# Patient Record
Sex: MALE | Race: WHITE | NOT HISPANIC OR LATINO | Employment: OTHER | URBAN - METROPOLITAN AREA
[De-identification: names, ages, dates, MRNs, and addresses within clinical notes are randomized per-mention and may not be internally consistent; named-entity substitution may affect disease eponyms.]

---

## 2017-10-26 ENCOUNTER — APPOINTMENT (OUTPATIENT)
Dept: RADIOLOGY | Facility: MEDICAL CENTER | Age: 72
DRG: 190 | End: 2017-10-26
Attending: EMERGENCY MEDICINE
Payer: MEDICARE

## 2017-10-26 ENCOUNTER — RESOLUTE PROFESSIONAL BILLING HOSPITAL PROF FEE (OUTPATIENT)
Dept: HOSPITALIST | Facility: MEDICAL CENTER | Age: 72
End: 2017-10-26
Payer: MEDICARE

## 2017-10-26 ENCOUNTER — APPOINTMENT (OUTPATIENT)
Dept: RADIOLOGY | Facility: MEDICAL CENTER | Age: 72
DRG: 190 | End: 2017-10-26
Payer: MEDICARE

## 2017-10-26 ENCOUNTER — HOSPITAL ENCOUNTER (INPATIENT)
Facility: MEDICAL CENTER | Age: 72
LOS: 4 days | DRG: 190 | End: 2017-10-30
Attending: EMERGENCY MEDICINE | Admitting: HOSPITALIST
Payer: MEDICARE

## 2017-10-26 DIAGNOSIS — J44.1 CHRONIC OBSTRUCTIVE PULMONARY DISEASE WITH ACUTE EXACERBATION (HCC): ICD-10-CM

## 2017-10-26 PROBLEM — R06.03 RESPIRATORY DISTRESS: Status: ACTIVE | Noted: 2017-10-26

## 2017-10-26 LAB
ALBUMIN SERPL BCP-MCNC: 3.9 G/DL (ref 3.2–4.9)
ALBUMIN/GLOB SERPL: 1.2 G/DL
ALP SERPL-CCNC: 64 U/L (ref 30–99)
ALT SERPL-CCNC: 15 U/L (ref 2–50)
ANION GAP SERPL CALC-SCNC: 13 MMOL/L (ref 0–11.9)
AST SERPL-CCNC: 24 U/L (ref 12–45)
BASOPHILS # BLD AUTO: 0.7 % (ref 0–1.8)
BASOPHILS # BLD: 0.07 K/UL (ref 0–0.12)
BILIRUB SERPL-MCNC: 0.8 MG/DL (ref 0.1–1.5)
BNP SERPL-MCNC: 91 PG/ML (ref 0–100)
BUN SERPL-MCNC: 12 MG/DL (ref 8–22)
CALCIUM SERPL-MCNC: 9.1 MG/DL (ref 8.5–10.5)
CHLORIDE SERPL-SCNC: 95 MMOL/L (ref 96–112)
CO2 SERPL-SCNC: 22 MMOL/L (ref 20–33)
CREAT SERPL-MCNC: 0.74 MG/DL (ref 0.5–1.4)
EOSINOPHIL # BLD AUTO: 0.01 K/UL (ref 0–0.51)
EOSINOPHIL NFR BLD: 0.1 % (ref 0–6.9)
ERYTHROCYTE [DISTWIDTH] IN BLOOD BY AUTOMATED COUNT: 53.1 FL (ref 35.9–50)
GFR SERPL CREATININE-BSD FRML MDRD: >60 ML/MIN/1.73 M 2
GLOBULIN SER CALC-MCNC: 3.3 G/DL (ref 1.9–3.5)
GLUCOSE SERPL-MCNC: 104 MG/DL (ref 65–99)
HCT VFR BLD AUTO: 66.8 % (ref 42–52)
HGB BLD-MCNC: 22.4 G/DL (ref 14–18)
IMM GRANULOCYTES # BLD AUTO: 0.04 K/UL (ref 0–0.11)
IMM GRANULOCYTES NFR BLD AUTO: 0.4 % (ref 0–0.9)
LACTATE BLD-SCNC: 1.8 MMOL/L (ref 0.5–2)
LACTATE BLD-SCNC: 2.4 MMOL/L (ref 0.5–2)
LYMPHOCYTES # BLD AUTO: 1.09 K/UL (ref 1–4.8)
LYMPHOCYTES NFR BLD: 10.3 % (ref 22–41)
MAGNESIUM SERPL-MCNC: 1.9 MG/DL (ref 1.5–2.5)
MCH RBC QN AUTO: 30.9 PG (ref 27–33)
MCHC RBC AUTO-ENTMCNC: 33.5 G/DL (ref 33.7–35.3)
MCV RBC AUTO: 92.3 FL (ref 81.4–97.8)
MONOCYTES # BLD AUTO: 0.97 K/UL (ref 0–0.85)
MONOCYTES NFR BLD AUTO: 9.2 % (ref 0–13.4)
NEUTROPHILS # BLD AUTO: 8.42 K/UL (ref 1.82–7.42)
NEUTROPHILS NFR BLD: 79.3 % (ref 44–72)
NRBC # BLD AUTO: 0 K/UL
NRBC BLD AUTO-RTO: 0 /100 WBC
PLATELET # BLD AUTO: 119 K/UL (ref 164–446)
PMV BLD AUTO: 10.6 FL (ref 9–12.9)
POTASSIUM SERPL-SCNC: 4.9 MMOL/L (ref 3.6–5.5)
PROT SERPL-MCNC: 7.2 G/DL (ref 6–8.2)
RBC # BLD AUTO: 7.24 M/UL (ref 4.7–6.1)
SODIUM SERPL-SCNC: 130 MMOL/L (ref 135–145)
TROPONIN I SERPL-MCNC: 0.01 NG/ML (ref 0–0.04)
WBC # BLD AUTO: 10.6 K/UL (ref 4.8–10.8)

## 2017-10-26 PROCEDURE — 71010 DX-CHEST-PORTABLE (1 VIEW): CPT

## 2017-10-26 PROCEDURE — 36415 COLL VENOUS BLD VENIPUNCTURE: CPT

## 2017-10-26 PROCEDURE — 700111 HCHG RX REV CODE 636 W/ 250 OVERRIDE (IP): Performed by: HOSPITALIST

## 2017-10-26 PROCEDURE — 770006 HCHG ROOM/CARE - MED/SURG/GYN SEMI*

## 2017-10-26 PROCEDURE — 80053 COMPREHEN METABOLIC PANEL: CPT

## 2017-10-26 PROCEDURE — 71275 CT ANGIOGRAPHY CHEST: CPT

## 2017-10-26 PROCEDURE — 99285 EMERGENCY DEPT VISIT HI MDM: CPT

## 2017-10-26 PROCEDURE — 85025 COMPLETE CBC W/AUTO DIFF WBC: CPT

## 2017-10-26 PROCEDURE — 87040 BLOOD CULTURE FOR BACTERIA: CPT | Mod: 91

## 2017-10-26 PROCEDURE — 84484 ASSAY OF TROPONIN QUANT: CPT

## 2017-10-26 PROCEDURE — 700105 HCHG RX REV CODE 258: Performed by: EMERGENCY MEDICINE

## 2017-10-26 PROCEDURE — 83880 ASSAY OF NATRIURETIC PEPTIDE: CPT

## 2017-10-26 PROCEDURE — 94760 N-INVAS EAR/PLS OXIMETRY 1: CPT

## 2017-10-26 PROCEDURE — 304562 HCHG STAT O2 MASK/CANNULA

## 2017-10-26 PROCEDURE — 82668 ASSAY OF ERYTHROPOIETIN: CPT

## 2017-10-26 PROCEDURE — 700117 HCHG RX CONTRAST REV CODE 255: Performed by: EMERGENCY MEDICINE

## 2017-10-26 PROCEDURE — 83735 ASSAY OF MAGNESIUM: CPT

## 2017-10-26 PROCEDURE — 96365 THER/PROPH/DIAG IV INF INIT: CPT

## 2017-10-26 PROCEDURE — 700105 HCHG RX REV CODE 258: Performed by: HOSPITALIST

## 2017-10-26 PROCEDURE — 83605 ASSAY OF LACTIC ACID: CPT

## 2017-10-26 PROCEDURE — 700101 HCHG RX REV CODE 250: Performed by: EMERGENCY MEDICINE

## 2017-10-26 PROCEDURE — 96361 HYDRATE IV INFUSION ADD-ON: CPT

## 2017-10-26 PROCEDURE — A9270 NON-COVERED ITEM OR SERVICE: HCPCS | Performed by: HOSPITALIST

## 2017-10-26 PROCEDURE — 94640 AIRWAY INHALATION TREATMENT: CPT

## 2017-10-26 PROCEDURE — 700102 HCHG RX REV CODE 250 W/ 637 OVERRIDE(OP): Performed by: HOSPITALIST

## 2017-10-26 RX ORDER — AMOXICILLIN 250 MG
2 CAPSULE ORAL 2 TIMES DAILY
Status: DISCONTINUED | OUTPATIENT
Start: 2017-10-27 | End: 2017-10-31 | Stop reason: HOSPADM

## 2017-10-26 RX ORDER — ALBUTEROL SULFATE 90 UG/1
2 AEROSOL, METERED RESPIRATORY (INHALATION) EVERY 6 HOURS PRN
COMMUNITY
End: 2018-10-21

## 2017-10-26 RX ORDER — NICOTINE 21 MG/24HR
21 PATCH, TRANSDERMAL 24 HOURS TRANSDERMAL
Status: DISCONTINUED | OUTPATIENT
Start: 2017-10-27 | End: 2017-10-31 | Stop reason: HOSPADM

## 2017-10-26 RX ORDER — IPRATROPIUM BROMIDE AND ALBUTEROL SULFATE 2.5; .5 MG/3ML; MG/3ML
3 SOLUTION RESPIRATORY (INHALATION)
Status: DISCONTINUED | OUTPATIENT
Start: 2017-10-26 | End: 2017-10-27

## 2017-10-26 RX ORDER — ONDANSETRON 2 MG/ML
4 INJECTION INTRAMUSCULAR; INTRAVENOUS EVERY 4 HOURS PRN
Status: DISCONTINUED | OUTPATIENT
Start: 2017-10-26 | End: 2017-10-31 | Stop reason: HOSPADM

## 2017-10-26 RX ORDER — AMLODIPINE BESYLATE 5 MG/1
5 TABLET ORAL DAILY
Status: DISCONTINUED | OUTPATIENT
Start: 2017-10-26 | End: 2017-10-26

## 2017-10-26 RX ORDER — BUDESONIDE AND FORMOTEROL FUMARATE DIHYDRATE 160; 4.5 UG/1; UG/1
2 AEROSOL RESPIRATORY (INHALATION)
Status: DISCONTINUED | OUTPATIENT
Start: 2017-10-26 | End: 2017-10-26

## 2017-10-26 RX ORDER — SODIUM CHLORIDE 9 MG/ML
INJECTION, SOLUTION INTRAVENOUS ONCE
Status: COMPLETED | OUTPATIENT
Start: 2017-10-26 | End: 2017-10-26

## 2017-10-26 RX ORDER — ONDANSETRON 4 MG/1
4 TABLET, ORALLY DISINTEGRATING ORAL EVERY 4 HOURS PRN
Status: DISCONTINUED | OUTPATIENT
Start: 2017-10-26 | End: 2017-10-31 | Stop reason: HOSPADM

## 2017-10-26 RX ORDER — IPRATROPIUM BROMIDE AND ALBUTEROL SULFATE 2.5; .5 MG/3ML; MG/3ML
3 SOLUTION RESPIRATORY (INHALATION)
Status: DISCONTINUED | OUTPATIENT
Start: 2017-10-26 | End: 2017-10-31 | Stop reason: HOSPADM

## 2017-10-26 RX ORDER — POLYETHYLENE GLYCOL 3350 17 G/17G
1 POWDER, FOR SOLUTION ORAL
Status: DISCONTINUED | OUTPATIENT
Start: 2017-10-26 | End: 2017-10-31 | Stop reason: HOSPADM

## 2017-10-26 RX ORDER — ENALAPRILAT 1.25 MG/ML
1.25 INJECTION INTRAVENOUS EVERY 6 HOURS PRN
Status: DISCONTINUED | OUTPATIENT
Start: 2017-10-26 | End: 2017-10-31 | Stop reason: HOSPADM

## 2017-10-26 RX ORDER — BUDESONIDE AND FORMOTEROL FUMARATE DIHYDRATE 160; 4.5 UG/1; UG/1
2 AEROSOL RESPIRATORY (INHALATION) 2 TIMES DAILY
Status: DISCONTINUED | OUTPATIENT
Start: 2017-10-27 | End: 2017-10-31 | Stop reason: HOSPADM

## 2017-10-26 RX ORDER — ACETAMINOPHEN 325 MG/1
650 TABLET ORAL EVERY 6 HOURS PRN
Status: DISCONTINUED | OUTPATIENT
Start: 2017-10-26 | End: 2017-10-31 | Stop reason: HOSPADM

## 2017-10-26 RX ORDER — BISACODYL 10 MG
10 SUPPOSITORY, RECTAL RECTAL
Status: DISCONTINUED | OUTPATIENT
Start: 2017-10-26 | End: 2017-10-31 | Stop reason: HOSPADM

## 2017-10-26 RX ORDER — SODIUM CHLORIDE 9 MG/ML
INJECTION, SOLUTION INTRAVENOUS CONTINUOUS
Status: DISCONTINUED | OUTPATIENT
Start: 2017-10-26 | End: 2017-10-31 | Stop reason: HOSPADM

## 2017-10-26 RX ADMIN — IOHEXOL 75 ML: 350 INJECTION, SOLUTION INTRAVENOUS at 19:30

## 2017-10-26 RX ADMIN — IPRATROPIUM BROMIDE AND ALBUTEROL SULFATE 3 ML: .5; 3 SOLUTION RESPIRATORY (INHALATION) at 20:43

## 2017-10-26 RX ADMIN — ENOXAPARIN SODIUM 40 MG: 100 INJECTION SUBCUTANEOUS at 22:17

## 2017-10-26 RX ADMIN — AMPICILLIN SODIUM AND SULBACTAM SODIUM 3 G: 2; 1 INJECTION, POWDER, FOR SOLUTION INTRAMUSCULAR; INTRAVENOUS at 19:41

## 2017-10-26 RX ADMIN — ASPIRIN 81 MG: 81 TABLET, COATED ORAL at 22:17

## 2017-10-26 RX ADMIN — IPRATROPIUM BROMIDE AND ALBUTEROL SULFATE 3 ML: .5; 3 SOLUTION RESPIRATORY (INHALATION) at 23:18

## 2017-10-26 RX ADMIN — SODIUM CHLORIDE: 9 INJECTION, SOLUTION INTRAVENOUS at 19:41

## 2017-10-26 RX ADMIN — AZITHROMYCIN FOR INJECTION INJECTION, POWDER, LYOPHILIZED, FOR SOLUTION 500 MG: 500 INJECTION INTRAVENOUS at 22:18

## 2017-10-26 RX ADMIN — SODIUM CHLORIDE: 9 INJECTION, SOLUTION INTRAVENOUS at 17:10

## 2017-10-26 ASSESSMENT — COPD QUESTIONNAIRES
DO YOU EVER COUGH UP ANY MUCUS OR PHLEGM?: YES, A FEW DAYS A WEEK OR MONTH
COPD SCREENING SCORE: 9
DURING THE PAST 4 WEEKS HOW MUCH DID YOU FEEL SHORT OF BREATH: MOST  OR ALL OF THE TIME
HAVE YOU SMOKED AT LEAST 100 CIGARETTES IN YOUR ENTIRE LIFE: YES

## 2017-10-26 ASSESSMENT — LIFESTYLE VARIABLES
HOW MANY TIMES IN THE PAST YEAR HAVE YOU HAD 5 OR MORE DRINKS IN A DAY: 10
TOTAL SCORE: 0
EVER HAD A DRINK FIRST THING IN THE MORNING TO STEADY YOUR NERVES TO GET RID OF A HANGOVER: NO
EVER_SMOKED: YES
ALCOHOL_USE: YES
ON A TYPICAL DAY WHEN YOU DRINK ALCOHOL HOW MANY DRINKS DO YOU HAVE: 4
CONSUMPTION TOTAL: POSITIVE
TOTAL SCORE: 0
AVERAGE NUMBER OF DAYS PER WEEK YOU HAVE A DRINK CONTAINING ALCOHOL: 7
TOTAL SCORE: 0
PACK_YEARS: 27
EVER_SMOKED: YES
HAVE YOU EVER FELT YOU SHOULD CUT DOWN ON YOUR DRINKING: NO
HAVE PEOPLE ANNOYED YOU BY CRITICIZING YOUR DRINKING: NO
EVER FELT BAD OR GUILTY ABOUT YOUR DRINKING: NO

## 2017-10-26 ASSESSMENT — PATIENT HEALTH QUESTIONNAIRE - PHQ9
SUM OF ALL RESPONSES TO PHQ QUESTIONS 1-9: 0
2. FEELING DOWN, DEPRESSED, IRRITABLE, OR HOPELESS: NOT AT ALL
SUM OF ALL RESPONSES TO PHQ9 QUESTIONS 1 AND 2: 0
1. LITTLE INTEREST OR PLEASURE IN DOING THINGS: NOT AT ALL
SUM OF ALL RESPONSES TO PHQ9 QUESTIONS 1 AND 2: 0
1. LITTLE INTEREST OR PLEASURE IN DOING THINGS: NOT AT ALL
2. FEELING DOWN, DEPRESSED, IRRITABLE, OR HOPELESS: NOT AT ALL
SUM OF ALL RESPONSES TO PHQ QUESTIONS 1-9: 0

## 2017-10-26 NOTE — ED NOTES
"Patient states onset of increased shortness of breath since Friday. Patient alert and oriented x 4. Patient states chest \"heaviness, it's not pain\". Patient also complaining of stool incontinence.  "

## 2017-10-26 NOTE — ED NOTES
Chief Complaint   Patient presents with   • Shortness of Breath     onset saturday   • Cough     chest congestion

## 2017-10-27 PROBLEM — D75.1 POLYCYTHEMIA: Status: ACTIVE | Noted: 2017-10-27

## 2017-10-27 LAB
ANION GAP SERPL CALC-SCNC: 7 MMOL/L (ref 0–11.9)
BASOPHILS # BLD AUTO: 0.5 % (ref 0–1.8)
BASOPHILS # BLD: 0.05 K/UL (ref 0–0.12)
BUN SERPL-MCNC: 14 MG/DL (ref 8–22)
CALCIUM SERPL-MCNC: 8.1 MG/DL (ref 8.5–10.5)
CHLORIDE SERPL-SCNC: 99 MMOL/L (ref 96–112)
CO2 SERPL-SCNC: 25 MMOL/L (ref 20–33)
CREAT SERPL-MCNC: 0.83 MG/DL (ref 0.5–1.4)
EOSINOPHIL # BLD AUTO: 0.01 K/UL (ref 0–0.51)
EOSINOPHIL NFR BLD: 0.1 % (ref 0–6.9)
ERYTHROCYTE [DISTWIDTH] IN BLOOD BY AUTOMATED COUNT: 53.1 FL (ref 35.9–50)
FERRITIN SERPL-MCNC: 301 NG/ML (ref 22–322)
GFR SERPL CREATININE-BSD FRML MDRD: >60 ML/MIN/1.73 M 2
GLUCOSE SERPL-MCNC: 83 MG/DL (ref 65–99)
HCT VFR BLD AUTO: 62.7 % (ref 42–52)
HGB BLD-MCNC: 21 G/DL (ref 14–18)
IMM GRANULOCYTES # BLD AUTO: 0.05 K/UL (ref 0–0.11)
IMM GRANULOCYTES NFR BLD AUTO: 0.5 % (ref 0–0.9)
LYMPHOCYTES # BLD AUTO: 1.28 K/UL (ref 1–4.8)
LYMPHOCYTES NFR BLD: 12.5 % (ref 22–41)
MCH RBC QN AUTO: 31.3 PG (ref 27–33)
MCHC RBC AUTO-ENTMCNC: 33.5 G/DL (ref 33.7–35.3)
MCV RBC AUTO: 93.3 FL (ref 81.4–97.8)
MONOCYTES # BLD AUTO: 1.1 K/UL (ref 0–0.85)
MONOCYTES NFR BLD AUTO: 10.7 % (ref 0–13.4)
NEUTROPHILS # BLD AUTO: 7.76 K/UL (ref 1.82–7.42)
NEUTROPHILS NFR BLD: 75.7 % (ref 44–72)
NRBC # BLD AUTO: 0 K/UL
NRBC BLD AUTO-RTO: 0 /100 WBC
PLATELET # BLD AUTO: 107 K/UL (ref 164–446)
PMV BLD AUTO: 10.9 FL (ref 9–12.9)
POTASSIUM SERPL-SCNC: 4.3 MMOL/L (ref 3.6–5.5)
RBC # BLD AUTO: 6.72 M/UL (ref 4.7–6.1)
SODIUM SERPL-SCNC: 131 MMOL/L (ref 135–145)
WBC # BLD AUTO: 10.3 K/UL (ref 4.8–10.8)

## 2017-10-27 PROCEDURE — 700101 HCHG RX REV CODE 250: Performed by: EMERGENCY MEDICINE

## 2017-10-27 PROCEDURE — 80048 BASIC METABOLIC PNL TOTAL CA: CPT

## 2017-10-27 PROCEDURE — 82728 ASSAY OF FERRITIN: CPT

## 2017-10-27 PROCEDURE — 700105 HCHG RX REV CODE 258: Performed by: HOSPITALIST

## 2017-10-27 PROCEDURE — 770006 HCHG ROOM/CARE - MED/SURG/GYN SEMI*

## 2017-10-27 PROCEDURE — A9270 NON-COVERED ITEM OR SERVICE: HCPCS | Performed by: HOSPITALIST

## 2017-10-27 PROCEDURE — 94640 AIRWAY INHALATION TREATMENT: CPT

## 2017-10-27 PROCEDURE — 36415 COLL VENOUS BLD VENIPUNCTURE: CPT

## 2017-10-27 PROCEDURE — 99195 PHLEBOTOMY: CPT

## 2017-10-27 PROCEDURE — 99223 1ST HOSP IP/OBS HIGH 75: CPT | Mod: AI | Performed by: HOSPITALIST

## 2017-10-27 PROCEDURE — 700102 HCHG RX REV CODE 250 W/ 637 OVERRIDE(OP): Performed by: HOSPITALIST

## 2017-10-27 PROCEDURE — 700111 HCHG RX REV CODE 636 W/ 250 OVERRIDE (IP): Performed by: HOSPITALIST

## 2017-10-27 PROCEDURE — 85025 COMPLETE CBC W/AUTO DIFF WBC: CPT

## 2017-10-27 RX ORDER — ALBUTEROL SULFATE 90 UG/1
2 AEROSOL, METERED RESPIRATORY (INHALATION) EVERY 4 HOURS PRN
Status: DISCONTINUED | OUTPATIENT
Start: 2017-10-27 | End: 2017-10-31 | Stop reason: HOSPADM

## 2017-10-27 RX ORDER — IPRATROPIUM BROMIDE AND ALBUTEROL SULFATE 2.5; .5 MG/3ML; MG/3ML
3 SOLUTION RESPIRATORY (INHALATION)
Status: DISCONTINUED | OUTPATIENT
Start: 2017-10-28 | End: 2017-10-31 | Stop reason: HOSPADM

## 2017-10-27 RX ADMIN — IPRATROPIUM BROMIDE AND ALBUTEROL SULFATE 3 ML: .5; 3 SOLUTION RESPIRATORY (INHALATION) at 20:37

## 2017-10-27 RX ADMIN — AZITHROMYCIN FOR INJECTION INJECTION, POWDER, LYOPHILIZED, FOR SOLUTION 500 MG: 500 INJECTION INTRAVENOUS at 08:08

## 2017-10-27 RX ADMIN — AMPICILLIN SODIUM AND SULBACTAM SODIUM 3 G: 2; 1 INJECTION, POWDER, FOR SOLUTION INTRAMUSCULAR; INTRAVENOUS at 05:37

## 2017-10-27 RX ADMIN — AMPICILLIN SODIUM AND SULBACTAM SODIUM 3 G: 2; 1 INJECTION, POWDER, FOR SOLUTION INTRAMUSCULAR; INTRAVENOUS at 11:14

## 2017-10-27 RX ADMIN — IPRATROPIUM BROMIDE AND ALBUTEROL SULFATE 3 ML: .5; 3 SOLUTION RESPIRATORY (INHALATION) at 08:13

## 2017-10-27 RX ADMIN — IPRATROPIUM BROMIDE AND ALBUTEROL SULFATE 3 ML: .5; 3 SOLUTION RESPIRATORY (INHALATION) at 10:59

## 2017-10-27 RX ADMIN — AMPICILLIN SODIUM AND SULBACTAM SODIUM 3 G: 2; 1 INJECTION, POWDER, FOR SOLUTION INTRAMUSCULAR; INTRAVENOUS at 23:21

## 2017-10-27 RX ADMIN — AMPICILLIN SODIUM AND SULBACTAM SODIUM 3 G: 2; 1 INJECTION, POWDER, FOR SOLUTION INTRAMUSCULAR; INTRAVENOUS at 17:12

## 2017-10-27 RX ADMIN — BUDESONIDE AND FORMOTEROL FUMARATE DIHYDRATE 2 PUFF: 160; 4.5 AEROSOL RESPIRATORY (INHALATION) at 08:09

## 2017-10-27 RX ADMIN — SODIUM CHLORIDE: 9 INJECTION, SOLUTION INTRAVENOUS at 23:32

## 2017-10-27 RX ADMIN — ASPIRIN 81 MG: 81 TABLET, COATED ORAL at 08:08

## 2017-10-27 RX ADMIN — AMPICILLIN SODIUM AND SULBACTAM SODIUM 3 G: 2; 1 INJECTION, POWDER, FOR SOLUTION INTRAMUSCULAR; INTRAVENOUS at 00:17

## 2017-10-27 RX ADMIN — IPRATROPIUM BROMIDE AND ALBUTEROL SULFATE 3 ML: .5; 3 SOLUTION RESPIRATORY (INHALATION) at 15:10

## 2017-10-27 RX ADMIN — ENOXAPARIN SODIUM 40 MG: 100 INJECTION SUBCUTANEOUS at 20:49

## 2017-10-27 RX ADMIN — BUDESONIDE AND FORMOTEROL FUMARATE DIHYDRATE 2 PUFF: 160; 4.5 AEROSOL RESPIRATORY (INHALATION) at 20:35

## 2017-10-27 RX ADMIN — SODIUM CHLORIDE: 9 INJECTION, SOLUTION INTRAVENOUS at 04:25

## 2017-10-27 ASSESSMENT — ENCOUNTER SYMPTOMS
CONSTIPATION: 0
WHEEZING: 1
DIZZINESS: 0
HEARTBURN: 0
DEPRESSION: 0
LOSS OF CONSCIOUSNESS: 0
DIARRHEA: 0
FOCAL WEAKNESS: 0
PALPITATIONS: 0
GASTROINTESTINAL NEGATIVE: 1
CHILLS: 1
EYES NEGATIVE: 1
SHORTNESS OF BREATH: 1
DOUBLE VISION: 0
BACK PAIN: 0
SPUTUM PRODUCTION: 1
COUGH: 1
HEADACHES: 0
HEMOPTYSIS: 0
ABDOMINAL PAIN: 0
EYE DISCHARGE: 0
CONSTITUTIONAL NEGATIVE: 1
PSYCHIATRIC NEGATIVE: 1
VOMITING: 0
NAUSEA: 0
MUSCULOSKELETAL NEGATIVE: 1
DIAPHORESIS: 0
SEIZURES: 0
FEVER: 0
COUGH: 0
SPEECH CHANGE: 0
NEUROLOGICAL NEGATIVE: 1
BRUISES/BLEEDS EASILY: 0
SORE THROAT: 1
NERVOUS/ANXIOUS: 0
CARDIOVASCULAR NEGATIVE: 1
BLOOD IN STOOL: 0
CHILLS: 0

## 2017-10-27 NOTE — ASSESSMENT & PLAN NOTE
COPD exacerbation is better with IV Solu-Medrol and able to taper his Solu-Medrol today. I will decrease it down to 20 mg every 8 hours. Anticipate that I will be able to switch him over to oral steroids tomorrow.  Continue with oxygen and weaning down the patient's oxygenation levels have improved and his oxygenation with pulse oximetry has improved as well.  Continue at this point with nebulizer treatments  Continue with RT protocol.  Anticipate patient will be able to be discharged home tomorrow.

## 2017-10-27 NOTE — PROGRESS NOTES
Patient with some SOB with exertion. OOB to BR with standby assist. Non productive cough noted. Patient encouraged to use incentive spirometry. Weaning patient's oxygen down. Currently at 2L with oxygen sats between 91-93.

## 2017-10-27 NOTE — ASSESSMENT & PLAN NOTE
Has improved and he is now up to 120.  Monitor platelet levels and monitor for bleeding. Currently does not need a transfusion.

## 2017-10-27 NOTE — PROGRESS NOTES
[facility] Hospitalist Progress Note    Date of Service: 10/27/2017    Chief Complaint  72 y.o. male admitted 10/26/2017 with shortness of breath.    Interval Problem Update  Patient is a 72-year-old male who is morbidly obese, he has obstructive sleep apnea by physical exam but needs a sleep study to confirm, he at this point also has developed COPD exacerbation, he is placed on RT protocol nebulizer treatments and antibiotics and oxygen. The patient also has polycythemia thus I have asked hematology to consult on him it is getting worse over time. At this point hematology does not feel a phlebotomy as indicated. They will follow him up as an outpatient in the clinic once he has did discharged and improved with his oxygenation. Sodium is still low with dehydration continue to's point with hydration.    Consultants/Specialty  Hematology    Disposition  To be determined        Review of Systems   Constitutional: Negative.  Negative for chills, diaphoresis and fever.   HENT: Negative.    Eyes: Negative.  Negative for double vision.   Respiratory: Positive for shortness of breath and wheezing. Negative for cough and hemoptysis.    Cardiovascular: Negative.  Negative for chest pain, palpitations and leg swelling.   Gastrointestinal: Negative.  Negative for abdominal pain, blood in stool, constipation, diarrhea, heartburn, nausea and vomiting.   Genitourinary: Negative.  Negative for frequency, hematuria and urgency.   Musculoskeletal: Negative.  Negative for joint pain.   Skin: Negative.  Negative for itching and rash.   Neurological: Negative.  Negative for dizziness, focal weakness, seizures, loss of consciousness and headaches.   Endo/Heme/Allergies: Negative.  Does not bruise/bleed easily.   Psychiatric/Behavioral: Negative.  Negative for suicidal ideas. The patient is not nervous/anxious.       Physical Exam  Laboratory/Imaging   Hemodynamics  Temp (24hrs), Av.5 °C (97.7 °F), Min:36.1 °C (97 °F), Max:36.7 °C (98  °F)   Temperature: 36.7 °C (98 °F)  Pulse  Av.1  Min: 72  Max: 107 Heart Rate (Monitored): 91  Blood Pressure : 120/71, NIBP: 127/77      Respiratory      Respiration: 17, Pulse Oximetry: 91 %, O2 Daily Delivery Respiratory : OxyMask     Given By:: Mouthpiece, Work Of Breathing / Effort: Mild  RUL Breath Sounds: Expiratory Wheezes, RML Breath Sounds: Expiratory Wheezes, RLL Breath Sounds: Diminished, STEFANI Breath Sounds: Expiratory Wheezes, LLL Breath Sounds: Diminished    Fluids    Intake/Output Summary (Last 24 hours) at 10/27/17 1651  Last data filed at 10/27/17 1600   Gross per 24 hour   Intake              720 ml   Output                0 ml   Net              720 ml       Nutrition  Orders Placed This Encounter   Procedures   • Diet Order     Standing Status:   Standing     Number of Occurrences:   1     Order Specific Question:   Diet:     Answer:   Regular [1]     Physical Exam   Constitutional: He is oriented to person, place, and time. He appears well-developed and well-nourished. He is not intubated.   HENT:   Head: Normocephalic and atraumatic.   Right Ear: External ear normal.   Left Ear: External ear normal.   Nose: Nose normal.   Mouth/Throat: Oropharynx is clear and moist.   Eyes: Conjunctivae and EOM are normal. Pupils are equal, round, and reactive to light.   Neck: Normal range of motion. Neck supple. No JVD present. No thyromegaly present.   Cardiovascular: Normal rate and regular rhythm.    No murmur heard.  Pulmonary/Chest: Accessory muscle usage present. Tachypnea noted. No apnea and no bradypnea. He is not intubated. He is in respiratory distress. He has decreased breath sounds in the right lower field and the left lower field. He has rhonchi in the right lower field and the left lower field. He exhibits no tenderness.   Abdominal: He exhibits no distension and no mass. There is no tenderness. There is no rebound and no guarding.   Genitourinary:   Genitourinary Comments: Moore catheter    Musculoskeletal: Normal range of motion. He exhibits no edema or tenderness.   Lymphadenopathy:     He has no cervical adenopathy.   Neurological: He is alert and oriented to person, place, and time. He has normal reflexes. No cranial nerve deficit.   Skin: Skin is warm and dry. No rash noted. No erythema.   Psychiatric: He has a normal mood and affect.   Nursing note and vitals reviewed.      Recent Labs      10/26/17   1600  10/27/17   0019  10/27/17   1353   WBC  10.6  10.3   --    RBC  7.24*  6.72*   --    HEMOGLOBIN  22.4*  21.0*   --    HEMATOCRIT  66.8*  62.7*  61.7*   MCV  92.3  93.3   --    MCH  30.9  31.3   --    MCHC  33.5*  33.5*   --    RDW  53.1*  53.1*   --    PLATELETCT  119*  107*   --    MPV  10.6  10.9   --      Recent Labs      10/26/17   1600  10/27/17   0019   SODIUM  130*  131*   POTASSIUM  4.9  4.3   CHLORIDE  95*  99   CO2  22  25   GLUCOSE  104*  83   BUN  12  14   CREATININE  0.74  0.83   CALCIUM  9.1  8.1*         Recent Labs      10/26/17   1717   BNPBTYPENAT  91              Assessment/Plan     Polycythemia   Assessment & Plan    Hematology was consult as they are at this point checking into JAK2 mutation as well as ferritin levels as well as reporting levels. They feel that phlebotomy at this point is not indicated yet. Bone marrow has not been suggested. They feel this is secondary polycythemia to his COPD and smoking as well as sleep apnea        Respiratory distress   Assessment & Plan    Continue with oxygen by facemask.        Thrombocytopenia (CMS-HCC)- (present on admission)   Assessment & Plan    Secondary to bone marrow suppression secondary to the polycythemia        Hyponatremia- (present on admission)   Assessment & Plan    Secondary to dehydration with hydration his sodium levels are slowly improving.        MATTY (obstructive sleep apnea)- (present on admission)   Assessment & Plan    Patient denies any sleep apnea in the past. He will need an outpatient sleep study.         Obesity- (present on admission)   Assessment & Plan    Patient needs weight loss management as an outpatient.        Tobacco dependence- (present on admission)   Assessment & Plan    -nicotine replacement protocol and cessation education provided for more than 10 minutes, discussed options of nicotine patch, acupuncture, medical treatment with wellbutrin and chantix. Discussed other options. Code 42208        COPD (chronic obstructive pulmonary disease) (CMS-[facility])- (present on admission)   Assessment & Plan    Patient is an RT protocol, oxygen by facemask, he usually does not use oxygen at home.  Patient continues to smoke smoking education was provided for him.  Suspect that he also has underlying sleep apnea and he will need an outpatient sleep study evaluation.            Reviewed items::  EKG reviewed, Labs reviewed, Medications reviewed and Radiology images reviewed  Moore catheter::  No Moore  DVT prophylaxis pharmacological::  Enoxaparin (Lovenox)  Ulcer Prophylaxis::  Not indicated  Antibiotics:  Treating active infection/contamination beyond 24 hours perioperative coverage

## 2017-10-27 NOTE — PROGRESS NOTES
Pt arrived to T335-2 via wheelchair with ED tech @ 2020. Pt able to ambulate from chair to bed, SB assist.  VSS, pt on 4 L O2 via oxymask sating at 92-96%,  in place. RT protocol in progress. Assessment completed per flowsheets. Pt and daughter oriented to floor, call light and routine. Updated on POC, all questions answered at this time. Call light and belongings within reach, bed locked and in lowest position, hourly rounding in progress.    Skin check performed with MARIA DEL CARMEN Coy. No breakdown noted.

## 2017-10-27 NOTE — ASSESSMENT & PLAN NOTE
I have counseled him on obesity today I'm anticipating he will need outpatient follow-up with a bariatric clinic

## 2017-10-27 NOTE — ASSESSMENT & PLAN NOTE
Patient initially refused to except that he has obstructive sleep apnea.  After discussing with him several times he is recognizing that the symptoms are all in the pointing to the direction that he may have obstructive sleep apnea and he is at this point willing to entertain an outpatient sleep study.

## 2017-10-27 NOTE — ED PROVIDER NOTES
"ED Provider Note    CHIEF COMPLAINT  Chief Complaint   Patient presents with   • Shortness of Breath     onset saturday   • Cough     chest congestion       HPI  Gary Severino Gil is a 72 y.o. male who presentsFor evaluation of shortness of breath. The patient has a history of COPD. He states for the past week he has been having shortness of breath chest congestion and is concerned he may have pneumonia. He is not oxygen dependent. He denies any exertional chest pain, denies any pleuritic pain. Denies any swelling of his legs. He is a long-time smoker. He denies pain radiating through the back. He has no abdominal pain. He states his cough is productive of yellowish-green sputum. He does have some degree of exertional dyspnea but no related chest pain. He has not had a documented fever but felt hot.    REVIEW OF SYSTEMS  See HPI for further details. All other systems reviewed and negative except as noted above    PAST MEDICAL HISTORY  Past Medical History:   Diagnosis Date   • Chronic obstructive pulmonary disease (CMS-[facility])    • MRSA (methicillin resistant Staphylococcus aureus)    • Productive cough     \"smoker\"   • Snoring     sleep apnea questionairre completed       FAMILY HISTORY  Family History   Problem Relation Age of Onset   • Stroke         SOCIAL HISTORY  Social History     Social History   • Marital status:      Spouse name: N/A   • Number of children: N/A   • Years of education: N/A     Social History Main Topics   • Smoking status: Current Every Day Smoker     Packs/day: 1.50     Years: 45.00     Types: Cigarettes   • Smokeless tobacco: Never Used   • Alcohol use 8.0 oz/week     16 Standard drinks or equivalent per week      Comment: noted 4 per day   • Drug use: No   • Sexual activity: Not on file     Other Topics Concern   • Not on file     Social History Narrative   • No narrative on file       SURGICAL HISTORY  Past Surgical History:   Procedure Laterality Date   • FLAP GRAFT  6/1/2010    " "Performed by FRANCESCA VIDAL at SURGERY [facility] ORS   • FISTULA IN ANO REPAIR  6/1/2010    Performed by FRANCESCA VIDAL at SURGERY [facility]   • RECTAL EXPLORATION  11/17/2009    Performed by FRANCESCA VIDAL at SURGERY SAME DAY [facility] ORS   • DEBRIDEMENT  11/17/2009    Performed by FRANCESCA VIDAL at SURGERY SAME DAY [facility] ORS   • RECTAL EXPLORATION  9/3/2009    Performed by FRANCESCA VIDAL at SURGERY SAME DAY [facility] ORS   • DEBRIDEMENT  9/3/2009    Performed by FRANCESCA VIDAL at SURGERY SAME DAY [facility] ORS   • LUIS ENRIQUE RECTAL ABSCESS INCISION AND DRAINAGE  4/6/2009    Performed by FRANCESCA VIDAL at SURGERY [facility]   • LUMBAR FUSION POSTERIOR  1987   • LUMBAR FUSION ANTERIOR  1983       CURRENT MEDICATIONS  Home Medications    **Home medications have not yet been reviewed for this encounter**          ALLERGIES  Allergies   Allergen Reactions   • Morphine Sulfate      hallucinates       PHYSICAL EXAM  VITAL SIGNS: /80   Pulse 92   Temp 36.6 °C (97.9 °F)   Resp 18   Ht 1.626 m (5' 4\")   Wt 96.8 kg (213 lb 6.5 oz)   SpO2 94%   BMI 36.63 kg/m²   Constitutional :  Well developed, Well nourished, No acute distress, Non-toxic appearance.   HENT:There is atraumatic normocephalic. Oxygen mask is in place he is plethoric in appearance. He has somewhat dry mucous membranes  Eyes: Normal-appearing nonicteric  Neck: Normal range of motion, No tenderness, Supple, No stridor.   Lymphatic: No cervical adenopathy or supraclavicular adenopathy.   Cardiovascular: Normal heart rate, Normal rhythm, No murmurs, No rubs, No gallops.   Thorax & Lungs: Equal breath sounds are noted bilaterally prolonged expiratory phase is noted no rales rhonchi  Skin: Warm, Dry, No erythema, No rash.   Abdomen soft nontender somewhat obese no masses  Extremities no peripheral edema no calf tenderness  Neurological the patient is awake alert without any focal findings  Psychiatric appropriate " mood and affect      Results for orders placed or performed during the hospital encounter of 10/26/17   Lactic acid (lactate)   Result Value Ref Range    Lactic Acid 2.4 (H) 0.5 - 2.0 mmol/L   Lactic acid (lactate)   Result Value Ref Range    Lactic Acid 1.8 0.5 - 2.0 mmol/L   CBC WITH DIFFERENTIAL   Result Value Ref Range    WBC 10.6 4.8 - 10.8 K/uL    RBC 7.24 (H) 4.70 - 6.10 M/uL    Hemoglobin 22.4 (H) 14.0 - 18.0 g/dL    Hematocrit 66.8 (HH) 42.0 - 52.0 %    MCV 92.3 81.4 - 97.8 fL    MCH 30.9 27.0 - 33.0 pg    MCHC 33.5 (L) 33.7 - 35.3 g/dL    RDW 53.1 (H) 35.9 - 50.0 fL    Platelet Count 119 (L) 164 - 446 K/uL    MPV 10.6 9.0 - 12.9 fL    Neutrophils-Polys 79.30 (H) 44.00 - 72.00 %    Lymphocytes 10.30 (L) 22.00 - 41.00 %    Monocytes 9.20 0.00 - 13.40 %    Eosinophils 0.10 0.00 - 6.90 %    Basophils 0.70 0.00 - 1.80 %    Immature Granulocytes 0.40 0.00 - 0.90 %    Nucleated RBC 0.00 /100 WBC    Neutrophils (Absolute) 8.42 (H) 1.82 - 7.42 K/uL    Lymphs (Absolute) 1.09 1.00 - 4.80 K/uL    Monos (Absolute) 0.97 (H) 0.00 - 0.85 K/uL    Eos (Absolute) 0.01 0.00 - 0.51 K/uL    Baso (Absolute) 0.07 0.00 - 0.12 K/uL    Immature Granulocytes (abs) 0.04 0.00 - 0.11 K/uL    NRBC (Absolute) 0.00 K/uL   COMP METABOLIC PANEL   Result Value Ref Range    Sodium 130 (L) 135 - 145 mmol/L    Potassium 4.9 3.6 - 5.5 mmol/L    Chloride 95 (L) 96 - 112 mmol/L    Co2 22 20 - 33 mmol/L    Anion Gap 13.0 (H) 0.0 - 11.9    Glucose 104 (H) 65 - 99 mg/dL    Bun 12 8 - 22 mg/dL    Creatinine 0.74 0.50 - 1.40 mg/dL    Calcium 9.1 8.5 - 10.5 mg/dL    AST(SGOT) 24 12 - 45 U/L    ALT(SGPT) 15 2 - 50 U/L    Alkaline Phosphatase 64 30 - 99 U/L    Total Bilirubin 0.8 0.1 - 1.5 mg/dL    Albumin 3.9 3.2 - 4.9 g/dL    Total Protein 7.2 6.0 - 8.2 g/dL    Globulin 3.3 1.9 - 3.5 g/dL    A-G Ratio 1.2 g/dL   URINALYSIS   Result Value Ref Range    Color Yellow     Character Clear     Specific Gravity 1.023 <1.035    Ph 5.0 5.0 - 8.0    Glucose >=1000  (A) Negative mg/dL    Ketones Negative Negative mg/dL    Protein Negative Negative mg/dL    Bilirubin Negative Negative    Urobilinogen, Urine 0.2 Negative    Nitrite Negative Negative    Leukocyte Esterase Negative Negative    Occult Blood Negative Negative    Micro Urine Req see below    URINE CULTURE(NEW)   Result Value Ref Range    Significant Indicator NEG     Source UR     Site      Urine Culture No growth at 48 hours    BLOOD CULTURE   Result Value Ref Range    Significant Indicator NEG     Source BLD     Site PERIPHERAL     Blood Culture No growth after 5 days of incubation.    BLOOD CULTURE   Result Value Ref Range    Significant Indicator NEG     Source BLD     Site PERIPHERAL     Blood Culture No growth after 5 days of incubation.    ESTIMATED GFR   Result Value Ref Range    GFR If African American >60 >60 mL/min/1.73 m 2    GFR If Non African American >60 >60 mL/min/1.73 m 2   BNP   Result Value Ref Range    B Natriuretic Peptide 91 0 - 100 pg/mL   TROPONIN   Result Value Ref Range    Troponin I 0.01 0.00 - 0.04 ng/mL   MAGNESIUM   Result Value Ref Range    Magnesium 1.9 1.5 - 2.5 mg/dL   Basic Metabolic Panel (BMP)   Result Value Ref Range    Sodium 131 (L) 135 - 145 mmol/L    Potassium 4.3 3.6 - 5.5 mmol/L    Chloride 99 96 - 112 mmol/L    Co2 25 20 - 33 mmol/L    Glucose 83 65 - 99 mg/dL    Bun 14 8 - 22 mg/dL    Creatinine 0.83 0.50 - 1.40 mg/dL    Calcium 8.1 (L) 8.5 - 10.5 mg/dL    Anion Gap 7.0 0.0 - 11.9   CBC with Differential   Result Value Ref Range    WBC 10.3 4.8 - 10.8 K/uL    RBC 6.72 (H) 4.70 - 6.10 M/uL    Hemoglobin 21.0 (H) 14.0 - 18.0 g/dL    Hematocrit 62.7 (H) 42.0 - 52.0 %    MCV 93.3 81.4 - 97.8 fL    MCH 31.3 27.0 - 33.0 pg    MCHC 33.5 (L) 33.7 - 35.3 g/dL    RDW 53.1 (H) 35.9 - 50.0 fL    Platelet Count 107 (L) 164 - 446 K/uL    MPV 10.9 9.0 - 12.9 fL    Neutrophils-Polys 75.70 (H) 44.00 - 72.00 %    Lymphocytes 12.50 (L) 22.00 - 41.00 %    Monocytes 10.70 0.00 - 13.40 %     Eosinophils 0.10 0.00 - 6.90 %    Basophils 0.50 0.00 - 1.80 %    Immature Granulocytes 0.50 0.00 - 0.90 %    Nucleated RBC 0.00 /100 WBC    Neutrophils (Absolute) 7.76 (H) 1.82 - 7.42 K/uL    Lymphs (Absolute) 1.28 1.00 - 4.80 K/uL    Monos (Absolute) 1.10 (H) 0.00 - 0.85 K/uL    Eos (Absolute) 0.01 0.00 - 0.51 K/uL    Baso (Absolute) 0.05 0.00 - 0.12 K/uL    Immature Granulocytes (abs) 0.05 0.00 - 0.11 K/uL    NRBC (Absolute) 0.00 K/uL   ERYTHROPOIETIN   Result Value Ref Range    Erythropoietin 53 (H) 4 - 27 mU/mL   ESTIMATED GFR   Result Value Ref Range    GFR If African American >60 >60 mL/min/1.73 m 2    GFR If Non African American >60 >60 mL/min/1.73 m 2   INFLUENZA BY PCR, A/B/H1N1   Result Value Ref Range    Influenza virus A RNA Negative Negative    Influenza virus B, PCR Negative Negative    Influenza A 2009, H1N1, PCR Not Detected Negative   ABG - LAB   Result Value Ref Range    Ph 7.28 (LL) 7.40 - 7.50    Pco2 60.5 (HH) 26.0 - 37.0 mmHg    Po2 63.0 (L) 64.0 - 87.0 mmHg    O2 Saturation 90.3 (L) 93.0 - 99.0 %    Hco3 28 (H) 17 - 25 mmol/L    Base Excess -1 -4 - 3 mmol/L    Body Temp 97.3 Centigrade   THERAPEUTIC PHLEBOTOMY   Result Value Ref Range    Hematocrit 61.7 (H) 42.0 - 52.0 %    Blood Pressure - Ther Phleb See comment     Volume Removed - Ther Phleb See comment     Theraputic Phlebotomy Performed By:    FERRITIN   Result Value Ref Range    Ferritin 301.0 22.0 - 322.0 ng/mL   JAK2 V617F MUTATION DETECTION   Result Value Ref Range    JAK2 V617F Mutation 0.0 %   ABG - LAB   Result Value Ref Range    Ph 7.29 (LL) 7.40 - 7.50    Pco2 66.9 (HH) 26.0 - 37.0 mmHg    Po2 70.2 64.0 - 87.0 mmHg    O2 Saturation 92.2 (L) 93.0 - 99.0 %    Hco3 32 (H) 17 - 25 mmol/L    Base Excess 2 -4 - 3 mmol/L    Body Temp see below Centigrade    O2 Therapy 3.0 2.0 - 10.0 L/min   CBC WITHOUT DIFFERENTIAL   Result Value Ref Range    WBC 7.5 4.8 - 10.8 K/uL    RBC 6.02 4.70 - 6.10 M/uL    Hemoglobin 18.7 (H) 14.0 - 18.0 g/dL     Hematocrit 58.6 (H) 42.0 - 52.0 %    MCV 97.3 81.4 - 97.8 fL    MCH 31.1 27.0 - 33.0 pg    MCHC 31.9 (L) 33.7 - 35.3 g/dL    RDW 56.5 (H) 35.9 - 50.0 fL    Platelet Count 120 (L) 164 - 446 K/uL    MPV 10.0 9.0 - 12.9 fL       CT-CTA CHEST PULMONARY ARTERY W/ RECONS   Final Result         1. No CT evidence of pulmonary embolism.      2. Wall thickening of the bilateral lower lobe airways with associated small ill-defined groundglass nodular opacities in the lung bases, right more than left. This could relate to infection.      DX-CHEST-PORTABLE (1 VIEW)   Final Result      No acute cardiac or pulmonary abnormality is noted. Cardiomegaly.            COURSE & MEDICAL DECISION MAKING  Pertinent Labs & Imaging studies reviewed. (See chart for details)  The patient is presenting for assessment shortness of breath. PE study was taken to rule out embolism as a cause of his shortness of breath although his initial chest x-ray shows clear findings CT shows findings of possible infection right more than left. He will be started on antibiotics for probable pneumonia. He will be admitted to the hospitalist staff for continued management of probable pneumonia. He does have mildly elevated lactate possibly secondary to dehydration no obvious sepsis as noted. The findings were discussed with the hospitalist who will be admitting the patient. He also has underlying COPD.    FINAL IMPRESSION  1. Pneumonitis right greater than left  2. COPD  3.      Electronically signed by: Ashwin Drew, 10/26/2017

## 2017-10-27 NOTE — NON-PROVIDER
[facility]    Date of Service: 10/27/2017    Chief Complaint  72 y.o. male admitted 10/26/2017 with SOB    Interval Problem Update  Patient was admitted to [facility] yesterday around 3:30pm. He reported complaints of SOB and heaviness in the chest. He had COPD exacerbation, possible sleep apnea, and high levels of hemoglobin. Sleep study will confirmed the MATTY. Hematology will find more results on the cause of polycythemia. Having a phlebotomy is not currently recommended at this time. He needs to be weaned of O2 mask till about 88% SAO2 for discharge.    Consultants/Specialty  Hematology will consult with patient.    Disposition  TBA until oxygen level is normalized.         Review of Systems   Constitutional: Positive for chills and malaise/fatigue. Negative for fever.   HENT: Positive for congestion and sore throat.    Eyes: Negative for discharge.   Respiratory: Positive for cough, sputum production and shortness of breath. Negative for hemoptysis.    Cardiovascular: Positive for leg swelling. Negative for chest pain and palpitations.   Gastrointestinal: Negative for abdominal pain, blood in stool, diarrhea, melena, nausea and vomiting.   Genitourinary: Negative for dysuria.   Musculoskeletal: Negative for back pain and joint pain.   Skin: Negative for rash.   Neurological: Negative for dizziness, speech change and headaches.   Psychiatric/Behavioral: Negative for depression. The patient is not nervous/anxious.     Physical Exam  Laboratory/Imaging   Hemodynamics  Temp (24hrs), Av.5 °C (97.7 °F), Min:36.1 °C (97 °F), Max:36.7 °C (98 °F)   Temperature: 36.7 °C (98 °F)  Pulse  Av.1  Min: 72  Max: 107 Heart Rate (Monitored): 91  Blood Pressure : 120/71, NIBP: 127/77      Respiratory      Respiration: 17, Pulse Oximetry: 91 %, O2 Daily Delivery Respiratory : OxyMask     Given By:: Mouthpiece, Work Of Breathing / Effort: Mild  RUL Breath Sounds: Expiratory Wheezes, RML Breath Sounds:  Expiratory Wheezes, RLL Breath Sounds: Diminished, STEFANI Breath Sounds: Expiratory Wheezes, LLL Breath Sounds: Diminished    Fluids    Intake/Output Summary (Last 24 hours) at 10/27/17 1654  Last data filed at 10/27/17 1600   Gross per 24 hour   Intake              720 ml   Output                0 ml   Net              720 ml       Nutrition  Orders Placed This Encounter   Procedures   • Diet Order     Standing Status:   Standing     Number of Occurrences:   1     Order Specific Question:   Diet:     Answer:   Regular [1]     Physical Exam    Constitutional: alert and aware of surroundings. Oriented to time, place, and event.   Head: Did not see abnormal head structure. No trauma seen.   Right Ear: External ear normal without erthyema   Left Ear: External ear normal without erythema.  Nose: Normal nose  Mouth/Throat: Oropharynx has no erythema or exudate  Eyes: pupillary light reflex is normal. EOM intact (H-test negative)   Neck:normal ROM. No enlarged thryoid gland felt.   Cardiovascular: normal S1/S2 without any murmurs. Normal rate and rhythm with faint heart sounds.   Pulmonary/Chest:Patient is using his acessory muscles more. He is breathing with trouble on inspiration but not expiration. He has rhonchi present  In RLF and LLF.  Abdominal: no distension and without any mass. No tenderness or gaurding.  Genitourinary Comments: de jesus catheter is present  Musculoskeletal: normal ROM with any tenderness or pain.   Lymphadenopathy: negative cervical adenopathy.   Neurological: . He has normal reflexes. Cranial nerves all intact I-XII. He is alert and oriented to person, place, and time   Skin: Skin is dry and not moist. Has no redness.   Psychiatric: No signs of depression or elevated mood. Normal mood present   Nursing note and vitals reviewed.    Recent Labs      10/26/17   1600  10/27/17   0019  10/27/17   1353   WBC  10.6  10.3   --    RBC  7.24*  6.72*   --    HEMOGLOBIN  22.4*  21.0*   --    HEMATOCRIT  66.8*   62.7*  61.7*   MCV  92.3  93.3   --    MCH  30.9  31.3   --    MCHC  33.5*  33.5*   --    RDW  53.1*  53.1*   --    PLATELETCT  119*  107*   --    MPV  10.6  10.9   --      Recent Labs      10/26/17   1600  10/27/17   0019   SODIUM  130*  131*   POTASSIUM  4.9  4.3   CHLORIDE  95*  99   CO2  22  25   GLUCOSE  104*  83   BUN  12  14   CREATININE  0.74  0.83   CALCIUM  9.1  8.1*         Recent Labs      10/26/17   1717   BNPBTYPENAT  91              Assessment/Plan     Active Problems:    COPD (chronic obstructive pulmonary disease) (CMS-[facility]) POA: Yes    Tobacco dependence POA: Yes    Obesity POA: Yes    MATTY (obstructive sleep apnea) POA: Yes    Hyponatremia POA: Yes    Thrombocytopenia (CMS-HCC) POA: Yes    Respiratory distress POA: Unknown    Polycythemia POA: Unknown  Resolved Problems:    * No resolved hospital problems. *      Assessment and Plan.  Polycythemia   Assessment & Plan     Hematology is checking for a JAK2 muatation but does not recommend for phlebotomy at this time. 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        Respiratory distress   Assessment & Plan     O2 mask will suffice.       Thrombocytopenia (CMS-HCC)- (present on admission)   Assessment & Plan     Due to bone marrow suppression and the secondary polycythemia       Hyponatremia- (present on admission)   Assessment & Plan     Hydration is gradually improving the sodium levels. We will monitor.       MATTY (obstructive sleep apnea)- (present on admission)   Assessment & Plan     Sleep study will be needed to confirm the MATTY diagnosis. Patient denies of having MATTY.       Obesity- (present on admission)   Assessment & Plan     Patient was educated briefly on weight loss and diet to control obesity. He agreed to change diet and start any exercise program that will be provided in an outpatient setting.        Tobacco dependence- (present on admission)   Assessment & Plan     -Patient reported that he quit smoking  cold turkey last week.       COPD (chronic obstructive pulmonary disease) (CMS-[facility])- (present on admission)   Assessment & Plan     Patient is using face mask to help with COPD exacerbations. RT protocol has been established here at [facility]. Smoking education was also discussed as well.                     Core Measures:  eviewed items:: EKG, Labs, radiology all reviewed  Moore catheter::  No Moore seen   DVT prophylaxis pharmacological::  Lovenox  Ulcer Prophylaxis::  Not indicated for patient  Antibiotics:  Treating active infection/contamination beyond 24 hours perioperative coverage

## 2017-10-27 NOTE — CONSULTS
"Consult Note: Hematology    Date of consultation: 10/27/2017 4:39 PM    Referring provider: Dr Peace    Reason for consultation: Polycythemia    History of presenting illness:      Gary Severino Gil   is a 72 y.o. year old male with long-standing history of heavy smoking and COPD. He was admitted to the hospital with pneumonia. He was found to have elevated hemoglobin of 22.4 g/dL with hematocrit of 67%.    Review of prior labs have shown hemoglobin in the 18 g/dL range back in 2012. According to the patient, he never had any proper workup for polycythemia. He does not have any leukocytosis. Platelet count has been slightly on the lower side for many years now. Differential is otherwise unremarkable. He is currently on antibiotics. He is plethoric faces. He is awaiting sleep apnea studies. He is planning to quit smoking. Denies having significant arterial or venous thrombotic events. He is on aspirin.    Past Medical History:    Past Medical History:   Diagnosis Date   • Chronic obstructive pulmonary disease (CMS-HCC)    • MRSA (methicillin resistant Staphylococcus aureus)    • Productive cough     \"smoker\"   • Snoring     sleep apnea questionairre completed       Past surgical history:    Past Surgical History:   Procedure Laterality Date   • FLAP GRAFT  6/1/2010    Performed by FRANCESCA VIDAL at SURGERY [facility]   • FISTULA IN ANO REPAIR  6/1/2010    Performed by FRANCESCA VIDAL at SURGERY [facility] ORS   • RECTAL EXPLORATION  11/17/2009    Performed by FRANCESCA VIDAL at SURGERY SAME DAY [facility] ORS   • DEBRIDEMENT  11/17/2009    Performed by FRANCESCA VIDAL at SURGERY SAME DAY [facility] ORS   • RECTAL EXPLORATION  9/3/2009    Performed by FRANCESCA VIDAL at SURGERY SAME DAY [facility] ORS   • DEBRIDEMENT  9/3/2009    Performed by FRANCESCA VIDAL at SURGERY SAME DAY [facility] ORS   • LUIS ENRIQUE RECTAL ABSCESS INCISION AND DRAINAGE  4/6/2009    Performed by FRANCESCA VIDAL at SURGERY " [facility] ORS   • LUMBAR FUSION POSTERIOR  1987   • LUMBAR FUSION ANTERIOR  1983       Allergies:  Morphine sulfate    Medications:    Current Facility-Administered Medications   Medication Dose Route Frequency Provider Last Rate Last Dose   • aspirin EC (ECOTRIN) tablet 81 mg  81 mg Oral DAILY ALLY QuirozOMiller   81 mg at 10/27/17 0808   • senna-docusate (PERICOLACE or SENOKOT S) 8.6-50 MG per tablet 2 Tab  2 Tab Oral BID Johny Peace D.O.   Stopped at 10/27/17 0900    And   • polyethylene glycol/lytes (MIRALAX) PACKET 1 Packet  1 Packet Oral QDAY PRN Johny Peace D.O.        And   • magnesium hydroxide (MILK OF MAGNESIA) suspension 30 mL  30 mL Oral QDAY PRN Johny Peace D.O.        And   • bisacodyl (DULCOLAX) suppository 10 mg  10 mg Rectal QDAY PRN Johny Peace D.O.       • Respiratory Care per Protocol   Nebulization Continuous RT Johny Peace D.O.       • NS infusion   Intravenous Continuous Johny Peace D.O. 83 mL/hr at 10/27/17 0425     • enoxaparin (LOVENOX) inj 40 mg  40 mg Subcutaneous QHS ALLY QuirozOMiller   40 mg at 10/26/17 2217   • enalaprilat (VASOTEC) injection 1.25 mg  1.25 mg Intravenous Q6HRS PRN ALLY QuirozO.       • ondansetron (ZOFRAN) syringe/vial injection 4 mg  4 mg Intravenous Q4HRS PRN Johny Peace D.O.       • ondansetron (ZOFRAN ODT) dispertab 4 mg  4 mg Oral Q4HRS PRN ALLY QuirozO.       • nicotine (NICODERM) 21 MG/24HR 21 mg  21 mg Transdermal Daily-0600 Johny Peace D.O.        And   • nicotine polacrilex (NICORETTE) 2 MG piece 2 mg  2 mg Oral Q HOUR PRN ALLY QuirozO.       • acetaminophen (TYLENOL) tablet 650 mg  650 mg Oral Q6HRS PRN ALLY QuirozO.       • ampicillin/sulbactam (UNASYN) 3 g in  mL IVPB  3 g Intravenous Q6HRS ALLY QuirozOMiller   Stopped at 10/27/17 1144   • azithromycin (ZITHROMAX) 500 mg in D5W 250 mL IVPB  500 mg Intravenous Q24HRS ALLY QuirozO.   Stopped at 10/27/17 0908   •  ipratropium-albuterol (DUONEB) nebulizer solution 3 mL  3 mL Nebulization Q4HRS (RT) Ashwin Drew M.D.   3 mL at 10/27/17 1510   • ipratropium-albuterol (DUONEB) nebulizer solution 3 mL  3 mL Nebulization Q4H PRN (RT) Johny Peace D.O.       • budesonide-formoterol (SYMBICORT) 160-4.5 MCG/ACT inhaler 2 Puff  2 Puff Inhalation BID Johny Peace D.O.   2 Puff at 10/27/17 0809       Social History:     Social History     Social History   • Marital status:      Spouse name: N/A   • Number of children: N/A   • Years of education: N/A     Occupational History   • Not on file.     Social History Main Topics   • Smoking status: Current Every Day Smoker     Packs/day: 1.50     Years: 45.00     Types: Cigarettes   • Smokeless tobacco: Never Used   • Alcohol use 8.0 oz/week     16 Standard drinks or equivalent per week      Comment: noted 4 per day   • Drug use: No   • Sexual activity: Not on file     Other Topics Concern   • Not on file     Social History Narrative   • No narrative on file       Family History:     Family History   Problem Relation Age of Onset   • Stroke         Review of Systems:  All other review of systems are negative except what was mentioned above in the HPI.    Constitutional: No fever, Positive for chills,  ,malaise/fatigue.    HEENT: No new auditory or visual complaints. No sore throat and neck pain.     RespiratoryPositive for cough, sputum production, shortness of breath and wheezing.    Cardiovascular: No new chest pain, palpitations, orthopnea and leg swelling.    Gastrointestinal: No heartburn, nausea, vomiting ,abdominal pain, hematochezia or melena     Genitourinary: Negative for dysuria, hematuria    Musculoskeletal: No new arthralgias or myalgias   Skin: Negative for rash and itching.    Neurological: Negative for focal weakness or headaches.    Endo/Heme/Allergies: No abnormal bleed/bruise.    Psychiatric/Behavioral: No new depression/anxiety.    Physical Exam:  Vitals:    "/71   Pulse 85   Temp 36.7 °C (98 °F)   Resp 17   Ht 1.626 m (5' 4\")   Wt 96.8 kg (213 lb 6.5 oz)   SpO2 91%   BMI 36.63 kg/m²     General: Not in acute distress, alert and oriented x 3  HEENT: No pallor, icterus. Oropharynx clear. Plethoric facies  Neck: Supple, no palpable masses.  Lymph nodes: No palpable cervical, supraclavicular, axillary or inguinal lymphadenopathy.    CVS: regular rate and rhythm, no rubs or gallops  RESP: Clear to auscultate bilaterally, no wheezing or crackles.   ABD: Soft, non tender, non distended, positive bowel sounds, no palpable organomegaly  EXT: 1+ bipedal edema  CNS: Alert and oriented x3, No focal deficits.  Skin- No rash      Labs:   Recent Labs      10/26/17   1600  10/27/17   0019  10/27/17   1353   RBC  7.24*  6.72*   --    HEMOGLOBIN  22.4*  21.0*   --    HEMATOCRIT  66.8*  62.7*  61.7*   PLATELETCT  119*  107*   --    FERRITIN   --    --   301.0     Lab Results   Component Value Date/Time    SODIUM 131 (L) 10/27/2017 12:19 AM    POTASSIUM 4.3 10/27/2017 12:19 AM    CHLORIDE 99 10/27/2017 12:19 AM    CO2 25 10/27/2017 12:19 AM    GLUCOSE 83 10/27/2017 12:19 AM    BUN 14 10/27/2017 12:19 AM    CREATININE 0.83 10/27/2017 12:19 AM    CREATININE 0.9 04/07/2009 04:25 AM        Assessment and Plan:  Elevated hemoglobin and hematocrit-review of prior labs showed elevated hemoglobin dating back to 2012. 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 He has been on aspirin which I recommended him to continue. His current hemoglobin and hematocrit is much more elevated compared to his baseline. He may also have some component of dehydration associated with this respiratory infection.     I will make arrangements for him to receive one phlebotomy. Had a discussion with him and his daughter about the diagnosis of primary versus secondary polycythemia. We will check ferritin and JAK2 mutation ,epo levels .    Informed him that for " secondary polycythemia, elevated hemoglobin and hematocrit is a compensated mechanics him to increase the oxygenation. No clear-cut data as to when to start phlebotomies in these patients.. I tend to initiate phlebotomies in patients with hematocrit to around 58-60%. Strongly counseled him about smoking cessation and getting a sleep study done.     Please make appointment with my clinic in a month or so after discharge for outpatient follow-up.    He agreed and verbalized his agreement and understanding with the current plan.  I answered all questions and concerns he has at this time.              Thank you for allowing me to participate in his care.    Please note that this dictation was created using voice recognition software. I have made every reasonable attempt to correct obvious errors, but I expect that there are errors of grammar and possibly content that I did not discover before finalizing the note.      SIGNATURES:  Ivan Garcia    CC:  Edwin Valentin M.D.  No ref. provider found

## 2017-10-27 NOTE — H&P
" Hospital Medicine History and Physical    Date of Service  10/27/2017    Chief Complaint  Chief Complaint   Patient presents with   • Shortness of Breath     onset saturday   • Cough     chest congestion       History of Presenting Illness  72 y.o. male who presented 10/26/2017 with Increasing shortness of breath and weakness. Patient has had upper respiratory symptoms that have developed over the past week. Ongoing cough initially dry but now becoming more productive with yellow sputum. Denies any sick contacts nor any recent travel out of the area. Quit smoking recently. Denies any fevers but did have some recent shaking chills. Denies any dysuria or diarrhea.    Primary Care Physician  Edwin Valentin M.D.    Code Status  Full    Review of Systems  Review of Systems   Constitutional: Positive for chills and malaise/fatigue. Negative for fever.   HENT: Positive for congestion and sore throat.    Eyes: Negative for discharge.   Respiratory: Positive for cough, sputum production and shortness of breath. Negative for hemoptysis.    Cardiovascular: Positive for leg swelling. Negative for chest pain and palpitations.   Gastrointestinal: Negative for abdominal pain, blood in stool, diarrhea, melena, nausea and vomiting.   Genitourinary: Negative for dysuria.   Musculoskeletal: Negative for back pain and joint pain.   Skin: Negative for rash.   Neurological: Negative for dizziness, speech change and headaches.   Psychiatric/Behavioral: Negative for depression. The patient is not nervous/anxious.           Past Medical History  Past Medical History:   Diagnosis Date   • Chronic obstructive pulmonary disease (CMS-HCC)    • MRSA (methicillin resistant Staphylococcus aureus)    • Productive cough     \"smoker\"   • Snoring     sleep apnea questionairre completed       Surgical History  Past Surgical History:   Procedure Laterality Date   • FLAP GRAFT  6/1/2010    Performed by FRANCESCA VIDAL at [facility] " ORS   • FISTULA IN ANO REPAIR  2010    Performed by FRANCESCA VIDAL at SURGERY [facility] ORS   • RECTAL EXPLORATION  2009    Performed by FRANCESCA VIDAL at SURGERY SAME DAY [facility] ORS   • DEBRIDEMENT  2009    Performed by FRANCESCA VIDAL at SURGERY SAME DAY [facility] ORS   • RECTAL EXPLORATION  9/3/2009    Performed by FRANCESCA VIDAL at SURGERY SAME DAY [facility] ORS   • DEBRIDEMENT  9/3/2009    Performed by FRANCESCA VIDAL at SURGERY SAME DAY [facility] ORS   • LUIS ENRIQUE RECTAL ABSCESS INCISION AND DRAINAGE  2009    Performed by FRANCESCA VIDAL at SURGERY [facility] ORS   • LUMBAR FUSION POSTERIOR     • LUMBAR FUSION ANTERIOR         Medications  No current facility-administered medications on file prior to encounter.      No current outpatient prescriptions on file prior to encounter.       Family History  Family History   Problem Relation Age of Onset   • Stroke         Social History  Social History   Substance Use Topics   • Smoking status: Current Every Day Smoker     Packs/day: 1.50     Years: 45.00     Types: Cigarettes   • Smokeless tobacco: Never Used   • Alcohol use 8.0 oz/week     16 Standard drinks or equivalent per week      Comment: noted 4 per day       Allergies  Allergies   Allergen Reactions   • Morphine Sulfate Unspecified     Hallucinations         Physical Exam  Laboratory   Hemodynamics  Temp (24hrs), Av.6 °C (97.9 °F), Min:36.6 °C (97.8 °F), Max:36.6 °C (97.9 °F)   Temperature: 36.6 °C (97.8 °F)  Pulse  Av.2  Min: 81  Max: 107 Heart Rate (Monitored): 91  Blood Pressure : 136/85, NIBP: 127/77      Respiratory      Respiration: 18, Pulse Oximetry: 92 %, O2 Daily Delivery Respiratory : OxyMask     Given By:: Mouthpiece, Work Of Breathing / Effort: Mild  RUL Breath Sounds: Expiratory Wheezes, RML Breath Sounds: Expiratory Wheezes, RLL Breath Sounds: Diminished, STEFANI Breath Sounds: Expiratory Wheezes, LLL Breath Sounds: Diminished    Physical  Exam   Constitutional: He is oriented to person, place, and time. No distress.   Obese   HENT:   Head: Normocephalic and atraumatic.   Nose: Nose normal.   Mouth/Throat: No oropharyngeal exudate.   Eyes: Conjunctivae and EOM are normal. Right eye exhibits no discharge. Left eye exhibits no discharge. No scleral icterus.   Neck: Carotid bruit is not present. No tracheal deviation present.   Cardiovascular: Normal rate, regular rhythm and intact distal pulses.    No murmur heard.  Pulses:       Carotid pulses are 2+ on the right side, and 2+ on the left side.       Radial pulses are 2+ on the right side, and 2+ on the left side.   Pulmonary/Chest: No stridor.   Abdominal: Soft. Bowel sounds are normal. He exhibits no distension. There is no tenderness.   Musculoskeletal: He exhibits edema (1+ bilateral lower legs). He exhibits no tenderness.   Lymphadenopathy:     He has no cervical adenopathy.   Neurological: He is alert and oriented to person, place, and time. No cranial nerve deficit.   Skin: He is not diaphoretic.   Bilateral lower legs with chronic venous stasis changes   Psychiatric: He has a normal mood and affect. His behavior is normal.   Vitals reviewed.      Recent Labs      10/26/17   1600  10/27/17   0019   WBC  10.6  10.3   RBC  7.24*  6.72*   HEMOGLOBIN  22.4*  21.0*   HEMATOCRIT  66.8*  62.7*   MCV  92.3  93.3   MCH  30.9  31.3   MCHC  33.5*  33.5*   RDW  53.1*  53.1*   PLATELETCT  119*  107*   MPV  10.6  10.9     Recent Labs      10/26/17   1600   SODIUM  130*   POTASSIUM  4.9   CHLORIDE  95*   CO2  22   GLUCOSE  104*   BUN  12   CREATININE  0.74   CALCIUM  9.1         Recent Labs      10/26/17   1717   BNPBTYPENAT  91           Imaging  CTA of the chest: No evidence of pulmonary embolism. Wall thickening of bilateral lower lobe airways associated small ill-defined groundglass nodular opacities in the lung bases, right more than left could be related infection   Chest x-ray: No acute cardiopulmonary  issues noted    Assessment/Plan     I anticipate this patient will require at least two midnights for appropriate medical management, necessitating inpatient admission.    Polycythemia   Assessment & Plan    IV fluids  Monitor hemoglobin and hematocrit  May need phlebotomy  Check EPO level  Multifactorial probable sleep apnea and COPD and ongoing smoker        Respiratory distress   Assessment & Plan    Probable pneumonia  For the pneumonitis from other vital process possible. Check influenza  Antibiotics initiated  Supplemental oxygen, as needed bronchodilators, respiratory protocol  Monitor vitals and daily lab        Thrombocytopenia (CMS-HCC)- (present on admission)   Assessment & Plan    Likely fatty liver  Monitor CBC  The signs of acute bleeding        Hyponatremia- (present on admission)   Assessment & Plan    IV fluids  Monitor a.m. BMP        MATTY (obstructive sleep apnea)- (present on admission)   Assessment & Plan    Night any knowledge of MATTY. He is not on any BiPAP/CPAP  Needs outpatient polysomnogram testing  Treatment ongoing weight loss        Obesity- (present on admission)   Assessment & Plan    Pickwickian body habitus  Needs weight loss  Encourage dietary changes        Tobacco dependence- (present on admission)   Assessment & Plan    States he quit one week ago  Nicotine patch if needed        COPD (chronic obstructive pulmonary disease) (CMS-HCC)- (present on admission)   Assessment & Plan    No acute exacerbation of this time  Respiratory protocol, bronchodilators as needed            VTE prophylaxis SCDs .

## 2017-10-27 NOTE — ASSESSMENT & PLAN NOTE
-nicotine replacement protocol and cessation education provided for more than 10 minutes, discussed options of nicotine patch, acupuncture, medical treatment with wellbutrin and chantix. Discussed other options. Code 88655

## 2017-10-28 LAB
APPEARANCE UR: CLEAR
BASE EXCESS BLDA CALC-SCNC: -1 MMOL/L (ref -4–3)
BASE EXCESS BLDA CALC-SCNC: 2 MMOL/L (ref -4–3)
BILIRUB UR QL STRIP.AUTO: NEGATIVE
BODY TEMPERATURE: 97.3 CENTIGRADE
BODY TEMPERATURE: ABNORMAL CENTIGRADE
COLOR UR: YELLOW
EPO SERPL-ACNC: 53 MU/ML (ref 4–27)
ERYTHROCYTE [DISTWIDTH] IN BLOOD BY AUTOMATED COUNT: 56.5 FL (ref 35.9–50)
FLUAV H1 2009 PAND RNA SPEC QL NAA+PROBE: NOT DETECTED
FLUAV RNA SPEC QL NAA+PROBE: NEGATIVE
FLUBV RNA SPEC QL NAA+PROBE: NEGATIVE
GLUCOSE UR STRIP.AUTO-MCNC: >=1000 MG/DL
HCO3 BLDA-SCNC: 28 MMOL/L (ref 17–25)
HCO3 BLDA-SCNC: 32 MMOL/L (ref 17–25)
HCT VFR BLD AUTO: 58.6 % (ref 42–52)
HGB BLD-MCNC: 18.7 G/DL (ref 14–18)
INHALED O2 FLOW RATE: 3 L/MIN (ref 2–10)
KETONES UR STRIP.AUTO-MCNC: NEGATIVE MG/DL
LEUKOCYTE ESTERASE UR QL STRIP.AUTO: NEGATIVE
MCH RBC QN AUTO: 31.1 PG (ref 27–33)
MCHC RBC AUTO-ENTMCNC: 31.9 G/DL (ref 33.7–35.3)
MCV RBC AUTO: 97.3 FL (ref 81.4–97.8)
MICRO URNS: ABNORMAL
NITRITE UR QL STRIP.AUTO: NEGATIVE
PCO2 BLDA: 60.5 MMHG (ref 26–37)
PCO2 BLDA: 66.9 MMHG (ref 26–37)
PH BLDA: 7.28 [PH] (ref 7.4–7.5)
PH BLDA: 7.29 [PH] (ref 7.4–7.5)
PH UR STRIP.AUTO: 5 [PH]
PLATELET # BLD AUTO: 120 K/UL (ref 164–446)
PMV BLD AUTO: 10 FL (ref 9–12.9)
PO2 BLDA: 63 MMHG (ref 64–87)
PO2 BLDA: 70.2 MMHG (ref 64–87)
PROT UR QL STRIP: NEGATIVE MG/DL
RBC # BLD AUTO: 6.02 M/UL (ref 4.7–6.1)
RBC UR QL AUTO: NEGATIVE
SAO2 % BLDA: 90.3 % (ref 93–99)
SAO2 % BLDA: 92.2 % (ref 93–99)
SP GR UR STRIP.AUTO: 1.02
UROBILINOGEN UR STRIP.AUTO-MCNC: 0.2 MG/DL
WBC # BLD AUTO: 7.5 K/UL (ref 4.8–10.8)

## 2017-10-28 PROCEDURE — 700111 HCHG RX REV CODE 636 W/ 250 OVERRIDE (IP): Performed by: HOSPITALIST

## 2017-10-28 PROCEDURE — 87502 INFLUENZA DNA AMP PROBE: CPT

## 2017-10-28 PROCEDURE — 36415 COLL VENOUS BLD VENIPUNCTURE: CPT

## 2017-10-28 PROCEDURE — 87503 INFLUENZA DNA AMP PROB ADDL: CPT

## 2017-10-28 PROCEDURE — 85027 COMPLETE CBC AUTOMATED: CPT

## 2017-10-28 PROCEDURE — 90670 PCV13 VACCINE IM: CPT | Performed by: HOSPITALIST

## 2017-10-28 PROCEDURE — A9270 NON-COVERED ITEM OR SERVICE: HCPCS | Performed by: HOSPITALIST

## 2017-10-28 PROCEDURE — 94640 AIRWAY INHALATION TREATMENT: CPT

## 2017-10-28 PROCEDURE — 81003 URINALYSIS AUTO W/O SCOPE: CPT

## 2017-10-28 PROCEDURE — 82803 BLOOD GASES ANY COMBINATION: CPT | Mod: 91

## 2017-10-28 PROCEDURE — 700105 HCHG RX REV CODE 258: Performed by: HOSPITALIST

## 2017-10-28 PROCEDURE — 90471 IMMUNIZATION ADMIN: CPT

## 2017-10-28 PROCEDURE — 99233 SBSQ HOSP IP/OBS HIGH 50: CPT | Performed by: HOSPITALIST

## 2017-10-28 PROCEDURE — 81270 JAK2 GENE: CPT

## 2017-10-28 PROCEDURE — 700101 HCHG RX REV CODE 250: Performed by: HOSPITALIST

## 2017-10-28 PROCEDURE — 3E0234Z INTRODUCTION OF SERUM, TOXOID AND VACCINE INTO MUSCLE, PERCUTANEOUS APPROACH: ICD-10-PCS | Performed by: HOSPITALIST

## 2017-10-28 PROCEDURE — 87086 URINE CULTURE/COLONY COUNT: CPT

## 2017-10-28 PROCEDURE — 700102 HCHG RX REV CODE 250 W/ 637 OVERRIDE(OP): Performed by: HOSPITALIST

## 2017-10-28 PROCEDURE — 770006 HCHG ROOM/CARE - MED/SURG/GYN SEMI*

## 2017-10-28 RX ORDER — AZITHROMYCIN 250 MG/1
500 TABLET, FILM COATED ORAL DAILY
Status: COMPLETED | OUTPATIENT
Start: 2017-10-29 | End: 2017-10-30

## 2017-10-28 RX ORDER — METHYLPREDNISOLONE SODIUM SUCCINATE 40 MG/ML
40 INJECTION, POWDER, LYOPHILIZED, FOR SOLUTION INTRAMUSCULAR; INTRAVENOUS EVERY 6 HOURS
Status: DISCONTINUED | OUTPATIENT
Start: 2017-10-28 | End: 2017-10-29

## 2017-10-28 RX ADMIN — PNEUMOCOCCAL 13-VALENT CONJUGATE VACCINE 0.5 ML: 2.2; 2.2; 2.2; 2.2; 2.2; 4.4; 2.2; 2.2; 2.2; 2.2; 2.2; 2.2; 2.2 INJECTION, SUSPENSION INTRAMUSCULAR at 09:47

## 2017-10-28 RX ADMIN — AMPICILLIN SODIUM AND SULBACTAM SODIUM 3 G: 2; 1 INJECTION, POWDER, FOR SOLUTION INTRAMUSCULAR; INTRAVENOUS at 17:38

## 2017-10-28 RX ADMIN — ASPIRIN 81 MG: 81 TABLET, COATED ORAL at 08:43

## 2017-10-28 RX ADMIN — ENOXAPARIN SODIUM 40 MG: 100 INJECTION SUBCUTANEOUS at 20:24

## 2017-10-28 RX ADMIN — BUDESONIDE AND FORMOTEROL FUMARATE DIHYDRATE 2 PUFF: 160; 4.5 AEROSOL RESPIRATORY (INHALATION) at 20:10

## 2017-10-28 RX ADMIN — AMPICILLIN SODIUM AND SULBACTAM SODIUM 3 G: 2; 1 INJECTION, POWDER, FOR SOLUTION INTRAMUSCULAR; INTRAVENOUS at 05:10

## 2017-10-28 RX ADMIN — AMPICILLIN SODIUM AND SULBACTAM SODIUM 3 G: 2; 1 INJECTION, POWDER, FOR SOLUTION INTRAMUSCULAR; INTRAVENOUS at 11:35

## 2017-10-28 RX ADMIN — STANDARDIZED SENNA CONCENTRATE AND DOCUSATE SODIUM 2 TABLET: 8.6; 5 TABLET, FILM COATED ORAL at 08:43

## 2017-10-28 RX ADMIN — METHYLPREDNISOLONE SODIUM SUCCINATE 40 MG: 40 INJECTION, POWDER, FOR SOLUTION INTRAMUSCULAR; INTRAVENOUS at 17:38

## 2017-10-28 RX ADMIN — IPRATROPIUM BROMIDE AND ALBUTEROL SULFATE 3 ML: .5; 3 SOLUTION RESPIRATORY (INHALATION) at 20:11

## 2017-10-28 RX ADMIN — METHYLPREDNISOLONE SODIUM SUCCINATE 40 MG: 40 INJECTION, POWDER, FOR SOLUTION INTRAMUSCULAR; INTRAVENOUS at 12:59

## 2017-10-28 RX ADMIN — METHYLPREDNISOLONE SODIUM SUCCINATE 40 MG: 40 INJECTION, POWDER, FOR SOLUTION INTRAMUSCULAR; INTRAVENOUS at 23:06

## 2017-10-28 RX ADMIN — NICOTINE 21 MG: 21 PATCH, EXTENDED RELEASE TRANSDERMAL at 05:09

## 2017-10-28 RX ADMIN — IPRATROPIUM BROMIDE AND ALBUTEROL SULFATE 3 ML: .5; 3 SOLUTION RESPIRATORY (INHALATION) at 15:38

## 2017-10-28 RX ADMIN — BUDESONIDE AND FORMOTEROL FUMARATE DIHYDRATE 2 PUFF: 160; 4.5 AEROSOL RESPIRATORY (INHALATION) at 08:38

## 2017-10-28 RX ADMIN — IPRATROPIUM BROMIDE AND ALBUTEROL SULFATE 3 ML: .5; 3 SOLUTION RESPIRATORY (INHALATION) at 12:03

## 2017-10-28 RX ADMIN — AZITHROMYCIN FOR INJECTION INJECTION, POWDER, LYOPHILIZED, FOR SOLUTION 500 MG: 500 INJECTION INTRAVENOUS at 08:43

## 2017-10-28 RX ADMIN — AMPICILLIN SODIUM AND SULBACTAM SODIUM 3 G: 2; 1 INJECTION, POWDER, FOR SOLUTION INTRAMUSCULAR; INTRAVENOUS at 23:06

## 2017-10-28 RX ADMIN — IPRATROPIUM BROMIDE AND ALBUTEROL SULFATE 3 ML: .5; 3 SOLUTION RESPIRATORY (INHALATION) at 08:37

## 2017-10-28 RX ADMIN — SODIUM CHLORIDE: 9 INJECTION, SOLUTION INTRAVENOUS at 13:03

## 2017-10-28 ASSESSMENT — LIFESTYLE VARIABLES: SUBSTANCE_ABUSE: 0

## 2017-10-28 ASSESSMENT — ENCOUNTER SYMPTOMS
PHOTOPHOBIA: 0
CONSTITUTIONAL NEGATIVE: 1
GASTROINTESTINAL NEGATIVE: 1
MYALGIAS: 0
TINGLING: 0
PSYCHIATRIC NEGATIVE: 1
NAUSEA: 0
ABDOMINAL PAIN: 0
FLANK PAIN: 0
DIZZINESS: 0
NEUROLOGICAL NEGATIVE: 1
EYES NEGATIVE: 1
BLURRED VISION: 0
EYE PAIN: 0
TREMORS: 0
BRUISES/BLEEDS EASILY: 0
DEPRESSION: 0
CLAUDICATION: 0
ORTHOPNEA: 0
VOMITING: 0
SENSORY CHANGE: 0
WHEEZING: 1
NECK PAIN: 0
WEIGHT LOSS: 0
COUGH: 1
CARDIOVASCULAR NEGATIVE: 1
MUSCULOSKELETAL NEGATIVE: 1
SHORTNESS OF BREATH: 1

## 2017-10-28 ASSESSMENT — PAIN SCALES - GENERAL: PAINLEVEL_OUTOF10: 0

## 2017-10-28 NOTE — PROGRESS NOTES
Patrick Nolan from Lab called with critical result of pH 7.29 and PCO2 of 66.9 at 0903. Critical lab result read back to Patrick Nolan.   Dr. Ida Gibson notified of critical lab result at 0906.  Critical lab result read back by Dr. Dr. Ida Gibson.

## 2017-10-28 NOTE — DIETARY
Nutrition note:  Pt with poor po PTA r/t SOB, no wt loss reported. BMI 36.63.  Pt is on a regular diet, eating well.  Skin intact. Please re-consult RD if po intake poor.  Otherwise, RD will monitor per dept policy.

## 2017-10-28 NOTE — PROGRESS NOTES
"Date of visit: 10/28/2017  2:54 PM      Chief Complaint- polycythemia-appears to be secondary    Interim history-he had one unit of phlebotomy this morning. Repeat hematocrit improved to 58%. Continues to have hypoxia and CO2 retention.  Past Medical History:      Past Medical History:   Diagnosis Date   • Chronic obstructive pulmonary disease (CMS-HCC)    • MRSA (methicillin resistant Staphylococcus aureus)    • Productive cough     \"smoker\"   • Snoring     sleep apnea questionairre completed       Past surgical history:       Past Surgical History:   Procedure Laterality Date   • FLAP GRAFT  6/1/2010    Performed by FRANCESCA VIDAL at SURGERY [facility] ORS   • FISTULA IN ANO REPAIR  6/1/2010    Performed by FRANCESCA VIDAL at SURGERY [facility] ORS   • RECTAL EXPLORATION  11/17/2009    Performed by FRANCESCA VIDAL at SURGERY SAME DAY [facility] ORS   • DEBRIDEMENT  11/17/2009    Performed by FRANCESCA VIDAL at SURGERY SAME DAY [facility] ORS   • RECTAL EXPLORATION  9/3/2009    Performed by FRANCESCA VIDAL at SURGERY SAME DAY [facility] ORS   • DEBRIDEMENT  9/3/2009    Performed by FRANCESCA VIDAL at SURGERY SAME DAY [facility] ORS   • LUIS ENRIQUE RECTAL ABSCESS INCISION AND DRAINAGE  4/6/2009    Performed by FRANCESCA VIDAL at SURGERY [facility] ORS   • LUMBAR FUSION POSTERIOR  1987   • LUMBAR FUSION ANTERIOR  1983       Allergies:       Morphine sulfate    Medications:         Current Facility-Administered Medications   Medication Dose Route Frequency Provider Last Rate Last Dose   • methylPREDNISolone (SOLU-MEDROL) 40 MG injection 40 mg  40 mg Intravenous Q6HRS Ida Gibson M.D.   40 mg at 10/28/17 1259   • albuterol inhaler 2 Puff  2 Puff Inhalation Q4HRS PRN Ida Gibson M.D.       • ipratropium-albuterol (DUONEB) nebulizer solution 3 mL  3 mL Nebulization 4X/DAY (RT) Ida Gibson M.D.   3 mL at 10/28/17 1203   • aspirin EC (ECOTRIN) tablet 81 mg  81 mg Oral DAILY Johny Peace D.O.   81 " mg at 10/28/17 0843   • senna-docusate (PERICOLACE or SENOKOT S) 8.6-50 MG per tablet 2 Tab  2 Tab Oral BID ALLY QuirozOMiller   2 Tab at 10/28/17 0843    And   • polyethylene glycol/lytes (MIRALAX) PACKET 1 Packet  1 Packet Oral QDAY PRN Johny Peace D.O.        And   • magnesium hydroxide (MILK OF MAGNESIA) suspension 30 mL  30 mL Oral QDAY PRN Johny Peace D.O.        And   • bisacodyl (DULCOLAX) suppository 10 mg  10 mg Rectal QDAY PRN ALLY QuirozO.       • Respiratory Care per Protocol   Nebulization Continuous RT Johny Peace D.O.       • NS infusion   Intravenous Continuous ALLY QuirozO. 83 mL/hr at 10/28/17 1303     • enoxaparin (LOVENOX) inj 40 mg  40 mg Subcutaneous QHS ALLY QuirozO.   40 mg at 10/27/17 2049   • enalaprilat (VASOTEC) injection 1.25 mg  1.25 mg Intravenous Q6HRS PRN Jonhy Peace D.O.       • ondansetron (ZOFRAN) syringe/vial injection 4 mg  4 mg Intravenous Q4HRS PRN ALLY QuirozO.       • ondansetron (ZOFRAN ODT) dispertab 4 mg  4 mg Oral Q4HRS PRN ALLY QuirozO.       • nicotine (NICODERM) 21 MG/24HR 21 mg  21 mg Transdermal Daily-0600 ALLY QuirozO.   21 mg at 10/28/17 0509    And   • nicotine polacrilex (NICORETTE) 2 MG piece 2 mg  2 mg Oral Q HOUR PRN ALLY QuirozO.       • acetaminophen (TYLENOL) tablet 650 mg  650 mg Oral Q6HRS PRN ALLY QuirozO.       • ampicillin/sulbactam (UNASYN) 3 g in  mL IVPB  3 g Intravenous Q6HRS ALLY QuirozOMiller   Stopped at 10/28/17 1205   • azithromycin (ZITHROMAX) 500 mg in D5W 250 mL IVPB  500 mg Intravenous Q24HRS Johny Peace D.O.   Stopped at 10/28/17 0943   • ipratropium-albuterol (DUONEB) nebulizer solution 3 mL  3 mL Nebulization Q4H PRN (RT) Johny Peace D.O.       • budesonide-formoterol (SYMBICORT) 160-4.5 MCG/ACT inhaler 2 Puff  2 Puff Inhalation BID Johny Peace D.O.   2 Puff at 10/28/17 0838         Social History:     Social History     Social History   •  "Marital status:      Spouse name: N/A   • Number of children: N/A   • Years of education: N/A     Occupational History   • Not on file.     Social History Main Topics   • Smoking status: Current Every Day Smoker     Packs/day: 1.50     Years: 45.00     Types: Cigarettes   • Smokeless tobacco: Never Used   • Alcohol use 8.0 oz/week     16 Standard drinks or equivalent per week      Comment: noted 4 per day   • Drug use: No   • Sexual activity: Not on file     Other Topics Concern   • Not on file     Social History Narrative   • No narrative on file       Family History:      Family History   Problem Relation Age of Onset   • Stroke         Review of Systems:  All other review of systems are negative except what was mentioned above in the HPI.    Constitutional: No fever, Positive for chills,  ,malaise/fatigue.    HEENT: No new auditory or visual complaints. No sore throat and neck pain.     RespiratoryPositive for cough, sputum production, shortness of breath and wheezing.    Cardiovascular: No new chest pain, palpitations, orthopnea and leg swelling.    Gastrointestinal: No heartburn, nausea, vomiting ,abdominal pain, hematochezia or melena     Genitourinary: Negative for dysuria, hematuria    Musculoskeletal: No new arthralgias or myalgias   Skin: Negative for rash and itching.    Neurological: Negative for focal weakness or headaches.    Endo/Heme/Allergies: No abnormal bleed/bruise.    Psychiatric/Behavioral: No new depression/anxiety.    Physical Exam:  Vitals: /46   Pulse 87   Temp 37 °C (98.6 °F)   Resp 18   Ht 1.626 m (5' 4\")   Wt 96.8 kg (213 lb 6.5 oz)   SpO2 97%   BMI 36.63 kg/m²     General: Not in acute distress, alert and oriented x 3  HEENT: No pallor, icterus. Oropharynx clear. Plethoric facies  Neck: Supple, no palpable masses.  Lymph nodes: No palpable cervical, supraclavicular, axillary or inguinal lymphadenopathy.    CVS: regular rate and rhythm, no rubs or gallops  RESP: " Clear to auscultate bilaterally, no wheezing or crackles.   ABD: Soft, non tender, non distended, positive bowel sounds, no palpable organomegaly  EXT: 1+ bipedal edema  CNS: Alert and oriented x3, No focal deficits.  Skin- No rash         Labs:   Admission on 10/26/2017   Component Date Value Ref Range Status   • Lactic Acid 10/26/2017 2.4* 0.5 - 2.0 mmol/L Final   • Lactic Acid 10/26/2017 1.8  0.5 - 2.0 mmol/L Final   • WBC 10/26/2017 10.6  4.8 - 10.8 K/uL Final   • RBC 10/26/2017 7.24* 4.70 - 6.10 M/uL Final   • Hemoglobin 10/26/2017 22.4* 14.0 - 18.0 g/dL Final   • Hematocrit 10/26/2017 66.8* 42.0 - 52.0 % Final    Comment: This result has been called to RN [id number] by Kailyn Garcia on 10 26 2017 at  1635, and has been read back.     • MCV 10/26/2017 92.3  81.4 - 97.8 fL Final   • MCH 10/26/2017 30.9  27.0 - 33.0 pg Final   • MCHC 10/26/2017 33.5* 33.7 - 35.3 g/dL Final   • RDW 10/26/2017 53.1* 35.9 - 50.0 fL Final   • Platelet Count 10/26/2017 119* 164 - 446 K/uL Final   • MPV 10/26/2017 10.6  9.0 - 12.9 fL Final   • Neutrophils-Polys 10/26/2017 79.30* 44.00 - 72.00 % Final   • Lymphocytes 10/26/2017 10.30* 22.00 - 41.00 % Final   • Monocytes 10/26/2017 9.20  0.00 - 13.40 % Final   • Eosinophils 10/26/2017 0.10  0.00 - 6.90 % Final   • Basophils 10/26/2017 0.70  0.00 - 1.80 % Final   • Immature Granulocytes 10/26/2017 0.40  0.00 - 0.90 % Final   • Nucleated RBC 10/26/2017 0.00  /100 WBC Final   • Neutrophils (Absolute) 10/26/2017 8.42* 1.82 - 7.42 K/uL Final   • Lymphs (Absolute) 10/26/2017 1.09  1.00 - 4.80 K/uL Final   • Monos (Absolute) 10/26/2017 0.97* 0.00 - 0.85 K/uL Final   • Eos (Absolute) 10/26/2017 0.01  0.00 - 0.51 K/uL Final   • Baso (Absolute) 10/26/2017 0.07  0.00 - 0.12 K/uL Final   • Immature Granulocytes (abs) 10/26/2017 0.04  0.00 - 0.11 K/uL Final         Assessment and Plan:  Polycythemia- Chronic with current worsening. Appears  to be secondary from chronic smoking, severe COPD and question  of sleep apnea. Ferritin level within normal limits, ruling out hemochromatosis.Epo, jak2 studies pending, expect them to be okay.    Hematocrit 58 today after phlebotomy. We will hold off on further phlebotomies as secondary polycythemia is a compensated mechanism for improved oxygenation. I will consider more phlebotomies if his hematocrit trends above 60%. Encouraged him to donate blood . Continue baby aspirin.    Please make an appointment with my office [phone] in 6-8 weeks    He agreed and verbalized his agreement and understanding with the current plan.  I answered all questions and concerns he has at this time         Please note that this dictation was created using voice recognition software. I have made every reasonable attempt to correct obvious errors, but I expect that there are errors of grammar and possibly content that I did not discover before finalizing the note.      SIGNATURES:  Ivan Garcia    CC:  Edwin Valentin M.D.  No ref. provider found

## 2017-10-28 NOTE — PROGRESS NOTES
[facility] Hospitalist Progress Note    Date of Service: 10/28/2017    Chief Complaint  72 y.o. male admitted 10/26/2017 with shortness of breath.    Interval Problem Update  Patient was initially admitted with shortness of breath, the shortness of breath is a combination of factors from his newly diagnosed community acquired pneumonia as well as COPD exacerbation as well as obstructive sleep apnea. Patient had to be placed at this point on IV Solu-Medrol today patient continues with IV antibiotics cultures at this point are pending. He continues with oxygen support when he takes the oxygen off he drops into the 80 percentile range on his oxygen saturation. At this point patient will need several days of treatment to get him out of exacerbation. Patient also was found to have extreme polycythemia hematology consult was done their input is appreciated, patient had therapeutic phlebotomy today and is able to his down to 18.7.    Consultants/Specialty  Hematology    Disposition  To be determined        Review of Systems   Constitutional: Negative.  Negative for malaise/fatigue and weight loss.   HENT: Negative.  Negative for ear discharge and nosebleeds.    Eyes: Negative.  Negative for blurred vision, photophobia and pain.   Respiratory: Positive for cough, shortness of breath and wheezing.    Cardiovascular: Negative.  Negative for orthopnea and claudication.   Gastrointestinal: Negative.  Negative for abdominal pain, nausea and vomiting.   Genitourinary: Negative.  Negative for dysuria and flank pain.   Musculoskeletal: Negative.  Negative for myalgias and neck pain.   Skin: Negative.    Neurological: Negative.  Negative for dizziness, tingling, tremors and sensory change.   Endo/Heme/Allergies: Negative.  Does not bruise/bleed easily.   Psychiatric/Behavioral: Negative.  Negative for depression, substance abuse and suicidal ideas.      Physical Exam  Laboratory/Imaging   Hemodynamics  Temp (24hrs), Av.6 °C (97.9  °F), Min:36.3 °C (97.3 °F), Max:37 °C (98.6 °F)   Temperature: 37 °C (98.6 °F)  Pulse  Av  Min: 72  Max: 107    Blood Pressure : 104/46      Respiratory      Respiration: 18, Pulse Oximetry: 97 %, O2 Daily Delivery Respiratory : OxyMask     Given By:: Mouthpiece, #MDI/DPI Given: MDI/DPI x 1, Work Of Breathing / Effort: Mild  RUL Breath Sounds: Diminished, RML Breath Sounds: Diminished, RLL Breath Sounds: Diminished, STEFANI Breath Sounds: Diminished, LLL Breath Sounds: Diminished    Fluids    Intake/Output Summary (Last 24 hours) at 10/28/17 1522  Last data filed at 10/28/17 1300   Gross per 24 hour   Intake             2054 ml   Output              250 ml   Net             1804 ml       Nutrition  Orders Placed This Encounter   Procedures   • Diet Order     Standing Status:   Standing     Number of Occurrences:   1     Order Specific Question:   Diet:     Answer:   Regular [1]     Physical Exam   Constitutional: He is oriented to person, place, and time. He appears well-developed and well-nourished. No distress. He is not intubated.   HENT:   Head: Normocephalic and atraumatic.   Right Ear: External ear normal.   Left Ear: External ear normal.   Nose: Nose normal.   Mouth/Throat: Oropharynx is clear and moist. No oropharyngeal exudate.   Eyes: Conjunctivae and EOM are normal. Pupils are equal, round, and reactive to light. Right eye exhibits no discharge. Left eye exhibits no discharge.   Neck: Normal range of motion. Neck supple. No tracheal deviation present.   Cardiovascular: Normal rate and regular rhythm.    No murmur heard.  Pulmonary/Chest: Accessory muscle usage present. No stridor. Tachypnea noted. No apnea and no bradypnea. He is not intubated. He is in respiratory distress. He has decreased breath sounds in the right middle field, the right lower field, the left middle field and the left lower field. He has wheezes in the right upper field, the right middle field, the left upper field and the left  middle field. He has rhonchi in the right lower field and the left lower field. He exhibits no tenderness.   Abdominal: Soft. Bowel sounds are normal. He exhibits distension. He exhibits no fluid wave and no ascites. There is no tenderness. There is no rebound.   Genitourinary:   Genitourinary Comments: Moore catheter   Musculoskeletal: Normal range of motion. He exhibits no edema or tenderness.   Neurological: He is alert and oriented to person, place, and time. He has normal reflexes. No cranial nerve deficit. He exhibits normal muscle tone. Coordination normal.   Skin: Skin is warm and dry. He is not diaphoretic. No pallor.   Psychiatric: He has a normal mood and affect. His behavior is normal. Thought content normal.   Nursing note and vitals reviewed.      Recent Labs      10/26/17   1600  10/27/17   0019  10/27/17   1353  10/28/17   1229   WBC  10.6  10.3   --   7.5   RBC  7.24*  6.72*   --   6.02   HEMOGLOBIN  22.4*  21.0*   --   18.7*   HEMATOCRIT  66.8*  62.7*  61.7*  58.6*   MCV  92.3  93.3   --   97.3   MCH  30.9  31.3   --   31.1   MCHC  33.5*  33.5*   --   31.9*   RDW  53.1*  53.1*   --   56.5*   PLATELETCT  119*  107*   --   120*   MPV  10.6  10.9   --   10.0     Recent Labs      10/26/17   1600  10/27/17   0019   SODIUM  130*  131*   POTASSIUM  4.9  4.3   CHLORIDE  95*  99   CO2  22  25   GLUCOSE  104*  83   BUN  12  14   CREATININE  0.74  0.83   CALCIUM  9.1  8.1*         Recent Labs      10/26/17   1717   BNPBTYPENAT  91              Assessment/Plan     Polycythemia   Assessment & Plan    Appreciated hematology input. Today the patient had therapeutic phlebotomy and his hemoglobin is down to 18.7.        Respiratory distress   Assessment & Plan    Today I have had to increase his respiratory treatment we are adding at this point Solu-Medrol to his COPD treatment.        Thrombocytopenia (CMS-HCC)- (present on admission)   Assessment & Plan    This is secondary to the polycythemia and underproduction  of platelets monitor for bleeding        Hyponatremia- (present on admission)   Assessment & Plan    Monitor sodium levels and continue with hydration.        MATTY (obstructive sleep apnea)- (present on admission)   Assessment & Plan    Patient denies ever having obstructive sleep apnea he will need an outpatient sleep study to evaluate him for possible nighttime CPAP        Obesity- (present on admission)   Assessment & Plan    I have discussed with him the need for weight loss he will need outpatient weight loss management.        Tobacco dependence- (present on admission)   Assessment & Plan    -nicotine replacement protocol and cessation education provided for more than 10 minutes, discussed options of nicotine patch, acupuncture, medical treatment with wellbutrin and chantix. Discussed other options. Code 99032        COPD (chronic obstructive pulmonary disease) (CMS-[facility])- (present on admission)   Assessment & Plan    His COPD at this point seems to be getting worse so I have added IV Solu-Medrol to it he remains on oxygen nebulizer treatments when he takes the oxygen off he's dropping down into the low 80s. Patient states that he's never been on oxygen continuously before. Patient will need at this point continued management with antibiotics as well as nebulizers.              Reviewed items::  EKG reviewed, Labs reviewed, Medications reviewed and Radiology images reviewed  Moore catheter::  No Moore  DVT prophylaxis pharmacological::  Enoxaparin (Lovenox)  Ulcer Prophylaxis::  Not indicated  Antibiotics:  Treating active infection/contamination beyond 24 hours perioperative coverage

## 2017-10-29 PROCEDURE — 700102 HCHG RX REV CODE 250 W/ 637 OVERRIDE(OP)

## 2017-10-29 PROCEDURE — 700111 HCHG RX REV CODE 636 W/ 250 OVERRIDE (IP): Performed by: HOSPITALIST

## 2017-10-29 PROCEDURE — 770006 HCHG ROOM/CARE - MED/SURG/GYN SEMI*

## 2017-10-29 PROCEDURE — A9270 NON-COVERED ITEM OR SERVICE: HCPCS | Performed by: HOSPITALIST

## 2017-10-29 PROCEDURE — 94667 MNPJ CHEST WALL 1ST: CPT

## 2017-10-29 PROCEDURE — 99407 BEHAV CHNG SMOKING > 10 MIN: CPT

## 2017-10-29 PROCEDURE — 99233 SBSQ HOSP IP/OBS HIGH 50: CPT | Performed by: HOSPITALIST

## 2017-10-29 PROCEDURE — 700101 HCHG RX REV CODE 250: Performed by: HOSPITALIST

## 2017-10-29 PROCEDURE — A9270 NON-COVERED ITEM OR SERVICE: HCPCS

## 2017-10-29 PROCEDURE — 700102 HCHG RX REV CODE 250 W/ 637 OVERRIDE(OP): Performed by: HOSPITALIST

## 2017-10-29 PROCEDURE — 94760 N-INVAS EAR/PLS OXIMETRY 1: CPT

## 2017-10-29 PROCEDURE — 94640 AIRWAY INHALATION TREATMENT: CPT

## 2017-10-29 PROCEDURE — 94668 MNPJ CHEST WALL SBSQ: CPT

## 2017-10-29 PROCEDURE — A9270 NON-COVERED ITEM OR SERVICE: HCPCS | Performed by: FAMILY MEDICINE

## 2017-10-29 PROCEDURE — 700105 HCHG RX REV CODE 258: Performed by: HOSPITALIST

## 2017-10-29 PROCEDURE — 700102 HCHG RX REV CODE 250 W/ 637 OVERRIDE(OP): Performed by: FAMILY MEDICINE

## 2017-10-29 RX ORDER — METHYLPREDNISOLONE SODIUM SUCCINATE 40 MG/ML
20 INJECTION, POWDER, LYOPHILIZED, FOR SOLUTION INTRAMUSCULAR; INTRAVENOUS EVERY 8 HOURS
Status: DISCONTINUED | OUTPATIENT
Start: 2017-10-29 | End: 2017-10-31 | Stop reason: HOSPADM

## 2017-10-29 RX ADMIN — BUDESONIDE AND FORMOTEROL FUMARATE DIHYDRATE 2 PUFF: 160; 4.5 AEROSOL RESPIRATORY (INHALATION) at 18:59

## 2017-10-29 RX ADMIN — STANDARDIZED SENNA CONCENTRATE AND DOCUSATE SODIUM 2 TABLET: 8.6; 5 TABLET, FILM COATED ORAL at 08:19

## 2017-10-29 RX ADMIN — BUDESONIDE AND FORMOTEROL FUMARATE DIHYDRATE 2 PUFF: 160; 4.5 AEROSOL RESPIRATORY (INHALATION) at 08:16

## 2017-10-29 RX ADMIN — IPRATROPIUM BROMIDE AND ALBUTEROL SULFATE 3 ML: .5; 3 SOLUTION RESPIRATORY (INHALATION) at 10:58

## 2017-10-29 RX ADMIN — IPRATROPIUM BROMIDE AND ALBUTEROL SULFATE 3 ML: .5; 3 SOLUTION RESPIRATORY (INHALATION) at 18:59

## 2017-10-29 RX ADMIN — AMPICILLIN SODIUM AND SULBACTAM SODIUM 3 G: 2; 1 INJECTION, POWDER, FOR SOLUTION INTRAMUSCULAR; INTRAVENOUS at 23:44

## 2017-10-29 RX ADMIN — ENOXAPARIN SODIUM 40 MG: 100 INJECTION SUBCUTANEOUS at 20:41

## 2017-10-29 RX ADMIN — AMPICILLIN SODIUM AND SULBACTAM SODIUM 3 G: 2; 1 INJECTION, POWDER, FOR SOLUTION INTRAMUSCULAR; INTRAVENOUS at 18:30

## 2017-10-29 RX ADMIN — AZITHROMYCIN 500 MG: 250 TABLET, FILM COATED ORAL at 08:19

## 2017-10-29 RX ADMIN — METHYLPREDNISOLONE SODIUM SUCCINATE 20 MG: 40 INJECTION, POWDER, FOR SOLUTION INTRAMUSCULAR; INTRAVENOUS at 20:40

## 2017-10-29 RX ADMIN — METHYLPREDNISOLONE SODIUM SUCCINATE 40 MG: 40 INJECTION, POWDER, FOR SOLUTION INTRAMUSCULAR; INTRAVENOUS at 05:21

## 2017-10-29 RX ADMIN — BUDESONIDE AND FORMOTEROL FUMARATE DIHYDRATE 2 PUFF: 160; 4.5 AEROSOL RESPIRATORY (INHALATION) at 20:41

## 2017-10-29 RX ADMIN — IPRATROPIUM BROMIDE AND ALBUTEROL SULFATE 3 ML: .5; 3 SOLUTION RESPIRATORY (INHALATION) at 08:15

## 2017-10-29 RX ADMIN — AMPICILLIN SODIUM AND SULBACTAM SODIUM 3 G: 2; 1 INJECTION, POWDER, FOR SOLUTION INTRAMUSCULAR; INTRAVENOUS at 05:21

## 2017-10-29 RX ADMIN — SODIUM CHLORIDE: 9 INJECTION, SOLUTION INTRAVENOUS at 15:40

## 2017-10-29 RX ADMIN — AMPICILLIN SODIUM AND SULBACTAM SODIUM 3 G: 2; 1 INJECTION, POWDER, FOR SOLUTION INTRAMUSCULAR; INTRAVENOUS at 11:27

## 2017-10-29 RX ADMIN — SODIUM CHLORIDE: 9 INJECTION, SOLUTION INTRAVENOUS at 01:03

## 2017-10-29 RX ADMIN — IPRATROPIUM BROMIDE AND ALBUTEROL SULFATE 3 ML: .5; 3 SOLUTION RESPIRATORY (INHALATION) at 15:58

## 2017-10-29 RX ADMIN — ASPIRIN 81 MG: 81 TABLET, COATED ORAL at 08:19

## 2017-10-29 RX ADMIN — GUAIFENESIN 200 MG: 100 SOLUTION ORAL at 23:44

## 2017-10-29 RX ADMIN — NICOTINE 21 MG: 21 PATCH, EXTENDED RELEASE TRANSDERMAL at 05:21

## 2017-10-29 RX ADMIN — METHYLPREDNISOLONE SODIUM SUCCINATE 20 MG: 40 INJECTION, POWDER, FOR SOLUTION INTRAMUSCULAR; INTRAVENOUS at 15:38

## 2017-10-29 ASSESSMENT — ENCOUNTER SYMPTOMS
SPEECH CHANGE: 0
BACK PAIN: 0
MYALGIAS: 0
CARDIOVASCULAR NEGATIVE: 1
SEIZURES: 0
EYES NEGATIVE: 1
BLURRED VISION: 0
CHILLS: 0
FOCAL WEAKNESS: 0
SHORTNESS OF BREATH: 1
MUSCULOSKELETAL NEGATIVE: 1
DIARRHEA: 0
HALLUCINATIONS: 0
CONSTIPATION: 0
NERVOUS/ANXIOUS: 0
GASTROINTESTINAL NEGATIVE: 1
HEARTBURN: 0
VOMITING: 0
DIAPHORESIS: 0
SORE THROAT: 0
BRUISES/BLEEDS EASILY: 0
PSYCHIATRIC NEGATIVE: 1
CONSTITUTIONAL NEGATIVE: 1
WHEEZING: 0
DOUBLE VISION: 0
FEVER: 0
COUGH: 0
PALPITATIONS: 0
DEPRESSION: 0
NEUROLOGICAL NEGATIVE: 1
NAUSEA: 0
HEADACHES: 0
WEAKNESS: 0

## 2017-10-29 ASSESSMENT — COPD QUESTIONNAIRES
DURING THE PAST 4 WEEKS HOW MUCH DID YOU FEEL SHORT OF BREATH: MOST  OR ALL OF THE TIME
COPD SCREENING SCORE: 9
DO YOU EVER COUGH UP ANY MUCUS OR PHLEGM?: YES, A FEW DAYS A WEEK OR MONTH
HAVE YOU SMOKED AT LEAST 100 CIGARETTES IN YOUR ENTIRE LIFE: YES
COPD SCREENING SCORE: 9
HAVE YOU SMOKED AT LEAST 100 CIGARETTES IN YOUR ENTIRE LIFE: YES
DURING THE PAST 4 WEEKS HOW MUCH DID YOU FEEL SHORT OF BREATH: MOST  OR ALL OF THE TIME
DO YOU EVER COUGH UP ANY MUCUS OR PHLEGM?: YES, A FEW DAYS A WEEK OR MONTH

## 2017-10-29 ASSESSMENT — LIFESTYLE VARIABLES
EVER FELT BAD OR GUILTY ABOUT YOUR DRINKING: NO
DO YOU DRINK ALCOHOL: YES
HAVE PEOPLE ANNOYED YOU BY CRITICIZING YOUR DRINKING: NO
TOTAL SCORE: 0
TOTAL SCORE: 0
EVER HAD A DRINK FIRST THING IN THE MORNING TO STEADY YOUR NERVES TO GET RID OF A HANGOVER: NO
HAVE YOU EVER FELT YOU SHOULD CUT DOWN ON YOUR DRINKING: NO
CONSUMPTION TOTAL: INCOMPLETE
TOTAL SCORE: 0

## 2017-10-29 ASSESSMENT — PAIN SCALES - GENERAL: PAINLEVEL_OUTOF10: 0

## 2017-10-29 ASSESSMENT — PATIENT HEALTH QUESTIONNAIRE - PHQ9
SUM OF ALL RESPONSES TO PHQ9 QUESTIONS 1 AND 2: 0
1. LITTLE INTEREST OR PLEASURE IN DOING THINGS: NOT AT ALL
2. FEELING DOWN, DEPRESSED, IRRITABLE, OR HOPELESS: NOT AT ALL
SUM OF ALL RESPONSES TO PHQ QUESTIONS 1-9: 0

## 2017-10-29 NOTE — PROGRESS NOTES
[facility] Hospitalist Progress Note    Date of Service: 10/29/2017    Chief Complaint  72 y.o. male admitted 10/26/2017 with shortness of breath.    Interval Problem Update  Patient was initially admitted with shortness of breath, the patient's initial hemoglobin was found to be 22.4. She was also with a COPD exacerbation and patient also uses tobacco and is extremely overweight with a weight of almost 100 kg. The patient was thus admitted to the floor he at this point has been evaluated by hematology at my request we have done a therapeutic phlebotomy for him and his hemoglobin is down to 18.7. Patient's COPD was treated with IV steroids I'm able to titrate the steroids down today to 20 mg IV every 8 hours and then tomorrow anticipate to switch him over to oral steroids. Patient's oxygenation at this point has improved initially was only 86% on 6 L by facemask today I have them down on facemask to 2 L and he is 96% saturating. I do anticipate that the patient will be discharged home with oxygen and we will ask the nurse at this point to do walking parameters of his oxygenation. Case management will need to most likely help him set up outpatient appointments and oxygen support.    Consultants/Specialty  Hematology    Disposition  Anticipate discharge to home tomorrow with oxygen.        Review of Systems   Constitutional: Negative.  Negative for chills, diaphoresis and fever.   HENT: Negative.  Negative for nosebleeds and sore throat.    Eyes: Negative.  Negative for blurred vision and double vision.   Respiratory: Positive for shortness of breath. Negative for cough and wheezing.    Cardiovascular: Negative.  Negative for chest pain, palpitations and leg swelling.   Gastrointestinal: Negative.  Negative for constipation, diarrhea, heartburn, nausea and vomiting.   Genitourinary: Negative.  Negative for dysuria and urgency.   Musculoskeletal: Negative.  Negative for back pain, joint pain and myalgias.   Skin: Negative.   Negative for itching and rash.   Neurological: Negative.  Negative for speech change, focal weakness, seizures, weakness and headaches.   Endo/Heme/Allergies: Negative.  Does not bruise/bleed easily.   Psychiatric/Behavioral: Negative.  Negative for depression, hallucinations and suicidal ideas. The patient is not nervous/anxious.       Physical Exam  Laboratory/Imaging   Hemodynamics  Temp (24hrs), Av.3 °C (97.4 °F), Min:35.9 °C (96.6 °F), Max:36.6 °C (97.8 °F)   Temperature: 35.9 °C (96.6 °F)  Pulse  Av.3  Min: 72  Max: 107    Blood Pressure : 108/86      Respiratory      Respiration: 16, Pulse Oximetry: 95 %, O2 Daily Delivery Respiratory : Silicone Nasal Cannula     Given By:: Mouthpiece, #MDI/DPI Given: MDI/DPI x 1, PEP/CPT Method: Flutter Valve, Work Of Breathing / Effort: Mild  RUL Breath Sounds: Expiratory Wheezes, RML Breath Sounds: Clear, RLL Breath Sounds: Clear, STEFANI Breath Sounds: Expiratory Wheezes, LLL Breath Sounds: Clear    Fluids    Intake/Output Summary (Last 24 hours) at 10/29/17 1259  Last data filed at 10/29/17 0000   Gross per 24 hour   Intake             1174 ml   Output              450 ml   Net              724 ml       Nutrition  Orders Placed This Encounter   Procedures   • Diet Order     Standing Status:   Standing     Number of Occurrences:   1     Order Specific Question:   Diet:     Answer:   Regular [1]     Physical Exam   Constitutional: He is oriented to person, place, and time. He appears well-developed and well-nourished. No distress. He is not intubated.   HENT:   Head: Normocephalic and atraumatic.   Right Ear: External ear normal.   Left Ear: External ear normal.   Nose: Nose normal.   Mouth/Throat: Oropharynx is clear and moist. No oropharyngeal exudate.   Eyes: Conjunctivae and EOM are normal. Pupils are equal, round, and reactive to light. Right eye exhibits no discharge. Left eye exhibits no discharge.   Neck: Normal range of motion. Neck supple. No tracheal  deviation present.   Cardiovascular: Normal rate and regular rhythm.    No murmur heard.  Pulmonary/Chest: No stridor. No apnea, no tachypnea and no bradypnea. He is not intubated. He has decreased breath sounds in the right middle field, the right lower field, the left middle field and the left lower field. He has no rhonchi. He exhibits no tenderness.   Abdominal: Soft. Bowel sounds are normal. He exhibits distension. He exhibits no fluid wave and no ascites. There is no tenderness. There is no guarding.   Genitourinary:   Genitourinary Comments: Moore catheter   Musculoskeletal: Normal range of motion. He exhibits no edema, tenderness or deformity.   Neurological: He is alert and oriented to person, place, and time. He has normal reflexes. He displays normal reflexes. He exhibits normal muscle tone.   Skin: Skin is warm and dry. No rash noted. He is not diaphoretic. No erythema. No pallor.   Psychiatric: He has a normal mood and affect. His behavior is normal. Judgment and thought content normal.   Nursing note and vitals reviewed.      Recent Labs      10/26/17   1600  10/27/17   0019  10/27/17   1353  10/28/17   1229   WBC  10.6  10.3   --   7.5   RBC  7.24*  6.72*   --   6.02   HEMOGLOBIN  22.4*  21.0*   --   18.7*   HEMATOCRIT  66.8*  62.7*  61.7*  58.6*   MCV  92.3  93.3   --   97.3   MCH  30.9  31.3   --   31.1   MCHC  33.5*  33.5*   --   31.9*   RDW  53.1*  53.1*   --   56.5*   PLATELETCT  119*  107*   --   120*   MPV  10.6  10.9   --   10.0     Recent Labs      10/26/17   1600  10/27/17   0019   SODIUM  130*  131*   POTASSIUM  4.9  4.3   CHLORIDE  95*  99   CO2  22  25   GLUCOSE  104*  83   BUN  12  14   CREATININE  0.74  0.83   CALCIUM  9.1  8.1*         Recent Labs      10/26/17   1717   BNPBTYPENAT  91              Assessment/Plan     Polycythemia   Assessment & Plan    Phlebotomy was performed and his hemoglobin is down to 18.7.        Respiratory distress   Assessment & Plan    Resolving and today is  his best respiratory date yet.        Thrombocytopenia (CMS-HCC)- (present on admission)   Assessment & Plan    Has improved and he is now up to 120.  Monitor platelet levels and monitor for bleeding. Currently does not need a transfusion.        Hyponatremia- (present on admission)   Assessment & Plan    Continue to monitor sodium levels with hydration we are returning back towards baseline.        MATTY (obstructive sleep apnea)- (present on admission)   Assessment & Plan    Patient initially refused to except that he has obstructive sleep apnea.  After discussing with him several times he is recognizing that the symptoms are all in the pointing to the direction that he may have obstructive sleep apnea and he is at this point willing to entertain an outpatient sleep study.        Obesity- (present on admission)   Assessment & Plan    I have counseled him on obesity today I'm anticipating he will need outpatient follow-up with a bariatric clinic        Tobacco dependence- (present on admission)   Assessment & Plan    -nicotine replacement protocol and cessation education provided for more than 10 minutes, discussed options of nicotine patch, acupuncture, medical treatment with wellbutrin and chantix. Discussed other options. Code 60120        COPD (chronic obstructive pulmonary disease) (CMS-HCC)- (present on admission)   Assessment & Plan    COPD exacerbation is better with IV Solu-Medrol and able to taper his Solu-Medrol today. I will decrease it down to 20 mg every 8 hours. Anticipate that I will be able to switch him over to oral steroids tomorrow.  Continue with oxygen and weaning down the patient's oxygenation levels have improved and his oxygenation with pulse oximetry has improved as well.  Continue at this point with nebulizer treatments  Continue with RT protocol.  Anticipate patient will be able to be discharged home tomorrow.                Reviewed items::  EKG reviewed, Labs reviewed, Medications  reviewed and Radiology images reviewed  Moore catheter::  No Moore  DVT prophylaxis pharmacological::  Enoxaparin (Lovenox)  Ulcer Prophylaxis::  Not indicated  Antibiotics:  Treating active infection/contamination beyond 24 hours perioperative coverage

## 2017-10-29 NOTE — RESPIRATORY CARE
"COPD EDUCATION by COPD CLINICAL EDUCATOR  (Phone: [phone])  10/29/2017 at 12:01 PM by Zenobia Chappell     Patient seen by Respiratory Education team to complete Block 1 of a 2 part series. Reference material about the program was given to patient along with our contact information.  Part #1 includes: What is COPD (how the lungs work), common treatments for COPD, the difference between \"rescue\" medications and \"daily\" medications, bronchial hygiene, and explanation of the Action Plan. We discussed appropriate medication technique along with a demonstration, and the correct way to care for and clean equipment.   A treatment was performed as documented, and advance directives and smoking cessation were discussed as appropriate to this patient. Question and answer session followed. Provided smoking cessation packet with \"Tips to Quit\" and flyer for \"Free Smoking Cessation Classes\". Provided \"Quit Card\" with the phone number to [employer] [facility] for free nicotine replacement therapy.  Smoking Cessation Intervention completed. Provided coupon for [employer].  Will place a request for Nebulizer and medications for home use with Nursing  "

## 2017-10-30 VITALS
HEIGHT: 64 IN | WEIGHT: 213.41 LBS | RESPIRATION RATE: 20 BRPM | OXYGEN SATURATION: 100 % | TEMPERATURE: 98.3 F | DIASTOLIC BLOOD PRESSURE: 81 MMHG | HEART RATE: 85 BPM | SYSTOLIC BLOOD PRESSURE: 139 MMHG | BODY MASS INDEX: 36.43 KG/M2

## 2017-10-30 LAB
BACTERIA UR CULT: NORMAL
BLD FROM PATIENT VOL: ABNORMAL ML
BP SYS/DIAS--P PHLEBOTOMY: ABNORMAL MM[HG]
HCT VFR BLD AUTO: 61.7 % (ref 42–52)
SIGNIFICANT IND 70042: NORMAL
SITE SITE: NORMAL
SOURCE SOURCE: NORMAL
THERAPEUTIC PHLEB 1539: ABNORMAL

## 2017-10-30 PROCEDURE — 94640 AIRWAY INHALATION TREATMENT: CPT

## 2017-10-30 PROCEDURE — 99407 BEHAV CHNG SMOKING > 10 MIN: CPT

## 2017-10-30 PROCEDURE — 94760 N-INVAS EAR/PLS OXIMETRY 1: CPT

## 2017-10-30 PROCEDURE — 99239 HOSP IP/OBS DSCHRG MGMT >30: CPT | Performed by: HOSPITALIST

## 2017-10-30 PROCEDURE — 700111 HCHG RX REV CODE 636 W/ 250 OVERRIDE (IP): Performed by: HOSPITALIST

## 2017-10-30 PROCEDURE — 700102 HCHG RX REV CODE 250 W/ 637 OVERRIDE(OP): Performed by: HOSPITALIST

## 2017-10-30 PROCEDURE — A9270 NON-COVERED ITEM OR SERVICE: HCPCS

## 2017-10-30 PROCEDURE — 700102 HCHG RX REV CODE 250 W/ 637 OVERRIDE(OP)

## 2017-10-30 PROCEDURE — 94668 MNPJ CHEST WALL SBSQ: CPT

## 2017-10-30 PROCEDURE — 700105 HCHG RX REV CODE 258: Performed by: HOSPITALIST

## 2017-10-30 PROCEDURE — 700101 HCHG RX REV CODE 250: Performed by: HOSPITALIST

## 2017-10-30 PROCEDURE — A9270 NON-COVERED ITEM OR SERVICE: HCPCS | Performed by: HOSPITALIST

## 2017-10-30 RX ORDER — AMOXICILLIN AND CLAVULANATE POTASSIUM 875; 125 MG/1; MG/1
1 TABLET, FILM COATED ORAL 2 TIMES DAILY
Qty: 14 TAB | Refills: 0 | Status: SHIPPED | OUTPATIENT
Start: 2017-10-30 | End: 2017-11-06

## 2017-10-30 RX ORDER — PREDNISONE 10 MG/1
TABLET ORAL
Qty: 30 TAB | Refills: 0 | Status: SHIPPED | OUTPATIENT
Start: 2017-10-30 | End: 2018-10-19

## 2017-10-30 RX ORDER — GUAIFENESIN 600 MG/1
600 TABLET, EXTENDED RELEASE ORAL EVERY 12 HOURS
Qty: 28 TAB | Refills: 0 | Status: SHIPPED | OUTPATIENT
Start: 2017-10-30 | End: 2018-10-21

## 2017-10-30 RX ORDER — ALBUTEROL SULFATE 2.5 MG/3ML
2.5 SOLUTION RESPIRATORY (INHALATION) EVERY 4 HOURS PRN
Qty: 120 BULLET | Refills: 3 | Status: SHIPPED | OUTPATIENT
Start: 2017-10-30 | End: 2018-10-21

## 2017-10-30 RX ORDER — NICOTINE 21 MG/24HR
1 PATCH, TRANSDERMAL 24 HOURS TRANSDERMAL EVERY 24 HOURS
Qty: 7 PATCH | Refills: 0 | Status: SHIPPED | OUTPATIENT
Start: 2017-10-30 | End: 2018-10-21

## 2017-10-30 RX ADMIN — METHYLPREDNISOLONE SODIUM SUCCINATE 20 MG: 40 INJECTION, POWDER, FOR SOLUTION INTRAMUSCULAR; INTRAVENOUS at 05:56

## 2017-10-30 RX ADMIN — IPRATROPIUM BROMIDE AND ALBUTEROL SULFATE 3 ML: .5; 3 SOLUTION RESPIRATORY (INHALATION) at 11:38

## 2017-10-30 RX ADMIN — NICOTINE 21 MG: 21 PATCH, EXTENDED RELEASE TRANSDERMAL at 05:55

## 2017-10-30 RX ADMIN — STANDARDIZED SENNA CONCENTRATE AND DOCUSATE SODIUM 2 TABLET: 8.6; 5 TABLET, FILM COATED ORAL at 08:33

## 2017-10-30 RX ADMIN — SODIUM CHLORIDE: 9 INJECTION, SOLUTION INTRAVENOUS at 01:27

## 2017-10-30 RX ADMIN — BUDESONIDE AND FORMOTEROL FUMARATE DIHYDRATE 2 PUFF: 160; 4.5 AEROSOL RESPIRATORY (INHALATION) at 08:25

## 2017-10-30 RX ADMIN — AMPICILLIN SODIUM AND SULBACTAM SODIUM 3 G: 2; 1 INJECTION, POWDER, FOR SOLUTION INTRAMUSCULAR; INTRAVENOUS at 05:56

## 2017-10-30 RX ADMIN — IPRATROPIUM BROMIDE AND ALBUTEROL SULFATE 3 ML: .5; 3 SOLUTION RESPIRATORY (INHALATION) at 14:22

## 2017-10-30 RX ADMIN — AZITHROMYCIN 500 MG: 250 TABLET, FILM COATED ORAL at 08:34

## 2017-10-30 RX ADMIN — IPRATROPIUM BROMIDE AND ALBUTEROL SULFATE 3 ML: .5; 3 SOLUTION RESPIRATORY (INHALATION) at 08:25

## 2017-10-30 RX ADMIN — ASPIRIN 81 MG: 81 TABLET, COATED ORAL at 08:34

## 2017-10-30 ASSESSMENT — COPD QUESTIONNAIRES
HAVE YOU SMOKED AT LEAST 100 CIGARETTES IN YOUR ENTIRE LIFE: YES
DURING THE PAST 4 WEEKS HOW MUCH DID YOU FEEL SHORT OF BREATH: MOST  OR ALL OF THE TIME
DO YOU EVER COUGH UP ANY MUCUS OR PHLEGM?: YES, A FEW DAYS A WEEK OR MONTH
COPD SCREENING SCORE: 9

## 2017-10-30 ASSESSMENT — LIFESTYLE VARIABLES: EVER_SMOKED: UNABLE TO EVALUATE AT THIS TIME - NEEDS ASSESSMENT PRIOR TO DISCHARGE

## 2017-10-30 ASSESSMENT — PATIENT HEALTH QUESTIONNAIRE - PHQ9
1. LITTLE INTEREST OR PLEASURE IN DOING THINGS: NOT AT ALL
SUM OF ALL RESPONSES TO PHQ9 QUESTIONS 1 AND 2: 0
2. FEELING DOWN, DEPRESSED, IRRITABLE, OR HOPELESS: NOT AT ALL
SUM OF ALL RESPONSES TO PHQ QUESTIONS 1-9: 0

## 2017-10-30 ASSESSMENT — PAIN SCALES - GENERAL: PAINLEVEL_OUTOF10: 0

## 2017-10-30 NOTE — DISCHARGE PLANNING
SW met with pt at bedside to discuss O2 and HH. SW obtained verbal auth for [facility] and [facility].     Choice faxed to [facility].

## 2017-10-30 NOTE — RESPIRATORY CARE
"COPD EDUCATION by COPD CLINICAL EDUCATOR  (Phone: [phone])  10/30/2017 at 11:50 AM by Zenobia Chappell    Patient seen by Respiratory Education team to complete the final block of education.  This session discussed signs and symptoms of an exacerbation (flare-up), triggers that can create flare-ups, reiteration of the \"Action Plan\" to refer to daily which will help categorize their symptoms in order to utilize the appropriate therapy, breathing techniques used to treat acute symptoms, and oxygen safety. Smoking Cessation was discussed as appropriate to this patient. Question and answer session followed.  Call placed to [phone] for assistance with discharge follow up appointment and a new patient referral to [facility]. He will be going home with Oxygen and a nebulizer with medications. Dr Gibson ordered for home. Encouraged Vlad to contact us if he has further questions.  "

## 2017-10-30 NOTE — DISCHARGE PLANNING
[facility] received DME choice form per the choice form the referral has been sent to [facility]

## 2017-10-30 NOTE — FACE TO FACE
Face to Face Note  -  Durable Medical Equipment    Ida Gibson M.D. - NPI: [id number]  I certify that this patient is under my care and that they have had a durable medical equipment(DME)face to face encounter by myself that meets the physician DME face-to-face encounter requirements with this patient on:    Date of encounter:   Patient:                    MRN:                       YOB: 2019  Gary Severino Gil  [id number]  1945     The encounter with the patient was in whole, or in part, for the following medical condition, which is the primary reason for durable medical equipment:  COPD    I certify that, based on my findings, the following durable medical equipment is medically necessary:  Oxygen.    HOME O2 Saturation Measurements:(Values must be present for Home Oxygen orders)  Room air sat at rest: 84  Room air sat with amb: 78 (stopped due to exertional dsypnea placed on O2)  With liters of O2: 4, O2 sat at rest with O2: 90  With Liters of O2: 4, O2 sat with amb with O2 : 89  Is the patient mobile?: Yes    My Clinical findings support the need for the above equipment due to:  Hypoxia    Supporting Symptoms: need for oxygen     ------------------------------------------------------------------------------------------------------------------    Face to Face Supporting Documentation - Home Health    The encounter with this patient was in whole or in part the primary reason for home health admission.    Date of encounter:   Patient:                    MRN:                       YOB: 2019  Gary Severino Gil  [id number]  1945     Home health to see patient for:  Skilled Nursing care for assessment, interventions & education    Skilled need for:  Exacerbation of Chronic Disease State copd    Skilled nursing interventions to include:  Comment: home help with oxygen support    Homebound evidenced status by:  Need the aid of supportive devices such as crutches,  canes, wheelchairs or walkers. Leaving home must require a considerable and taxing effort. There must exist a normal inability to leave the home.    Community Physician to provide follow up care: Edwin Valentin M.D.     Optional Interventions    Wound information & treatment:    Home Infusion Therapy orders:    Line/Drain/Airway:    I certify the face to face encounter for this home care referral meets the CMS requirements and the encounter/clinical assessment with the patient was, in whole, or in part, for the medical condition(s) listed above, which is the primary reason for home health care. Based on my clinical findings: the service(s) are medically necessary, support the need for home health care, and the homebound criteria are met.  I certify that this patient has had a face to face encounter by myself.  Ida Gibson M.D. - NPI: [id number]    *Debility, frailty and advanced age in the absence of an acute deterioration or exacerbation of a condition do not qualify a patient for home health.

## 2017-10-30 NOTE — DISCHARGE SUMMARY
CHIEF COMPLAINT ON ADMISSION  Chief Complaint   Patient presents with   • Shortness of Breath     onset saturday   • Cough     chest congestion       CODE STATUS  Full Code    HPI & HOSPITAL COURSE  This is a 72 y.o. male here with acute shortness of breath. Unfortunately patient is morbidly obese as well as has obstructive sleep apnea that has not been diagnosed. Over years the patient also has been a terrible smoker. The patient at this point with a combination of hypoxia has resulted in severe polycythemia. Looking back as far as 2012 the patient was already had a polycythemia but now his hemoglobin is elevated to over 22. Hematocrit was above 60. Patient had a hematology consultation thus and they recommended at that point therapeutic phlebotomy. Patient in the meantime was also with a COPD exacerbation which we had to treat with nebulizer treatments oxygenation as well as steroids. We were able to wean the steroids down and now his oxygenation has stabilized with supplemental oxygen only. Patient this point will need supplement oxygen for discharge patient also needs at this point continued evaluation of obstructive sleep apneas and outpatient and this will need to be set up to the sleep lab. At this point referrals made to the pulmonary clinic. Overall patient's condition has stabilized were able to transition him to oral steroids outpatient nebulizers and with the addition of oxygen and he'll be discharged home today.    Therefore, he is discharged in good and stable condition with close outpatient follow-up.    SPECIFIC OUTPATIENT FOLLOW-UP  Follow with primary care in 7-10 days.  Follow with pulmonary clinic for obstructive sleep apnea evaluation.    DISCHARGE PROBLEM LIST  Active Problems:    COPD (chronic obstructive pulmonary disease) (CMS-HCC) POA: Yes    Tobacco dependence POA: Yes    Obesity POA: Yes    MATTY (obstructive sleep apnea) POA: Yes    Hyponatremia POA: Yes    Thrombocytopenia (CMS-HCC) POA:  Yes    Respiratory distress POA: Unknown    Polycythemia POA: Unknown  Resolved Problems:    * No resolved hospital problems. *      FOLLOW UP  Future Appointments  Date Time Provider Department Center   10/31/2017 10:30 AM CARE MANAGER [facility]   11/1/2017 2:20 PM LARRY Arredondo [facility]   3/20/2018 10:00 AM [facility] None     No follow-up provider specified.    MEDICATIONS ON DISCHARGE   Gil, Gary Severino   Home Medication Instructions HUGO:[id number]    Printed on:10/30/17 1211   Medication Information                      albuterol (PROVENTIL) 2.5mg/3ml Nebu Soln solution for nebulization  3 mL by Nebulization route every four hours as needed for Shortness of Breath.             albuterol 108 (90 Base) MCG/ACT Aero Soln inhalation aerosol  Inhale 2 Puffs by mouth every 6 hours as needed for Shortness of Breath.             amoxicillin-clavulanate (AUGMENTIN) 875-125 MG Tab  Take 1 Tab by mouth 2 times a day for 7 days.             fluticasone-salmeterol (ADVAIR) 250-50 MCG/DOSE AEROSOL POWDER, BREATH ACTIVATED  Inhale 2 Puffs by mouth 2 times a day as needed.             guaifenesin LA (MUCINEX) 600 MG TABLET SR 12 HR  Take 1 Tab by mouth every 12 hours.             nicotine (NICODERM) 21 MG/24HR PATCH 24 HR  Apply 1 Patch to skin as directed every 24 hours.             predniSONE (DELTASONE) 10 MG Tab  40 mg daily for 5 days, 30 mg daily for 5 days, 20 mg daily for 5 days, 10 mg daily for 5 days                 DIET  Orders Placed This Encounter   Procedures   • Diet Order     Standing Status:   Standing     Number of Occurrences:   1     Order Specific Question:   Diet:     Answer:   Regular [1]       ACTIVITY  As tolerated.  Weight bearing as tolerated      CONSULTATIONS  None    PROCEDURES  None    LABORATORY  Lab Results   Component Value Date/Time    SODIUM 131 (L) 10/27/2017 12:19 AM    POTASSIUM 4.3 10/27/2017 12:19 AM    CHLORIDE 99 10/27/2017 12:19 AM    CO2 25 10/27/2017 12:19 AM     GLUCOSE 83 10/27/2017 12:19 AM    BUN 14 10/27/2017 12:19 AM    CREATININE 0.83 10/27/2017 12:19 AM    CREATININE 0.9 04/07/2009 04:25 AM        Lab Results   Component Value Date/Time    WBC 7.5 10/28/2017 12:29 PM    HEMOGLOBIN 18.7 (H) 10/28/2017 12:29 PM    HEMATOCRIT 58.6 (H) 10/28/2017 12:29 PM    PLATELETCT 120 (L) 10/28/2017 12:29 PM        Total time of the discharge process exceeds 45 minutes

## 2017-10-30 NOTE — PROGRESS NOTES
Pt complains of a cough when laying flat and is requesting cough medication. Call placed to Dr. Vasquez for orders.

## 2017-10-30 NOTE — PROGRESS NOTES
Report received from day RN, and care assumed. Pt A&O x 4. Pt denies N/V, and pain at this time. Vital signs reviewed and are WNL. IV assessed and patent, with dressing CDI. Pt states he feels most comfortable sitting at the edge of the bed, but was able to lay down and go to sleep. He did complain of a cough, so an order for PRN robitussin was obtained and Pt states this greatly helped his cough. Pt is on 3 L and switches back and forth between the nasal cannula and oxy-mask so that he is able to sleep better. Plan is to discharge the Pt home 10/30 pending labs and how Pt is feeling.  POC discussed with Pt and questions answered. POC includes O2 monitoring, rest, and probable discharge in the morning. Bed is in the lowest position, rails are up, and call light is within reach. Communication board updated and hourly rounding implemented.

## 2017-10-31 ENCOUNTER — PATIENT OUTREACH (OUTPATIENT)
Dept: HEALTH INFORMATION MANAGEMENT | Facility: OTHER | Age: 72
End: 2017-10-31

## 2017-10-31 LAB
BACTERIA BLD CULT: NORMAL
BACTERIA BLD CULT: NORMAL
SIGNIFICANT IND 70042: NORMAL
SIGNIFICANT IND 70042: NORMAL
SITE SITE: NORMAL
SITE SITE: NORMAL
SOURCE SOURCE: NORMAL
SOURCE SOURCE: NORMAL

## 2017-10-31 NOTE — DISCHARGE INSTRUCTIONS
Discharge Instructions    Discharged to home by car with relative. Discharged via wheelchair, hospital escort: Yes.  Special equipment needed: oxygen    Be sure to schedule a follow-up appointment with your primary care doctor or any specialists as instructed.     Discharge Plan:   Smoking Cessation Offered: Patient Refused  Pneumococcal Vaccine Given - only chart on this line when given: Given (See MAR)  Influenza Vaccine Indication: Patient Refuses    I understand that a diet low in cholesterol, fat, and sodium is recommended for good health. Unless I have been given specific instructions below for another diet, I accept this instruction as my diet prescription.   Other diet: regular    Special Instructions: None    · Is patient discharged on Warfarin / Coumadin?   No     · Is patient Post Blood Transfusion?  No    Depression / Suicide Risk    As you are discharged from this [facility] facility, it is important to learn how to keep safe from harming yourself.    Recognize the warning signs:  · Abrupt changes in personality, positive or negative- including increase in energy   · Giving away possessions  · Change in eating patterns- significant weight changes-  positive or negative  · Change in sleeping patterns- unable to sleep or sleeping all the time   · Unwillingness or inability to communicate  · Depression  · Unusual sadness, discouragement and loneliness  · Talk of wanting to die  · Neglect of personal appearance   · Rebelliousness- reckless behavior  · Withdrawal from people/activities they love  · Confusion- inability to concentrate     If you or a loved one observes any of these behaviors or has concerns about self-harm, here's what you can do:  · Talk about it- your feelings and reasons for harming yourself  · Remove any means that you might use to hurt yourself (examples: pills, rope, extension cords, firearm)  · Get professional help from the community (Mental Health, Substance Abuse, psychological  counseling)  · Do not be alone:Call your Safe Contact- someone whom you trust who will be there for you.  · Call your local CRISIS HOTLINE 141-3356 or 920-903-6251  · Call your local Children's Mobile Crisis Response Team [place] (843) 525-1311 or www.[employer]  · Call the toll free National Suicide Prevention Hotlines   · National Suicide Prevention Lifeline 721-226-GLHL (6923)  · National Hope Line Network 800-SUICIDE (204-2651)

## 2017-11-01 ENCOUNTER — OFFICE VISIT (OUTPATIENT)
Dept: MEDICAL GROUP | Facility: CLINIC | Age: 72
End: 2017-11-01
Payer: MEDICARE

## 2017-11-01 ENCOUNTER — HOSPITAL ENCOUNTER (OUTPATIENT)
Dept: LAB | Facility: MEDICAL CENTER | Age: 72
End: 2017-11-01
Attending: NURSE PRACTITIONER
Payer: MEDICARE

## 2017-11-01 VITALS
TEMPERATURE: 96.7 F | SYSTOLIC BLOOD PRESSURE: 124 MMHG | BODY MASS INDEX: 37.92 KG/M2 | RESPIRATION RATE: 16 BRPM | HEIGHT: 63 IN | HEART RATE: 78 BPM | WEIGHT: 214 LBS | OXYGEN SATURATION: 92 % | DIASTOLIC BLOOD PRESSURE: 74 MMHG

## 2017-11-01 DIAGNOSIS — E87.1 HYPONATREMIA: ICD-10-CM

## 2017-11-01 DIAGNOSIS — D75.1 POLYCYTHEMIA: ICD-10-CM

## 2017-11-01 DIAGNOSIS — J44.1 CHRONIC OBSTRUCTIVE PULMONARY DISEASE WITH ACUTE EXACERBATION (HCC): ICD-10-CM

## 2017-11-01 DIAGNOSIS — G47.33 OSA (OBSTRUCTIVE SLEEP APNEA): ICD-10-CM

## 2017-11-01 DIAGNOSIS — J96.21 ACUTE ON CHRONIC RESPIRATORY FAILURE WITH HYPOXIA (HCC): ICD-10-CM

## 2017-11-01 DIAGNOSIS — F17.200 TOBACCO DEPENDENCE: ICD-10-CM

## 2017-11-01 DIAGNOSIS — Z09 HOSPITAL DISCHARGE FOLLOW-UP: ICD-10-CM

## 2017-11-01 PROBLEM — R06.03 RESPIRATORY DISTRESS: Status: RESOLVED | Noted: 2017-10-26 | Resolved: 2017-11-01

## 2017-11-01 LAB
ANION GAP SERPL CALC-SCNC: 8 MMOL/L (ref 0–11.9)
BUN SERPL-MCNC: 12 MG/DL (ref 8–22)
CALCIUM SERPL-MCNC: 9.6 MG/DL (ref 8.5–10.5)
CHLORIDE SERPL-SCNC: 94 MMOL/L (ref 96–112)
CO2 SERPL-SCNC: 34 MMOL/L (ref 20–33)
CREAT SERPL-MCNC: 0.59 MG/DL (ref 0.5–1.4)
GFR SERPL CREATININE-BSD FRML MDRD: >60 ML/MIN/1.73 M 2
GLUCOSE SERPL-MCNC: 99 MG/DL (ref 65–99)
HCT VFR BLD AUTO: 66 % (ref 42–52)
HGB BLD-MCNC: 21.4 G/DL (ref 14–18)
MORPHOLOGY BLD-IMP: NORMAL
POTASSIUM SERPL-SCNC: 4.5 MMOL/L (ref 3.6–5.5)
SODIUM SERPL-SCNC: 136 MMOL/L (ref 135–145)

## 2017-11-01 PROCEDURE — 36415 COLL VENOUS BLD VENIPUNCTURE: CPT

## 2017-11-01 PROCEDURE — 85014 HEMATOCRIT: CPT

## 2017-11-01 PROCEDURE — 85018 HEMOGLOBIN: CPT

## 2017-11-01 PROCEDURE — 99496 TRANSJ CARE MGMT HIGH F2F 7D: CPT | Performed by: NURSE PRACTITIONER

## 2017-11-01 PROCEDURE — 80048 BASIC METABOLIC PNL TOTAL CA: CPT

## 2017-11-01 ASSESSMENT — ENCOUNTER SYMPTOMS
COUGH: 1
HEADACHES: 0
FALLS: 0
SHORTNESS OF BREATH: 1
PALPITATIONS: 0
NAUSEA: 0
VOMITING: 0
EYE PAIN: 0
WEAKNESS: 0
CHILLS: 0
NERVOUS/ANXIOUS: 1
FOCAL WEAKNESS: 0
WHEEZING: 0
DEPRESSION: 0
ABDOMINAL PAIN: 0
SPUTUM PRODUCTION: 1
BLURRED VISION: 0
FEVER: 0
HEARTBURN: 0
MYALGIAS: 0
DIZZINESS: 0

## 2017-11-01 ASSESSMENT — PATIENT HEALTH QUESTIONNAIRE - PHQ9: CLINICAL INTERPRETATION OF PHQ2 SCORE: 0

## 2017-11-01 NOTE — PATIENT INSTRUCTIONS
If you need further assistance, or have any questions; concerns or lingering symptoms before seeing your Primary Care Provider or specialist.     Do not hesitate to contact us.     Please contact us at the Post-Hospital Follow Up Program at [phone].   Our offices hours are Monday-Friday 8 am-5 pm.

## 2017-11-01 NOTE — ASSESSMENT & PLAN NOTE
On oxygen.   Need sleep study, no order yet.  10/26  Ct of chest: No CT evidence of pulmonary embolism. Wall thickening of the bilateral lower lobe airways with associated small ill-defined groundglass nodular opacities in the lung bases, right more than left. This could relate to infection.

## 2017-11-01 NOTE — ASSESSMENT & PLAN NOTE
On aspirin? Pt does not remember the visit from hematologist and not sure if he is taking aspirin. No aspirin listed on his medication list.   Last phlebotomy in the hospital on 10/27.   Most recent Hct 58.6% on 10/28.   JAK2 mutation in the process. EPO 53, ferritin normal  Hematologist follow up appointment: No. Will call hematologist Dr. Garcia office to obtain follow up appointment.

## 2017-11-01 NOTE — PROGRESS NOTES
POST DISCHARGE CALL MADE BY Rina Abbott.  Discharge Date:10/30/2017   Date of Outreach Call: 10/31/2017 10:30 AM  Now that you're home, how are you doing? Poor  Comment:States anxious feeling alone.  Do you have questions about your medications? No    Did you fill your medications? Yes  Comment:Patient states he did not get albuterol for  nebulizer.  He will contact pharmacy to see if medicaition  order received.    Do you have a follow-up appointment scheduled?Yes  Comment:Discharge Clinic on 11/1/17/Scheduled Uber  pick-up for patient at 1:30 pm    Discharging Department: Orthopedics    Number of Attempts: 1  Current or previous attempts completed within two business days of discharge? Yes  Provided education regarding treatment plan, medication, self-management, ADLs? Yes  Has patient completed Advance Directive? If yes, advise them to bring to appointment. No  Care Manager phone number provided? Yes  Is there anything else I can help you with? No

## 2017-11-01 NOTE — PROGRESS NOTES
Subjective:     Gary Severino Gil is a 72 y.o. male who presents for Hospital Follow-up.  Chart reviewed. Discharge summary available for review: Yes   Date of discharge 10/30/2017.  48- hour post discharge RN call completed on 10/31/2017 and documented in the medical record by Rina Abbott..    HPI: Recently hospitalized for sob, coughing, found to have COPD exacerbation, polycythemia.     COPD (chronic obstructive pulmonary disease) (CMS-[facility])  On oxygen, new to him.   Dishcarged with albuterol, advair, augmentin through 11/6 and tapering prednisone.    Since returning home, patient reports feeling unable to get enough oxygen at times which cause him feel panic. He is now living alone, daughter living close by but might not be able to help him all the time. He was not using oxygen before but now he is on oxygen but he is not sure how many liter he is using. He said maybe 3-5 liter. He is noted that he would take off oxygen every few minutes. Pt reports that is because he does not want to freeze his nose. He is noted to a little bit sob after that but saturation has been good 91-93%.     The patient reports a little weakness but he does not need physical therapy. He feels somewhat difficulty taking care of self at home and feeling alone and scared about his oxygen level. He would benefit from home health nurse.   Patient reports taking medications as instructed but when asked medication name or how to take it, he seems to be a little bit confused and reports he takes whatever he has at home.     Respiratory failure  On oxygen.   Need sleep study, no order yet.  10/26  Ct of chest: No CT evidence of pulmonary embolism. Wall thickening of the bilateral lower lobe airways with associated small ill-defined groundglass nodular opacities in the lung bases, right more than left. This could relate to infection.      Polycythemia  On aspirin? Pt does not remember the visit from hematologist and not sure if he is taking  aspirin. No aspirin listed on his medication list.   Last phlebotomy in the hospital on 10/27.   Most recent Hct 58.6% on 10/28.   JAK2 mutation in the process. EPO 53, ferritin normal  Hematologist follow up appointment: No. Will call hematologist Dr. Garcia office to obtain follow up appointment.       Tobacco dependence  Pt has not smoked since discharge.   Pt has nicotine patch but he is not using it. He has no urge to smoke now.     MATTY (obstructive sleep apnea)  No sleep study order yet.    No PCP appointment. Outside [facility]. Pt to call to make an appointment in 4-8 weeks.     Patient Active Problem List    Diagnosis Date Noted   • Polycythemia 10/27/2017   • COPD (chronic obstructive pulmonary disease) (CMS-HCC) 02/10/2012   • Respiratory failure (CMS-HCC) 02/10/2012   • Tobacco dependence 02/10/2012   • Obesity 02/10/2012   • MATTY (obstructive sleep apnea) 02/10/2012   • EtOH dependence (CMS-HCC) 02/10/2012   • Hyponatremia 02/10/2012   • Thrombocytopenia (CMS-HCC) 02/10/2012         Allergies:   Morphine sulfate    Social History:  Social History   Substance Use Topics   • Smoking status: Current Every Day Smoker     Packs/day: 1.50     Years: 45.00     Types: Cigarettes   • Smokeless tobacco: Never Used   • Alcohol use 8.0 oz/week     16 Standard drinks or equivalent per week      Comment: noted 4 per day        ROS:  Review of Systems   Constitutional: Negative for chills, fever and malaise/fatigue.   HENT: Negative for hearing loss and tinnitus.    Eyes: Negative for blurred vision and pain.   Respiratory: Positive for cough, sputum production (LITTLE) and shortness of breath (ON EXERTION). Negative for wheezing.    Cardiovascular: Negative for chest pain, palpitations and leg swelling.   Gastrointestinal: Negative for abdominal pain, heartburn, nausea and vomiting.   Genitourinary: Negative for dysuria, frequency and urgency.   Musculoskeletal: Negative for falls ( mainly walk with cane at home, no fall  "incident) and myalgias.   Skin: Negative for rash.   Neurological: Negative for dizziness, focal weakness, weakness and headaches.   Psychiatric/Behavioral: Negative for depression. The patient is nervous/anxious (FEELING PANIC ATTACK WHEN HE FEELS NO OXYGEN).         Objective:     Blood pressure 124/74, pulse 78, temperature 35.9 °C (96.7 °F), resp. rate 16, height 1.6 m (5' 3\"), weight 97.1 kg (214 lb), SpO2 92 %.     Physical Exam:  Physical Exam   Constitutional: He is oriented to person, place, and time and well-developed, well-nourished, and in no distress.   HENT:   Head: Normocephalic and atraumatic.   Eyes: Conjunctivae are normal.   Neck: Neck supple. No JVD present.   Cardiovascular: Normal rate and regular rhythm.    No murmur heard.  Pulmonary/Chest: Effort normal. No respiratory distress. He has wheezes ( left > right).   Abdominal: Soft. Bowel sounds are normal. He exhibits no distension. There is no tenderness. There is no rebound.   Musculoskeletal: Normal range of motion. He exhibits no edema.   Neurological: He is alert and oriented to person, place, and time.   Skin: Skin is warm.   Vitals reviewed.        Assessment and Plan:     1. Hospital discharge follow-up  Hospitalization and results reviewed with patient. High risk conditions requiring teaching or care coordination were identified and addressed.The patient demonstrate understanding of admission and underlying conditions. The patient understands discharge instructions and when to seek medical attention. Medications reviewed including instructions regarding high risk medications, dosing and side effects.    The patient is able to safely adhere to ADL/IADL, treatment and medication regimen, self-manage of high-risk conditions? No   The patient requires physical therapy/home health/DME referral? Yes   The patient requires referral to care coordination/behavioral health/social work?  No   Patient requires referral for pharmacy consult? No "   Advance directive/POLST on file?  No   Required counseled on advance directive?  Yes     No PCP appointment. Outside [facility]. Pt to call to make an appointment in 4-8 weeks.   - REFERRAL TO HOME HEALTH  Pt is living alone. Will also need  consult for assisted living information for future consideration.    2. Chronic obstructive pulmonary disease with acute exacerbation (CMS-HCC)  - on advair and prn albuterol, tapering steroid, continue augmentin to complete the course.   - oxygen required.   - REFERRAL TO SLEEP STUDIES  - REFERRAL TO HOME HEALTH    3. Polycythemia  - Follow up with hematologist. No appointment yet, message left to Dr. Garcia office.   - HEMOGLOBIN AND HEMATOCRIT; Future  - aspirin 81 MG tablet; Take 1 Tab by mouth every day.  Dispense: 100 Tab; Refill: 3  - REFERRAL TO HOME HEALTH  - prn phlebotomy if Hct > 58%.    4. Acute on chronic respiratory failure with hypoxia (CMS-HCC)  oxygen  - HEMOGLOBIN AND HEMATOCRIT; Future  - REFERRAL TO SLEEP STUDIES  - REFERRAL TO HOME HEALTH    5. MATTY (obstructive sleep apnea)  - REFERRAL TO SLEEP STUDIES  - Patient identified as having weight management issue.  Appropriate orders and counseling given.  - REFERRAL TO HOME HEALTH    6. Tobacco dependence  quit    7. Class 2 obesity due to excess calories with serious comorbidity and body mass index (BMI) of 37.0 to 37.9 in adult  - Patient identified as having weight management issue.  Appropriate orders and counseling given.  - REFERRAL TO HOME HEALTH    8. Hyponatremia  - BASIC METABOLIC PANEL; Future      Medication Reconciliation  Medication list at end of encounter:   Current Outpatient Prescriptions   Medication Sig Dispense Refill   • fluticasone-salmeterol (ADVAIR) 250-50 MCG/DOSE AEROSOL POWDER, BREATH ACTIVATED Inhale 2 Puffs by mouth 2 times a day.     • aspirin 81 MG tablet Take 1 Tab by mouth every day. 100 Tab 3   • nicotine (NICODERM) 21 MG/24HR PATCH 24 HR Apply 1 Patch to skin as  directed every 24 hours. 7 Patch 0   • albuterol (PROVENTIL) 2.5mg/3ml Nebu Soln solution for nebulization 3 mL by Nebulization route every four hours as needed for Shortness of Breath. 120 Bullet 3   • amoxicillin-clavulanate (AUGMENTIN) 875-125 MG Tab Take 1 Tab by mouth 2 times a day for 7 days. 14 Tab 0   • predniSONE (DELTASONE) 10 MG Tab 40 mg daily for 5 days, 30 mg daily for 5 days, 20 mg daily for 5 days, 10 mg daily for 5 days 30 Tab 0   • guaifenesin LA (MUCINEX) 600 MG TABLET SR 12 HR Take 1 Tab by mouth every 12 hours. 28 Tab 0   • albuterol 108 (90 Base) MCG/ACT Aero Soln inhalation aerosol Inhale 2 Puffs by mouth every 6 hours as needed for Shortness of Breath.       No current facility-administered medications for this visit.        Primary care follow-up:  New health conditions identified during hospitalization? Yes   Labs/pathology/imaging requires future PCP follow-up?  No we will call pt for result  Changes to medications during hospitalization or today? Yes     Recommended followup: No Follow-up on file. with Edwin Valentin M.D.   Future Appointments       Provider Department Center    11/1/2017 2:20 PM LARRY Arredondo [facility]    3/20/2018 10:00 AM [facility] Pulmonary Medicine           Patient Instruction  Patient offered educational material on discharge diagnosis and management of symptoms/red flags. Patient instructed to keep follow-up appointments and to bring written questions and and actual medications to each office visit. Patient instructed to call PCP/specialist with any problems/questions/concerns. Patient verbalizes understanding and has no further questions at this time.    Face-to-face transitional care management services with high complexity medical decision making.            Face to Face Supporting Documentation - Home Health    The encounter with this patient was in whole or in part the primary reason for home health  admission.    Date of encounter:   Patient:                    MRN:                       YOB: 2019  Gary Severino Gil  [id number]  1945     Home health to see patient for:  Skilled Nursing care for assessment, interventions & education    Skilled need for:  Exacerbation of Chronic Disease State copd exacerbation    Skilled nursing interventions to include:  Comment: monitor for respiratory status, periodically lab collection if ordered, vital sign, fall precaution    Homebound status evidenced by:  Need the aid of supportive devices such as crutches, canes, wheelchairs or walkers or Require the use of special transportation. Leaving home requires a considerable and taxing effort. There is a normal inability to leave the home.    Community Physician to provide follow up care: Edwin Valentin M.D.     Optional Interventions? No      I certify the face to face encounter for this home health care referral meets the CMS requirements and the encounter/clinical assessment with the patient was, in whole, or in part, for the medical condition(s) listed above, which is the primary reason for home health care. Based on my clinical findings: the service(s) are medically necessary, support the need for home health care, and the homebound criteria are met.  I certify that this patient has had a face to face encounter by myself.  KATTY Arredondo. - NPI: [id number]

## 2017-11-01 NOTE — ASSESSMENT & PLAN NOTE
Pt has not smoked since discharge.   Pt has nicotine patch but he is not using it. He has no urge to smoke now.

## 2017-11-01 NOTE — ASSESSMENT & PLAN NOTE
On oxygen, new to him.   Dishcarged with albuterol, advair, augmentin through 11/6 and tapering prednisone.    Since returning home, patient reports feeling unable to get enough oxygen at times which cause him feel panic. He is now living alone, daughter living close by but might not be able to help him all the time. He was not using oxygen before but now he is on oxygen but he is not sure how many liter he is using. He said maybe 3-5 liter. He is noted that he would take off oxygen every few minutes. Pt reports that is because he does not want to freeze his nose. He is noted to a little bit sob after that but saturation has been good 91-93%.     The patient reports a little weakness but he does not need physical therapy. He feels somewhat difficulty taking care of self at home and feeling alone and scared about his oxygen level. He would benefit from home health nurse.   Patient reports taking medications as instructed but when asked medication name or how to take it, he seems to be a little bit confused and reports he takes whatever he has at home.

## 2017-11-02 ENCOUNTER — HOME HEALTH ADMISSION (OUTPATIENT)
Dept: HOME HEALTH SERVICES | Facility: HOME HEALTHCARE | Age: 72
End: 2017-11-02
Payer: MEDICARE

## 2017-11-02 ENCOUNTER — TELEPHONE (OUTPATIENT)
Dept: MEDICAL GROUP | Facility: CLINIC | Age: 72
End: 2017-11-02

## 2017-11-02 DIAGNOSIS — D75.1 POLYCYTHEMIA: ICD-10-CM

## 2017-11-02 NOTE — TELEPHONE ENCOUNTER
Patient aware of results and need for phlebotomy lab.  Will arrange lab date & time and call patient with information.

## 2017-11-02 NOTE — TELEPHONE ENCOUNTER
----- Message from LARRY Arredondo sent at 11/2/2017  1:14 PM PDT -----  Result reviewed. Please call patient to let him know that his hemoglobin and hematocrit has elevated more. I have placed therapeutic phlebotomy order for him. I don't think home health can do this so please assist him to get transportation to lab. I also placed another follow up hemoglobin/hematocrit order for home health to collect next Monday. Ask patient to see if he gets any call from hematologist office for follow up appointment. Make sure he is taking aspirin now.     Thanks.  PARKER Mathews), APRN

## 2017-11-03 ENCOUNTER — TELEPHONE (OUTPATIENT)
Dept: VASCULAR LAB | Facility: MEDICAL CENTER | Age: 72
End: 2017-11-03

## 2017-11-03 ENCOUNTER — HOME CARE VISIT (OUTPATIENT)
Dept: HOME HEALTH SERVICES | Facility: HOME HEALTHCARE | Age: 72
End: 2017-11-03
Payer: MEDICARE

## 2017-11-03 ENCOUNTER — HOSPITAL ENCOUNTER (OUTPATIENT)
Dept: LAB | Facility: MEDICAL CENTER | Age: 72
End: 2017-11-03
Attending: NURSE PRACTITIONER
Payer: MEDICARE

## 2017-11-03 VITALS
SYSTOLIC BLOOD PRESSURE: 116 MMHG | TEMPERATURE: 97.4 F | DIASTOLIC BLOOD PRESSURE: 80 MMHG | HEART RATE: 82 BPM | BODY MASS INDEX: 32.74 KG/M2 | RESPIRATION RATE: 18 BRPM | WEIGHT: 191.8 LBS | OXYGEN SATURATION: 98 % | HEIGHT: 64 IN

## 2017-11-03 DIAGNOSIS — D75.1 POLYCYTHEMIA: ICD-10-CM

## 2017-11-03 LAB
HCT VFR BLD AUTO: 65.5 % (ref 42–52)
HGB BLD-MCNC: 21.6 G/DL (ref 14–18)
JAK2 P.V617F MUT/NOR BLD/T: 0 %

## 2017-11-03 PROCEDURE — 85014 HEMATOCRIT: CPT

## 2017-11-03 PROCEDURE — 36415 COLL VENOUS BLD VENIPUNCTURE: CPT

## 2017-11-03 PROCEDURE — G0162 HHC RN E&M PLAN SVS, 15 MIN: HCPCS

## 2017-11-03 PROCEDURE — 665001 SOC-HOME HEALTH

## 2017-11-03 PROCEDURE — 85018 HEMOGLOBIN: CPT

## 2017-11-03 PROCEDURE — 665999 HH PPS REVENUE DEBIT

## 2017-11-03 PROCEDURE — 665998 HH PPS REVENUE CREDIT

## 2017-11-03 SDOH — ECONOMIC STABILITY: HOUSING INSECURITY: UNSAFE APPLIANCES: 0

## 2017-11-03 SDOH — ECONOMIC STABILITY: HOUSING INSECURITY: UNSAFE COOKING RANGE AREA: 0

## 2017-11-03 SDOH — ECONOMIC STABILITY: HOUSING INSECURITY: HOME SAFETY: IALS PRESENT IN THE HOME.

## 2017-11-03 SDOH — ECONOMIC STABILITY: HOUSING INSECURITY
HOME SAFETY: OXYGEN SAFETY RISK ASSESSMENT PERFORMED. PATIENT DOES NOT HAVE A NO SMOKING SIGN POSTED IN THE HOME., PT WAS GIVEN A NO SMOKING SIGN AND PROVIDED EDUCATION ABOUT WHY IT IS IMPORTANT TO PLACE ONE. PATIENT DOES HAVE A WORKING FIRE EXTINGUISHER PRESENT

## 2017-11-03 SDOH — ECONOMIC STABILITY: HOUSING INSECURITY
HOME SAFETY: IN THE HOME. SMOKE ALARMS ARE PRESENT AND FUNCTIONAL ON EACH LEVEL OF THE HOME. PATIENT DOES HAVE A FIRE ESCAPE PLAN DEVELOPED. PATIENT DOES NOT HAVE FLAMMABLE MATERIALS PRESENT IN THE HOME PRESENTING A FIRE HAZARD. NO EVIDENCE FOUND OF SMOKING MATER

## 2017-11-03 ASSESSMENT — PATIENT HEALTH QUESTIONNAIRE - PHQ9
1. LITTLE INTEREST OR PLEASURE IN DOING THINGS: 00
2. FEELING DOWN, DEPRESSED, IRRITABLE, OR HOPELESS: 00

## 2017-11-03 ASSESSMENT — ENCOUNTER SYMPTOMS: SEVERE DYSPNEA: 1

## 2017-11-03 ASSESSMENT — ACTIVITIES OF DAILY LIVING (ADL): HOME_HEALTH_OASIS: 01

## 2017-11-04 ENCOUNTER — HOME CARE VISIT (OUTPATIENT)
Dept: HOME HEALTH SERVICES | Facility: HOME HEALTHCARE | Age: 72
End: 2017-11-04
Payer: MEDICARE

## 2017-11-04 PROCEDURE — 665999 HH PPS REVENUE DEBIT

## 2017-11-04 PROCEDURE — 665998 HH PPS REVENUE CREDIT

## 2017-11-05 PROCEDURE — 665999 HH PPS REVENUE DEBIT

## 2017-11-05 PROCEDURE — 665998 HH PPS REVENUE CREDIT

## 2017-11-06 ENCOUNTER — HOME CARE VISIT (OUTPATIENT)
Dept: HOME HEALTH SERVICES | Facility: HOME HEALTHCARE | Age: 72
End: 2017-11-06
Payer: MEDICARE

## 2017-11-06 ENCOUNTER — TELEPHONE (OUTPATIENT)
Dept: MEDICAL GROUP | Facility: CLINIC | Age: 72
End: 2017-11-06

## 2017-11-06 PROCEDURE — G0152 HHCP-SERV OF OT,EA 15 MIN: HCPCS

## 2017-11-06 PROCEDURE — 665998 HH PPS REVENUE CREDIT

## 2017-11-06 PROCEDURE — 665999 HH PPS REVENUE DEBIT

## 2017-11-06 NOTE — TELEPHONE ENCOUNTER
Left generic message for patient to contact office at [phone].   Patient scheduled with Hematology on 11/09/17 at 9 am.

## 2017-11-06 NOTE — TELEPHONE ENCOUNTER
----- Message from LARRY Arredondo sent at 11/6/2017  8:25 AM PST -----  Result reviewed. Pt is status post phlebotomy on 11/3. Did you hear anything from hematologist Dr. Garcia office?     Thanks a lot.  PARKER (Tess), APRN

## 2017-11-07 ENCOUNTER — HOME CARE VISIT (OUTPATIENT)
Dept: HOME HEALTH SERVICES | Facility: HOME HEALTHCARE | Age: 72
End: 2017-11-07
Payer: MEDICARE

## 2017-11-07 ENCOUNTER — HOSPITAL ENCOUNTER (OUTPATIENT)
Facility: MEDICAL CENTER | Age: 72
End: 2017-11-07
Attending: FAMILY MEDICINE
Payer: MEDICARE

## 2017-11-07 VITALS
RESPIRATION RATE: 20 BRPM | TEMPERATURE: 98.1 F | DIASTOLIC BLOOD PRESSURE: 62 MMHG | HEART RATE: 82 BPM | OXYGEN SATURATION: 98 % | SYSTOLIC BLOOD PRESSURE: 122 MMHG

## 2017-11-07 VITALS
DIASTOLIC BLOOD PRESSURE: 82 MMHG | OXYGEN SATURATION: 93 % | TEMPERATURE: 98.2 F | HEART RATE: 84 BPM | SYSTOLIC BLOOD PRESSURE: 112 MMHG | RESPIRATION RATE: 18 BRPM

## 2017-11-07 LAB
HCT VFR BLD AUTO: 62.7 % (ref 42–52)
HGB BLD-MCNC: 20.8 G/DL (ref 14–18)

## 2017-11-07 PROCEDURE — 665998 HH PPS REVENUE CREDIT

## 2017-11-07 PROCEDURE — 85014 HEMATOCRIT: CPT

## 2017-11-07 PROCEDURE — 665999 HH PPS REVENUE DEBIT

## 2017-11-07 PROCEDURE — G0151 HHCP-SERV OF PT,EA 15 MIN: HCPCS

## 2017-11-07 PROCEDURE — 36415 COLL VENOUS BLD VENIPUNCTURE: CPT

## 2017-11-07 PROCEDURE — G0299 HHS/HOSPICE OF RN EA 15 MIN: HCPCS

## 2017-11-07 PROCEDURE — G0155 HHCP-SVS OF CSW,EA 15 MIN: HCPCS

## 2017-11-07 PROCEDURE — 85018 HEMOGLOBIN: CPT

## 2017-11-07 SDOH — ECONOMIC STABILITY: HOUSING INSECURITY: UNSAFE COOKING RANGE AREA: 0

## 2017-11-07 SDOH — ECONOMIC STABILITY: HOUSING INSECURITY: UNSAFE APPLIANCES: 0

## 2017-11-07 ASSESSMENT — ACTIVITIES OF DAILY LIVING (ADL): ADLS_COMMENTS: !--EPICE-->

## 2017-11-07 ASSESSMENT — ENCOUNTER SYMPTOMS
NAUSEA: DENIES
VOMITING: DENIES

## 2017-11-07 NOTE — TELEPHONE ENCOUNTER
Patient informed of appointment scheduled with Dr Garcia on 11/09/17 at 9 am.  Patient agreed to appointment date and time.

## 2017-11-08 VITALS
DIASTOLIC BLOOD PRESSURE: 62 MMHG | RESPIRATION RATE: 20 BRPM | SYSTOLIC BLOOD PRESSURE: 122 MMHG | HEART RATE: 82 BPM | OXYGEN SATURATION: 98 % | TEMPERATURE: 98.1 F

## 2017-11-08 PROCEDURE — 665998 HH PPS REVENUE CREDIT

## 2017-11-08 PROCEDURE — 665999 HH PPS REVENUE DEBIT

## 2017-11-08 SDOH — ECONOMIC STABILITY: HOUSING INSECURITY
HOME SAFETY: OXYGEN SAFETY RISK ASSESSMENT PERFORMED. PATIENT DOES HAVE A NO SMOKING SIGN POSTED IN THE HOME. PATIENT DOES HAVE A WORKING FIRE EXTINGUISHER PRESENT IN THE HOME. SMOKE ALARMS ARE PRESENT AND FUNCTIONAL ON EACH LEVEL OF THE HOME. PATIENT DOES HAVE A

## 2017-11-08 SDOH — ECONOMIC STABILITY: HOUSING INSECURITY
HOME SAFETY: FIRE ESCAPE PLAN DEVELOPED. PATIENT DOES NOT HAVE FLAMMABLE MATERIALS PRESENT IN THE HOME PRESENTING A FIRE HAZARD. NO EVIDENCE FOUND OF SMOKING MATERIALS PRESENT IN THE HOME.

## 2017-11-08 SDOH — ECONOMIC STABILITY: HOUSING INSECURITY: UNSAFE APPLIANCES: 0

## 2017-11-08 SDOH — ECONOMIC STABILITY: HOUSING INSECURITY: UNSAFE COOKING RANGE AREA: 0

## 2017-11-08 ASSESSMENT — ENCOUNTER SYMPTOMS: SHORTNESS OF BREATH: T

## 2017-11-09 ENCOUNTER — OFFICE VISIT (OUTPATIENT)
Dept: HEMATOLOGY ONCOLOGY | Facility: MEDICAL CENTER | Age: 72
End: 2017-11-09
Payer: MEDICARE

## 2017-11-09 VITALS
RESPIRATION RATE: 16 BRPM | SYSTOLIC BLOOD PRESSURE: 120 MMHG | BODY MASS INDEX: 36.82 KG/M2 | TEMPERATURE: 96.7 F | HEIGHT: 63 IN | HEART RATE: 91 BPM | DIASTOLIC BLOOD PRESSURE: 78 MMHG | OXYGEN SATURATION: 92 % | WEIGHT: 207.78 LBS

## 2017-11-09 DIAGNOSIS — D75.1 POLYCYTHEMIA: ICD-10-CM

## 2017-11-09 PROCEDURE — 665998 HH PPS REVENUE CREDIT

## 2017-11-09 PROCEDURE — 99213 OFFICE O/P EST LOW 20 MIN: CPT | Performed by: INTERNAL MEDICINE

## 2017-11-09 PROCEDURE — 665999 HH PPS REVENUE DEBIT

## 2017-11-09 ASSESSMENT — PAIN SCALES - GENERAL: PAINLEVEL: NO PAIN

## 2017-11-09 NOTE — LETTER
2017        Gary Severino Gil  : 1945        To Whom It May Concern:      Mr. Hassan is a patient of mine. He is diagnosed with secondary polycythemia. It is ok to donate blood monthly.                           ____________________________________________  Ivan Garcia, Medical Oncologist/Hematologist

## 2017-11-10 PROCEDURE — 665999 HH PPS REVENUE DEBIT

## 2017-11-10 PROCEDURE — 665998 HH PPS REVENUE CREDIT

## 2017-11-11 PROCEDURE — 665999 HH PPS REVENUE DEBIT

## 2017-11-11 PROCEDURE — 665998 HH PPS REVENUE CREDIT

## 2017-11-12 PROCEDURE — 665999 HH PPS REVENUE DEBIT

## 2017-11-12 PROCEDURE — 665998 HH PPS REVENUE CREDIT

## 2017-11-13 ENCOUNTER — HOME CARE VISIT (OUTPATIENT)
Dept: HOME HEALTH SERVICES | Facility: HOME HEALTHCARE | Age: 72
End: 2017-11-13
Payer: MEDICARE

## 2017-11-13 PROCEDURE — 665998 HH PPS REVENUE CREDIT

## 2017-11-13 PROCEDURE — 665999 HH PPS REVENUE DEBIT

## 2017-11-13 ASSESSMENT — ACTIVITIES OF DAILY LIVING (ADL): OASIS_M1830: 02

## 2017-11-13 NOTE — PROGRESS NOTES
"Date of visit: 11/9/2017  4:00 PM      Chief Complaint- secondary polycythemia      History of presenting illness: Gary Severino Gil  is a 72 y.o. 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 He was recently admitted to the hospital with pneumonia and required a unit of phlebotomy. Testing for JAK2 mutation erythropoietin levels were not consistent with primary polycythemia. He is currently feeling better. He is here for a posthospital discharge follow-up    Past Medical History:      Past Medical History:   Diagnosis Date   • Chronic obstructive pulmonary disease (CMS-HCC)    • MRSA (methicillin resistant Staphylococcus aureus)    • Productive cough     \"smoker\"   • Snoring     sleep apnea questionairre completed       Past surgical history:       Past Surgical History:   Procedure Laterality Date   • FLAP GRAFT  6/1/2010    Performed by FRANCESCA VIDAL at SURGERY [facility] ORS   • FISTULA IN ANO REPAIR  6/1/2010    Performed by FRANCESCA VIDAL at SURGERY [facility] ORS   • RECTAL EXPLORATION  11/17/2009    Performed by FRANCESCA VIDAL at SURGERY SAME DAY [facility] ORS   • DEBRIDEMENT  11/17/2009    Performed by FRANCESCA VIDAL at SURGERY SAME DAY [facility] ORS   • RECTAL EXPLORATION  9/3/2009    Performed by FRANCESCA VIDAL at SURGERY SAME DAY [facility] ORS   • DEBRIDEMENT  9/3/2009    Performed by FRANCESCA VIDAL at SURGERY SAME DAY [facility] ORS   • LUIS ENRIQUE RECTAL ABSCESS INCISION AND DRAINAGE  4/6/2009    Performed by FRANCESCA VIDAL at SURGERY [facility] ORS   • LUMBAR FUSION POSTERIOR  1987   • LUMBAR FUSION ANTERIOR  1983       Allergies:       Morphine sulfate    Medications:         Current Outpatient Prescriptions   Medication Sig Dispense Refill   • aspirin 81 MG tablet Take 1 Tab by mouth every day. 100 Tab 3   • albuterol (PROVENTIL) 2.5mg/3ml Nebu Soln solution for nebulization 3 mL by Nebulization route every four hours as needed for " Shortness of Breath. 120 Bullet 3   • predniSONE (DELTASONE) 10 MG Tab 40 mg daily for 5 days, 30 mg daily for 5 days, 20 mg daily for 5 days, 10 mg daily for 5 days 30 Tab 0   • guaifenesin LA (MUCINEX) 600 MG TABLET SR 12 HR Take 1 Tab by mouth every 12 hours. 28 Tab 0   • non-formulary med Spray 4 L/min in nose. per mask and nasal cannula as he wants.     • fluticasone-salmeterol (ADVAIR) 250-50 MCG/DOSE AEROSOL POWDER, BREATH ACTIVATED Inhale 2 Puffs by mouth 2 times a day.     • nicotine (NICODERM) 21 MG/24HR PATCH 24 HR Apply 1 Patch to skin as directed every 24 hours. 7 Patch 0   • albuterol 108 (90 Base) MCG/ACT Aero Soln inhalation aerosol Inhale 2 Puffs by mouth every 6 hours as needed for Shortness of Breath.       No current facility-administered medications for this visit.          Social History:     Social History     Social History   • Marital status:      Spouse name: N/A   • Number of children: N/A   • Years of education: N/A     Occupational History   • Not on file.     Social History Main Topics   • Smoking status: Former Smoker     Packs/day: 1.50     Years: 45.00     Types: Cigarettes     Quit date: 10/27/2017   • Smokeless tobacco: Never Used   • Alcohol use 8.0 oz/week     16 Standard drinks or equivalent per week      Comment: noted 4 per day   • Drug use: No   • Sexual activity: Not on file     Other Topics Concern   • Not on file     Social History Narrative   • No narrative on file       Family History:      Family History   Problem Relation Age of Onset   • Stroke         Review of Systems:  All other review of systems are negative except what was mentioned above in the HPI.    Constitutional: Negative for fever, chills, weight loss and malaise/fatigue.    HEENT: No new auditory or visual complaints. No sore throat and neck pain.     Respiratory: Negative for cough, sputum production, Positive shortness of breath and wheezing.    Cardiovascular: Negative for chest pain,  "palpitations, orthopnea and leg swelling.    Gastrointestinal: Negative for heartburn, nausea, vomiting and abdominal pain.    Genitourinary: Negative for dysuria, hematuria    Musculoskeletal: No new arthralgias or myalgias   Skin: Negative for rash and itching.    Neurological: Negative for focal weakness and headaches.    Endo/Heme/Allergies: No abnormal bleed/bruise.    Psychiatric/Behavioral: No new depression/anxiety.    Physical Exam:  Vitals: /78   Pulse 91   Temp 35.9 °C (96.7 °F)   Resp 16   Ht 1.6 m (5' 2.99\")   Wt 94.3 kg (207 lb 12.5 oz)   SpO2 92%   BMI 36.82 kg/m²     General: Not in acute distress, alert and oriented x 3, plethoric  HEENT: No pallor, icterus. Oropharynx clear.   Neck: Supple, no palpable masses.  Lymph nodes: No palpable cervical, supraclavicular, axillary or inguinal lymphadenopathy.    CVS: regular rate and rhythm, no rubs or gallops  RESP: Clear to auscultate bilaterally, no wheezing or crackles.   ABD: Soft, non tender, non distended, positive bowel sounds, no palpable organomegaly  EXT: No edema or cyanosis  CNS: Alert and oriented x3, No focal deficits.  Skin- No rash      Labs:   Hospital Outpatient Visit on 11/07/2017   Component Date Value Ref Range Status   • Hematocrit 11/07/2017 62.7* 42.0 - 52.0 % Final   • Hemoglobin 11/07/2017 20.8* 14.0 - 18.0 g/dL Final   Hospital Outpatient Visit on 11/03/2017   Component Date Value Ref Range Status   • Hemoglobin 11/03/2017 21.6* 14.0 - 18.0 g/dL Final   • Hematocrit 11/03/2017 65.5* 42.0 - 52.0 % Final             Assessment and Plan:Secondary polycythemia-JAK2 mutation testing was negative. Erythropoietin is slightly on the elevated side. No evidence of malignancy seen in the recent CT scans.    Informed him that there is no clear-cut evidence for ideal threshold for phlebotomy in the case of second polycythemia. I would prefer to keep his hematocrit 58-60% range. His posthospital discharge follow-up hematocrit is " above 60%. Informed him that we can arrange monthly phlebotomies. He apparently have a rare type of blood group and would favor donating his blood , which is think is reasonable. I have given  him a letter stating that he does not have any contraindication for blood transfusion from a hematology standpoint. I will see him back in around 3 months from now. Instructed him to continue aspirin indefinitely    He agreed and verbalized his agreement and understanding with the current plan.  I answered all questions and concerns he has at this time         Please note that this dictation was created using voice recognition software. I have made every reasonable attempt to correct obvious errors, but I expect that there are errors of grammar and possibly content that I did not discover before finalizing the note.      SIGNATURES:  Ivan Garcia    CC:  ANNE MARIE Fraser Ya-Ju, A.P.R.N.

## 2017-11-14 PROCEDURE — 665998 HH PPS REVENUE CREDIT

## 2017-11-14 PROCEDURE — 665999 HH PPS REVENUE DEBIT

## 2017-11-15 ENCOUNTER — HOME CARE VISIT (OUTPATIENT)
Dept: HOME HEALTH SERVICES | Facility: HOME HEALTHCARE | Age: 72
End: 2017-11-15
Payer: MEDICARE

## 2017-11-15 PROCEDURE — 665998 HH PPS REVENUE CREDIT

## 2017-11-15 PROCEDURE — 665999 HH PPS REVENUE DEBIT

## 2017-11-16 PROCEDURE — 665998 HH PPS REVENUE CREDIT

## 2017-11-16 PROCEDURE — 665999 HH PPS REVENUE DEBIT

## 2017-11-17 ENCOUNTER — HOME CARE VISIT (OUTPATIENT)
Dept: HOME HEALTH SERVICES | Facility: HOME HEALTHCARE | Age: 72
End: 2017-11-17
Payer: MEDICARE

## 2017-11-17 PROCEDURE — 665999 HH PPS REVENUE DEBIT

## 2017-11-17 PROCEDURE — 665998 HH PPS REVENUE CREDIT

## 2017-11-18 PROCEDURE — 665998 HH PPS REVENUE CREDIT

## 2017-11-18 PROCEDURE — 665999 HH PPS REVENUE DEBIT

## 2017-11-19 PROCEDURE — 665999 HH PPS REVENUE DEBIT

## 2017-11-19 PROCEDURE — 665998 HH PPS REVENUE CREDIT

## 2017-11-19 SDOH — ECONOMIC STABILITY: HOUSING INSECURITY: UNSAFE APPLIANCES: 0

## 2017-11-19 SDOH — ECONOMIC STABILITY: HOUSING INSECURITY: UNSAFE COOKING RANGE AREA: 0

## 2017-11-19 ASSESSMENT — ACTIVITIES OF DAILY LIVING (ADL)
OASIS_M1830: 01
HOME_HEALTH_OASIS: 00

## 2017-11-20 PROCEDURE — G0180 MD CERTIFICATION HHA PATIENT: HCPCS | Performed by: FAMILY MEDICINE

## 2017-11-20 PROCEDURE — 665999 HH PPS REVENUE DEBIT

## 2017-11-20 PROCEDURE — 665998 HH PPS REVENUE CREDIT

## 2017-11-21 PROCEDURE — 665999 HH PPS REVENUE DEBIT

## 2017-11-21 PROCEDURE — 665998 HH PPS REVENUE CREDIT

## 2017-11-22 PROCEDURE — 665998 HH PPS REVENUE CREDIT

## 2017-11-22 PROCEDURE — 665999 HH PPS REVENUE DEBIT

## 2017-11-23 PROCEDURE — 665998 HH PPS REVENUE CREDIT

## 2017-11-23 PROCEDURE — 665999 HH PPS REVENUE DEBIT

## 2017-11-24 PROCEDURE — 665999 HH PPS REVENUE DEBIT

## 2017-11-24 PROCEDURE — 665998 HH PPS REVENUE CREDIT

## 2017-11-25 PROCEDURE — 665998 HH PPS REVENUE CREDIT

## 2017-11-25 PROCEDURE — 665999 HH PPS REVENUE DEBIT

## 2017-11-26 PROCEDURE — 665999 HH PPS REVENUE DEBIT

## 2017-11-26 PROCEDURE — 665998 HH PPS REVENUE CREDIT

## 2017-11-27 PROCEDURE — 665999 HH PPS REVENUE DEBIT

## 2017-11-27 PROCEDURE — 665998 HH PPS REVENUE CREDIT

## 2017-11-28 PROCEDURE — 665999 HH PPS REVENUE DEBIT

## 2017-11-28 PROCEDURE — 665998 HH PPS REVENUE CREDIT

## 2017-11-29 PROCEDURE — 665999 HH PPS REVENUE DEBIT

## 2017-11-29 PROCEDURE — 665998 HH PPS REVENUE CREDIT

## 2017-12-04 ENCOUNTER — PATIENT OUTREACH (OUTPATIENT)
Dept: HEALTH INFORMATION MANAGEMENT | Facility: OTHER | Age: 72
End: 2017-12-04

## 2017-12-04 DIAGNOSIS — J44.9 CHRONIC OBSTRUCTIVE PULMONARY DISEASE, UNSPECIFIED COPD TYPE (HCC): ICD-10-CM

## 2018-01-05 ENCOUNTER — HOSPITAL ENCOUNTER (OUTPATIENT)
Dept: LAB | Facility: MEDICAL CENTER | Age: 73
End: 2018-01-05
Attending: FAMILY MEDICINE
Payer: MEDICARE

## 2018-01-05 LAB
ALBUMIN SERPL BCP-MCNC: 4 G/DL (ref 3.2–4.9)
ALBUMIN/GLOB SERPL: 1.4 G/DL
ALP SERPL-CCNC: 57 U/L (ref 30–99)
ALT SERPL-CCNC: 12 U/L (ref 2–50)
ANION GAP SERPL CALC-SCNC: 6 MMOL/L (ref 0–11.9)
AST SERPL-CCNC: 16 U/L (ref 12–45)
BASOPHILS # BLD AUTO: 1.2 % (ref 0–1.8)
BASOPHILS # BLD: 0.12 K/UL (ref 0–0.12)
BILIRUB SERPL-MCNC: 0.6 MG/DL (ref 0.1–1.5)
BUN SERPL-MCNC: 14 MG/DL (ref 8–22)
CALCIUM SERPL-MCNC: 9.2 MG/DL (ref 8.5–10.5)
CHLORIDE SERPL-SCNC: 102 MMOL/L (ref 96–112)
CO2 SERPL-SCNC: 30 MMOL/L (ref 20–33)
CREAT SERPL-MCNC: 0.83 MG/DL (ref 0.5–1.4)
EOSINOPHIL # BLD AUTO: 0.28 K/UL (ref 0–0.51)
EOSINOPHIL NFR BLD: 2.8 % (ref 0–6.9)
ERYTHROCYTE [DISTWIDTH] IN BLOOD BY AUTOMATED COUNT: 58.2 FL (ref 35.9–50)
GFR SERPL CREATININE-BSD FRML MDRD: >60 ML/MIN/1.73 M 2
GLOBULIN SER CALC-MCNC: 2.8 G/DL (ref 1.9–3.5)
GLUCOSE SERPL-MCNC: 94 MG/DL (ref 65–99)
HCT VFR BLD AUTO: 56.7 % (ref 42–52)
HGB BLD-MCNC: 18.7 G/DL (ref 14–18)
IMM GRANULOCYTES # BLD AUTO: 0.1 K/UL (ref 0–0.11)
IMM GRANULOCYTES NFR BLD AUTO: 1 % (ref 0–0.9)
LYMPHOCYTES # BLD AUTO: 1.75 K/UL (ref 1–4.8)
LYMPHOCYTES NFR BLD: 17.5 % (ref 22–41)
MCH RBC QN AUTO: 31.7 PG (ref 27–33)
MCHC RBC AUTO-ENTMCNC: 33 G/DL (ref 33.7–35.3)
MCV RBC AUTO: 96.1 FL (ref 81.4–97.8)
MONOCYTES # BLD AUTO: 1.1 K/UL (ref 0–0.85)
MONOCYTES NFR BLD AUTO: 11 % (ref 0–13.4)
NEUTROPHILS # BLD AUTO: 6.63 K/UL (ref 1.82–7.42)
NEUTROPHILS NFR BLD: 66.5 % (ref 44–72)
NRBC # BLD AUTO: 0 K/UL
NRBC BLD-RTO: 0 /100 WBC
PLATELET # BLD AUTO: 206 K/UL (ref 164–446)
PMV BLD AUTO: 10.3 FL (ref 9–12.9)
POTASSIUM SERPL-SCNC: 4.2 MMOL/L (ref 3.6–5.5)
PROT SERPL-MCNC: 6.8 G/DL (ref 6–8.2)
RBC # BLD AUTO: 5.9 M/UL (ref 4.7–6.1)
SODIUM SERPL-SCNC: 138 MMOL/L (ref 135–145)
WBC # BLD AUTO: 10 K/UL (ref 4.8–10.8)

## 2018-01-05 PROCEDURE — 80053 COMPREHEN METABOLIC PANEL: CPT

## 2018-01-05 PROCEDURE — 36415 COLL VENOUS BLD VENIPUNCTURE: CPT

## 2018-01-05 PROCEDURE — 85025 COMPLETE CBC W/AUTO DIFF WBC: CPT

## 2018-01-15 ENCOUNTER — OFFICE VISIT (OUTPATIENT)
Dept: HEMATOLOGY ONCOLOGY | Facility: MEDICAL CENTER | Age: 73
End: 2018-01-15
Payer: MEDICARE

## 2018-01-15 VITALS
HEIGHT: 63 IN | WEIGHT: 222.55 LBS | DIASTOLIC BLOOD PRESSURE: 59 MMHG | SYSTOLIC BLOOD PRESSURE: 100 MMHG | HEART RATE: 89 BPM | RESPIRATION RATE: 16 BRPM | TEMPERATURE: 97.5 F | OXYGEN SATURATION: 82 % | BODY MASS INDEX: 39.43 KG/M2

## 2018-01-15 DIAGNOSIS — D75.1 POLYCYTHEMIA: ICD-10-CM

## 2018-01-15 PROCEDURE — 99213 OFFICE O/P EST LOW 20 MIN: CPT | Performed by: INTERNAL MEDICINE

## 2018-01-15 ASSESSMENT — PAIN SCALES - GENERAL: PAINLEVEL: NO PAIN

## 2018-01-15 NOTE — PROGRESS NOTES
"Date of visit: 1/15/2018  10:42 AM      Chief Complaint-  secondary polycythemia        History of presenting illness: Gary Severino Gil  is a 72 y.o. 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 He was admitted to the hospital with pneumonia and required a unit of phlebotomy. Testing for JAK2 mutation erythropoietin levels were not consistent with primary polycythemia.  The patient was more interested in donating his blood occasionally, however, the blood bank did not accept it. He is here for follow-up visit. Recent CBC shows stable hemoglobin level of 18.7 and hematocrit of 56%. His hemoglobin and hematocrit tends to run high in the i-STAT testing.    Past Medical History:      Past Medical History:   Diagnosis Date   • Chronic obstructive pulmonary disease (CMS-HCC)    • MRSA (methicillin resistant Staphylococcus aureus)    • Productive cough     \"smoker\"   • Snoring     sleep apnea questionairre completed       Past surgical history:       Past Surgical History:   Procedure Laterality Date   • FLAP GRAFT  6/1/2010    Performed by FRANCESCA VIDAL at SURGERY [facility] ORS   • FISTULA IN ANO REPAIR  6/1/2010    Performed by FRANCESCA VIDAL at SURGERY [facility] ORS   • RECTAL EXPLORATION  11/17/2009    Performed by FRANCESCA VIDAL at SURGERY SAME DAY [facility] ORS   • DEBRIDEMENT  11/17/2009    Performed by FRANCESCA VIDAL at SURGERY SAME DAY [facility] ORS   • RECTAL EXPLORATION  9/3/2009    Performed by FRANCESCA VIDAL at SURGERY SAME DAY [facility] ORS   • DEBRIDEMENT  9/3/2009    Performed by FRANCESCA VIDAL at SURGERY SAME DAY [facility] ORS   • LUIS ENRIQUE RECTAL ABSCESS INCISION AND DRAINAGE  4/6/2009    Performed by FRANCESCA VIDAL at SURGERY [facility] ORS   • LUMBAR FUSION POSTERIOR  1987   • LUMBAR FUSION ANTERIOR  1983       Allergies:       Morphine sulfate    Medications:         Current Outpatient Prescriptions   Medication Sig Dispense " Refill   • non-formulary med Spray 4 L/min in nose. per mask and nasal cannula as he wants.     • aspirin 81 MG tablet Take 1 Tab by mouth every day. (Patient not taking: Reported on 11/19/2017) 100 Tab 3   • fluticasone-salmeterol (ADVAIR) 250-50 MCG/DOSE AEROSOL POWDER, BREATH ACTIVATED Inhale 2 Puffs by mouth 2 times a day.     • nicotine (NICODERM) 21 MG/24HR PATCH 24 HR Apply 1 Patch to skin as directed every 24 hours. 7 Patch 0   • albuterol (PROVENTIL) 2.5mg/3ml Nebu Soln solution for nebulization 3 mL by Nebulization route every four hours as needed for Shortness of Breath. 120 Bullet 3   • predniSONE (DELTASONE) 10 MG Tab 40 mg daily for 5 days, 30 mg daily for 5 days, 20 mg daily for 5 days, 10 mg daily for 5 days 30 Tab 0   • guaifenesin LA (MUCINEX) 600 MG TABLET SR 12 HR Take 1 Tab by mouth every 12 hours. 28 Tab 0   • albuterol 108 (90 Base) MCG/ACT Aero Soln inhalation aerosol Inhale 2 Puffs by mouth every 6 hours as needed for Shortness of Breath.       No current facility-administered medications for this visit.          Social History:     Social History     Social History   • Marital status:      Spouse name: N/A   • Number of children: N/A   • Years of education: N/A     Occupational History   • Not on file.     Social History Main Topics   • Smoking status: Former Smoker     Packs/day: 1.50     Years: 45.00     Types: Cigarettes     Quit date: 10/27/2017   • Smokeless tobacco: Never Used   • Alcohol use 8.0 oz/week     16 Standard drinks or equivalent per week      Comment: noted 4 per day   • Drug use: No   • Sexual activity: Not on file     Other Topics Concern   • Not on file     Social History Narrative   • No narrative on file       Family History:      Family History   Problem Relation Age of Onset   • Stroke         Review of Systems:  All other review of systems are negative except what was mentioned above in the HPI.    Constitutional: Negative for fever, chills, weight loss,  "positive for malaise/fatigue.    HEENT: No new auditory or visual complaints. No sore throat and neck pain.     Respiratory: Negative for cough, sputum production, positive for shortness of breath and wheezing.    Cardiovascular: Negative for chest pain, palpitations, orthopnea and leg swelling.    Gastrointestinal: Negative for heartburn, nausea, vomiting and abdominal pain.    Genitourinary: Negative for dysuria, hematuria    Musculoskeletal: No new arthralgias or myalgias   Skin: Negative for rash and itching.    Neurological: Negative for focal weakness and headaches.    Endo/Heme/Allergies: No abnormal bleed/bruise.    Psychiatric/Behavioral: No new depression/anxiety.    Physical Exam:  Vitals: /59   Pulse 89   Temp 36.4 °C (97.5 °F)   Resp 16   Ht 1.6 m (5' 2.99\")   Wt 101 kg (222 lb 8.9 oz)   SpO2 (!) 82%   BMI 39.44 kg/m²     General: Not in acute distress, alert and oriented x 3.   HEENT: No pallor, icterus. Oropharynx clear.   Neck: Supple, no palpable masses.  Lymph nodes: No palpable cervical, supraclavicular, axillary or inguinal lymphadenopathy.    CVS: regular rate and rhythm, no rubs or gallops  RESP: Clear to auscultate bilaterally, no wheezing or crackles.   ABD: Soft, non tender, non distended, positive bowel sounds, no palpable organomegaly  EXT: No edema or cyanosis  CNS: Alert and oriented x3, No focal deficits.  Skin- No rash      Labs:   No visits with results within 1 Week(s) from this visit.   Latest known visit with results is:   Hospital Outpatient Visit on 01/05/2018   Component Date Value Ref Range Status   • WBC 01/05/2018 10.0  4.8 - 10.8 K/uL Final   • RBC 01/05/2018 5.90  4.70 - 6.10 M/uL Final   • Hemoglobin 01/05/2018 18.7* 14.0 - 18.0 g/dL Final   • Hematocrit 01/05/2018 56.7* 42.0 - 52.0 % Final   • MCV 01/05/2018 96.1  81.4 - 97.8 fL Final   • MCH 01/05/2018 31.7  27.0 - 33.0 pg Final   • MCHC 01/05/2018 33.0* 33.7 - 35.3 g/dL Final   • RDW 01/05/2018 58.2* 35.9 - " 50.0 fL Final   • Platelet Count 01/05/2018 206  164 - 446 K/uL Final   • MPV 01/05/2018 10.3  9.0 - 12.9 fL Final   • Neutrophils-Polys 01/05/2018 66.50  44.00 - 72.00 % Final   • Lymphocytes 01/05/2018 17.50* 22.00 - 41.00 % Final   • Monocytes 01/05/2018 11.00  0.00 - 13.40 % Final   • Eosinophils 01/05/2018 2.80  0.00 - 6.90 % Final   • Basophils 01/05/2018 1.20  0.00 - 1.80 % Final   • Immature Granulocytes 01/05/2018 1.00* 0.00 - 0.90 % Final   • Nucleated RBC 01/05/2018 0.00  /100 WBC Final   • Neutrophils (Absolute) 01/05/2018 6.63  1.82 - 7.42 K/uL Final   • Lymphs (Absolute) 01/05/2018 1.75  1.00 - 4.80 K/uL Final   • Monos (Absolute) 01/05/2018 1.10* 0.00 - 0.85 K/uL Final   • Eos (Absolute) 01/05/2018 0.28  0.00 - 0.51 K/uL Final   • Baso (Absolute) 01/05/2018 0.12  0.00 - 0.12 K/uL Final   • Immature Granulocytes (abs) 01/05/2018 0.10  0.00 - 0.11 K/uL Final   • NRBC (Absolute) 01/05/2018 0.00  K/uL Final   • GFR If  01/05/2018 >60  >60 mL/min/1.73 m 2 Final   • GFR If Non  01/05/2018 >60  >60 mL/min/1.73 m 2 Final   • Sodium 01/05/2018 138  135 - 145 mmol/L Final   • Potassium 01/05/2018 4.2  3.6 - 5.5 mmol/L Final   • Chloride 01/05/2018 102  96 - 112 mmol/L Final   • Co2 01/05/2018 30  20 - 33 mmol/L Final   • Anion Gap 01/05/2018 6.0  0.0 - 11.9 Final   • Glucose 01/05/2018 94  65 - 99 mg/dL Final   • Bun 01/05/2018 14  8 - 22 mg/dL Final   • Creatinine 01/05/2018 0.83  0.50 - 1.40 mg/dL Final   • Calcium 01/05/2018 9.2  8.5 - 10.5 mg/dL Final   • AST(SGOT) 01/05/2018 16  12 - 45 U/L Final   • ALT(SGPT) 01/05/2018 12  2 - 50 U/L Final   • Alkaline Phosphatase 01/05/2018 57  30 - 99 U/L Final   • Total Bilirubin 01/05/2018 0.6  0.1 - 1.5 mg/dL Final   • Albumin 01/05/2018 4.0  3.2 - 4.9 g/dL Final   • Total Protein 01/05/2018 6.8  6.0 - 8.2 g/dL Final   • Globulin 01/05/2018 2.8  1.9 - 3.5 g/dL Final   • A-G Ratio 01/05/2018 1.4  g/dL Final             Assessment and  Plan:  Secondary polycythemia-JAK2 mutation testing was negative. Erythropoietin is slightly on the elevated side. No evidence of malignancy seen in the recent CT scans.    . CBC shows overall stable hematocrit of 56%. Given the secondary polycythemia, I will hold off on doing any phlebotomy at this point. Informed him that there is no clear-cut evidence for ideal threshold for phlebotomy in the case of second polycythemia and unnecessary phlebotomies may exacerbate his compensated COPD. We will check a CBC in 3 months and I will see him back in 6 months for further follow-up.    . Of note, the i-STAT hemoglobin and hematocrit usually shows exaggerated parameters and he needs proper CBCs.    He agreed and verbalized his agreement and understanding with the current plan.  I answered all questions and concerns he has at this time         Please note that this dictation was created using voice recognition software. I have made every reasonable attempt to correct obvious errors, but I expect that there are errors of grammar and possibly content that I did not discover before finalizing the note.      SIGNATURES:  Ivan Garcia    CC:  Edwin Valentin M.D.  No ref. provider found

## 2018-06-20 ENCOUNTER — HOSPITAL ENCOUNTER (OUTPATIENT)
Dept: LAB | Facility: MEDICAL CENTER | Age: 73
End: 2018-06-20
Attending: INTERNAL MEDICINE
Payer: MEDICARE

## 2018-06-20 DIAGNOSIS — D75.1 POLYCYTHEMIA: ICD-10-CM

## 2018-06-20 LAB
BASOPHILS # BLD AUTO: 1.2 % (ref 0–1.8)
BASOPHILS # BLD: 0.13 K/UL (ref 0–0.12)
EOSINOPHIL # BLD AUTO: 0.33 K/UL (ref 0–0.51)
EOSINOPHIL NFR BLD: 3 % (ref 0–6.9)
ERYTHROCYTE [DISTWIDTH] IN BLOOD BY AUTOMATED COUNT: 52.6 FL (ref 35.9–50)
HCT VFR BLD AUTO: 65.1 % (ref 42–52)
HGB BLD-MCNC: 20.6 G/DL (ref 14–18)
IMM GRANULOCYTES # BLD AUTO: 0.07 K/UL (ref 0–0.11)
IMM GRANULOCYTES NFR BLD AUTO: 0.6 % (ref 0–0.9)
LYMPHOCYTES # BLD AUTO: 1.95 K/UL (ref 1–4.8)
LYMPHOCYTES NFR BLD: 17.6 % (ref 22–41)
MCH RBC QN AUTO: 30.5 PG (ref 27–33)
MCHC RBC AUTO-ENTMCNC: 31.6 G/DL (ref 33.7–35.3)
MCV RBC AUTO: 96.3 FL (ref 81.4–97.8)
MONOCYTES # BLD AUTO: 1.18 K/UL (ref 0–0.85)
MONOCYTES NFR BLD AUTO: 10.6 % (ref 0–13.4)
NEUTROPHILS # BLD AUTO: 7.42 K/UL (ref 1.82–7.42)
NEUTROPHILS NFR BLD: 67 % (ref 44–72)
NRBC # BLD AUTO: 0 K/UL
NRBC BLD-RTO: 0 /100 WBC
PLATELET # BLD AUTO: 194 K/UL (ref 164–446)
PMV BLD AUTO: 11.5 FL (ref 9–12.9)
RBC # BLD AUTO: 6.76 M/UL (ref 4.7–6.1)
WBC # BLD AUTO: 11.1 K/UL (ref 4.8–10.8)

## 2018-06-20 PROCEDURE — 36415 COLL VENOUS BLD VENIPUNCTURE: CPT

## 2018-06-20 PROCEDURE — 85025 COMPLETE CBC W/AUTO DIFF WBC: CPT

## 2018-06-21 ENCOUNTER — TELEPHONE (OUTPATIENT)
Dept: HEMATOLOGY ONCOLOGY | Facility: MEDICAL CENTER | Age: 73
End: 2018-06-21

## 2018-06-21 NOTE — TELEPHONE ENCOUNTER
Reviewed Hct result with Vlad and reason for therapeutic phlebotomy. This has been a chronic problem for the patient. He denies any acute complaints or questions. MAXIMILIAN Rodriguez reviewed TP appointments with him.

## 2018-06-21 NOTE — TELEPHONE ENCOUNTER
Received critical HCT at 65.1 from Cynthia at the [facility] lab. LONG Meyers and Dr. Radha dillon MA scheduled STAT phlebotomy for 7/2 and 7/9. Orders received for this RN to review critical result with patient.     Attempted to reach patient. LVM to call MARIA DEL CARMEN Pinto at [phone].

## 2018-06-21 NOTE — TELEPHONE ENCOUNTER
After hours answering service received a call on 06/20/2018 at 20:54 from Geovany at [phone] stating: Critical results.

## 2018-06-28 ENCOUNTER — OUTPATIENT INFUSION SERVICES (OUTPATIENT)
Dept: ONCOLOGY | Facility: MEDICAL CENTER | Age: 73
End: 2018-06-28
Attending: NURSE PRACTITIONER
Payer: MEDICARE

## 2018-06-28 VITALS
HEIGHT: 63 IN | OXYGEN SATURATION: 95 % | WEIGHT: 214.51 LBS | RESPIRATION RATE: 18 BRPM | SYSTOLIC BLOOD PRESSURE: 139 MMHG | DIASTOLIC BLOOD PRESSURE: 67 MMHG | TEMPERATURE: 98.2 F | HEART RATE: 98 BPM | BODY MASS INDEX: 38.01 KG/M2

## 2018-06-28 DIAGNOSIS — D75.1 POLYCYTHEMIA: ICD-10-CM

## 2018-06-28 LAB
BASOPHILS # BLD AUTO: 0.8 % (ref 0–1.8)
BASOPHILS # BLD: 0.08 K/UL (ref 0–0.12)
EOSINOPHIL # BLD AUTO: 0.29 K/UL (ref 0–0.51)
EOSINOPHIL NFR BLD: 2.9 % (ref 0–6.9)
ERYTHROCYTE [DISTWIDTH] IN BLOOD BY AUTOMATED COUNT: 50.9 FL (ref 35.9–50)
FERRITIN SERPL-MCNC: 216 NG/ML (ref 22–322)
HCT VFR BLD AUTO: 62.8 % (ref 42–52)
HGB BLD-MCNC: 20.4 G/DL (ref 14–18)
IMM GRANULOCYTES # BLD AUTO: 0.07 K/UL (ref 0–0.11)
IMM GRANULOCYTES NFR BLD AUTO: 0.7 % (ref 0–0.9)
LYMPHOCYTES # BLD AUTO: 1.37 K/UL (ref 1–4.8)
LYMPHOCYTES NFR BLD: 13.9 % (ref 22–41)
MCH RBC QN AUTO: 30.6 PG (ref 27–33)
MCHC RBC AUTO-ENTMCNC: 32.5 G/DL (ref 33.7–35.3)
MCV RBC AUTO: 94.3 FL (ref 81.4–97.8)
MONOCYTES # BLD AUTO: 1.02 K/UL (ref 0–0.85)
MONOCYTES NFR BLD AUTO: 10.3 % (ref 0–13.4)
NEUTROPHILS # BLD AUTO: 7.03 K/UL (ref 1.82–7.42)
NEUTROPHILS NFR BLD: 71.4 % (ref 44–72)
NRBC # BLD AUTO: 0 K/UL
NRBC BLD-RTO: 0 /100 WBC
PLATELET # BLD AUTO: 190 K/UL (ref 164–446)
PMV BLD AUTO: 10.1 FL (ref 9–12.9)
RBC # BLD AUTO: 6.66 M/UL (ref 4.7–6.1)
WBC # BLD AUTO: 9.9 K/UL (ref 4.8–10.8)

## 2018-06-28 PROCEDURE — 99195 PHLEBOTOMY: CPT

## 2018-06-28 PROCEDURE — 85025 COMPLETE CBC W/AUTO DIFF WBC: CPT

## 2018-06-28 PROCEDURE — 82728 ASSAY OF FERRITIN: CPT

## 2018-06-28 PROCEDURE — 36415 COLL VENOUS BLD VENIPUNCTURE: CPT

## 2018-06-28 ASSESSMENT — PAIN SCALES - GENERAL: PAINLEVEL_OUTOF10: 0

## 2018-06-28 NOTE — PROGRESS NOTES
Patient arrived for initial TP for polycythemia.  Reviewed plan of care; confirmed pt is to received two TP's, then f/u with Dr. Garcia due to increase in Hgb.  Pt asymptomatic, reports fatigue but states his blood levels have always been elevated.  PIV started, labs drawn. CBC resulted, Hgb 20.4.  TP indicated.  500ml whole blood removed without incident.  Orthostatic vitals taken, and wnl.  Pt denies any feelings of lightheadedness or dizziness. PIV flushed, removed. Confirmed next appt in two weeks. Pt discharged home in good spirits under no apparent distress.

## 2018-07-12 ENCOUNTER — OUTPATIENT INFUSION SERVICES (OUTPATIENT)
Dept: ONCOLOGY | Facility: MEDICAL CENTER | Age: 73
End: 2018-07-12
Attending: NURSE PRACTITIONER
Payer: MEDICARE

## 2018-07-12 ENCOUNTER — TELEPHONE (OUTPATIENT)
Dept: HEMATOLOGY ONCOLOGY | Facility: MEDICAL CENTER | Age: 73
End: 2018-07-12

## 2018-07-12 VITALS
WEIGHT: 213.85 LBS | TEMPERATURE: 97.6 F | OXYGEN SATURATION: 91 % | DIASTOLIC BLOOD PRESSURE: 74 MMHG | HEART RATE: 96 BPM | RESPIRATION RATE: 18 BRPM | SYSTOLIC BLOOD PRESSURE: 134 MMHG | BODY MASS INDEX: 37.89 KG/M2 | HEIGHT: 63 IN

## 2018-07-12 DIAGNOSIS — D75.1 POLYCYTHEMIA: ICD-10-CM

## 2018-07-12 LAB
BASOPHILS # BLD AUTO: 0.9 % (ref 0–1.8)
BASOPHILS # BLD: 0.09 K/UL (ref 0–0.12)
EOSINOPHIL # BLD AUTO: 0.3 K/UL (ref 0–0.51)
EOSINOPHIL NFR BLD: 3.1 % (ref 0–6.9)
ERYTHROCYTE [DISTWIDTH] IN BLOOD BY AUTOMATED COUNT: 51.9 FL (ref 35.9–50)
HCT VFR BLD AUTO: 57 % (ref 42–52)
HCT VFR BLD CALC: 59 % (ref 42–52)
HGB BLD-MCNC: 19 G/DL (ref 14–18)
HGB BLD-MCNC: 20.1 G/DL (ref 14–18)
IMM GRANULOCYTES # BLD AUTO: 0.08 K/UL (ref 0–0.11)
IMM GRANULOCYTES NFR BLD AUTO: 0.8 % (ref 0–0.9)
LYMPHOCYTES # BLD AUTO: 1.3 K/UL (ref 1–4.8)
LYMPHOCYTES NFR BLD: 13.5 % (ref 22–41)
MCH RBC QN AUTO: 31.2 PG (ref 27–33)
MCHC RBC AUTO-ENTMCNC: 33.3 G/DL (ref 33.7–35.3)
MCV RBC AUTO: 93.6 FL (ref 81.4–97.8)
MONOCYTES # BLD AUTO: 0.99 K/UL (ref 0–0.85)
MONOCYTES NFR BLD AUTO: 10.2 % (ref 0–13.4)
NEUTROPHILS # BLD AUTO: 6.9 K/UL (ref 1.82–7.42)
NEUTROPHILS NFR BLD: 71.5 % (ref 44–72)
NRBC # BLD AUTO: 0 K/UL
NRBC BLD-RTO: 0 /100 WBC
PLATELET # BLD AUTO: 200 K/UL (ref 164–446)
PMV BLD AUTO: 10.3 FL (ref 9–12.9)
RBC # BLD AUTO: 6.09 M/UL (ref 4.7–6.1)
WBC # BLD AUTO: 9.7 K/UL (ref 4.8–10.8)

## 2018-07-12 PROCEDURE — 36415 COLL VENOUS BLD VENIPUNCTURE: CPT

## 2018-07-12 PROCEDURE — 85014 HEMATOCRIT: CPT | Mod: XU

## 2018-07-12 PROCEDURE — 85025 COMPLETE CBC W/AUTO DIFF WBC: CPT

## 2018-07-12 PROCEDURE — 99195 PHLEBOTOMY: CPT

## 2018-07-12 ASSESSMENT — PAIN SCALES - GENERAL
PAINLEVEL_OUTOF10: 0
PAINLEVEL: NO PAIN

## 2018-07-12 NOTE — TELEPHONE ENCOUNTER
Left message for patient to confirm the new date and time of his follow up with Dr. Garcia since he will be out of town.

## 2018-07-13 NOTE — PROGRESS NOTES
Vlad arrives for the therapeutic phlebotomy. Labs drawn as ordered by MD.  Harrison's Hgb 19.0 , Hct 57 %. 500 cc blood removed. Vlad tolerated well. Orthostatic vitals stable, see flowsheet. Vlad denies chest pain, shortness of breath, dizziness, or lightheadedness after treatment. Next appointment scheduled. Discharged to self care; no apparent distress noted.

## 2018-07-16 ENCOUNTER — APPOINTMENT (OUTPATIENT)
Dept: HEMATOLOGY ONCOLOGY | Facility: MEDICAL CENTER | Age: 73
End: 2018-07-16
Payer: MEDICARE

## 2018-08-06 ENCOUNTER — OFFICE VISIT (OUTPATIENT)
Dept: HEMATOLOGY ONCOLOGY | Facility: MEDICAL CENTER | Age: 73
End: 2018-08-06
Payer: MEDICARE

## 2018-08-06 VITALS
DIASTOLIC BLOOD PRESSURE: 76 MMHG | WEIGHT: 215.39 LBS | HEIGHT: 63 IN | OXYGEN SATURATION: 92 % | TEMPERATURE: 97.3 F | RESPIRATION RATE: 18 BRPM | SYSTOLIC BLOOD PRESSURE: 112 MMHG | BODY MASS INDEX: 38.16 KG/M2 | HEART RATE: 91 BPM

## 2018-08-06 DIAGNOSIS — D75.1 POLYCYTHEMIA: ICD-10-CM

## 2018-08-06 PROCEDURE — 99213 OFFICE O/P EST LOW 20 MIN: CPT | Performed by: INTERNAL MEDICINE

## 2018-08-06 ASSESSMENT — PAIN SCALES - GENERAL: PAINLEVEL: NO PAIN

## 2018-08-06 NOTE — PROGRESS NOTES
"Date of visit: 8/6/2018  11:49 AM      Chief Complaint- secondary polycythemia      Identification/Prior relevant history: long-standing history of polycythemia secondary to smoking and COPD. He was admitted to the hospital with pneumonia and required a unit of phlebotomy. Testing for JAK2 mutation erythropoietin levels were not consistent with primary polycythemia.  The patient was more interested in donating his blood occasionally, however, the blood bank did not accept it  Interim history-he is getting periodic phlebotomy to keep his hematocrit around 58% or so. His hematocrit went up to 65% and received phlebotomy last month. CBC from today shows hematocrit of 57%. He reports that he has stopped smoking. He is feeling at his baseline otherwise.    Past Medical History:      Past Medical History:   Diagnosis Date   • Chronic obstructive pulmonary disease (CMS-HCC) (HCC)    • MRSA (methicillin resistant Staphylococcus aureus)    • Productive cough     \"smoker\"   • Snoring     sleep apnea questionairre completed       Past surgical history:       Past Surgical History:   Procedure Laterality Date   • FLAP GRAFT  6/1/2010    Performed by FRANCESCA VIDAL at SURGERY [facility] ORS   • FISTULA IN ANO REPAIR  6/1/2010    Performed by FRANCESCA VIDAL at SURGERY [facility] ORS   • RECTAL EXPLORATION  11/17/2009    Performed by FRANCESCA VIDAL at SURGERY SAME DAY [facility] ORS   • DEBRIDEMENT  11/17/2009    Performed by FRANCESCA VIDAL at SURGERY SAME DAY [facility] ORS   • RECTAL EXPLORATION  9/3/2009    Performed by FRANCESCA VIDAL at SURGERY SAME DAY [facility] ORS   • DEBRIDEMENT  9/3/2009    Performed by FRANCESCA VIDAL at SURGERY SAME DAY [facility] ORS   • LUIS ENRIQUE RECTAL ABSCESS INCISION AND DRAINAGE  4/6/2009    Performed by FRANCESCA VIDAL at SURGERY [facility] ORS   • LUMBAR FUSION POSTERIOR  1987   • LUMBAR FUSION ANTERIOR  1983       Allergies:       Morphine sulfate    Medications:       "   Current Outpatient Prescriptions   Medication Sig Dispense Refill   • non-formulary med Spray 4 L/min in nose. per mask and nasal cannula as he wants.     • aspirin 81 MG tablet Take 1 Tab by mouth every day. (Patient not taking: Reported on 11/19/2017) 100 Tab 3   • fluticasone-salmeterol (ADVAIR) 250-50 MCG/DOSE AEROSOL POWDER, BREATH ACTIVATED Inhale 2 Puffs by mouth 2 times a day.     • nicotine (NICODERM) 21 MG/24HR PATCH 24 HR Apply 1 Patch to skin as directed every 24 hours. 7 Patch 0   • albuterol (PROVENTIL) 2.5mg/3ml Nebu Soln solution for nebulization 3 mL by Nebulization route every four hours as needed for Shortness of Breath. 120 Bullet 3   • predniSONE (DELTASONE) 10 MG Tab 40 mg daily for 5 days, 30 mg daily for 5 days, 20 mg daily for 5 days, 10 mg daily for 5 days 30 Tab 0   • guaifenesin LA (MUCINEX) 600 MG TABLET SR 12 HR Take 1 Tab by mouth every 12 hours. 28 Tab 0   • albuterol 108 (90 Base) MCG/ACT Aero Soln inhalation aerosol Inhale 2 Puffs by mouth every 6 hours as needed for Shortness of Breath.       No current facility-administered medications for this visit.          Social History:     Social History     Social History   • Marital status:      Spouse name: N/A   • Number of children: N/A   • Years of education: N/A     Occupational History   • Not on file.     Social History Main Topics   • Smoking status: Former Smoker     Packs/day: 1.50     Years: 45.00     Types: Cigarettes     Quit date: 10/27/2017   • Smokeless tobacco: Never Used   • Alcohol use 8.0 oz/week     16 Standard drinks or equivalent per week      Comment: noted 4 per day   • Drug use: No   • Sexual activity: Not on file     Other Topics Concern   • Not on file     Social History Narrative   • No narrative on file       Family History:      Family History   Problem Relation Age of Onset   • Stroke Unknown        Review of Systems:  All other review of systems are negative except what was mentioned above in the  HPI.    Constitutional: Negative for fever, chills, weight loss and malaise/fatigue.    HEENT: No new auditory or visual complaints. No sore throat and neck pain.     Respiratory: Negative for cough, sputum production, shortness of breath and wheezing.    Cardiovascular: Negative for chest pain, palpitations, orthopnea and leg swelling.    Gastrointestinal: Negative for heartburn, nausea, vomiting and abdominal pain.    Genitourinary: Negative for dysuria, hematuria    Musculoskeletal: No new arthralgias or myalgias   Skin: Negative for rash and itching.    Neurological: Negative for focal weakness and headaches.    Endo/Heme/Allergies: No abnormal bleed/bruise.    Psychiatric/Behavioral: No new depression/anxiety.    Physical Exam:  Vitals: There were no vitals taken for this visit.    General: Not in acute distress, alert and oriented x 3  HEENT: No pallor, icterus. Oropharynx clear.   Neck: Supple, no palpable masses.  Lymph nodes: No palpable cervical, supraclavicular, axillary or inguinal lymphadenopathy.    CVS: regular rate and rhythm, no rubs or gallops  RESP: Clear to auscultate bilaterally, no wheezing or crackles.   ABD: Soft, non tender, non distended, positive bowel sounds, no palpable organomegaly  EXT: No edema or cyanosis  CNS: Alert and oriented x3, No focal deficits.  Skin- No rash      Labs:   No visits with results within 1 Week(s) from this visit.   Latest known visit with results is:   Outpatient Infusion Services on 07/12/2018   Component Date Value Ref Range Status   • WBC 07/12/2018 9.7  4.8 - 10.8 K/uL Final   • RBC 07/12/2018 6.09  4.70 - 6.10 M/uL Final   • Hemoglobin 07/12/2018 19.0* 14.0 - 18.0 g/dL Final   • Hematocrit 07/12/2018 57.0* 42.0 - 52.0 % Final   • MCV 07/12/2018 93.6  81.4 - 97.8 fL Final   • MCH 07/12/2018 31.2  27.0 - 33.0 pg Final   • MCHC 07/12/2018 33.3* 33.7 - 35.3 g/dL Final   • RDW 07/12/2018 51.9* 35.9 - 50.0 fL Final   • Platelet Count 07/12/2018 200  164 - 446  K/uL Final   • MPV 07/12/2018 10.3  9.0 - 12.9 fL Final   • Neutrophils-Polys 07/12/2018 71.50  44.00 - 72.00 % Final   • Lymphocytes 07/12/2018 13.50* 22.00 - 41.00 % Final   • Monocytes 07/12/2018 10.20  0.00 - 13.40 % Final   • Eosinophils 07/12/2018 3.10  0.00 - 6.90 % Final   • Basophils 07/12/2018 0.90  0.00 - 1.80 % Final   • Immature Granulocytes 07/12/2018 0.80  0.00 - 0.90 % Final   • Nucleated RBC 07/12/2018 0.00  /100 WBC Final   • Neutrophils (Absolute) 07/12/2018 6.90  1.82 - 7.42 K/uL Final    Includes immature neutrophils, if present.   • Lymphs (Absolute) 07/12/2018 1.30  1.00 - 4.80 K/uL Final   • Monos (Absolute) 07/12/2018 0.99* 0.00 - 0.85 K/uL Final   • Eos (Absolute) 07/12/2018 0.30  0.00 - 0.51 K/uL Final   • Baso (Absolute) 07/12/2018 0.09  0.00 - 0.12 K/uL Final   • Immature Granulocytes (abs) 07/12/2018 0.08  0.00 - 0.11 K/uL Final   • NRBC (Absolute) 07/12/2018 0.00  K/uL Final   • Istat Hematocrit 07/12/2018 59* 42 - 52 % Final   • Istat Hemoglobin 07/12/2018 20.1* 14.0 - 18.0 g/dL Final             Assessment and Plan:  Secondary polycythemia-JAK2 mutation testing was negative. Erythropoietin is slightly on the elevated side. No evidence of malignancy seen in the recent CT scans    . He tends to develop hematocrit in excess of 60%.. We will schedule phlebotomy around 3-4 months to keep the hematocrit from going about 60%. He has severe COPD and strongly counseled smoking cessation. He reports that he has stopped smoking. Of note, he does have some relative monocytosis and will keep an eye on it. Consideration can be given for NGS testing to rule out any occult myeloproliferative variant of CMML.    Return to clinic in 6 months.    He agreed and verbalized  agreement and understanding with the current plan.  I answered all questions and concerns at this time         Please note that this dictation was created using voice recognition software. I have made every reasonable attempt to  correct obvious errors, but I expect that there are errors of grammar and possibly content that I did not discover before finalizing the note.      SIGNATURES:  Ivan Garcia    CC:  Edwin Valentin M.D.  No ref. provider found

## 2018-10-04 ENCOUNTER — APPOINTMENT (OUTPATIENT)
Dept: ONCOLOGY | Facility: MEDICAL CENTER | Age: 73
End: 2018-10-04
Attending: NURSE PRACTITIONER
Payer: MEDICARE

## 2018-10-21 ENCOUNTER — OUTPATIENT INFUSION SERVICES (OUTPATIENT)
Dept: ONCOLOGY | Facility: MEDICAL CENTER | Age: 73
End: 2018-10-21
Attending: NURSE PRACTITIONER
Payer: MEDICARE

## 2018-10-21 VITALS
BODY MASS INDEX: 38.01 KG/M2 | RESPIRATION RATE: 20 BRPM | WEIGHT: 214.51 LBS | SYSTOLIC BLOOD PRESSURE: 144 MMHG | DIASTOLIC BLOOD PRESSURE: 72 MMHG | OXYGEN SATURATION: 93 % | HEART RATE: 74 BPM | HEIGHT: 63 IN | TEMPERATURE: 97.8 F

## 2018-10-21 DIAGNOSIS — D75.1 POLYCYTHEMIA: ICD-10-CM

## 2018-10-21 LAB
BASOPHILS # BLD AUTO: 0.8 % (ref 0–1.8)
BASOPHILS # BLD: 0.08 K/UL (ref 0–0.12)
EOSINOPHIL # BLD AUTO: 0.36 K/UL (ref 0–0.51)
EOSINOPHIL NFR BLD: 3.7 % (ref 0–6.9)
ERYTHROCYTE [DISTWIDTH] IN BLOOD BY AUTOMATED COUNT: 49.4 FL (ref 35.9–50)
FERRITIN SERPL-MCNC: 155.3 NG/ML (ref 22–322)
HCT VFR BLD AUTO: 60 % (ref 42–52)
HGB BLD-MCNC: 19.6 G/DL (ref 14–18)
IMM GRANULOCYTES # BLD AUTO: 0.06 K/UL (ref 0–0.11)
IMM GRANULOCYTES NFR BLD AUTO: 0.6 % (ref 0–0.9)
LYMPHOCYTES # BLD AUTO: 1.49 K/UL (ref 1–4.8)
LYMPHOCYTES NFR BLD: 15.4 % (ref 22–41)
MCH RBC QN AUTO: 30.8 PG (ref 27–33)
MCHC RBC AUTO-ENTMCNC: 32.7 G/DL (ref 33.7–35.3)
MCV RBC AUTO: 94.2 FL (ref 81.4–97.8)
MONOCYTES # BLD AUTO: 0.64 K/UL (ref 0–0.85)
MONOCYTES NFR BLD AUTO: 6.6 % (ref 0–13.4)
NEUTROPHILS # BLD AUTO: 7.06 K/UL (ref 1.82–7.42)
NEUTROPHILS NFR BLD: 72.9 % (ref 44–72)
NRBC # BLD AUTO: 0 K/UL
NRBC BLD-RTO: 0 /100 WBC
PLATELET # BLD AUTO: 195 K/UL (ref 164–446)
PMV BLD AUTO: 10.2 FL (ref 9–12.9)
RBC # BLD AUTO: 6.37 M/UL (ref 4.7–6.1)
WBC # BLD AUTO: 9.7 K/UL (ref 4.8–10.8)

## 2018-10-21 PROCEDURE — 82728 ASSAY OF FERRITIN: CPT

## 2018-10-21 PROCEDURE — 36415 COLL VENOUS BLD VENIPUNCTURE: CPT

## 2018-10-21 PROCEDURE — 99195 PHLEBOTOMY: CPT

## 2018-10-21 PROCEDURE — 85025 COMPLETE CBC W/AUTO DIFF WBC: CPT

## 2018-10-21 ASSESSMENT — PAIN SCALES - GENERAL: PAINLEVEL_OUTOF10: 0

## 2018-10-22 NOTE — PROGRESS NOTES
Patient arrived to infusion center for therapeutic phlebotomy. PIV established. CBC and Ferritn draw and verified per MD order. Hgb was 19.6. 500cc blood removed. Patient tolerated well. VS verified and no dizziness noted. Patient left infusion center with no apparent distress. Verifies appointment for January 2019.

## 2019-01-13 ENCOUNTER — OUTPATIENT INFUSION SERVICES (OUTPATIENT)
Dept: ONCOLOGY | Facility: MEDICAL CENTER | Age: 74
End: 2019-01-13
Attending: NURSE PRACTITIONER
Payer: MEDICARE

## 2019-01-13 VITALS
TEMPERATURE: 97.7 F | WEIGHT: 217.37 LBS | HEIGHT: 63 IN | BODY MASS INDEX: 38.52 KG/M2 | HEART RATE: 93 BPM | RESPIRATION RATE: 18 BRPM | OXYGEN SATURATION: 94 % | SYSTOLIC BLOOD PRESSURE: 121 MMHG | DIASTOLIC BLOOD PRESSURE: 67 MMHG

## 2019-01-13 DIAGNOSIS — D75.1 POLYCYTHEMIA: ICD-10-CM

## 2019-01-13 LAB
BASOPHILS # BLD AUTO: 0 % (ref 0–1.8)
BASOPHILS # BLD: 0 K/UL (ref 0–0.12)
EOSINOPHIL # BLD AUTO: 0.24 K/UL (ref 0–0.51)
EOSINOPHIL NFR BLD: 2 % (ref 0–6.9)
ERYTHROCYTE [DISTWIDTH] IN BLOOD BY AUTOMATED COUNT: 52.7 FL (ref 35.9–50)
FERRITIN SERPL-MCNC: 91.2 NG/ML (ref 22–322)
HCT VFR BLD AUTO: 63.5 % (ref 42–52)
HGB BLD-MCNC: 20.6 G/DL (ref 14–18)
LYMPHOCYTES # BLD AUTO: 2.16 K/UL (ref 1–4.8)
LYMPHOCYTES NFR BLD: 18 % (ref 22–41)
MANUAL DIFF BLD: NORMAL
MCH RBC QN AUTO: 30.6 PG (ref 27–33)
MCHC RBC AUTO-ENTMCNC: 32.4 G/DL (ref 33.7–35.3)
MCV RBC AUTO: 94.4 FL (ref 81.4–97.8)
MONOCYTES # BLD AUTO: 1.08 K/UL (ref 0–0.85)
MONOCYTES NFR BLD AUTO: 9 % (ref 0–13.4)
MORPHOLOGY BLD-IMP: NORMAL
NEUTROPHILS # BLD AUTO: 8.52 K/UL (ref 1.82–7.42)
NEUTROPHILS NFR BLD: 70 % (ref 44–72)
NEUTS BAND NFR BLD MANUAL: 1 % (ref 0–10)
NRBC # BLD AUTO: 0 K/UL
NRBC BLD-RTO: 0 /100 WBC
PLATELET # BLD AUTO: 209 K/UL (ref 164–446)
PLATELET BLD QL SMEAR: NORMAL
PMV BLD AUTO: 10.5 FL (ref 9–12.9)
POIKILOCYTOSIS BLD QL SMEAR: NORMAL
RBC # BLD AUTO: 6.73 M/UL (ref 4.7–6.1)
RBC BLD AUTO: PRESENT
WBC # BLD AUTO: 12 K/UL (ref 4.8–10.8)

## 2019-01-13 PROCEDURE — 99195 PHLEBOTOMY: CPT

## 2019-01-13 PROCEDURE — 85027 COMPLETE CBC AUTOMATED: CPT

## 2019-01-13 PROCEDURE — 36415 COLL VENOUS BLD VENIPUNCTURE: CPT

## 2019-01-13 PROCEDURE — 85007 BL SMEAR W/DIFF WBC COUNT: CPT

## 2019-01-13 PROCEDURE — 82728 ASSAY OF FERRITIN: CPT

## 2019-01-13 ASSESSMENT — PAIN SCALES - GENERAL: PAINLEVEL_OUTOF10: 0

## 2019-01-14 NOTE — PROGRESS NOTES
Pt scheduled for therapeutic phlebotomy (TP). Arrived ambulatory w/ cane, independent; denied pain/discomfort, s/sx infection, or recent health changes. PIV placed to R-AC; easily flushed, brisk blood return. CBC & ferritin drawn per orders; Hgb 20.6, w/i parameters for treatment. Approx 500 mL whole blood drawn off; pt denied dizziness, lightheadedness, or feeling unwell; orthostatic VS documented. PIV flushed, brisk blood return; removed, cath intact. Treatment plan reviewed, next appt in April; pt denied any unmet needs. Pt discharged ambulatory, independent, NAD.

## 2019-01-24 ENCOUNTER — TELEPHONE (OUTPATIENT)
Dept: HEMATOLOGY ONCOLOGY | Facility: MEDICAL CENTER | Age: 74
End: 2019-01-24

## 2020-07-23 ENCOUNTER — HOSPITAL ENCOUNTER (OUTPATIENT)
Dept: LAB | Facility: MEDICAL CENTER | Age: 75
End: 2020-07-23
Attending: INTERNAL MEDICINE
Payer: MEDICARE

## 2020-07-23 LAB
ALBUMIN SERPL BCP-MCNC: 4.5 G/DL (ref 3.2–4.9)
ALBUMIN/GLOB SERPL: 1.7 G/DL
ALP SERPL-CCNC: 108 U/L (ref 30–99)
ALT SERPL-CCNC: 14 U/L (ref 2–50)
ANION GAP SERPL CALC-SCNC: 13 MMOL/L (ref 7–16)
AST SERPL-CCNC: 13 U/L (ref 12–45)
BILIRUB SERPL-MCNC: 0.8 MG/DL (ref 0.1–1.5)
BUN SERPL-MCNC: 8 MG/DL (ref 8–22)
CALCIUM SERPL-MCNC: 9.2 MG/DL (ref 8.5–10.5)
CHLORIDE SERPL-SCNC: 96 MMOL/L (ref 96–112)
CO2 SERPL-SCNC: 30 MMOL/L (ref 20–33)
CREAT SERPL-MCNC: 0.72 MG/DL (ref 0.5–1.4)
GLOBULIN SER CALC-MCNC: 2.6 G/DL (ref 1.9–3.5)
GLUCOSE SERPL-MCNC: 101 MG/DL (ref 65–99)
LDH SERPL L TO P-CCNC: 232 U/L (ref 107–266)
POTASSIUM SERPL-SCNC: 4.5 MMOL/L (ref 3.6–5.5)
PROT SERPL-MCNC: 7.1 G/DL (ref 6–8.2)
SODIUM SERPL-SCNC: 139 MMOL/L (ref 135–145)

## 2020-07-23 PROCEDURE — 84165 PROTEIN E-PHORESIS SERUM: CPT

## 2020-07-23 PROCEDURE — 84155 ASSAY OF PROTEIN SERUM: CPT

## 2020-07-23 PROCEDURE — 82232 ASSAY OF BETA-2 PROTEIN: CPT

## 2020-07-23 PROCEDURE — 83615 LACTATE (LD) (LDH) ENZYME: CPT

## 2020-07-23 PROCEDURE — 80053 COMPREHEN METABOLIC PANEL: CPT

## 2020-07-25 LAB — B2 MICROGLOB SERPL-MCNC: 3 MG/L (ref 1.1–2.4)

## 2020-07-26 LAB
ALBUMIN SERPL ELPH-MCNC: 3.87 G/DL (ref 3.75–5.01)
ALPHA1 GLOB SERPL ELPH-MCNC: 0.43 G/DL (ref 0.19–0.46)
ALPHA2 GLOB SERPL ELPH-MCNC: 1.13 G/DL (ref 0.48–1.05)
B-GLOBULIN SERPL ELPH-MCNC: 1.02 G/DL (ref 0.48–1.1)
EER PROT ELECT SER Q1092: ABNORMAL
GAMMA GLOB SERPL ELPH-MCNC: 1.06 G/DL (ref 0.62–1.51)
INTERPRETATION SERPL IFE-IMP: ABNORMAL
PROT SERPL-MCNC: 7.5 G/DL (ref 6.3–8.2)

## 2020-09-10 NOTE — ED NOTES
Patient, Connie Henryemiec calling for medication refill. Medication(s) set up as pending orders from medication list.    Caller has been advised that their call does not guarantee an immediate refill. This refill will be reviewed within 72 hours by a qualified provider who will determine whether he or she can refill the medication.    Patient has contacted the pharmacy?  No    Patient advised n/a    Additional information:     patient has a month supply left yet    Patient’s preferred pharmacy has been noted and populated.       [id number] - [place], [place] [address] AT [place] [id number] [facility]  [place] [address]  Phone: [phone] Fax: [phone]     Pt rounded on and denies any needs. Pt sitting up in [place] for comfort.

## 2021-01-01 ENCOUNTER — APPOINTMENT (OUTPATIENT)
Dept: RADIOLOGY | Facility: MEDICAL CENTER | Age: 76
End: 2021-01-01
Attending: EMERGENCY MEDICINE
Payer: MEDICARE

## 2021-01-01 ENCOUNTER — HOSPICE ADMISSION (OUTPATIENT)
Dept: HOSPICE | Facility: HOSPICE | Age: 76
End: 2021-01-01
Payer: MEDICARE

## 2021-01-01 ENCOUNTER — APPOINTMENT (OUTPATIENT)
Dept: RADIOLOGY | Facility: MEDICAL CENTER | Age: 76
DRG: 871 | End: 2021-01-01
Attending: EMERGENCY MEDICINE
Payer: MEDICARE

## 2021-01-01 ENCOUNTER — HOSPITAL ENCOUNTER (INPATIENT)
Facility: MEDICAL CENTER | Age: 76
LOS: 5 days | DRG: 189 | End: 2021-10-05
Attending: EMERGENCY MEDICINE | Admitting: INTERNAL MEDICINE
Payer: MEDICARE

## 2021-01-01 ENCOUNTER — HOSPITAL ENCOUNTER (INPATIENT)
Facility: MEDICAL CENTER | Age: 76
LOS: 3 days | DRG: 871 | End: 2021-11-02
Attending: EMERGENCY MEDICINE | Admitting: HOSPITALIST
Payer: MEDICARE

## 2021-01-01 ENCOUNTER — HOSPITAL ENCOUNTER (OUTPATIENT)
Facility: MEDICAL CENTER | Age: 76
End: 2021-01-01
Attending: INTERNAL MEDICINE | Admitting: INTERNAL MEDICINE
Payer: MEDICARE

## 2021-01-01 ENCOUNTER — APPOINTMENT (OUTPATIENT)
Dept: CARDIOLOGY | Facility: MEDICAL CENTER | Age: 76
DRG: 189 | End: 2021-01-01
Attending: STUDENT IN AN ORGANIZED HEALTH CARE EDUCATION/TRAINING PROGRAM
Payer: MEDICARE

## 2021-01-01 ENCOUNTER — PATIENT OUTREACH (OUTPATIENT)
Dept: HEALTH INFORMATION MANAGEMENT | Facility: OTHER | Age: 76
End: 2021-01-01

## 2021-01-01 ENCOUNTER — APPOINTMENT (OUTPATIENT)
Dept: RADIOLOGY | Facility: MEDICAL CENTER | Age: 76
DRG: 189 | End: 2021-01-01
Attending: EMERGENCY MEDICINE
Payer: MEDICARE

## 2021-01-01 ENCOUNTER — HOSPITAL ENCOUNTER (OUTPATIENT)
Facility: MEDICAL CENTER | Age: 76
End: 2021-05-03
Attending: INTERNAL MEDICINE
Payer: MEDICARE

## 2021-01-01 ENCOUNTER — APPOINTMENT (OUTPATIENT)
Dept: RADIOLOGY | Facility: MEDICAL CENTER | Age: 76
DRG: 871 | End: 2021-01-01
Attending: HOSPITALIST
Payer: MEDICARE

## 2021-01-01 ENCOUNTER — HOSPITAL ENCOUNTER (INPATIENT)
Facility: MEDICAL CENTER | Age: 76
LOS: 10 days | DRG: 308 | End: 2021-10-29
Attending: EMERGENCY MEDICINE | Admitting: STUDENT IN AN ORGANIZED HEALTH CARE EDUCATION/TRAINING PROGRAM
Payer: MEDICARE

## 2021-01-01 ENCOUNTER — APPOINTMENT (OUTPATIENT)
Dept: RADIOLOGY | Facility: MEDICAL CENTER | Age: 76
DRG: 871 | End: 2021-01-01
Attending: INTERNAL MEDICINE
Payer: MEDICARE

## 2021-01-01 ENCOUNTER — APPOINTMENT (OUTPATIENT)
Dept: RADIOLOGY | Facility: MEDICAL CENTER | Age: 76
DRG: 871 | End: 2021-01-01
Attending: NURSE PRACTITIONER
Payer: MEDICARE

## 2021-01-01 ENCOUNTER — APPOINTMENT (OUTPATIENT)
Dept: RADIOLOGY | Facility: MEDICAL CENTER | Age: 76
DRG: 308 | End: 2021-01-01
Attending: EMERGENCY MEDICINE
Payer: MEDICARE

## 2021-01-01 ENCOUNTER — HOSPITAL ENCOUNTER (OUTPATIENT)
Dept: RADIOLOGY | Facility: MEDICAL CENTER | Age: 76
End: 2021-04-15

## 2021-01-01 ENCOUNTER — HOSPITAL ENCOUNTER (EMERGENCY)
Facility: MEDICAL CENTER | Age: 76
End: 2021-10-07
Attending: EMERGENCY MEDICINE
Payer: MEDICARE

## 2021-01-01 ENCOUNTER — APPOINTMENT (OUTPATIENT)
Dept: RADIOLOGY | Facility: MEDICAL CENTER | Age: 76
DRG: 308 | End: 2021-01-01
Attending: STUDENT IN AN ORGANIZED HEALTH CARE EDUCATION/TRAINING PROGRAM
Payer: MEDICARE

## 2021-01-01 VITALS
HEART RATE: 80 BPM | SYSTOLIC BLOOD PRESSURE: 94 MMHG | HEIGHT: 60 IN | DIASTOLIC BLOOD PRESSURE: 48 MMHG | OXYGEN SATURATION: 96 % | RESPIRATION RATE: 18 BRPM | WEIGHT: 186.07 LBS | BODY MASS INDEX: 36.53 KG/M2 | TEMPERATURE: 97.8 F

## 2021-01-01 VITALS
HEIGHT: 63 IN | BODY MASS INDEX: 34.73 KG/M2 | TEMPERATURE: 97.1 F | OXYGEN SATURATION: 92 % | RESPIRATION RATE: 18 BRPM | HEART RATE: 121 BPM | SYSTOLIC BLOOD PRESSURE: 134 MMHG | WEIGHT: 195.99 LBS | DIASTOLIC BLOOD PRESSURE: 78 MMHG

## 2021-01-01 VITALS
OXYGEN SATURATION: 93 % | HEIGHT: 63 IN | RESPIRATION RATE: 18 BRPM | WEIGHT: 196.21 LBS | BODY MASS INDEX: 34.77 KG/M2 | HEART RATE: 92 BPM | SYSTOLIC BLOOD PRESSURE: 111 MMHG | DIASTOLIC BLOOD PRESSURE: 70 MMHG | TEMPERATURE: 98 F

## 2021-01-01 VITALS
SYSTOLIC BLOOD PRESSURE: 77 MMHG | BODY MASS INDEX: 36.36 KG/M2 | TEMPERATURE: 98 F | HEIGHT: 60 IN | HEART RATE: 86 BPM | WEIGHT: 185.19 LBS | DIASTOLIC BLOOD PRESSURE: 45 MMHG | OXYGEN SATURATION: 38 %

## 2021-01-01 DIAGNOSIS — G89.4 CHRONIC PAIN SYNDROME: ICD-10-CM

## 2021-01-01 DIAGNOSIS — R53.1 WEAKNESS: ICD-10-CM

## 2021-01-01 DIAGNOSIS — I48.91 ATRIAL FIBRILLATION WITH RVR (HCC): ICD-10-CM

## 2021-01-01 DIAGNOSIS — A41.9 SEPSIS WITHOUT ACUTE ORGAN DYSFUNCTION, DUE TO UNSPECIFIED ORGANISM (HCC): ICD-10-CM

## 2021-01-01 DIAGNOSIS — J44.1 CHRONIC OBSTRUCTIVE PULMONARY DISEASE WITH ACUTE EXACERBATION (HCC): ICD-10-CM

## 2021-01-01 DIAGNOSIS — R79.89 ELEVATED LFTS: ICD-10-CM

## 2021-01-01 DIAGNOSIS — J44.1 ACUTE EXACERBATION OF CHRONIC OBSTRUCTIVE PULMONARY DISEASE (COPD) (HCC): ICD-10-CM

## 2021-01-01 DIAGNOSIS — R26.81 UNSTEADY GAIT: ICD-10-CM

## 2021-01-01 DIAGNOSIS — Z23 NEED FOR VACCINATION: ICD-10-CM

## 2021-01-01 DIAGNOSIS — M62.82 NON-TRAUMATIC RHABDOMYOLYSIS: ICD-10-CM

## 2021-01-01 DIAGNOSIS — J44.9 CHRONIC OBSTRUCTIVE PULMONARY DISEASE, UNSPECIFIED COPD TYPE (HCC): ICD-10-CM

## 2021-01-01 DIAGNOSIS — J96.21 ACUTE ON CHRONIC RESPIRATORY FAILURE WITH HYPOXIA (HCC): ICD-10-CM

## 2021-01-01 DIAGNOSIS — R57.9 SHOCK (HCC): ICD-10-CM

## 2021-01-01 DIAGNOSIS — A41.9 SEPSIS, DUE TO UNSPECIFIED ORGANISM, UNSPECIFIED WHETHER ACUTE ORGAN DYSFUNCTION PRESENT (HCC): ICD-10-CM

## 2021-01-01 DIAGNOSIS — I48.91 ATRIAL FIBRILLATION, UNSPECIFIED TYPE (HCC): ICD-10-CM

## 2021-01-01 LAB
ALBUMIN SERPL BCP-MCNC: 1.6 G/DL (ref 3.2–4.9)
ALBUMIN SERPL BCP-MCNC: 1.7 G/DL (ref 3.2–4.9)
ALBUMIN SERPL BCP-MCNC: 1.8 G/DL (ref 3.2–4.9)
ALBUMIN SERPL BCP-MCNC: 1.8 G/DL (ref 3.2–4.9)
ALBUMIN SERPL BCP-MCNC: 1.9 G/DL (ref 3.2–4.9)
ALBUMIN SERPL BCP-MCNC: 2.1 G/DL (ref 3.2–4.9)
ALBUMIN SERPL BCP-MCNC: 2.1 G/DL (ref 3.2–4.9)
ALBUMIN SERPL BCP-MCNC: 2.2 G/DL (ref 3.2–4.9)
ALBUMIN SERPL BCP-MCNC: 2.3 G/DL (ref 3.2–4.9)
ALBUMIN SERPL BCP-MCNC: 2.3 G/DL (ref 3.2–4.9)
ALBUMIN SERPL BCP-MCNC: 4 G/DL (ref 3.2–4.9)
ALBUMIN/GLOB SERPL: 0.5 G/DL
ALBUMIN/GLOB SERPL: 0.6 G/DL
ALBUMIN/GLOB SERPL: 0.7 G/DL
ALBUMIN/GLOB SERPL: 1.1 G/DL
ALP SERPL-CCNC: 115 U/L (ref 30–99)
ALP SERPL-CCNC: 134 U/L (ref 30–99)
ALP SERPL-CCNC: 148 U/L (ref 30–99)
ALP SERPL-CCNC: 150 U/L (ref 30–99)
ALP SERPL-CCNC: 153 U/L (ref 30–99)
ALP SERPL-CCNC: 177 U/L (ref 30–99)
ALP SERPL-CCNC: 221 U/L (ref 30–99)
ALP SERPL-CCNC: 261 U/L (ref 30–99)
ALP SERPL-CCNC: 315 U/L (ref 30–99)
ALP SERPL-CCNC: 322 U/L (ref 30–99)
ALP SERPL-CCNC: 386 U/L (ref 30–99)
ALT SERPL-CCNC: 20 U/L (ref 2–50)
ALT SERPL-CCNC: 31 U/L (ref 2–50)
ALT SERPL-CCNC: 37 U/L (ref 2–50)
ALT SERPL-CCNC: 39 U/L (ref 2–50)
ALT SERPL-CCNC: 48 U/L (ref 2–50)
ALT SERPL-CCNC: 48 U/L (ref 2–50)
ALT SERPL-CCNC: 49 U/L (ref 2–50)
ALT SERPL-CCNC: 60 U/L (ref 2–50)
ALT SERPL-CCNC: 72 U/L (ref 2–50)
ALT SERPL-CCNC: 78 U/L (ref 2–50)
ALT SERPL-CCNC: 82 U/L (ref 2–50)
AMMONIA PLAS-SCNC: 31 UMOL/L (ref 11–45)
AMMONIA PLAS-SCNC: 39 UMOL/L (ref 11–45)
ANION GAP SERPL CALC-SCNC: 11 MMOL/L (ref 7–16)
ANION GAP SERPL CALC-SCNC: 12 MMOL/L (ref 7–16)
ANION GAP SERPL CALC-SCNC: 6 MMOL/L (ref 7–16)
ANION GAP SERPL CALC-SCNC: 7 MMOL/L (ref 7–16)
ANION GAP SERPL CALC-SCNC: 8 MMOL/L (ref 7–16)
ANION GAP SERPL CALC-SCNC: 8 MMOL/L (ref 7–16)
ANION GAP SERPL CALC-SCNC: 9 MMOL/L (ref 7–16)
ANISOCYTOSIS BLD QL SMEAR: ABNORMAL
APPEARANCE UR: CLEAR
APPEARANCE UR: CLEAR
APTT PPP: 36.7 SEC (ref 24.7–36)
AST SERPL-CCNC: 114 U/L (ref 12–45)
AST SERPL-CCNC: 12 U/L (ref 12–45)
AST SERPL-CCNC: 132 U/L (ref 12–45)
AST SERPL-CCNC: 134 U/L (ref 12–45)
AST SERPL-CCNC: 139 U/L (ref 12–45)
AST SERPL-CCNC: 152 U/L (ref 12–45)
AST SERPL-CCNC: 161 U/L (ref 12–45)
AST SERPL-CCNC: 53 U/L (ref 12–45)
AST SERPL-CCNC: 75 U/L (ref 12–45)
AST SERPL-CCNC: 89 U/L (ref 12–45)
AST SERPL-CCNC: 92 U/L (ref 12–45)
B2 MICROGLOB SERPL-MCNC: 3.2 MG/L (ref 1.1–2.4)
BACTERIA #/AREA URNS HPF: NEGATIVE /HPF
BACTERIA #/AREA URNS HPF: NEGATIVE /HPF
BACTERIA BLD CULT: NORMAL
BACTERIA BLD CULT: NORMAL
BACTERIA UR CULT: NORMAL
BASE EXCESS BLDA CALC-SCNC: 7 MMOL/L (ref -4–3)
BASOPHILS # BLD AUTO: 0 % (ref 0–1.8)
BASOPHILS # BLD AUTO: 0.1 % (ref 0–1.8)
BASOPHILS # BLD AUTO: 0.2 % (ref 0–1.8)
BASOPHILS # BLD AUTO: 0.3 % (ref 0–1.8)
BASOPHILS # BLD AUTO: 0.3 % (ref 0–1.8)
BASOPHILS # BLD AUTO: 0.4 % (ref 0–1.8)
BASOPHILS # BLD AUTO: 0.5 % (ref 0–1.8)
BASOPHILS # BLD: 0 K/UL (ref 0–0.12)
BASOPHILS # BLD: 0.02 K/UL (ref 0–0.12)
BASOPHILS # BLD: 0.06 K/UL (ref 0–0.12)
BASOPHILS # BLD: 0.06 K/UL (ref 0–0.12)
BASOPHILS # BLD: 0.07 K/UL (ref 0–0.12)
BASOPHILS # BLD: 0.08 K/UL (ref 0–0.12)
BASOPHILS # BLD: 0.1 K/UL (ref 0–0.12)
BILIRUB CONJ SERPL-MCNC: 5.3 MG/DL (ref 0.1–0.5)
BILIRUB SERPL-MCNC: 0.2 MG/DL (ref 0.1–1.5)
BILIRUB SERPL-MCNC: 0.4 MG/DL (ref 0.1–1.5)
BILIRUB SERPL-MCNC: 0.6 MG/DL (ref 0.1–1.5)
BILIRUB SERPL-MCNC: 0.9 MG/DL (ref 0.1–1.5)
BILIRUB SERPL-MCNC: 1.1 MG/DL (ref 0.1–1.5)
BILIRUB SERPL-MCNC: 1.2 MG/DL (ref 0.1–1.5)
BILIRUB SERPL-MCNC: 1.6 MG/DL (ref 0.1–1.5)
BILIRUB SERPL-MCNC: 4.3 MG/DL (ref 0.1–1.5)
BILIRUB SERPL-MCNC: 5.1 MG/DL (ref 0.1–1.5)
BILIRUB SERPL-MCNC: 6.4 MG/DL (ref 0.1–1.5)
BILIRUB SERPL-MCNC: 7.1 MG/DL (ref 0.1–1.5)
BILIRUB UR QL STRIP.AUTO: ABNORMAL
BILIRUB UR QL STRIP.AUTO: NEGATIVE
BODY TEMPERATURE: 36.4 CENTIGRADE
BUN SERPL-MCNC: 10 MG/DL (ref 8–22)
BUN SERPL-MCNC: 12 MG/DL (ref 8–22)
BUN SERPL-MCNC: 14 MG/DL (ref 8–22)
BUN SERPL-MCNC: 15 MG/DL (ref 8–22)
BUN SERPL-MCNC: 16 MG/DL (ref 8–22)
BUN SERPL-MCNC: 18 MG/DL (ref 8–22)
BUN SERPL-MCNC: 6 MG/DL (ref 8–22)
BUN SERPL-MCNC: 7 MG/DL (ref 8–22)
BUN SERPL-MCNC: 8 MG/DL (ref 8–22)
BURR CELLS BLD QL SMEAR: NORMAL
CALCIUM SERPL-MCNC: 7.6 MG/DL (ref 8.5–10.5)
CALCIUM SERPL-MCNC: 8 MG/DL (ref 8.5–10.5)
CALCIUM SERPL-MCNC: 8.1 MG/DL (ref 8.5–10.5)
CALCIUM SERPL-MCNC: 8.1 MG/DL (ref 8.5–10.5)
CALCIUM SERPL-MCNC: 8.3 MG/DL (ref 8.5–10.5)
CALCIUM SERPL-MCNC: 8.4 MG/DL (ref 8.5–10.5)
CALCIUM SERPL-MCNC: 8.4 MG/DL (ref 8.5–10.5)
CALCIUM SERPL-MCNC: 8.5 MG/DL (ref 8.5–10.5)
CALCIUM SERPL-MCNC: 8.6 MG/DL (ref 8.5–10.5)
CALCIUM SERPL-MCNC: 8.6 MG/DL (ref 8.5–10.5)
CALCIUM SERPL-MCNC: 9.4 MG/DL (ref 8.5–10.5)
CHLORIDE SERPL-SCNC: 100 MMOL/L (ref 96–112)
CHLORIDE SERPL-SCNC: 101 MMOL/L (ref 96–112)
CHLORIDE SERPL-SCNC: 102 MMOL/L (ref 96–112)
CHLORIDE SERPL-SCNC: 103 MMOL/L (ref 96–112)
CHLORIDE SERPL-SCNC: 104 MMOL/L (ref 96–112)
CHLORIDE SERPL-SCNC: 105 MMOL/L (ref 96–112)
CHLORIDE SERPL-SCNC: 94 MMOL/L (ref 96–112)
CHLORIDE SERPL-SCNC: 95 MMOL/L (ref 96–112)
CHLORIDE SERPL-SCNC: 95 MMOL/L (ref 96–112)
CHLORIDE SERPL-SCNC: 96 MMOL/L (ref 96–112)
CHLORIDE SERPL-SCNC: 96 MMOL/L (ref 96–112)
CHLORIDE SERPL-SCNC: 97 MMOL/L (ref 96–112)
CHLORIDE SERPL-SCNC: 99 MMOL/L (ref 96–112)
CHOLEST SERPL-MCNC: 59 MG/DL (ref 100–199)
CK SERPL-CCNC: 15 U/L (ref 0–154)
CK SERPL-CCNC: 2677 U/L (ref 0–154)
CK SERPL-CCNC: 491 U/L (ref 0–154)
CO2 SERPL-SCNC: 26 MMOL/L (ref 20–33)
CO2 SERPL-SCNC: 27 MMOL/L (ref 20–33)
CO2 SERPL-SCNC: 28 MMOL/L (ref 20–33)
CO2 SERPL-SCNC: 28 MMOL/L (ref 20–33)
CO2 SERPL-SCNC: 29 MMOL/L (ref 20–33)
CO2 SERPL-SCNC: 30 MMOL/L (ref 20–33)
CO2 SERPL-SCNC: 30 MMOL/L (ref 20–33)
CO2 SERPL-SCNC: 31 MMOL/L (ref 20–33)
CO2 SERPL-SCNC: 31 MMOL/L (ref 20–33)
CO2 SERPL-SCNC: 32 MMOL/L (ref 20–33)
CO2 SERPL-SCNC: 33 MMOL/L (ref 20–33)
CO2 SERPL-SCNC: 33 MMOL/L (ref 20–33)
COLOR UR: ABNORMAL
COLOR UR: YELLOW
CREAT SERPL-MCNC: 0.25 MG/DL (ref 0.5–1.4)
CREAT SERPL-MCNC: 0.29 MG/DL (ref 0.5–1.4)
CREAT SERPL-MCNC: 0.29 MG/DL (ref 0.5–1.4)
CREAT SERPL-MCNC: 0.34 MG/DL (ref 0.5–1.4)
CREAT SERPL-MCNC: 0.43 MG/DL (ref 0.5–1.4)
CREAT SERPL-MCNC: 0.44 MG/DL (ref 0.5–1.4)
CREAT SERPL-MCNC: 0.44 MG/DL (ref 0.5–1.4)
CREAT SERPL-MCNC: 0.47 MG/DL (ref 0.5–1.4)
CREAT SERPL-MCNC: 0.48 MG/DL (ref 0.5–1.4)
CREAT SERPL-MCNC: 0.5 MG/DL (ref 0.5–1.4)
CREAT SERPL-MCNC: 0.52 MG/DL (ref 0.5–1.4)
CREAT SERPL-MCNC: 0.57 MG/DL (ref 0.5–1.4)
CREAT SERPL-MCNC: 0.58 MG/DL (ref 0.5–1.4)
CREAT SERPL-MCNC: 0.58 MG/DL (ref 0.5–1.4)
CREAT SERPL-MCNC: 0.74 MG/DL (ref 0.5–1.4)
CREAT SERPL-MCNC: 0.76 MG/DL (ref 0.5–1.4)
CRP SERPL HS-MCNC: 20.44 MG/DL (ref 0–0.75)
CRP SERPL HS-MCNC: 25.73 MG/DL (ref 0–0.75)
D DIMER PPP IA.FEU-MCNC: 2.09 UG/ML (FEU) (ref 0–0.5)
EKG IMPRESSION: NORMAL
EOSINOPHIL # BLD AUTO: 0 K/UL (ref 0–0.51)
EOSINOPHIL # BLD AUTO: 0.01 K/UL (ref 0–0.51)
EOSINOPHIL # BLD AUTO: 0.04 K/UL (ref 0–0.51)
EOSINOPHIL # BLD AUTO: 0.04 K/UL (ref 0–0.51)
EOSINOPHIL NFR BLD: 0 % (ref 0–6.9)
EOSINOPHIL NFR BLD: 0.1 % (ref 0–6.9)
EOSINOPHIL NFR BLD: 0.2 % (ref 0–6.9)
EOSINOPHIL NFR BLD: 0.3 % (ref 0–6.9)
EPI CELLS #/AREA URNS HPF: ABNORMAL /HPF
EPI CELLS #/AREA URNS HPF: NEGATIVE /HPF
ERYTHROCYTE [DISTWIDTH] IN BLOOD BY AUTOMATED COUNT: 49.1 FL (ref 35.9–50)
ERYTHROCYTE [DISTWIDTH] IN BLOOD BY AUTOMATED COUNT: 49.4 FL (ref 35.9–50)
ERYTHROCYTE [DISTWIDTH] IN BLOOD BY AUTOMATED COUNT: 50 FL (ref 35.9–50)
ERYTHROCYTE [DISTWIDTH] IN BLOOD BY AUTOMATED COUNT: 50.6 FL (ref 35.9–50)
ERYTHROCYTE [DISTWIDTH] IN BLOOD BY AUTOMATED COUNT: 50.8 FL (ref 35.9–50)
ERYTHROCYTE [DISTWIDTH] IN BLOOD BY AUTOMATED COUNT: 50.8 FL (ref 35.9–50)
ERYTHROCYTE [DISTWIDTH] IN BLOOD BY AUTOMATED COUNT: 51 FL (ref 35.9–50)
ERYTHROCYTE [DISTWIDTH] IN BLOOD BY AUTOMATED COUNT: 51 FL (ref 35.9–50)
ERYTHROCYTE [DISTWIDTH] IN BLOOD BY AUTOMATED COUNT: 51.1 FL (ref 35.9–50)
ERYTHROCYTE [DISTWIDTH] IN BLOOD BY AUTOMATED COUNT: 52.2 FL (ref 35.9–50)
ERYTHROCYTE [DISTWIDTH] IN BLOOD BY AUTOMATED COUNT: 53.2 FL (ref 35.9–50)
ERYTHROCYTE [DISTWIDTH] IN BLOOD BY AUTOMATED COUNT: 54.6 FL (ref 35.9–50)
ERYTHROCYTE [DISTWIDTH] IN BLOOD BY AUTOMATED COUNT: 54.8 FL (ref 35.9–50)
ERYTHROCYTE [DISTWIDTH] IN BLOOD BY AUTOMATED COUNT: 55.3 FL (ref 35.9–50)
ERYTHROCYTE [DISTWIDTH] IN BLOOD BY AUTOMATED COUNT: 55.8 FL (ref 35.9–50)
ETHANOL BLD-MCNC: <10.1 MG/DL (ref 0–10)
FLUAV RNA SPEC QL NAA+PROBE: NEGATIVE
FLUAV RNA SPEC QL NAA+PROBE: NEGATIVE
FLUBV RNA SPEC QL NAA+PROBE: NEGATIVE
FLUBV RNA SPEC QL NAA+PROBE: NEGATIVE
GLOBULIN SER CALC-MCNC: 2.8 G/DL (ref 1.9–3.5)
GLOBULIN SER CALC-MCNC: 3.2 G/DL (ref 1.9–3.5)
GLOBULIN SER CALC-MCNC: 3.2 G/DL (ref 1.9–3.5)
GLOBULIN SER CALC-MCNC: 3.3 G/DL (ref 1.9–3.5)
GLOBULIN SER CALC-MCNC: 3.4 G/DL (ref 1.9–3.5)
GLOBULIN SER CALC-MCNC: 3.5 G/DL (ref 1.9–3.5)
GLOBULIN SER CALC-MCNC: 3.5 G/DL (ref 1.9–3.5)
GLOBULIN SER CALC-MCNC: 3.6 G/DL (ref 1.9–3.5)
GLOBULIN SER CALC-MCNC: 3.6 G/DL (ref 1.9–3.5)
GLOBULIN SER CALC-MCNC: 3.7 G/DL (ref 1.9–3.5)
GLOBULIN SER CALC-MCNC: 4.1 G/DL (ref 1.9–3.5)
GLUCOSE BLD-MCNC: 106 MG/DL (ref 65–99)
GLUCOSE BLD-MCNC: 109 MG/DL (ref 65–99)
GLUCOSE BLD-MCNC: 98 MG/DL (ref 65–99)
GLUCOSE SERPL-MCNC: 102 MG/DL (ref 65–99)
GLUCOSE SERPL-MCNC: 107 MG/DL (ref 65–99)
GLUCOSE SERPL-MCNC: 108 MG/DL (ref 65–99)
GLUCOSE SERPL-MCNC: 114 MG/DL (ref 65–99)
GLUCOSE SERPL-MCNC: 123 MG/DL (ref 65–99)
GLUCOSE SERPL-MCNC: 126 MG/DL (ref 65–99)
GLUCOSE SERPL-MCNC: 133 MG/DL (ref 65–99)
GLUCOSE SERPL-MCNC: 145 MG/DL (ref 65–99)
GLUCOSE SERPL-MCNC: 147 MG/DL (ref 65–99)
GLUCOSE SERPL-MCNC: 83 MG/DL (ref 65–99)
GLUCOSE SERPL-MCNC: 88 MG/DL (ref 65–99)
GLUCOSE SERPL-MCNC: 93 MG/DL (ref 65–99)
GLUCOSE SERPL-MCNC: 98 MG/DL (ref 65–99)
GLUCOSE SERPL-MCNC: 98 MG/DL (ref 65–99)
GLUCOSE UR STRIP.AUTO-MCNC: 100 MG/DL
GLUCOSE UR STRIP.AUTO-MCNC: NEGATIVE MG/DL
HAV IGM SERPL QL IA: NORMAL
HBV CORE IGM SER QL: NORMAL
HBV SURFACE AG SER QL: NORMAL
HCO3 BLDA-SCNC: 32 MMOL/L (ref 17–25)
HCT VFR BLD AUTO: 25.8 % (ref 42–52)
HCT VFR BLD AUTO: 26.4 % (ref 42–52)
HCT VFR BLD AUTO: 27.8 % (ref 42–52)
HCT VFR BLD AUTO: 28.1 % (ref 42–52)
HCT VFR BLD AUTO: 31 % (ref 42–52)
HCT VFR BLD AUTO: 32.1 % (ref 42–52)
HCT VFR BLD AUTO: 33.7 % (ref 42–52)
HCT VFR BLD AUTO: 34.5 % (ref 42–52)
HCT VFR BLD AUTO: 37.5 % (ref 42–52)
HCT VFR BLD AUTO: 43.8 % (ref 42–52)
HCT VFR BLD AUTO: 46.8 % (ref 42–52)
HCT VFR BLD AUTO: 47 % (ref 42–52)
HCT VFR BLD AUTO: 48.5 % (ref 42–52)
HCT VFR BLD AUTO: 49.9 % (ref 42–52)
HCT VFR BLD AUTO: 51.1 % (ref 42–52)
HCV AB SER QL: NORMAL
HDLC SERPL-MCNC: 9 MG/DL
HGB BLD-MCNC: 10.4 G/DL (ref 14–18)
HGB BLD-MCNC: 10.5 G/DL (ref 14–18)
HGB BLD-MCNC: 11.3 G/DL (ref 14–18)
HGB BLD-MCNC: 12.2 G/DL (ref 14–18)
HGB BLD-MCNC: 13.9 G/DL (ref 14–18)
HGB BLD-MCNC: 14.5 G/DL (ref 14–18)
HGB BLD-MCNC: 14.5 G/DL (ref 14–18)
HGB BLD-MCNC: 14.8 G/DL (ref 14–18)
HGB BLD-MCNC: 15.9 G/DL (ref 14–18)
HGB BLD-MCNC: 16.5 G/DL (ref 14–18)
HGB BLD-MCNC: 8.2 G/DL (ref 14–18)
HGB BLD-MCNC: 8.3 G/DL (ref 14–18)
HGB BLD-MCNC: 8.5 G/DL (ref 14–18)
HGB BLD-MCNC: 8.8 G/DL (ref 14–18)
HGB BLD-MCNC: 9.7 G/DL (ref 14–18)
HYALINE CASTS #/AREA URNS LPF: ABNORMAL /LPF
HYALINE CASTS #/AREA URNS LPF: ABNORMAL /LPF
HYPOCHROMIA BLD QL SMEAR: ABNORMAL
IMM GRANULOCYTES # BLD AUTO: 0.18 K/UL (ref 0–0.11)
IMM GRANULOCYTES # BLD AUTO: 0.2 K/UL (ref 0–0.11)
IMM GRANULOCYTES # BLD AUTO: 0.31 K/UL (ref 0–0.11)
IMM GRANULOCYTES # BLD AUTO: 0.31 K/UL (ref 0–0.11)
IMM GRANULOCYTES # BLD AUTO: 0.32 K/UL (ref 0–0.11)
IMM GRANULOCYTES # BLD AUTO: 0.83 K/UL (ref 0–0.11)
IMM GRANULOCYTES NFR BLD AUTO: 0.7 % (ref 0–0.9)
IMM GRANULOCYTES NFR BLD AUTO: 1.1 % (ref 0–0.9)
IMM GRANULOCYTES NFR BLD AUTO: 1.3 % (ref 0–0.9)
IMM GRANULOCYTES NFR BLD AUTO: 1.5 % (ref 0–0.9)
IMM GRANULOCYTES NFR BLD AUTO: 1.7 % (ref 0–0.9)
IMM GRANULOCYTES NFR BLD AUTO: 4.7 % (ref 0–0.9)
INHALED O2 FLOW RATE: 7 L/MIN (ref 2–10)
INR PPP: 1.09 (ref 0.87–1.13)
INR PPP: 1.17 (ref 0.87–1.13)
INR PPP: 1.86 (ref 0.87–1.13)
KETONES UR STRIP.AUTO-MCNC: ABNORMAL MG/DL
KETONES UR STRIP.AUTO-MCNC: NEGATIVE MG/DL
LACTATE BLD-SCNC: 1.8 MMOL/L (ref 0.5–2)
LACTATE BLD-SCNC: 1.9 MMOL/L (ref 0.5–2)
LACTATE BLD-SCNC: 2.1 MMOL/L (ref 0.5–2)
LACTATE BLD-SCNC: 2.2 MMOL/L (ref 0.5–2)
LACTATE BLD-SCNC: 2.5 MMOL/L (ref 0.5–2)
LACTATE BLD-SCNC: 2.6 MMOL/L (ref 0.5–2)
LACTATE BLD-SCNC: 3.7 MMOL/L (ref 0.5–2)
LDH SERPL L TO P-CCNC: 281 U/L (ref 107–266)
LDH SERPL L TO P-CCNC: 296 U/L (ref 107–266)
LDH SERPL L TO P-CCNC: 323 U/L (ref 107–266)
LDLC SERPL CALC-MCNC: 24 MG/DL
LEUKOCYTE ESTERASE UR QL STRIP.AUTO: NEGATIVE
LEUKOCYTE ESTERASE UR QL STRIP.AUTO: NEGATIVE
LG PLATELETS BLD QL SMEAR: NORMAL
LIPASE SERPL-CCNC: 8 U/L (ref 11–82)
LV EJECT FRACT  99904: 60
LV EJECT FRACT MOD 2C 99903: 55.96
LV EJECT FRACT MOD 4C 99902: 62.07
LV EJECT FRACT MOD BP 99901: 60.69
LYMPHOCYTES # BLD AUTO: 0 K/UL (ref 1–4.8)
LYMPHOCYTES # BLD AUTO: 0.19 K/UL (ref 1–4.8)
LYMPHOCYTES # BLD AUTO: 0.21 K/UL (ref 1–4.8)
LYMPHOCYTES # BLD AUTO: 0.21 K/UL (ref 1–4.8)
LYMPHOCYTES # BLD AUTO: 0.26 K/UL (ref 1–4.8)
LYMPHOCYTES # BLD AUTO: 0.28 K/UL (ref 1–4.8)
LYMPHOCYTES # BLD AUTO: 0.37 K/UL (ref 1–4.8)
LYMPHOCYTES # BLD AUTO: 0.5 K/UL (ref 1–4.8)
LYMPHOCYTES NFR BLD: 0 % (ref 22–41)
LYMPHOCYTES NFR BLD: 0.8 % (ref 22–41)
LYMPHOCYTES NFR BLD: 0.9 % (ref 22–41)
LYMPHOCYTES NFR BLD: 0.9 % (ref 22–41)
LYMPHOCYTES NFR BLD: 1.4 % (ref 22–41)
LYMPHOCYTES NFR BLD: 1.6 % (ref 22–41)
LYMPHOCYTES NFR BLD: 2 % (ref 22–41)
LYMPHOCYTES NFR BLD: 2.1 % (ref 22–41)
MACROCYTES BLD QL SMEAR: ABNORMAL
MACROCYTES BLD QL SMEAR: ABNORMAL
MAGNESIUM SERPL-MCNC: 1.7 MG/DL (ref 1.5–2.5)
MAGNESIUM SERPL-MCNC: 2 MG/DL (ref 1.5–2.5)
MANUAL DIFF BLD: NORMAL
MCH RBC QN AUTO: 25.9 PG (ref 27–33)
MCH RBC QN AUTO: 26.1 PG (ref 27–33)
MCH RBC QN AUTO: 26.3 PG (ref 27–33)
MCH RBC QN AUTO: 26.4 PG (ref 27–33)
MCH RBC QN AUTO: 26.6 PG (ref 27–33)
MCH RBC QN AUTO: 26.7 PG (ref 27–33)
MCH RBC QN AUTO: 27 PG (ref 27–33)
MCH RBC QN AUTO: 27.3 PG (ref 27–33)
MCH RBC QN AUTO: 27.5 PG (ref 27–33)
MCH RBC QN AUTO: 27.6 PG (ref 27–33)
MCH RBC QN AUTO: 27.6 PG (ref 27–33)
MCH RBC QN AUTO: 27.9 PG (ref 27–33)
MCH RBC QN AUTO: 28.2 PG (ref 27–33)
MCH RBC QN AUTO: 28.4 PG (ref 27–33)
MCH RBC QN AUTO: 28.4 PG (ref 27–33)
MCHC RBC AUTO-ENTMCNC: 29.9 G/DL (ref 33.7–35.3)
MCHC RBC AUTO-ENTMCNC: 30.6 G/DL (ref 33.7–35.3)
MCHC RBC AUTO-ENTMCNC: 31 G/DL (ref 33.7–35.3)
MCHC RBC AUTO-ENTMCNC: 31.1 G/DL (ref 33.7–35.3)
MCHC RBC AUTO-ENTMCNC: 31.2 G/DL (ref 33.7–35.3)
MCHC RBC AUTO-ENTMCNC: 31.3 G/DL (ref 33.7–35.3)
MCHC RBC AUTO-ENTMCNC: 31.3 G/DL (ref 33.7–35.3)
MCHC RBC AUTO-ENTMCNC: 31.5 G/DL (ref 33.7–35.3)
MCHC RBC AUTO-ENTMCNC: 31.7 G/DL (ref 33.7–35.3)
MCHC RBC AUTO-ENTMCNC: 31.9 G/DL (ref 33.7–35.3)
MCHC RBC AUTO-ENTMCNC: 32.2 G/DL (ref 33.7–35.3)
MCHC RBC AUTO-ENTMCNC: 32.3 G/DL (ref 33.7–35.3)
MCHC RBC AUTO-ENTMCNC: 32.4 G/DL (ref 33.7–35.3)
MCHC RBC AUTO-ENTMCNC: 32.5 G/DL (ref 33.7–35.3)
MCHC RBC AUTO-ENTMCNC: 32.8 G/DL (ref 33.7–35.3)
MCV RBC AUTO: 82.9 FL (ref 81.4–97.8)
MCV RBC AUTO: 83 FL (ref 81.4–97.8)
MCV RBC AUTO: 83.3 FL (ref 81.4–97.8)
MCV RBC AUTO: 83.4 FL (ref 81.4–97.8)
MCV RBC AUTO: 83.9 FL (ref 81.4–97.8)
MCV RBC AUTO: 84.8 FL (ref 81.4–97.8)
MCV RBC AUTO: 84.9 FL (ref 81.4–97.8)
MCV RBC AUTO: 85.3 FL (ref 81.4–97.8)
MCV RBC AUTO: 85.3 FL (ref 81.4–97.8)
MCV RBC AUTO: 87.7 FL (ref 81.4–97.8)
MCV RBC AUTO: 87.8 FL (ref 81.4–97.8)
MCV RBC AUTO: 88.1 FL (ref 81.4–97.8)
MCV RBC AUTO: 88.6 FL (ref 81.4–97.8)
MCV RBC AUTO: 88.8 FL (ref 81.4–97.8)
MCV RBC AUTO: 94.9 FL (ref 81.4–97.8)
MICRO URNS: ABNORMAL
MICRO URNS: ABNORMAL
MICROCYTES BLD QL SMEAR: ABNORMAL
MONOCYTES # BLD AUTO: 0 K/UL (ref 0–0.85)
MONOCYTES # BLD AUTO: 0 K/UL (ref 0–0.85)
MONOCYTES # BLD AUTO: 0.17 K/UL (ref 0–0.85)
MONOCYTES # BLD AUTO: 0.21 K/UL (ref 0–0.85)
MONOCYTES # BLD AUTO: 0.26 K/UL (ref 0–0.85)
MONOCYTES # BLD AUTO: 0.53 K/UL (ref 0–0.85)
MONOCYTES # BLD AUTO: 0.63 K/UL (ref 0–0.85)
MONOCYTES # BLD AUTO: 0.65 K/UL (ref 0–0.85)
MONOCYTES # BLD AUTO: 0.68 K/UL (ref 0–0.85)
MONOCYTES # BLD AUTO: 0.68 K/UL (ref 0–0.85)
MONOCYTES # BLD AUTO: 0.82 K/UL (ref 0–0.85)
MONOCYTES # BLD AUTO: 1.58 K/UL (ref 0–0.85)
MONOCYTES NFR BLD AUTO: 0 % (ref 0–13.4)
MONOCYTES NFR BLD AUTO: 0 % (ref 0–13.4)
MONOCYTES NFR BLD AUTO: 0.8 % (ref 0–13.4)
MONOCYTES NFR BLD AUTO: 0.9 % (ref 0–13.4)
MONOCYTES NFR BLD AUTO: 0.9 % (ref 0–13.4)
MONOCYTES NFR BLD AUTO: 2.2 % (ref 0–13.4)
MONOCYTES NFR BLD AUTO: 2.5 % (ref 0–13.4)
MONOCYTES NFR BLD AUTO: 2.6 % (ref 0–13.4)
MONOCYTES NFR BLD AUTO: 3.7 % (ref 0–13.4)
MONOCYTES NFR BLD AUTO: 4.6 % (ref 0–13.4)
MONOCYTES NFR BLD AUTO: 5 % (ref 0–13.4)
MONOCYTES NFR BLD AUTO: 6.2 % (ref 0–13.4)
MORPHOLOGY BLD-IMP: NORMAL
MYELOCYTES NFR BLD MANUAL: 0.8 %
MYELOCYTES NFR BLD MANUAL: 0.9 %
NEUTROPHILS # BLD AUTO: 12.5 K/UL (ref 1.82–7.42)
NEUTROPHILS # BLD AUTO: 15.68 K/UL (ref 1.82–7.42)
NEUTROPHILS # BLD AUTO: 16.33 K/UL (ref 1.82–7.42)
NEUTROPHILS # BLD AUTO: 17.8 K/UL (ref 1.82–7.42)
NEUTROPHILS # BLD AUTO: 20.31 K/UL (ref 1.82–7.42)
NEUTROPHILS # BLD AUTO: 20.4 K/UL (ref 1.82–7.42)
NEUTROPHILS # BLD AUTO: 21.63 K/UL (ref 1.82–7.42)
NEUTROPHILS # BLD AUTO: 23.03 K/UL (ref 1.82–7.42)
NEUTROPHILS # BLD AUTO: 23.27 K/UL (ref 1.82–7.42)
NEUTROPHILS # BLD AUTO: 25.23 K/UL (ref 1.82–7.42)
NEUTROPHILS # BLD AUTO: 27.58 K/UL (ref 1.82–7.42)
NEUTROPHILS # BLD AUTO: 27.73 K/UL (ref 1.82–7.42)
NEUTROPHILS NFR BLD: 100 % (ref 44–72)
NEUTROPHILS NFR BLD: 100 % (ref 44–72)
NEUTROPHILS NFR BLD: 89.3 % (ref 44–72)
NEUTROPHILS NFR BLD: 90.1 % (ref 44–72)
NEUTROPHILS NFR BLD: 91.2 % (ref 44–72)
NEUTROPHILS NFR BLD: 91.7 % (ref 44–72)
NEUTROPHILS NFR BLD: 93 % (ref 44–72)
NEUTROPHILS NFR BLD: 93.2 % (ref 44–72)
NEUTROPHILS NFR BLD: 94.2 % (ref 44–72)
NEUTROPHILS NFR BLD: 95.6 % (ref 44–72)
NEUTROPHILS NFR BLD: 95.6 % (ref 44–72)
NEUTROPHILS NFR BLD: 98.2 % (ref 44–72)
NEUTS BAND NFR BLD MANUAL: 2.5 % (ref 0–10)
NEUTS BAND NFR BLD MANUAL: 4.3 % (ref 0–10)
NEUTS BAND NFR BLD MANUAL: 6 % (ref 0–10)
NITRITE UR QL STRIP.AUTO: NEGATIVE
NITRITE UR QL STRIP.AUTO: POSITIVE
NRBC # BLD AUTO: 0 K/UL
NRBC # BLD AUTO: 0.03 K/UL
NRBC # BLD AUTO: 0.05 K/UL
NRBC # BLD AUTO: 0.05 K/UL
NRBC # BLD AUTO: 0.06 K/UL
NRBC # BLD AUTO: 0.06 K/UL
NRBC BLD-RTO: 0 /100 WBC
NRBC BLD-RTO: 0.1 /100 WBC
NRBC BLD-RTO: 0.2 /100 WBC
NRBC BLD-RTO: 0.3 /100 WBC
NT-PROBNP SERPL IA-MCNC: 1160 PG/ML (ref 0–125)
NT-PROBNP SERPL IA-MCNC: 1283 PG/ML (ref 0–125)
NT-PROBNP SERPL IA-MCNC: 909 PG/ML (ref 0–125)
OTHER ELEMENTS URNS MICRO: ABNORMAL
OVALOCYTES BLD QL SMEAR: NORMAL
PCO2 BLDA: 44.9 MMHG (ref 26–37)
PCO2 TEMP ADJ BLDA: 43.7 MMHG (ref 26–37)
PH BLDA: 7.47 [PH] (ref 7.4–7.5)
PH TEMP ADJ BLDA: 7.48 [PH] (ref 7.4–7.5)
PH UR STRIP.AUTO: 5.5 [PH] (ref 5–8)
PH UR STRIP.AUTO: 6.5 [PH] (ref 5–8)
PHOSPHATE SERPL-MCNC: 1.6 MG/DL (ref 2.5–4.5)
PHOSPHATE SERPL-MCNC: 2.2 MG/DL (ref 2.5–4.5)
PHOSPHATE SERPL-MCNC: 2.8 MG/DL (ref 2.5–4.5)
PLATELET # BLD AUTO: 144 K/UL (ref 164–446)
PLATELET # BLD AUTO: 147 K/UL (ref 164–446)
PLATELET # BLD AUTO: 153 K/UL (ref 164–446)
PLATELET # BLD AUTO: 159 K/UL (ref 164–446)
PLATELET # BLD AUTO: 185 K/UL (ref 164–446)
PLATELET # BLD AUTO: 202 K/UL (ref 164–446)
PLATELET # BLD AUTO: 258 K/UL (ref 164–446)
PLATELET # BLD AUTO: 285 K/UL (ref 164–446)
PLATELET # BLD AUTO: 292 K/UL (ref 164–446)
PLATELET # BLD AUTO: 295 K/UL (ref 164–446)
PLATELET # BLD AUTO: 48 K/UL (ref 164–446)
PLATELET # BLD AUTO: 50 K/UL (ref 164–446)
PLATELET # BLD AUTO: 52 K/UL (ref 164–446)
PLATELET # BLD AUTO: 68 K/UL (ref 164–446)
PLATELET # BLD AUTO: 81 K/UL (ref 164–446)
PLATELET BLD QL SMEAR: NORMAL
PLATELETS.RETICULATED NFR BLD AUTO: 12.9 K/UL (ref 0.6–13.1)
PMV BLD AUTO: 10.2 FL (ref 9–12.9)
PMV BLD AUTO: 10.4 FL (ref 9–12.9)
PMV BLD AUTO: 11.1 FL (ref 9–12.9)
PMV BLD AUTO: 11.1 FL (ref 9–12.9)
PMV BLD AUTO: 11.3 FL (ref 9–12.9)
PMV BLD AUTO: 11.6 FL (ref 9–12.9)
PMV BLD AUTO: 11.7 FL (ref 9–12.9)
PMV BLD AUTO: 11.9 FL (ref 9–12.9)
PMV BLD AUTO: 12.1 FL (ref 9–12.9)
PMV BLD AUTO: 12.3 FL (ref 9–12.9)
PMV BLD AUTO: 9.6 FL (ref 9–12.9)
PMV BLD AUTO: 9.7 FL (ref 9–12.9)
PMV BLD AUTO: 9.8 FL (ref 9–12.9)
PO2 BLDA: 101.4 MMHG (ref 64–87)
POIKILOCYTOSIS BLD QL SMEAR: NORMAL
POTASSIUM SERPL-SCNC: 3.8 MMOL/L (ref 3.6–5.5)
POTASSIUM SERPL-SCNC: 3.9 MMOL/L (ref 3.6–5.5)
POTASSIUM SERPL-SCNC: 4 MMOL/L (ref 3.6–5.5)
POTASSIUM SERPL-SCNC: 4.2 MMOL/L (ref 3.6–5.5)
POTASSIUM SERPL-SCNC: 4.3 MMOL/L (ref 3.6–5.5)
POTASSIUM SERPL-SCNC: 4.4 MMOL/L (ref 3.6–5.5)
POTASSIUM SERPL-SCNC: 4.5 MMOL/L (ref 3.6–5.5)
POTASSIUM SERPL-SCNC: 4.5 MMOL/L (ref 3.6–5.5)
POTASSIUM SERPL-SCNC: 4.6 MMOL/L (ref 3.6–5.5)
POTASSIUM SERPL-SCNC: 4.6 MMOL/L (ref 3.6–5.5)
POTASSIUM SERPL-SCNC: 4.7 MMOL/L (ref 3.6–5.5)
POTASSIUM SERPL-SCNC: 4.9 MMOL/L (ref 3.6–5.5)
POTASSIUM SERPL-SCNC: 4.9 MMOL/L (ref 3.6–5.5)
POTASSIUM SERPL-SCNC: 5.1 MMOL/L (ref 3.6–5.5)
PREALB SERPL-MCNC: <3 MG/DL (ref 18–38)
PREALB SERPL-MCNC: <3 MG/DL (ref 18–38)
PROCALCITONIN SERPL-MCNC: 0.44 NG/ML
PROCALCITONIN SERPL-MCNC: 0.83 NG/ML
PROCALCITONIN SERPL-MCNC: 1.1 NG/ML
PROCALCITONIN SERPL-MCNC: 1.29 NG/ML
PROCALCITONIN SERPL-MCNC: 1.62 NG/ML
PROCALCITONIN SERPL-MCNC: 1.83 NG/ML
PROMYELOCYTES NFR BLD MANUAL: 0.9 %
PROT SERPL-MCNC: 4.5 G/DL (ref 6–8.2)
PROT SERPL-MCNC: 4.9 G/DL (ref 6–8.2)
PROT SERPL-MCNC: 5 G/DL (ref 6–8.2)
PROT SERPL-MCNC: 5.4 G/DL (ref 6–8.2)
PROT SERPL-MCNC: 5.5 G/DL (ref 6–8.2)
PROT SERPL-MCNC: 5.6 G/DL (ref 6–8.2)
PROT SERPL-MCNC: 6 G/DL (ref 6–8.2)
PROT SERPL-MCNC: 6.4 G/DL (ref 6–8.2)
PROT SERPL-MCNC: 7.6 G/DL (ref 6–8.2)
PROT UR QL STRIP: 30 MG/DL
PROT UR QL STRIP: NEGATIVE MG/DL
PROTHROMBIN TIME: 13.8 SEC (ref 12–14.6)
PROTHROMBIN TIME: 14.6 SEC (ref 12–14.6)
PROTHROMBIN TIME: 20.8 SEC (ref 12–14.6)
RBC # BLD AUTO: 3.11 M/UL (ref 4.7–6.1)
RBC # BLD AUTO: 3.11 M/UL (ref 4.7–6.1)
RBC # BLD AUTO: 3.26 M/UL (ref 4.7–6.1)
RBC # BLD AUTO: 3.35 M/UL (ref 4.7–6.1)
RBC # BLD AUTO: 3.74 M/UL (ref 4.7–6.1)
RBC # BLD AUTO: 3.85 M/UL (ref 4.7–6.1)
RBC # BLD AUTO: 3.95 M/UL (ref 4.7–6.1)
RBC # BLD AUTO: 4.14 M/UL (ref 4.7–6.1)
RBC # BLD AUTO: 4.42 M/UL (ref 4.7–6.1)
RBC # BLD AUTO: 4.99 M/UL (ref 4.7–6.1)
RBC # BLD AUTO: 5.11 M/UL (ref 4.7–6.1)
RBC # BLD AUTO: 5.27 M/UL (ref 4.7–6.1)
RBC # BLD AUTO: 5.36 M/UL (ref 4.7–6.1)
RBC # BLD AUTO: 5.63 M/UL (ref 4.7–6.1)
RBC # BLD AUTO: 5.8 M/UL (ref 4.7–6.1)
RBC # URNS HPF: ABNORMAL /HPF
RBC # URNS HPF: ABNORMAL /HPF
RBC BLD AUTO: NORMAL
RBC BLD AUTO: PRESENT
RBC UR QL AUTO: ABNORMAL
RBC UR QL AUTO: NEGATIVE
RSV RNA SPEC QL NAA+PROBE: NEGATIVE
RSV RNA SPEC QL NAA+PROBE: NEGATIVE
SAO2 % BLDA: 97.3 % (ref 93–99)
SARS-COV-2 RNA RESP QL NAA+PROBE: NOTDETECTED
SARS-COV-2 RNA RESP QL NAA+PROBE: NOTDETECTED
SIGNIFICANT IND 70042: NORMAL
SITE SITE: NORMAL
SODIUM SERPL-SCNC: 130 MMOL/L (ref 135–145)
SODIUM SERPL-SCNC: 132 MMOL/L (ref 135–145)
SODIUM SERPL-SCNC: 133 MMOL/L (ref 135–145)
SODIUM SERPL-SCNC: 135 MMOL/L (ref 135–145)
SODIUM SERPL-SCNC: 135 MMOL/L (ref 135–145)
SODIUM SERPL-SCNC: 137 MMOL/L (ref 135–145)
SODIUM SERPL-SCNC: 139 MMOL/L (ref 135–145)
SODIUM SERPL-SCNC: 139 MMOL/L (ref 135–145)
SODIUM SERPL-SCNC: 140 MMOL/L (ref 135–145)
SODIUM SERPL-SCNC: 141 MMOL/L (ref 135–145)
SODIUM SERPL-SCNC: 142 MMOL/L (ref 135–145)
SOURCE SOURCE: NORMAL
SP GR UR STRIP.AUTO: 1.01
SP GR UR STRIP.AUTO: 1.02
SPECIMEN SOURCE: NORMAL
SPECIMEN SOURCE: NORMAL
TARGETS BLD QL SMEAR: NORMAL
TOXIC GRANULES BLD QL SMEAR: NORMAL
TRIGL SERPL-MCNC: 131 MG/DL (ref 0–149)
TROPONIN T SERPL-MCNC: 13 NG/L (ref 6–19)
TROPONIN T SERPL-MCNC: 28 NG/L (ref 6–19)
TROPONIN T SERPL-MCNC: 48 NG/L (ref 6–19)
TROPONIN T SERPL-MCNC: 56 NG/L (ref 6–19)
TROPONIN T SERPL-MCNC: 58 NG/L (ref 6–19)
TROPONIN T SERPL-MCNC: 60 NG/L (ref 6–19)
TROPONIN T SERPL-MCNC: 61 NG/L (ref 6–19)
TSH SERPL DL<=0.005 MIU/L-ACNC: 0.74 UIU/ML (ref 0.38–5.33)
TSH SERPL DL<=0.005 MIU/L-ACNC: 3.85 UIU/ML (ref 0.38–5.33)
UROBILINOGEN UR STRIP.AUTO-MCNC: 1 MG/DL
UROBILINOGEN UR STRIP.AUTO-MCNC: >=8 MG/DL
WBC # BLD AUTO: 12.3 K/UL (ref 4.8–10.8)
WBC # BLD AUTO: 12.3 K/UL (ref 4.8–10.8)
WBC # BLD AUTO: 13.6 K/UL (ref 4.8–10.8)
WBC # BLD AUTO: 17.6 K/UL (ref 4.8–10.8)
WBC # BLD AUTO: 17.8 K/UL (ref 4.8–10.8)
WBC # BLD AUTO: 17.9 K/UL (ref 4.8–10.8)
WBC # BLD AUTO: 20.4 K/UL (ref 4.8–10.8)
WBC # BLD AUTO: 21 K/UL (ref 4.8–10.8)
WBC # BLD AUTO: 21.8 K/UL (ref 4.8–10.8)
WBC # BLD AUTO: 23.7 K/UL (ref 4.8–10.8)
WBC # BLD AUTO: 25.5 K/UL (ref 4.8–10.8)
WBC # BLD AUTO: 26.4 K/UL (ref 4.8–10.8)
WBC # BLD AUTO: 26.8 K/UL (ref 4.8–10.8)
WBC # BLD AUTO: 28.5 K/UL (ref 4.8–10.8)
WBC # BLD AUTO: 28.8 K/UL (ref 4.8–10.8)
WBC #/AREA URNS HPF: ABNORMAL /HPF
WBC #/AREA URNS HPF: ABNORMAL /HPF

## 2021-01-01 PROCEDURE — 770020 HCHG ROOM/CARE - TELE (206)

## 2021-01-01 PROCEDURE — 87086 URINE CULTURE/COLONY COUNT: CPT

## 2021-01-01 PROCEDURE — A9270 NON-COVERED ITEM OR SERVICE: HCPCS | Performed by: INTERNAL MEDICINE

## 2021-01-01 PROCEDURE — 93005 ELECTROCARDIOGRAM TRACING: CPT | Performed by: INTERNAL MEDICINE

## 2021-01-01 PROCEDURE — 84134 ASSAY OF PREALBUMIN: CPT

## 2021-01-01 PROCEDURE — A9270 NON-COVERED ITEM OR SERVICE: HCPCS | Performed by: STUDENT IN AN ORGANIZED HEALTH CARE EDUCATION/TRAINING PROGRAM

## 2021-01-01 PROCEDURE — 96365 THER/PROPH/DIAG IV INF INIT: CPT

## 2021-01-01 PROCEDURE — 700111 HCHG RX REV CODE 636 W/ 250 OVERRIDE (IP): Performed by: STUDENT IN AN ORGANIZED HEALTH CARE EDUCATION/TRAINING PROGRAM

## 2021-01-01 PROCEDURE — A9270 NON-COVERED ITEM OR SERVICE: HCPCS | Performed by: NURSE PRACTITIONER

## 2021-01-01 PROCEDURE — 700102 HCHG RX REV CODE 250 W/ 637 OVERRIDE(OP): Performed by: INTERNAL MEDICINE

## 2021-01-01 PROCEDURE — 700102 HCHG RX REV CODE 250 W/ 637 OVERRIDE(OP): Performed by: HOSPITALIST

## 2021-01-01 PROCEDURE — 93010 ELECTROCARDIOGRAM REPORT: CPT | Performed by: INTERNAL MEDICINE

## 2021-01-01 PROCEDURE — 700111 HCHG RX REV CODE 636 W/ 250 OVERRIDE (IP): Performed by: NURSE PRACTITIONER

## 2021-01-01 PROCEDURE — 700101 HCHG RX REV CODE 250: Performed by: INTERNAL MEDICINE

## 2021-01-01 PROCEDURE — 31720 CLEARANCE OF AIRWAYS: CPT

## 2021-01-01 PROCEDURE — 99223 1ST HOSP IP/OBS HIGH 75: CPT | Performed by: INTERNAL MEDICINE

## 2021-01-01 PROCEDURE — 97535 SELF CARE MNGMENT TRAINING: CPT

## 2021-01-01 PROCEDURE — A9270 NON-COVERED ITEM OR SERVICE: HCPCS | Performed by: HOSPITALIST

## 2021-01-01 PROCEDURE — 95819 EEG AWAKE AND ASLEEP: CPT | Mod: 26 | Performed by: PSYCHIATRY & NEUROLOGY

## 2021-01-01 PROCEDURE — 700102 HCHG RX REV CODE 250 W/ 637 OVERRIDE(OP): Performed by: GENERAL PRACTICE

## 2021-01-01 PROCEDURE — 81001 URINALYSIS AUTO W/SCOPE: CPT

## 2021-01-01 PROCEDURE — 94760 N-INVAS EAR/PLS OXIMETRY 1: CPT

## 2021-01-01 PROCEDURE — 94640 AIRWAY INHALATION TREATMENT: CPT

## 2021-01-01 PROCEDURE — 700105 HCHG RX REV CODE 258: Performed by: STUDENT IN AN ORGANIZED HEALTH CARE EDUCATION/TRAINING PROGRAM

## 2021-01-01 PROCEDURE — 80053 COMPREHEN METABOLIC PANEL: CPT

## 2021-01-01 PROCEDURE — 80048 BASIC METABOLIC PNL TOTAL CA: CPT

## 2021-01-01 PROCEDURE — 84443 ASSAY THYROID STIM HORMONE: CPT

## 2021-01-01 PROCEDURE — 94669 MECHANICAL CHEST WALL OSCILL: CPT

## 2021-01-01 PROCEDURE — 700105 HCHG RX REV CODE 258: Performed by: NURSE PRACTITIONER

## 2021-01-01 PROCEDURE — 71045 X-RAY EXAM CHEST 1 VIEW: CPT

## 2021-01-01 PROCEDURE — 71275 CT ANGIOGRAPHY CHEST: CPT | Mod: ME

## 2021-01-01 PROCEDURE — 85027 COMPLETE CBC AUTOMATED: CPT | Mod: 91

## 2021-01-01 PROCEDURE — 83880 ASSAY OF NATRIURETIC PEPTIDE: CPT

## 2021-01-01 PROCEDURE — 85730 THROMBOPLASTIN TIME PARTIAL: CPT

## 2021-01-01 PROCEDURE — 99232 SBSQ HOSP IP/OBS MODERATE 35: CPT | Performed by: HOSPITALIST

## 2021-01-01 PROCEDURE — 93005 ELECTROCARDIOGRAM TRACING: CPT | Performed by: EMERGENCY MEDICINE

## 2021-01-01 PROCEDURE — 700111 HCHG RX REV CODE 636 W/ 250 OVERRIDE (IP): Performed by: INTERNAL MEDICINE

## 2021-01-01 PROCEDURE — 84484 ASSAY OF TROPONIN QUANT: CPT

## 2021-01-01 PROCEDURE — 85027 COMPLETE CBC AUTOMATED: CPT

## 2021-01-01 PROCEDURE — 92610 EVALUATE SWALLOWING FUNCTION: CPT

## 2021-01-01 PROCEDURE — 93005 ELECTROCARDIOGRAM TRACING: CPT

## 2021-01-01 PROCEDURE — 85025 COMPLETE CBC W/AUTO DIFF WBC: CPT

## 2021-01-01 PROCEDURE — 96376 TX/PRO/DX INJ SAME DRUG ADON: CPT

## 2021-01-01 PROCEDURE — 700102 HCHG RX REV CODE 250 W/ 637 OVERRIDE(OP): Performed by: STUDENT IN AN ORGANIZED HEALTH CARE EDUCATION/TRAINING PROGRAM

## 2021-01-01 PROCEDURE — 99233 SBSQ HOSP IP/OBS HIGH 50: CPT | Performed by: INTERNAL MEDICINE

## 2021-01-01 PROCEDURE — 85007 BL SMEAR W/DIFF WBC COUNT: CPT

## 2021-01-01 PROCEDURE — 83615 LACTATE (LD) (LDH) ENZYME: CPT

## 2021-01-01 PROCEDURE — 36556 INSERT NON-TUNNEL CV CATH: CPT | Mod: RT | Performed by: NURSE PRACTITIONER

## 2021-01-01 PROCEDURE — 99223 1ST HOSP IP/OBS HIGH 75: CPT | Mod: AI | Performed by: INTERNAL MEDICINE

## 2021-01-01 PROCEDURE — 99291 CRITICAL CARE FIRST HOUR: CPT | Performed by: INTERNAL MEDICINE

## 2021-01-01 PROCEDURE — 99238 HOSP IP/OBS DSCHRG MGMT 30/<: CPT | Performed by: HOSPITALIST

## 2021-01-01 PROCEDURE — 99285 EMERGENCY DEPT VISIT HI MDM: CPT

## 2021-01-01 PROCEDURE — 82140 ASSAY OF AMMONIA: CPT

## 2021-01-01 PROCEDURE — 92526 ORAL FUNCTION THERAPY: CPT

## 2021-01-01 PROCEDURE — 97112 NEUROMUSCULAR REEDUCATION: CPT

## 2021-01-01 PROCEDURE — 87040 BLOOD CULTURE FOR BACTERIA: CPT

## 2021-01-01 PROCEDURE — 36415 COLL VENOUS BLD VENIPUNCTURE: CPT

## 2021-01-01 PROCEDURE — 99497 ADVNCD CARE PLAN 30 MIN: CPT | Performed by: NURSE PRACTITIONER

## 2021-01-01 PROCEDURE — 70450 CT HEAD/BRAIN W/O DYE: CPT | Mod: ME

## 2021-01-01 PROCEDURE — 83605 ASSAY OF LACTIC ACID: CPT

## 2021-01-01 PROCEDURE — 97602 WOUND(S) CARE NON-SELECTIVE: CPT

## 2021-01-01 PROCEDURE — A9270 NON-COVERED ITEM OR SERVICE: HCPCS | Performed by: EMERGENCY MEDICINE

## 2021-01-01 PROCEDURE — 85610 PROTHROMBIN TIME: CPT

## 2021-01-01 PROCEDURE — A9270 NON-COVERED ITEM OR SERVICE: HCPCS

## 2021-01-01 PROCEDURE — 96372 THER/PROPH/DIAG INJ SC/IM: CPT

## 2021-01-01 PROCEDURE — 0241U HCHG SARS-COV-2 COVID-19 NFCT DS RESP RNA 4 TRGT MIC: CPT

## 2021-01-01 PROCEDURE — 97162 PT EVAL MOD COMPLEX 30 MIN: CPT

## 2021-01-01 PROCEDURE — 83605 ASSAY OF LACTIC ACID: CPT | Mod: 91

## 2021-01-01 PROCEDURE — 83690 ASSAY OF LIPASE: CPT

## 2021-01-01 PROCEDURE — 87040 BLOOD CULTURE FOR BACTERIA: CPT | Mod: 91

## 2021-01-01 PROCEDURE — 99233 SBSQ HOSP IP/OBS HIGH 50: CPT | Performed by: GENERAL PRACTICE

## 2021-01-01 PROCEDURE — 770001 HCHG ROOM/CARE - MED/SURG/GYN PRIV*

## 2021-01-01 PROCEDURE — 76705 ECHO EXAM OF ABDOMEN: CPT

## 2021-01-01 PROCEDURE — 82962 GLUCOSE BLOOD TEST: CPT

## 2021-01-01 PROCEDURE — 700111 HCHG RX REV CODE 636 W/ 250 OVERRIDE (IP): Performed by: HOSPITALIST

## 2021-01-01 PROCEDURE — 700105 HCHG RX REV CODE 258: Performed by: INTERNAL MEDICINE

## 2021-01-01 PROCEDURE — 90662 IIV NO PRSV INCREASED AG IM: CPT | Performed by: INTERNAL MEDICINE

## 2021-01-01 PROCEDURE — 99233 SBSQ HOSP IP/OBS HIGH 50: CPT | Performed by: HOSPITALIST

## 2021-01-01 PROCEDURE — 700102 HCHG RX REV CODE 250 W/ 637 OVERRIDE(OP)

## 2021-01-01 PROCEDURE — 85055 RETICULATED PLATELET ASSAY: CPT

## 2021-01-01 PROCEDURE — 700102 HCHG RX REV CODE 250 W/ 637 OVERRIDE(OP): Performed by: NURSE PRACTITIONER

## 2021-01-01 PROCEDURE — 99233 SBSQ HOSP IP/OBS HIGH 50: CPT | Mod: 25 | Performed by: STUDENT IN AN ORGANIZED HEALTH CARE EDUCATION/TRAINING PROGRAM

## 2021-01-01 PROCEDURE — 700102 HCHG RX REV CODE 250 W/ 637 OVERRIDE(OP): Performed by: EMERGENCY MEDICINE

## 2021-01-01 PROCEDURE — A9270 NON-COVERED ITEM OR SERVICE: HCPCS | Performed by: GENERAL PRACTICE

## 2021-01-01 PROCEDURE — 96375 TX/PRO/DX INJ NEW DRUG ADDON: CPT

## 2021-01-01 PROCEDURE — 97530 THERAPEUTIC ACTIVITIES: CPT

## 2021-01-01 PROCEDURE — 700111 HCHG RX REV CODE 636 W/ 250 OVERRIDE (IP): Performed by: EMERGENCY MEDICINE

## 2021-01-01 PROCEDURE — 700105 HCHG RX REV CODE 258: Performed by: EMERGENCY MEDICINE

## 2021-01-01 PROCEDURE — 82550 ASSAY OF CK (CPK): CPT

## 2021-01-01 PROCEDURE — 84145 PROCALCITONIN (PCT): CPT

## 2021-01-01 PROCEDURE — 700101 HCHG RX REV CODE 250: Performed by: STUDENT IN AN ORGANIZED HEALTH CARE EDUCATION/TRAINING PROGRAM

## 2021-01-01 PROCEDURE — 99498 ADVNCD CARE PLAN ADDL 30 MIN: CPT | Performed by: NURSE PRACTITIONER

## 2021-01-01 PROCEDURE — 700105 HCHG RX REV CODE 258: Performed by: HOSPITALIST

## 2021-01-01 PROCEDURE — 86140 C-REACTIVE PROTEIN: CPT

## 2021-01-01 PROCEDURE — 770022 HCHG ROOM/CARE - ICU (200)

## 2021-01-01 PROCEDURE — 99233 SBSQ HOSP IP/OBS HIGH 50: CPT | Mod: 25 | Performed by: INTERNAL MEDICINE

## 2021-01-01 PROCEDURE — 83735 ASSAY OF MAGNESIUM: CPT

## 2021-01-01 PROCEDURE — 97168 OT RE-EVAL EST PLAN CARE: CPT

## 2021-01-01 PROCEDURE — 99239 HOSP IP/OBS DSCHRG MGMT >30: CPT | Performed by: INTERNAL MEDICINE

## 2021-01-01 PROCEDURE — 99292 CRITICAL CARE ADDL 30 MIN: CPT | Performed by: INTERNAL MEDICINE

## 2021-01-01 PROCEDURE — C9803 HOPD COVID-19 SPEC COLLECT: HCPCS | Performed by: STUDENT IN AN ORGANIZED HEALTH CARE EDUCATION/TRAINING PROGRAM

## 2021-01-01 PROCEDURE — 95819 EEG AWAKE AND ASLEEP: CPT | Performed by: PSYCHIATRY & NEUROLOGY

## 2021-01-01 PROCEDURE — 700117 HCHG RX CONTRAST REV CODE 255: Performed by: EMERGENCY MEDICINE

## 2021-01-01 PROCEDURE — 700117 HCHG RX CONTRAST REV CODE 255: Performed by: STUDENT IN AN ORGANIZED HEALTH CARE EDUCATION/TRAINING PROGRAM

## 2021-01-01 PROCEDURE — 85379 FIBRIN DEGRADATION QUANT: CPT

## 2021-01-01 PROCEDURE — 80061 LIPID PANEL: CPT

## 2021-01-01 PROCEDURE — 93005 ELECTROCARDIOGRAM TRACING: CPT | Performed by: STUDENT IN AN ORGANIZED HEALTH CARE EDUCATION/TRAINING PROGRAM

## 2021-01-01 PROCEDURE — 99291 CRITICAL CARE FIRST HOUR: CPT | Mod: 25 | Performed by: INTERNAL MEDICINE

## 2021-01-01 PROCEDURE — 96366 THER/PROPH/DIAG IV INF ADDON: CPT

## 2021-01-01 PROCEDURE — 84100 ASSAY OF PHOSPHORUS: CPT

## 2021-01-01 PROCEDURE — 97165 OT EVAL LOW COMPLEX 30 MIN: CPT

## 2021-01-01 PROCEDURE — 02HV33Z INSERTION OF INFUSION DEVICE INTO SUPERIOR VENA CAVA, PERCUTANEOUS APPROACH: ICD-10-PCS | Performed by: INTERNAL MEDICINE

## 2021-01-01 PROCEDURE — 90471 IMMUNIZATION ADMIN: CPT

## 2021-01-01 PROCEDURE — 93306 TTE W/DOPPLER COMPLETE: CPT | Mod: 26 | Performed by: INTERNAL MEDICINE

## 2021-01-01 PROCEDURE — 700101 HCHG RX REV CODE 250: Performed by: EMERGENCY MEDICINE

## 2021-01-01 PROCEDURE — 90791 PSYCH DIAGNOSTIC EVALUATION: CPT | Performed by: SOCIAL WORKER

## 2021-01-01 PROCEDURE — 96367 TX/PROPH/DG ADDL SEQ IV INF: CPT

## 2021-01-01 PROCEDURE — 80074 ACUTE HEPATITIS PANEL: CPT

## 2021-01-01 PROCEDURE — 99497 ADVNCD CARE PLAN 30 MIN: CPT | Performed by: STUDENT IN AN ORGANIZED HEALTH CARE EDUCATION/TRAINING PROGRAM

## 2021-01-01 PROCEDURE — 82803 BLOOD GASES ANY COMBINATION: CPT

## 2021-01-01 PROCEDURE — 93005 ELECTROCARDIOGRAM TRACING: CPT | Performed by: HOSPITALIST

## 2021-01-01 PROCEDURE — 93306 TTE W/DOPPLER COMPLETE: CPT

## 2021-01-01 PROCEDURE — 94664 DEMO&/EVAL PT USE INHALER: CPT

## 2021-01-01 PROCEDURE — 82232 ASSAY OF BETA-2 PROTEIN: CPT

## 2021-01-01 PROCEDURE — 99231 SBSQ HOSP IP/OBS SF/LOW 25: CPT | Performed by: HOSPITALIST

## 2021-01-01 PROCEDURE — C1751 CATH, INF, PER/CENT/MIDLINE: HCPCS

## 2021-01-01 PROCEDURE — 99223 1ST HOSP IP/OBS HIGH 75: CPT | Mod: AI | Performed by: HOSPITALIST

## 2021-01-01 PROCEDURE — 96368 THER/DIAG CONCURRENT INF: CPT

## 2021-01-01 PROCEDURE — 97166 OT EVAL MOD COMPLEX 45 MIN: CPT

## 2021-01-01 PROCEDURE — 3E02340 INTRODUCTION OF INFLUENZA VACCINE INTO MUSCLE, PERCUTANEOUS APPROACH: ICD-10-PCS | Performed by: INTERNAL MEDICINE

## 2021-01-01 PROCEDURE — 36556 INSERT NON-TUNNEL CV CATH: CPT

## 2021-01-01 PROCEDURE — 99223 1ST HOSP IP/OBS HIGH 75: CPT | Mod: GC | Performed by: INTERNAL MEDICINE

## 2021-01-01 PROCEDURE — 82248 BILIRUBIN DIRECT: CPT

## 2021-01-01 PROCEDURE — 97161 PT EVAL LOW COMPLEX 20 MIN: CPT

## 2021-01-01 PROCEDURE — 82077 ASSAY SPEC XCP UR&BREATH IA: CPT

## 2021-01-01 PROCEDURE — 99233 SBSQ HOSP IP/OBS HIGH 50: CPT | Performed by: STUDENT IN AN ORGANIZED HEALTH CARE EDUCATION/TRAINING PROGRAM

## 2021-01-01 RX ORDER — PREGABALIN 75 MG/1
75 CAPSULE ORAL 2 TIMES DAILY
Status: DISCONTINUED | OUTPATIENT
Start: 2021-01-01 | End: 2021-01-01

## 2021-01-01 RX ORDER — METHYLPREDNISOLONE SODIUM SUCCINATE 40 MG/ML
40 INJECTION, POWDER, LYOPHILIZED, FOR SOLUTION INTRAMUSCULAR; INTRAVENOUS EVERY 6 HOURS
Status: DISCONTINUED | OUTPATIENT
Start: 2021-01-01 | End: 2021-01-01

## 2021-01-01 RX ORDER — FAMOTIDINE 20 MG/1
20 TABLET, FILM COATED ORAL 2 TIMES DAILY
Status: DISCONTINUED | OUTPATIENT
Start: 2021-01-01 | End: 2021-01-01

## 2021-01-01 RX ORDER — SODIUM CHLORIDE, SODIUM LACTATE, POTASSIUM CHLORIDE, AND CALCIUM CHLORIDE .6; .31; .03; .02 G/100ML; G/100ML; G/100ML; G/100ML
500 INJECTION, SOLUTION INTRAVENOUS ONCE
Status: DISCONTINUED | OUTPATIENT
Start: 2021-01-01 | End: 2021-01-01

## 2021-01-01 RX ORDER — LORAZEPAM 2 MG/ML
1 CONCENTRATE ORAL
Status: DISCONTINUED | OUTPATIENT
Start: 2021-01-01 | End: 2021-01-01 | Stop reason: HOSPADM

## 2021-01-01 RX ORDER — L. ACIDOPHILUS/BIFID. ANIMALIS 31B CELL
1 CAPSULE ORAL 2 TIMES DAILY
Status: DISCONTINUED | OUTPATIENT
Start: 2021-01-01 | End: 2021-01-01

## 2021-01-01 RX ORDER — PREGABALIN 75 MG/1
75 CAPSULE ORAL 2 TIMES DAILY
Status: DISCONTINUED | OUTPATIENT
Start: 2021-01-01 | End: 2021-01-01 | Stop reason: HOSPADM

## 2021-01-01 RX ORDER — POLYETHYLENE GLYCOL 3350 17 G/17G
1 POWDER, FOR SOLUTION ORAL
Status: DISCONTINUED | OUTPATIENT
Start: 2021-01-01 | End: 2021-01-01

## 2021-01-01 RX ORDER — HYDROMORPHONE HYDROCHLORIDE 1 MG/ML
0.25 INJECTION, SOLUTION INTRAMUSCULAR; INTRAVENOUS; SUBCUTANEOUS
Status: DISCONTINUED | OUTPATIENT
Start: 2021-01-01 | End: 2021-01-01

## 2021-01-01 RX ORDER — OXYCODONE HYDROCHLORIDE 10 MG/1
10 TABLET ORAL EVERY 6 HOURS PRN
COMMUNITY

## 2021-01-01 RX ORDER — FAMOTIDINE 20 MG/1
20 TABLET, FILM COATED ORAL 2 TIMES DAILY
Status: DISCONTINUED | OUTPATIENT
Start: 2021-01-01 | End: 2021-01-01 | Stop reason: HOSPADM

## 2021-01-01 RX ORDER — ONDANSETRON 2 MG/ML
4 INJECTION INTRAMUSCULAR; INTRAVENOUS EVERY 4 HOURS PRN
Status: DISCONTINUED | OUTPATIENT
Start: 2021-01-01 | End: 2021-01-01 | Stop reason: HOSPADM

## 2021-01-01 RX ORDER — OXYCODONE HYDROCHLORIDE 5 MG/1
5 TABLET ORAL
Status: DISCONTINUED | OUTPATIENT
Start: 2021-01-01 | End: 2021-01-01

## 2021-01-01 RX ORDER — CHOLECALCIFEROL (VITAMIN D3) 125 MCG
5 CAPSULE ORAL
Status: DISCONTINUED | OUTPATIENT
Start: 2021-01-01 | End: 2021-01-01 | Stop reason: HOSPADM

## 2021-01-01 RX ORDER — SODIUM CHLORIDE 9 MG/ML
INJECTION, SOLUTION INTRAVENOUS
Status: COMPLETED | OUTPATIENT
Start: 2021-01-01 | End: 2021-01-01

## 2021-01-01 RX ORDER — SODIUM CHLORIDE, SODIUM LACTATE, POTASSIUM CHLORIDE, AND CALCIUM CHLORIDE .6; .31; .03; .02 G/100ML; G/100ML; G/100ML; G/100ML
500 INJECTION, SOLUTION INTRAVENOUS
Status: COMPLETED | OUTPATIENT
Start: 2021-01-01 | End: 2021-01-01

## 2021-01-01 RX ORDER — DOXYCYCLINE 100 MG/1
100 TABLET ORAL EVERY 12 HOURS
Status: DISCONTINUED | OUTPATIENT
Start: 2021-01-01 | End: 2021-01-01

## 2021-01-01 RX ORDER — AMIODARONE HYDROCHLORIDE 200 MG/1
200 TABLET ORAL TWICE DAILY
Status: DISCONTINUED | OUTPATIENT
Start: 2021-01-01 | End: 2021-01-01

## 2021-01-01 RX ORDER — AMOXICILLIN 250 MG
2 CAPSULE ORAL 2 TIMES DAILY
Status: DISCONTINUED | OUTPATIENT
Start: 2021-01-01 | End: 2021-01-01

## 2021-01-01 RX ORDER — SODIUM CHLORIDE 9 MG/ML
1000 INJECTION, SOLUTION INTRAVENOUS ONCE
Status: COMPLETED | OUTPATIENT
Start: 2021-01-01 | End: 2021-01-01

## 2021-01-01 RX ORDER — BUDESONIDE AND FORMOTEROL FUMARATE DIHYDRATE 80; 4.5 UG/1; UG/1
2 AEROSOL RESPIRATORY (INHALATION) EVERY 12 HOURS
Status: DISCONTINUED | OUTPATIENT
Start: 2021-01-01 | End: 2021-01-01

## 2021-01-01 RX ORDER — MIDODRINE HYDROCHLORIDE 5 MG/1
5 TABLET ORAL
Status: DISCONTINUED | OUTPATIENT
Start: 2021-01-01 | End: 2021-01-01

## 2021-01-01 RX ORDER — BUDESONIDE AND FORMOTEROL FUMARATE DIHYDRATE 160; 4.5 UG/1; UG/1
2 AEROSOL RESPIRATORY (INHALATION)
Status: DISCONTINUED | OUTPATIENT
Start: 2021-01-01 | End: 2021-01-01 | Stop reason: HOSPADM

## 2021-01-01 RX ORDER — HYDROMORPHONE HYDROCHLORIDE 2 MG/ML
2 INJECTION, SOLUTION INTRAMUSCULAR; INTRAVENOUS; SUBCUTANEOUS
Status: DISCONTINUED | OUTPATIENT
Start: 2021-01-01 | End: 2021-01-01 | Stop reason: HOSPADM

## 2021-01-01 RX ORDER — AZITHROMYCIN 500 MG/5ML
500 INJECTION, POWDER, LYOPHILIZED, FOR SOLUTION INTRAVENOUS EVERY 24 HOURS
Status: DISCONTINUED | OUTPATIENT
Start: 2021-01-01 | End: 2021-01-01

## 2021-01-01 RX ORDER — LEVALBUTEROL INHALATION SOLUTION 1.25 MG/3ML
1.25 SOLUTION RESPIRATORY (INHALATION) 4 TIMES DAILY
Status: DISCONTINUED | OUTPATIENT
Start: 2021-01-01 | End: 2021-01-01

## 2021-01-01 RX ORDER — MIDODRINE HYDROCHLORIDE 5 MG/1
15 TABLET ORAL
Status: DISCONTINUED | OUTPATIENT
Start: 2021-01-01 | End: 2021-01-01

## 2021-01-01 RX ORDER — LOPERAMIDE HYDROCHLORIDE 2 MG/1
2 CAPSULE ORAL EVERY 8 HOURS PRN
COMMUNITY

## 2021-01-01 RX ORDER — IPRATROPIUM BROMIDE AND ALBUTEROL SULFATE 2.5; .5 MG/3ML; MG/3ML
3 SOLUTION RESPIRATORY (INHALATION)
Status: DISCONTINUED | OUTPATIENT
Start: 2021-01-01 | End: 2021-01-01 | Stop reason: HOSPADM

## 2021-01-01 RX ORDER — BISACODYL 10 MG
10 SUPPOSITORY, RECTAL RECTAL
Status: DISCONTINUED | OUTPATIENT
Start: 2021-01-01 | End: 2021-01-01 | Stop reason: HOSPADM

## 2021-01-01 RX ORDER — FLUTICASONE PROPIONATE 44 UG/1
2 AEROSOL, METERED RESPIRATORY (INHALATION)
Status: DISCONTINUED | OUTPATIENT
Start: 2021-01-01 | End: 2021-01-01

## 2021-01-01 RX ORDER — SODIUM CHLORIDE 9 MG/ML
INJECTION, SOLUTION INTRAVENOUS CONTINUOUS
Status: DISCONTINUED | OUTPATIENT
Start: 2021-01-01 | End: 2021-01-01

## 2021-01-01 RX ORDER — OXYCODONE HYDROCHLORIDE 5 MG/1
2.5 TABLET ORAL
Status: DISCONTINUED | OUTPATIENT
Start: 2021-01-01 | End: 2021-01-01

## 2021-01-01 RX ORDER — CHOLECALCIFEROL (VITAMIN D3) 125 MCG
5 CAPSULE ORAL
COMMUNITY

## 2021-01-01 RX ORDER — METOPROLOL TARTRATE 50 MG/1
50 TABLET, FILM COATED ORAL ONCE
Status: ACTIVE | OUTPATIENT
Start: 2021-01-01 | End: 2021-01-01

## 2021-01-01 RX ORDER — DEXTROSE MONOHYDRATE 50 MG/ML
INJECTION, SOLUTION INTRAVENOUS CONTINUOUS
Status: DISCONTINUED | OUTPATIENT
Start: 2021-01-01 | End: 2021-01-01

## 2021-01-01 RX ORDER — DOXYCYCLINE HYCLATE 100 MG
100 TABLET ORAL 2 TIMES DAILY
COMMUNITY
Start: 2021-01-01 | End: 2021-01-01

## 2021-01-01 RX ORDER — HALOPERIDOL 5 MG/ML
1 INJECTION INTRAMUSCULAR EVERY 4 HOURS PRN
Status: DISCONTINUED | OUTPATIENT
Start: 2021-01-01 | End: 2021-01-01

## 2021-01-01 RX ORDER — ENALAPRILAT 1.25 MG/ML
1.25 INJECTION INTRAVENOUS EVERY 6 HOURS PRN
Status: DISCONTINUED | OUTPATIENT
Start: 2021-01-01 | End: 2021-01-01 | Stop reason: HOSPADM

## 2021-01-01 RX ORDER — ACETAMINOPHEN 325 MG/1
650 TABLET ORAL EVERY 6 HOURS PRN
Status: DISCONTINUED | OUTPATIENT
Start: 2021-01-01 | End: 2021-01-01

## 2021-01-01 RX ORDER — DILTIAZEM HYDROCHLORIDE 5 MG/ML
10 INJECTION INTRAVENOUS EVERY 6 HOURS PRN
Status: DISCONTINUED | OUTPATIENT
Start: 2021-01-01 | End: 2021-01-01

## 2021-01-01 RX ORDER — ONDANSETRON 4 MG/1
4 TABLET, ORALLY DISINTEGRATING ORAL EVERY 4 HOURS PRN
Status: DISCONTINUED | OUTPATIENT
Start: 2021-01-01 | End: 2021-01-01

## 2021-01-01 RX ORDER — BISACODYL 10 MG
10 SUPPOSITORY, RECTAL RECTAL
Status: DISCONTINUED | OUTPATIENT
Start: 2021-01-01 | End: 2021-01-01

## 2021-01-01 RX ORDER — HYDROMORPHONE HYDROCHLORIDE 2 MG/ML
4 INJECTION, SOLUTION INTRAMUSCULAR; INTRAVENOUS; SUBCUTANEOUS
Status: DISCONTINUED | OUTPATIENT
Start: 2021-01-01 | End: 2021-01-01 | Stop reason: HOSPADM

## 2021-01-01 RX ORDER — CHOLECALCIFEROL (VITAMIN D3) 125 MCG
5 CAPSULE ORAL
Status: DISCONTINUED | OUTPATIENT
Start: 2021-01-01 | End: 2021-01-01

## 2021-01-01 RX ORDER — METHYLPREDNISOLONE SODIUM SUCCINATE 40 MG/ML
40 INJECTION, POWDER, LYOPHILIZED, FOR SOLUTION INTRAMUSCULAR; INTRAVENOUS EVERY 8 HOURS
Status: COMPLETED | OUTPATIENT
Start: 2021-01-01 | End: 2021-01-01

## 2021-01-01 RX ORDER — LABETALOL HYDROCHLORIDE 5 MG/ML
10 INJECTION, SOLUTION INTRAVENOUS EVERY 4 HOURS PRN
Status: DISCONTINUED | OUTPATIENT
Start: 2021-01-01 | End: 2021-01-01 | Stop reason: HOSPADM

## 2021-01-01 RX ORDER — ASCORBIC ACID 500 MG
500 TABLET ORAL DAILY
COMMUNITY

## 2021-01-01 RX ORDER — DILTIAZEM HYDROCHLORIDE 5 MG/ML
10 INJECTION INTRAVENOUS ONCE
Status: DISCONTINUED | OUTPATIENT
Start: 2021-01-01 | End: 2021-01-01

## 2021-01-01 RX ORDER — NALOXONE HYDROCHLORIDE 0.4 MG/ML
0.4 INJECTION, SOLUTION INTRAMUSCULAR; INTRAVENOUS; SUBCUTANEOUS PRN
Status: DISCONTINUED | OUTPATIENT
Start: 2021-01-01 | End: 2021-01-01 | Stop reason: HOSPADM

## 2021-01-01 RX ORDER — TRIAMCINOLONE ACETONIDE 1 MG/G
CREAM TOPICAL 2 TIMES DAILY
Status: DISCONTINUED | OUTPATIENT
Start: 2021-01-01 | End: 2021-01-01 | Stop reason: HOSPADM

## 2021-01-01 RX ORDER — LEVALBUTEROL INHALATION SOLUTION 0.63 MG/3ML
1.25 SOLUTION RESPIRATORY (INHALATION)
Status: DISCONTINUED | OUTPATIENT
Start: 2021-01-01 | End: 2021-01-01

## 2021-01-01 RX ORDER — FLUTICASONE PROPIONATE 44 UG/1
2 AEROSOL, METERED RESPIRATORY (INHALATION) DAILY
Status: DISCONTINUED | OUTPATIENT
Start: 2021-01-01 | End: 2021-01-01 | Stop reason: HOSPADM

## 2021-01-01 RX ORDER — LORAZEPAM 2 MG/ML
2 INJECTION INTRAMUSCULAR ONCE
Status: COMPLETED | OUTPATIENT
Start: 2021-01-01 | End: 2021-01-01

## 2021-01-01 RX ORDER — LORAZEPAM 1 MG/1
1 TABLET ORAL ONCE
Status: COMPLETED | OUTPATIENT
Start: 2021-01-01 | End: 2021-01-01

## 2021-01-01 RX ORDER — OXYCODONE AND ACETAMINOPHEN 10; 325 MG/1; MG/1
1 TABLET ORAL EVERY 6 HOURS PRN
Status: DISCONTINUED | OUTPATIENT
Start: 2021-01-01 | End: 2021-01-01

## 2021-01-01 RX ORDER — ATROPINE SULFATE 10 MG/ML
2 SOLUTION/ DROPS OPHTHALMIC EVERY 4 HOURS PRN
Status: DISCONTINUED | OUTPATIENT
Start: 2021-01-01 | End: 2021-01-01 | Stop reason: HOSPADM

## 2021-01-01 RX ORDER — SODIUM CHLORIDE 9 MG/ML
500 INJECTION, SOLUTION INTRAVENOUS ONCE
Status: COMPLETED | OUTPATIENT
Start: 2021-01-01 | End: 2021-01-01

## 2021-01-01 RX ORDER — CEFTRIAXONE 2 G/1
2 INJECTION, POWDER, FOR SOLUTION INTRAMUSCULAR; INTRAVENOUS ONCE
Status: COMPLETED | OUTPATIENT
Start: 2021-01-01 | End: 2021-01-01

## 2021-01-01 RX ORDER — METOPROLOL TARTRATE 1 MG/ML
5 INJECTION, SOLUTION INTRAVENOUS
Status: COMPLETED | OUTPATIENT
Start: 2021-01-01 | End: 2021-01-01

## 2021-01-01 RX ORDER — SODIUM CHLORIDE, SODIUM LACTATE, POTASSIUM CHLORIDE, CALCIUM CHLORIDE 600; 310; 30; 20 MG/100ML; MG/100ML; MG/100ML; MG/100ML
1000 INJECTION, SOLUTION INTRAVENOUS ONCE
Status: COMPLETED | OUTPATIENT
Start: 2021-01-01 | End: 2021-01-01

## 2021-01-01 RX ORDER — METOPROLOL TARTRATE 1 MG/ML
5 INJECTION, SOLUTION INTRAVENOUS
Status: DISCONTINUED | OUTPATIENT
Start: 2021-01-01 | End: 2021-01-01

## 2021-01-01 RX ORDER — HYDROCODONE BITARTRATE AND ACETAMINOPHEN 5; 325 MG/1; MG/1
1 TABLET ORAL EVERY 6 HOURS PRN
Status: DISCONTINUED | OUTPATIENT
Start: 2021-01-01 | End: 2021-01-01 | Stop reason: HOSPADM

## 2021-01-01 RX ORDER — GUAIFENESIN/DEXTROMETHORPHAN 100-10MG/5
10 SYRUP ORAL EVERY 6 HOURS PRN
Status: DISCONTINUED | OUTPATIENT
Start: 2021-01-01 | End: 2021-01-01 | Stop reason: HOSPADM

## 2021-01-01 RX ORDER — HALOPERIDOL 5 MG/ML
2-5 INJECTION INTRAMUSCULAR
Status: DISCONTINUED | OUTPATIENT
Start: 2021-01-01 | End: 2021-01-01

## 2021-01-01 RX ORDER — ACETAMINOPHEN 325 MG/1
650 TABLET ORAL EVERY 4 HOURS PRN
COMMUNITY

## 2021-01-01 RX ORDER — LEVALBUTEROL INHALATION SOLUTION 1.25 MG/3ML
1.25 SOLUTION RESPIRATORY (INHALATION)
Status: DISCONTINUED | OUTPATIENT
Start: 2021-01-01 | End: 2021-01-01

## 2021-01-01 RX ORDER — ASCORBIC ACID 500 MG
500 TABLET ORAL DAILY
Status: DISCONTINUED | OUTPATIENT
Start: 2021-01-01 | End: 2021-01-01 | Stop reason: HOSPADM

## 2021-01-01 RX ORDER — AZITHROMYCIN 250 MG/1
500 TABLET, FILM COATED ORAL DAILY
Status: COMPLETED | OUTPATIENT
Start: 2021-01-01 | End: 2021-01-01

## 2021-01-01 RX ORDER — METOPROLOL TARTRATE 1 MG/ML
5 INJECTION, SOLUTION INTRAVENOUS
Status: DISCONTINUED | OUTPATIENT
Start: 2021-01-01 | End: 2021-01-01 | Stop reason: HOSPADM

## 2021-01-01 RX ORDER — ALBUTEROL SULFATE 90 UG/1
4 AEROSOL, METERED RESPIRATORY (INHALATION)
Status: DISCONTINUED | OUTPATIENT
Start: 2021-01-01 | End: 2021-01-01

## 2021-01-01 RX ORDER — ALBUTEROL SULFATE 90 UG/1
2 AEROSOL, METERED RESPIRATORY (INHALATION)
Status: DISCONTINUED | OUTPATIENT
Start: 2021-01-01 | End: 2021-01-01 | Stop reason: HOSPADM

## 2021-01-01 RX ORDER — AMIODARONE HYDROCHLORIDE 200 MG/1
TABLET ORAL
Qty: 38 TABLET | Refills: 0 | Status: SHIPPED
Start: 2021-01-01 | End: 2021-01-01

## 2021-01-01 RX ORDER — LORAZEPAM 0.5 MG/1
0.5 TABLET ORAL EVERY 4 HOURS PRN
Status: DISCONTINUED | OUTPATIENT
Start: 2021-01-01 | End: 2021-01-01

## 2021-01-01 RX ORDER — PREGABALIN 75 MG/1
75 CAPSULE ORAL 2 TIMES DAILY
COMMUNITY
Start: 2021-01-01

## 2021-01-01 RX ORDER — METHYLPREDNISOLONE SODIUM SUCCINATE 125 MG/2ML
62.5 INJECTION, POWDER, LYOPHILIZED, FOR SOLUTION INTRAMUSCULAR; INTRAVENOUS EVERY 6 HOURS
Status: DISCONTINUED | OUTPATIENT
Start: 2021-01-01 | End: 2021-01-01

## 2021-01-01 RX ORDER — ACETAMINOPHEN 325 MG/1
650 TABLET ORAL EVERY 6 HOURS PRN
Status: DISCONTINUED | OUTPATIENT
Start: 2021-01-01 | End: 2021-01-01 | Stop reason: HOSPADM

## 2021-01-01 RX ORDER — AMOXICILLIN AND CLAVULANATE POTASSIUM 875; 125 MG/1; MG/1
1 TABLET, FILM COATED ORAL EVERY 12 HOURS
Status: DISCONTINUED | OUTPATIENT
Start: 2021-01-01 | End: 2021-01-01 | Stop reason: HOSPADM

## 2021-01-01 RX ORDER — POLYETHYLENE GLYCOL 3350 17 G/17G
1 POWDER, FOR SOLUTION ORAL
Status: DISCONTINUED | OUTPATIENT
Start: 2021-01-01 | End: 2021-01-01 | Stop reason: HOSPADM

## 2021-01-01 RX ORDER — AMOXICILLIN 250 MG
2 CAPSULE ORAL 2 TIMES DAILY
Status: DISCONTINUED | OUTPATIENT
Start: 2021-01-01 | End: 2021-01-01 | Stop reason: HOSPADM

## 2021-01-01 RX ORDER — PHENYLEPHRINE HCL IN 0.9% NACL 0.5 MG/5ML
SYRINGE (ML) INTRAVENOUS
Status: COMPLETED | OUTPATIENT
Start: 2021-01-01 | End: 2021-01-01

## 2021-01-01 RX ORDER — ONDANSETRON 2 MG/ML
8 INJECTION INTRAMUSCULAR; INTRAVENOUS EVERY 8 HOURS PRN
Status: DISCONTINUED | OUTPATIENT
Start: 2021-01-01 | End: 2021-01-01 | Stop reason: HOSPADM

## 2021-01-01 RX ORDER — TRAZODONE HYDROCHLORIDE 50 MG/1
50 TABLET ORAL
Status: DISCONTINUED | OUTPATIENT
Start: 2021-01-01 | End: 2021-01-01 | Stop reason: HOSPADM

## 2021-01-01 RX ORDER — METOPROLOL TARTRATE 50 MG/1
50 TABLET, FILM COATED ORAL 2 TIMES DAILY
Qty: 60 TABLET | Refills: 2 | Status: SHIPPED
Start: 2021-01-01 | End: 2021-01-01

## 2021-01-01 RX ORDER — IPRATROPIUM BROMIDE AND ALBUTEROL SULFATE 2.5; .5 MG/3ML; MG/3ML
3 SOLUTION RESPIRATORY (INHALATION)
Status: DISCONTINUED | OUTPATIENT
Start: 2021-01-01 | End: 2021-01-01

## 2021-01-01 RX ORDER — ZINC SULFATE 50(220)MG
220 CAPSULE ORAL DAILY
COMMUNITY

## 2021-01-01 RX ORDER — LEVALBUTEROL INHALATION SOLUTION 0.63 MG/3ML
0.63 SOLUTION RESPIRATORY (INHALATION)
Status: DISCONTINUED | OUTPATIENT
Start: 2021-01-01 | End: 2021-01-01

## 2021-01-01 RX ORDER — BUDESONIDE AND FORMOTEROL FUMARATE DIHYDRATE 80; 4.5 UG/1; UG/1
2 AEROSOL RESPIRATORY (INHALATION)
Status: DISCONTINUED | OUTPATIENT
Start: 2021-01-01 | End: 2021-01-01 | Stop reason: HOSPADM

## 2021-01-01 RX ORDER — SODIUM CHLORIDE, SODIUM LACTATE, POTASSIUM CHLORIDE, CALCIUM CHLORIDE 600; 310; 30; 20 MG/100ML; MG/100ML; MG/100ML; MG/100ML
INJECTION, SOLUTION INTRAVENOUS CONTINUOUS
Status: DISCONTINUED | OUTPATIENT
Start: 2021-01-01 | End: 2021-01-01

## 2021-01-01 RX ORDER — OXYCODONE AND ACETAMINOPHEN 10; 325 MG/1; MG/1
1 TABLET ORAL EVERY 6 HOURS PRN
COMMUNITY
Start: 2021-01-01 | End: 2021-01-01

## 2021-01-01 RX ORDER — ONDANSETRON 4 MG/1
4 TABLET, ORALLY DISINTEGRATING ORAL EVERY 4 HOURS PRN
Status: DISCONTINUED | OUTPATIENT
Start: 2021-01-01 | End: 2021-01-01 | Stop reason: HOSPADM

## 2021-01-01 RX ORDER — ONDANSETRON 4 MG/1
8 TABLET, ORALLY DISINTEGRATING ORAL EVERY 8 HOURS PRN
Status: DISCONTINUED | OUTPATIENT
Start: 2021-01-01 | End: 2021-01-01 | Stop reason: HOSPADM

## 2021-01-01 RX ORDER — DILTIAZEM HYDROCHLORIDE 5 MG/ML
10 INJECTION INTRAVENOUS ONCE
Status: COMPLETED | OUTPATIENT
Start: 2021-01-01 | End: 2021-01-01

## 2021-01-01 RX ORDER — DOXYCYCLINE 100 MG/1
100 TABLET ORAL 2 TIMES DAILY
Status: DISCONTINUED | OUTPATIENT
Start: 2021-01-01 | End: 2021-01-01 | Stop reason: HOSPADM

## 2021-01-01 RX ORDER — ASCORBIC ACID 500 MG
500 TABLET ORAL DAILY
Status: DISCONTINUED | OUTPATIENT
Start: 2021-01-01 | End: 2021-01-01

## 2021-01-01 RX ORDER — GUAIFENESIN 600 MG/1
1200 TABLET, EXTENDED RELEASE ORAL 2 TIMES DAILY
Status: DISCONTINUED | OUTPATIENT
Start: 2021-01-01 | End: 2021-01-01

## 2021-01-01 RX ORDER — DEXTROSE MONOHYDRATE 25 G/50ML
50 INJECTION, SOLUTION INTRAVENOUS
Status: DISCONTINUED | OUTPATIENT
Start: 2021-01-01 | End: 2021-01-01 | Stop reason: HOSPADM

## 2021-01-01 RX ORDER — FAMOTIDINE 20 MG/1
20 TABLET, FILM COATED ORAL 2 TIMES DAILY
COMMUNITY

## 2021-01-01 RX ORDER — METOPROLOL TARTRATE 50 MG/1
50 TABLET, FILM COATED ORAL TWICE DAILY
Status: DISCONTINUED | OUTPATIENT
Start: 2021-01-01 | End: 2021-01-01

## 2021-01-01 RX ORDER — OXYCODONE AND ACETAMINOPHEN 10; 325 MG/1; MG/1
1 TABLET ORAL 4 TIMES DAILY PRN
Status: DISCONTINUED | OUTPATIENT
Start: 2021-01-01 | End: 2021-01-01 | Stop reason: HOSPADM

## 2021-01-01 RX ORDER — DILTIAZEM HYDROCHLORIDE 5 MG/ML
20 INJECTION INTRAVENOUS ONCE
Status: COMPLETED | OUTPATIENT
Start: 2021-01-01 | End: 2021-01-01

## 2021-01-01 RX ORDER — PREDNISONE 20 MG/1
40 TABLET ORAL DAILY
Status: COMPLETED | OUTPATIENT
Start: 2021-01-01 | End: 2021-01-01

## 2021-01-01 RX ORDER — LORAZEPAM 2 MG/ML
2 INJECTION INTRAMUSCULAR
Status: DISCONTINUED | OUTPATIENT
Start: 2021-01-01 | End: 2021-01-01 | Stop reason: HOSPADM

## 2021-01-01 RX ORDER — METHYLPREDNISOLONE SODIUM SUCCINATE 125 MG/2ML
40 INJECTION, POWDER, LYOPHILIZED, FOR SOLUTION INTRAMUSCULAR; INTRAVENOUS ONCE
Status: COMPLETED | OUTPATIENT
Start: 2021-01-01 | End: 2021-01-01

## 2021-01-01 RX ORDER — ONDANSETRON 2 MG/ML
4 INJECTION INTRAMUSCULAR; INTRAVENOUS EVERY 4 HOURS PRN
Status: DISCONTINUED | OUTPATIENT
Start: 2021-01-01 | End: 2021-01-01

## 2021-01-01 RX ORDER — MAGNESIUM SULFATE HEPTAHYDRATE 40 MG/ML
2 INJECTION, SOLUTION INTRAVENOUS ONCE
Status: COMPLETED | OUTPATIENT
Start: 2021-01-01 | End: 2021-01-01

## 2021-01-01 RX ORDER — PREDNISONE 20 MG/1
40 TABLET ORAL DAILY
Status: DISCONTINUED | OUTPATIENT
Start: 2021-01-01 | End: 2021-01-01

## 2021-01-01 RX ORDER — POTASSIUM CHLORIDE 20 MEQ/1
40 TABLET, EXTENDED RELEASE ORAL ONCE
Status: COMPLETED | OUTPATIENT
Start: 2021-01-01 | End: 2021-01-01

## 2021-01-01 RX ORDER — DIGOXIN 0.25 MG/ML
125 INJECTION INTRAMUSCULAR; INTRAVENOUS DAILY
Status: DISCONTINUED | OUTPATIENT
Start: 2021-01-01 | End: 2021-01-01

## 2021-01-01 RX ORDER — METOPROLOL TARTRATE 50 MG/1
TABLET, FILM COATED ORAL
Status: COMPLETED
Start: 2021-01-01 | End: 2021-01-01

## 2021-01-01 RX ORDER — CHOLECALCIFEROL (VITAMIN D3) 125 MCG
5 CAPSULE ORAL NIGHTLY
Status: DISCONTINUED | OUTPATIENT
Start: 2021-01-01 | End: 2021-01-01 | Stop reason: HOSPADM

## 2021-01-01 RX ORDER — TRIAMCINOLONE ACETONIDE 1 MG/G
CREAM TOPICAL
Status: DISCONTINUED | OUTPATIENT
Start: 2021-01-01 | End: 2021-01-01

## 2021-01-01 RX ORDER — ZINC SULFATE 50(220)MG
220 CAPSULE ORAL DAILY
Status: DISCONTINUED | OUTPATIENT
Start: 2021-01-01 | End: 2021-01-01

## 2021-01-01 RX ORDER — OXYCODONE AND ACETAMINOPHEN 10; 325 MG/1; MG/1
1 TABLET ORAL EVERY 6 HOURS PRN
Qty: 12 TABLET | Refills: 0 | Status: SHIPPED | OUTPATIENT
Start: 2021-01-01 | End: 2021-01-01

## 2021-01-01 RX ORDER — AZITHROMYCIN 250 MG/1
500 TABLET, FILM COATED ORAL DAILY
Status: DISCONTINUED | OUTPATIENT
Start: 2021-01-01 | End: 2021-01-01

## 2021-01-01 RX ADMIN — INFLUENZA A VIRUS A/VICTORIA/2570/2019 IVR-215 (H1N1) ANTIGEN (FORMALDEHYDE INACTIVATED), INFLUENZA A VIRUS A/TASMANIA/503/2020 IVR-221 (H3N2) ANTIGEN (FORMALDEHYDE INACTIVATED), INFLUENZA B VIRUS B/PHUKET/3073/2013 ANTIGEN (FORMALDEHYDE INACTIVATED), AND INFLUENZA B VIRUS B/WASHINGTON/02/2019 ANTIGEN (FORMALDEHYDE INACTIVATED) 0.7 ML: 60; 60; 60; 60 INJECTION, SUSPENSION INTRAMUSCULAR at 13:28

## 2021-01-01 RX ADMIN — PHENYLEPHRINE HYDROCHLORIDE 200 MCG/MIN: 10 INJECTION INTRAVENOUS at 04:00

## 2021-01-01 RX ADMIN — METOPROLOL TARTRATE 5 MG: 1 INJECTION, SOLUTION INTRAVENOUS at 03:37

## 2021-01-01 RX ADMIN — METOPROLOL TARTRATE 25 MG: 25 TABLET, FILM COATED ORAL at 09:56

## 2021-01-01 RX ADMIN — FAMOTIDINE 20 MG: 20 TABLET ORAL at 20:21

## 2021-01-01 RX ADMIN — BUDESONIDE AND FORMOTEROL FUMARATE DIHYDRATE 2 PUFF: 80; 4.5 AEROSOL RESPIRATORY (INHALATION) at 21:27

## 2021-01-01 RX ADMIN — TIOTROPIUM BROMIDE INHALATION SPRAY 5 MCG: 3.12 SPRAY, METERED RESPIRATORY (INHALATION) at 07:58

## 2021-01-01 RX ADMIN — LORAZEPAM 0.5 MG: 0.5 TABLET ORAL at 11:18

## 2021-01-01 RX ADMIN — METOPROLOL TARTRATE 25 MG: 25 TABLET, FILM COATED ORAL at 21:24

## 2021-01-01 RX ADMIN — HYDROMORPHONE HYDROCHLORIDE 0.25 MG: 1 INJECTION, SOLUTION INTRAMUSCULAR; INTRAVENOUS; SUBCUTANEOUS at 08:34

## 2021-01-01 RX ADMIN — SODIUM CHLORIDE 3 G: 900 INJECTION INTRAVENOUS at 14:13

## 2021-01-01 RX ADMIN — SODIUM CHLORIDE, POTASSIUM CHLORIDE, SODIUM LACTATE AND CALCIUM CHLORIDE: 600; 310; 30; 20 INJECTION, SOLUTION INTRAVENOUS at 22:18

## 2021-01-01 RX ADMIN — NYSTATIN 1000000 UNITS: 100000 SUSPENSION ORAL at 15:09

## 2021-01-01 RX ADMIN — AZITHROMYCIN FOR INJECTION INJECTION, POWDER, LYOPHILIZED, FOR SOLUTION 500 MG: 500 INJECTION INTRAVENOUS at 05:21

## 2021-01-01 RX ADMIN — PREGABALIN 75 MG: 75 CAPSULE ORAL at 17:48

## 2021-01-01 RX ADMIN — APIXABAN 5 MG: 5 TABLET, FILM COATED ORAL at 06:00

## 2021-01-01 RX ADMIN — Medication 500 MG: at 05:58

## 2021-01-01 RX ADMIN — TRIAMCINOLONE ACETONIDE: 1 CREAM TOPICAL at 18:28

## 2021-01-01 RX ADMIN — OXYCODONE 5 MG: 5 TABLET ORAL at 03:58

## 2021-01-01 RX ADMIN — NYSTATIN 1000000 UNITS: 100000 SUSPENSION ORAL at 08:56

## 2021-01-01 RX ADMIN — Medication 5 MG: at 21:24

## 2021-01-01 RX ADMIN — FAMOTIDINE 20 MG: 20 TABLET ORAL at 09:33

## 2021-01-01 RX ADMIN — TRAZODONE HYDROCHLORIDE 50 MG: 50 TABLET ORAL at 20:21

## 2021-01-01 RX ADMIN — METHYLPREDNISOLONE SODIUM SUCCINATE 62.5 MG: 125 INJECTION, POWDER, FOR SOLUTION INTRAMUSCULAR; INTRAVENOUS at 06:34

## 2021-01-01 RX ADMIN — APIXABAN 5 MG: 5 TABLET, FILM COATED ORAL at 04:37

## 2021-01-01 RX ADMIN — LORAZEPAM 0.5 MG: 0.5 TABLET ORAL at 22:03

## 2021-01-01 RX ADMIN — TRIAMCINOLONE ACETONIDE: 1 CREAM TOPICAL at 17:50

## 2021-01-01 RX ADMIN — HYDROMORPHONE HYDROCHLORIDE 2 MG: 2 INJECTION INTRAMUSCULAR; INTRAVENOUS; SUBCUTANEOUS at 14:34

## 2021-01-01 RX ADMIN — GUAIFENESIN SYRUP AND DEXTROMETHORPHAN 10 ML: 100; 10 SYRUP ORAL at 03:41

## 2021-01-01 RX ADMIN — MIDODRINE HYDROCHLORIDE 5 MG: 5 TABLET ORAL at 11:18

## 2021-01-01 RX ADMIN — LEVALBUTEROL 1.25 MG: 1.25 SOLUTION RESPIRATORY (INHALATION) at 07:04

## 2021-01-01 RX ADMIN — Medication 5 MG: at 23:06

## 2021-01-01 RX ADMIN — POTASSIUM PHOSPHATE, MONOBASIC POTASSIUM PHOSPHATE, DIBASIC 15 MMOL: 224; 236 INJECTION, SOLUTION, CONCENTRATE INTRAVENOUS at 00:32

## 2021-01-01 RX ADMIN — Medication 200 MCG: at 17:23

## 2021-01-01 RX ADMIN — APIXABAN 5 MG: 5 TABLET, FILM COATED ORAL at 18:00

## 2021-01-01 RX ADMIN — DIBASIC SODIUM PHOSPHATE, MONOBASIC POTASSIUM PHOSPHATE AND MONOBASIC SODIUM PHOSPHATE 500 MG: 852; 155; 130 TABLET ORAL at 12:39

## 2021-01-01 RX ADMIN — IPRATROPIUM BROMIDE AND ALBUTEROL SULFATE 3 ML: .5; 2.5 SOLUTION RESPIRATORY (INHALATION) at 20:10

## 2021-01-01 RX ADMIN — NYSTATIN 1000000 UNITS: 100000 SUSPENSION ORAL at 05:17

## 2021-01-01 RX ADMIN — FLUTICASONE PROPIONATE 88 MCG: 44 AEROSOL, METERED RESPIRATORY (INHALATION) at 18:03

## 2021-01-01 RX ADMIN — MIDODRINE HYDROCHLORIDE 5 MG: 5 TABLET ORAL at 12:12

## 2021-01-01 RX ADMIN — IPRATROPIUM BROMIDE AND ALBUTEROL SULFATE 3 ML: .5; 2.5 SOLUTION RESPIRATORY (INHALATION) at 06:58

## 2021-01-01 RX ADMIN — MIDODRINE HYDROCHLORIDE 15 MG: 5 TABLET ORAL at 12:43

## 2021-01-01 RX ADMIN — BUDESONIDE AND FORMOTEROL FUMARATE DIHYDRATE 2 PUFF: 160; 4.5 AEROSOL RESPIRATORY (INHALATION) at 14:28

## 2021-01-01 RX ADMIN — AMIODARONE HYDROCHLORIDE 200 MG: 200 TABLET ORAL at 17:10

## 2021-01-01 RX ADMIN — NYSTATIN 1000000 UNITS: 100000 SUSPENSION ORAL at 10:21

## 2021-01-01 RX ADMIN — METOPROLOL TARTRATE 5 MG: 5 INJECTION INTRAVENOUS at 16:50

## 2021-01-01 RX ADMIN — AMIODARONE HYDROCHLORIDE 200 MG: 200 TABLET ORAL at 17:47

## 2021-01-01 RX ADMIN — PREGABALIN 75 MG: 75 CAPSULE ORAL at 04:04

## 2021-01-01 RX ADMIN — FAMOTIDINE 20 MG: 20 TABLET ORAL at 09:56

## 2021-01-01 RX ADMIN — AMIODARONE HYDROCHLORIDE 200 MG: 200 TABLET ORAL at 05:16

## 2021-01-01 RX ADMIN — SODIUM CHLORIDE 3 G: 900 INJECTION INTRAVENOUS at 14:27

## 2021-01-01 RX ADMIN — TIOTROPIUM BROMIDE INHALATION SPRAY 5 MCG: 3.12 SPRAY, METERED RESPIRATORY (INHALATION) at 06:43

## 2021-01-01 RX ADMIN — ENOXAPARIN SODIUM 100 MG: 100 INJECTION SUBCUTANEOUS at 20:59

## 2021-01-01 RX ADMIN — DOCUSATE SODIUM 50 MG AND SENNOSIDES 8.6 MG 2 TABLET: 8.6; 5 TABLET, FILM COATED ORAL at 05:16

## 2021-01-01 RX ADMIN — PREGABALIN 75 MG: 75 CAPSULE ORAL at 05:58

## 2021-01-01 RX ADMIN — METOPROLOL TARTRATE 50 MG: 50 TABLET, FILM COATED ORAL at 16:25

## 2021-01-01 RX ADMIN — UMECLIDINIUM BROMIDE AND VILANTEROL TRIFENATATE 1 PUFF: 62.5; 25 POWDER RESPIRATORY (INHALATION) at 17:59

## 2021-01-01 RX ADMIN — BUDESONIDE AND FORMOTEROL FUMARATE DIHYDRATE 2 PUFF: 80; 4.5 AEROSOL RESPIRATORY (INHALATION) at 06:22

## 2021-01-01 RX ADMIN — IPRATROPIUM BROMIDE AND ALBUTEROL SULFATE 3 ML: .5; 2.5 SOLUTION RESPIRATORY (INHALATION) at 22:57

## 2021-01-01 RX ADMIN — CEFTRIAXONE SODIUM 2 G: 2 INJECTION, POWDER, FOR SOLUTION INTRAMUSCULAR; INTRAVENOUS at 05:45

## 2021-01-01 RX ADMIN — DIGOXIN 125 MCG: 0.25 INJECTION INTRAMUSCULAR; INTRAVENOUS at 17:37

## 2021-01-01 RX ADMIN — PREGABALIN 75 MG: 75 CAPSULE ORAL at 19:56

## 2021-01-01 RX ADMIN — APIXABAN 5 MG: 5 TABLET, FILM COATED ORAL at 06:26

## 2021-01-01 RX ADMIN — DOXYCYCLINE 100 MG: 100 TABLET, FILM COATED ORAL at 06:22

## 2021-01-01 RX ADMIN — PREGABALIN 75 MG: 75 CAPSULE ORAL at 09:02

## 2021-01-01 RX ADMIN — Medication 500 MG: at 04:04

## 2021-01-01 RX ADMIN — SODIUM CHLORIDE 3 G: 900 INJECTION INTRAVENOUS at 01:48

## 2021-01-01 RX ADMIN — PREDNISONE 40 MG: 20 TABLET ORAL at 06:22

## 2021-01-01 RX ADMIN — PREGABALIN 75 MG: 75 CAPSULE ORAL at 17:44

## 2021-01-01 RX ADMIN — FAMOTIDINE 20 MG: 20 TABLET ORAL at 08:14

## 2021-01-01 RX ADMIN — TRAZODONE HYDROCHLORIDE 50 MG: 50 TABLET ORAL at 20:06

## 2021-01-01 RX ADMIN — DILTIAZEM HYDROCHLORIDE 10 MG: 5 INJECTION INTRAVENOUS at 05:12

## 2021-01-01 RX ADMIN — PHENYLEPHRINE HYDROCHLORIDE 200 MCG/MIN: 10 INJECTION INTRAVENOUS at 01:00

## 2021-01-01 RX ADMIN — OXYCODONE AND ACETAMINOPHEN 1 TABLET: 10; 325 TABLET ORAL at 19:53

## 2021-01-01 RX ADMIN — BUDESONIDE AND FORMOTEROL FUMARATE DIHYDRATE 2 PUFF: 80; 4.5 AEROSOL RESPIRATORY (INHALATION) at 06:43

## 2021-01-01 RX ADMIN — METOPROLOL TARTRATE 50 MG: 50 TABLET, FILM COATED ORAL at 19:37

## 2021-01-01 RX ADMIN — APIXABAN 5 MG: 5 TABLET, FILM COATED ORAL at 05:25

## 2021-01-01 RX ADMIN — METOPROLOL TARTRATE 25 MG: 25 TABLET, FILM COATED ORAL at 12:12

## 2021-01-01 RX ADMIN — PREGABALIN 75 MG: 75 CAPSULE ORAL at 06:08

## 2021-01-01 RX ADMIN — MIDODRINE HYDROCHLORIDE 5 MG: 5 TABLET ORAL at 19:56

## 2021-01-01 RX ADMIN — SODIUM CHLORIDE 1000 ML: 9 INJECTION, SOLUTION INTRAVENOUS at 22:49

## 2021-01-01 RX ADMIN — NYSTATIN 1000000 UNITS: 100000 SUSPENSION ORAL at 20:32

## 2021-01-01 RX ADMIN — BUDESONIDE AND FORMOTEROL FUMARATE DIHYDRATE 2 PUFF: 80; 4.5 AEROSOL RESPIRATORY (INHALATION) at 19:39

## 2021-01-01 RX ADMIN — NYSTATIN 1000000 UNITS: 100000 SUSPENSION ORAL at 09:57

## 2021-01-01 RX ADMIN — AMIODARONE HYDROCHLORIDE 200 MG: 200 TABLET ORAL at 04:04

## 2021-01-01 RX ADMIN — DILTIAZEM HYDROCHLORIDE 10 MG: 5 INJECTION INTRAVENOUS at 01:44

## 2021-01-01 RX ADMIN — ASPIRIN 81 MG: 81 TABLET, COATED ORAL at 05:25

## 2021-01-01 RX ADMIN — PREGABALIN 75 MG: 75 CAPSULE ORAL at 17:37

## 2021-01-01 RX ADMIN — FAMOTIDINE 20 MG: 20 TABLET ORAL at 09:44

## 2021-01-01 RX ADMIN — FAMOTIDINE 20 MG: 20 TABLET ORAL at 21:28

## 2021-01-01 RX ADMIN — PREGABALIN 75 MG: 75 CAPSULE ORAL at 16:31

## 2021-01-01 RX ADMIN — PREGABALIN 75 MG: 75 CAPSULE ORAL at 16:21

## 2021-01-01 RX ADMIN — METHYLPREDNISOLONE SODIUM SUCCINATE 40 MG: 40 INJECTION, POWDER, FOR SOLUTION INTRAMUSCULAR; INTRAVENOUS at 11:28

## 2021-01-01 RX ADMIN — Medication 500 MG: at 06:26

## 2021-01-01 RX ADMIN — LORAZEPAM 1 MG: 1 TABLET ORAL at 01:04

## 2021-01-01 RX ADMIN — TIOTROPIUM BROMIDE INHALATION SPRAY 5 MCG: 3.12 SPRAY, METERED RESPIRATORY (INHALATION) at 06:24

## 2021-01-01 RX ADMIN — Medication 5 MG: at 22:03

## 2021-01-01 RX ADMIN — OXYCODONE 5 MG: 5 TABLET ORAL at 13:06

## 2021-01-01 RX ADMIN — Medication 500 MG: at 04:18

## 2021-01-01 RX ADMIN — Medication 500 MG: at 05:08

## 2021-01-01 RX ADMIN — FAMOTIDINE 20 MG: 20 TABLET ORAL at 21:45

## 2021-01-01 RX ADMIN — SENNOSIDES, DOCUSATE SODIUM 2 TABLET: 8.6; 5 TABLET ORAL at 16:52

## 2021-01-01 RX ADMIN — AZITHROMYCIN MONOHYDRATE 500 MG: 250 TABLET ORAL at 06:00

## 2021-01-01 RX ADMIN — PREGABALIN 75 MG: 75 CAPSULE ORAL at 05:08

## 2021-01-01 RX ADMIN — OXYCODONE AND ACETAMINOPHEN 1 TABLET: 10; 325 TABLET ORAL at 15:55

## 2021-01-01 RX ADMIN — ENOXAPARIN SODIUM 40 MG: 40 INJECTION SUBCUTANEOUS at 06:20

## 2021-01-01 RX ADMIN — METOPROLOL TARTRATE 50 MG: 50 TABLET, FILM COATED ORAL at 04:04

## 2021-01-01 RX ADMIN — LEVALBUTEROL 1.25 MG: 1.25 SOLUTION RESPIRATORY (INHALATION) at 00:22

## 2021-01-01 RX ADMIN — IPRATROPIUM BROMIDE AND ALBUTEROL SULFATE 3 ML: .5; 2.5 SOLUTION RESPIRATORY (INHALATION) at 19:37

## 2021-01-01 RX ADMIN — NYSTATIN 1000000 UNITS: 100000 SUSPENSION ORAL at 21:47

## 2021-01-01 RX ADMIN — TRAZODONE HYDROCHLORIDE 50 MG: 50 TABLET ORAL at 21:28

## 2021-01-01 RX ADMIN — SODIUM CHLORIDE 500 ML: 9 INJECTION, SOLUTION INTRAVENOUS at 17:59

## 2021-01-01 RX ADMIN — FLUTICASONE PROPIONATE 88 MCG: 44 AEROSOL, METERED RESPIRATORY (INHALATION) at 06:08

## 2021-01-01 RX ADMIN — SODIUM CHLORIDE 3 G: 900 INJECTION INTRAVENOUS at 19:52

## 2021-01-01 RX ADMIN — APIXABAN 5 MG: 5 TABLET, FILM COATED ORAL at 04:19

## 2021-01-01 RX ADMIN — BUDESONIDE AND FORMOTEROL FUMARATE DIHYDRATE 2 PUFF: 80; 4.5 AEROSOL RESPIRATORY (INHALATION) at 07:11

## 2021-01-01 RX ADMIN — FAMOTIDINE 20 MG: 20 TABLET ORAL at 09:16

## 2021-01-01 RX ADMIN — AZITHROMYCIN 500 MG: 250 TABLET, FILM COATED ORAL at 05:16

## 2021-01-01 RX ADMIN — DILTIAZEM HYDROCHLORIDE 10 MG: 5 INJECTION INTRAVENOUS at 18:00

## 2021-01-01 RX ADMIN — TRAZODONE HYDROCHLORIDE 50 MG: 50 TABLET ORAL at 21:24

## 2021-01-01 RX ADMIN — PREGABALIN 75 MG: 75 CAPSULE ORAL at 17:16

## 2021-01-01 RX ADMIN — FAMOTIDINE 20 MG: 20 TABLET ORAL at 21:47

## 2021-01-01 RX ADMIN — HALOPERIDOL LACTATE 5 MG: 5 INJECTION, SOLUTION INTRAMUSCULAR at 07:32

## 2021-01-01 RX ADMIN — ASPIRIN 81 MG: 81 TABLET, COATED ORAL at 05:58

## 2021-01-01 RX ADMIN — BUDESONIDE AND FORMOTEROL FUMARATE DIHYDRATE 2 PUFF: 80; 4.5 AEROSOL RESPIRATORY (INHALATION) at 07:59

## 2021-01-01 RX ADMIN — AZITHROMYCIN MONOHYDRATE 500 MG: 500 INJECTION, POWDER, LYOPHILIZED, FOR SOLUTION INTRAVENOUS at 07:55

## 2021-01-01 RX ADMIN — MIDODRINE HYDROCHLORIDE 5 MG: 5 TABLET ORAL at 17:37

## 2021-01-01 RX ADMIN — Medication 500 MG: at 04:37

## 2021-01-01 RX ADMIN — Medication 500 MG: at 06:00

## 2021-01-01 RX ADMIN — TRAZODONE HYDROCHLORIDE 50 MG: 50 TABLET ORAL at 02:16

## 2021-01-01 RX ADMIN — SODIUM CHLORIDE 3 G: 900 INJECTION INTRAVENOUS at 09:07

## 2021-01-01 RX ADMIN — APIXABAN 5 MG: 5 TABLET, FILM COATED ORAL at 16:52

## 2021-01-01 RX ADMIN — PREGABALIN 75 MG: 75 CAPSULE ORAL at 06:26

## 2021-01-01 RX ADMIN — AMIODARONE HYDROCHLORIDE 200 MG: 200 TABLET ORAL at 04:18

## 2021-01-01 RX ADMIN — FAMOTIDINE 20 MG: 20 TABLET ORAL at 08:57

## 2021-01-01 RX ADMIN — PHENYLEPHRINE HYDROCHLORIDE 100 MCG/MIN: 10 INJECTION INTRAVENOUS at 18:30

## 2021-01-01 RX ADMIN — NYSTATIN 500000 UNITS: 100000 SUSPENSION ORAL at 12:39

## 2021-01-01 RX ADMIN — NYSTATIN 1000000 UNITS: 100000 SUSPENSION ORAL at 20:06

## 2021-01-01 RX ADMIN — NYSTATIN 1000000 UNITS: 100000 SUSPENSION ORAL at 15:15

## 2021-01-01 RX ADMIN — ENOXAPARIN SODIUM 100 MG: 100 INJECTION SUBCUTANEOUS at 21:08

## 2021-01-01 RX ADMIN — SODIUM CHLORIDE, POTASSIUM CHLORIDE, SODIUM LACTATE AND CALCIUM CHLORIDE 1000 ML: 600; 310; 30; 20 INJECTION, SOLUTION INTRAVENOUS at 17:40

## 2021-01-01 RX ADMIN — PREGABALIN 75 MG: 75 CAPSULE ORAL at 23:36

## 2021-01-01 RX ADMIN — DEXTROSE MONOHYDRATE: 50 INJECTION, SOLUTION INTRAVENOUS at 15:54

## 2021-01-01 RX ADMIN — AMIODARONE HYDROCHLORIDE 150 MG: 1.5 INJECTION, SOLUTION INTRAVENOUS at 22:52

## 2021-01-01 RX ADMIN — NYSTATIN 1000000 UNITS: 100000 SUSPENSION ORAL at 21:45

## 2021-01-01 RX ADMIN — AMIODARONE HYDROCHLORIDE 150 MG: 1.5 INJECTION, SOLUTION INTRAVENOUS at 05:01

## 2021-01-01 RX ADMIN — FAMOTIDINE 20 MG: 20 TABLET ORAL at 20:34

## 2021-01-01 RX ADMIN — METHYLPREDNISOLONE SODIUM SUCCINATE 40 MG: 40 INJECTION, POWDER, FOR SOLUTION INTRAMUSCULAR; INTRAVENOUS at 14:13

## 2021-01-01 RX ADMIN — AMIODARONE HYDROCHLORIDE 200 MG: 200 TABLET ORAL at 17:06

## 2021-01-01 RX ADMIN — BUDESONIDE AND FORMOTEROL FUMARATE DIHYDRATE 2 PUFF: 80; 4.5 AEROSOL RESPIRATORY (INHALATION) at 20:15

## 2021-01-01 RX ADMIN — AMIODARONE HYDROCHLORIDE 200 MG: 200 TABLET ORAL at 16:31

## 2021-01-01 RX ADMIN — AMIODARONE HYDROCHLORIDE 200 MG: 200 TABLET ORAL at 04:37

## 2021-01-01 RX ADMIN — AZITHROMYCIN MONOHYDRATE 500 MG: 250 TABLET ORAL at 04:04

## 2021-01-01 RX ADMIN — BUDESONIDE AND FORMOTEROL FUMARATE DIHYDRATE 2 PUFF: 80; 4.5 AEROSOL RESPIRATORY (INHALATION) at 06:46

## 2021-01-01 RX ADMIN — PHENYLEPHRINE HYDROCHLORIDE 200 MCG/MIN: 10 INJECTION INTRAVENOUS at 10:52

## 2021-01-01 RX ADMIN — NYSTATIN 1000000 UNITS: 100000 SUSPENSION ORAL at 04:03

## 2021-01-01 RX ADMIN — DIGOXIN 125 MCG: 0.25 INJECTION INTRAMUSCULAR; INTRAVENOUS at 16:53

## 2021-01-01 RX ADMIN — AMIODARONE HYDROCHLORIDE 200 MG: 200 TABLET ORAL at 05:58

## 2021-01-01 RX ADMIN — IPRATROPIUM BROMIDE 0.5 MG: 0.5 SOLUTION RESPIRATORY (INHALATION) at 07:03

## 2021-01-01 RX ADMIN — FAMOTIDINE 20 MG: 20 TABLET ORAL at 09:18

## 2021-01-01 RX ADMIN — AMIODARONE HYDROCHLORIDE 200 MG: 200 TABLET ORAL at 13:22

## 2021-01-01 RX ADMIN — PREDNISONE 40 MG: 20 TABLET ORAL at 05:44

## 2021-01-01 RX ADMIN — METOPROLOL TARTRATE 50 MG: 50 TABLET, FILM COATED ORAL at 16:31

## 2021-01-01 RX ADMIN — NYSTATIN 1000000 UNITS: 100000 SUSPENSION ORAL at 15:45

## 2021-01-01 RX ADMIN — BUDESONIDE AND FORMOTEROL FUMARATE DIHYDRATE 2 PUFF: 80; 4.5 AEROSOL RESPIRATORY (INHALATION) at 16:52

## 2021-01-01 RX ADMIN — METOPROLOL TARTRATE 12.5 MG: 25 TABLET, FILM COATED ORAL at 18:00

## 2021-01-01 RX ADMIN — AMOXICILLIN AND CLAVULANATE POTASSIUM 1 TABLET: 875; 125 TABLET, FILM COATED ORAL at 05:43

## 2021-01-01 RX ADMIN — METOPROLOL TARTRATE 25 MG: 25 TABLET, FILM COATED ORAL at 23:36

## 2021-01-01 RX ADMIN — AMIODARONE HYDROCHLORIDE 1 MG/MIN: 1.8 INJECTION, SOLUTION INTRAVENOUS at 16:05

## 2021-01-01 RX ADMIN — APIXABAN 5 MG: 5 TABLET, FILM COATED ORAL at 04:04

## 2021-01-01 RX ADMIN — SODIUM CHLORIDE: 9 INJECTION, SOLUTION INTRAVENOUS at 06:21

## 2021-01-01 RX ADMIN — PREGABALIN 75 MG: 75 CAPSULE ORAL at 04:18

## 2021-01-01 RX ADMIN — PREGABALIN 75 MG: 75 CAPSULE ORAL at 06:22

## 2021-01-01 RX ADMIN — NYSTATIN 1000000 UNITS: 100000 SUSPENSION ORAL at 16:30

## 2021-01-01 RX ADMIN — Medication 5 MG: at 20:59

## 2021-01-01 RX ADMIN — UMECLIDINIUM BROMIDE AND VILANTEROL TRIFENATATE 1 PUFF: 62.5; 25 POWDER RESPIRATORY (INHALATION) at 08:28

## 2021-01-01 RX ADMIN — BUDESONIDE AND FORMOTEROL FUMARATE DIHYDRATE 2 PUFF: 80; 4.5 AEROSOL RESPIRATORY (INHALATION) at 21:28

## 2021-01-01 RX ADMIN — IPRATROPIUM BROMIDE 0.5 MG: 0.5 SOLUTION RESPIRATORY (INHALATION) at 02:47

## 2021-01-01 RX ADMIN — DOXYCYCLINE 100 MG: 100 TABLET, FILM COATED ORAL at 06:08

## 2021-01-01 RX ADMIN — ASPIRIN 81 MG: 81 TABLET, COATED ORAL at 06:00

## 2021-01-01 RX ADMIN — AZITHROMYCIN MONOHYDRATE 500 MG: 250 TABLET ORAL at 09:18

## 2021-01-01 RX ADMIN — DOXYCYCLINE 100 MG: 100 TABLET, FILM COATED ORAL at 19:53

## 2021-01-01 RX ADMIN — METOPROLOL TARTRATE 50 MG: 50 TABLET, FILM COATED ORAL at 04:17

## 2021-01-01 RX ADMIN — Medication 5 MG: at 20:21

## 2021-01-01 RX ADMIN — ASPIRIN 81 MG: 81 TABLET, COATED ORAL at 05:08

## 2021-01-01 RX ADMIN — BUDESONIDE AND FORMOTEROL FUMARATE DIHYDRATE 2 PUFF: 80; 4.5 AEROSOL RESPIRATORY (INHALATION) at 07:58

## 2021-01-01 RX ADMIN — NYSTATIN 1000000 UNITS: 100000 SUSPENSION ORAL at 03:41

## 2021-01-01 RX ADMIN — ENOXAPARIN SODIUM 100 MG: 100 INJECTION SUBCUTANEOUS at 08:28

## 2021-01-01 RX ADMIN — BUDESONIDE AND FORMOTEROL FUMARATE DIHYDRATE 2 PUFF: 80; 4.5 AEROSOL RESPIRATORY (INHALATION) at 06:29

## 2021-01-01 RX ADMIN — APIXABAN 5 MG: 5 TABLET, FILM COATED ORAL at 17:48

## 2021-01-01 RX ADMIN — MIDODRINE HYDROCHLORIDE 5 MG: 5 TABLET ORAL at 08:28

## 2021-01-01 RX ADMIN — LORAZEPAM 2 MG: 2 INJECTION INTRAMUSCULAR; INTRAVENOUS at 00:37

## 2021-01-01 RX ADMIN — FAMOTIDINE 20 MG: 20 TABLET ORAL at 19:39

## 2021-01-01 RX ADMIN — SODIUM CHLORIDE 3 G: 900 INJECTION INTRAVENOUS at 19:40

## 2021-01-01 RX ADMIN — METOPROLOL TARTRATE 5 MG: 1 INJECTION, SOLUTION INTRAVENOUS at 17:49

## 2021-01-01 RX ADMIN — ASPIRIN 81 MG: 81 TABLET, COATED ORAL at 04:37

## 2021-01-01 RX ADMIN — SODIUM CHLORIDE 3 G: 900 INJECTION INTRAVENOUS at 08:28

## 2021-01-01 RX ADMIN — DEXTROSE MONOHYDRATE: 50 INJECTION, SOLUTION INTRAVENOUS at 05:02

## 2021-01-01 RX ADMIN — Medication 5 MG: at 23:36

## 2021-01-01 RX ADMIN — PREGABALIN 75 MG: 75 CAPSULE ORAL at 17:10

## 2021-01-01 RX ADMIN — APIXABAN 5 MG: 5 TABLET, FILM COATED ORAL at 09:26

## 2021-01-01 RX ADMIN — Medication 500 MG: at 05:16

## 2021-01-01 RX ADMIN — PREGABALIN 75 MG: 75 CAPSULE ORAL at 05:16

## 2021-01-01 RX ADMIN — SODIUM CHLORIDE 500 ML: 9 INJECTION, SOLUTION INTRAVENOUS at 19:54

## 2021-01-01 RX ADMIN — ACETAMINOPHEN 650 MG: 325 TABLET ORAL at 20:31

## 2021-01-01 RX ADMIN — GUAIFENESIN SYRUP AND DEXTROMETHORPHAN 10 ML: 100; 10 SYRUP ORAL at 21:47

## 2021-01-01 RX ADMIN — PREGABALIN 75 MG: 75 CAPSULE ORAL at 17:06

## 2021-01-01 RX ADMIN — METOPROLOL TARTRATE 5 MG: 5 INJECTION INTRAVENOUS at 12:28

## 2021-01-01 RX ADMIN — NYSTATIN 1000000 UNITS: 100000 SUSPENSION ORAL at 08:11

## 2021-01-01 RX ADMIN — NYSTATIN 1000000 UNITS: 100000 SUSPENSION ORAL at 17:47

## 2021-01-01 RX ADMIN — FAMOTIDINE 20 MG: 20 TABLET ORAL at 08:44

## 2021-01-01 RX ADMIN — TIOTROPIUM BROMIDE INHALATION SPRAY 5 MCG: 3.12 SPRAY, METERED RESPIRATORY (INHALATION) at 08:29

## 2021-01-01 RX ADMIN — TRAZODONE HYDROCHLORIDE 50 MG: 50 TABLET ORAL at 20:31

## 2021-01-01 RX ADMIN — FLUTICASONE PROPIONATE 88 MCG: 44 AEROSOL, METERED RESPIRATORY (INHALATION) at 07:28

## 2021-01-01 RX ADMIN — PREGABALIN 75 MG: 75 CAPSULE ORAL at 05:20

## 2021-01-01 RX ADMIN — DIBASIC SODIUM PHOSPHATE, MONOBASIC POTASSIUM PHOSPHATE AND MONOBASIC SODIUM PHOSPHATE 500 MG: 852; 155; 130 TABLET ORAL at 06:22

## 2021-01-01 RX ADMIN — FLUTICASONE PROPIONATE 88 MCG: 44 AEROSOL, METERED RESPIRATORY (INHALATION) at 19:28

## 2021-01-01 RX ADMIN — BUDESONIDE AND FORMOTEROL FUMARATE DIHYDRATE 2 PUFF: 80; 4.5 AEROSOL RESPIRATORY (INHALATION) at 06:42

## 2021-01-01 RX ADMIN — FAMOTIDINE 20 MG: 20 TABLET ORAL at 10:20

## 2021-01-01 RX ADMIN — METOPROLOL TARTRATE 12.5 MG: 25 TABLET, FILM COATED ORAL at 05:16

## 2021-01-01 RX ADMIN — DILTIAZEM HYDROCHLORIDE 10 MG: 5 INJECTION INTRAVENOUS at 08:35

## 2021-01-01 RX ADMIN — BUDESONIDE AND FORMOTEROL FUMARATE DIHYDRATE 2 PUFF: 80; 4.5 AEROSOL RESPIRATORY (INHALATION) at 06:24

## 2021-01-01 RX ADMIN — BUDESONIDE AND FORMOTEROL FUMARATE DIHYDRATE 2 PUFF: 80; 4.5 AEROSOL RESPIRATORY (INHALATION) at 21:32

## 2021-01-01 RX ADMIN — NYSTATIN 1000000 UNITS: 100000 SUSPENSION ORAL at 19:37

## 2021-01-01 RX ADMIN — METOPROLOL TARTRATE 5 MG: 1 INJECTION, SOLUTION INTRAVENOUS at 20:59

## 2021-01-01 RX ADMIN — PREGABALIN 75 MG: 75 CAPSULE ORAL at 16:53

## 2021-01-01 RX ADMIN — PREGABALIN 75 MG: 75 CAPSULE ORAL at 05:25

## 2021-01-01 RX ADMIN — METHYLPREDNISOLONE SODIUM SUCCINATE 62.5 MG: 125 INJECTION, POWDER, FOR SOLUTION INTRAMUSCULAR; INTRAVENOUS at 00:19

## 2021-01-01 RX ADMIN — METOPROLOL TARTRATE 12.5 MG: 25 TABLET, FILM COATED ORAL at 17:12

## 2021-01-01 RX ADMIN — AMIODARONE HYDROCHLORIDE 1 MG/MIN: 1.8 INJECTION, SOLUTION INTRAVENOUS at 05:12

## 2021-01-01 RX ADMIN — METHYLPREDNISOLONE SODIUM SUCCINATE 40 MG: 125 INJECTION, POWDER, FOR SOLUTION INTRAMUSCULAR; INTRAVENOUS at 05:11

## 2021-01-01 RX ADMIN — MIDODRINE HYDROCHLORIDE 5 MG: 5 TABLET ORAL at 16:53

## 2021-01-01 RX ADMIN — PREGABALIN 75 MG: 75 CAPSULE ORAL at 17:47

## 2021-01-01 RX ADMIN — HYDROMORPHONE HYDROCHLORIDE 0.25 MG: 1 INJECTION, SOLUTION INTRAMUSCULAR; INTRAVENOUS; SUBCUTANEOUS at 05:11

## 2021-01-01 RX ADMIN — LORAZEPAM 0.5 MG: 0.5 TABLET ORAL at 16:52

## 2021-01-01 RX ADMIN — SENNOSIDES, DOCUSATE SODIUM 2 TABLET: 8.6; 5 TABLET ORAL at 06:35

## 2021-01-01 RX ADMIN — APIXABAN 5 MG: 5 TABLET, FILM COATED ORAL at 17:14

## 2021-01-01 RX ADMIN — METOPROLOL TARTRATE 25 MG: 25 TABLET, FILM COATED ORAL at 12:39

## 2021-01-01 RX ADMIN — PREGABALIN 75 MG: 75 CAPSULE ORAL at 18:00

## 2021-01-01 RX ADMIN — APIXABAN 5 MG: 5 TABLET, FILM COATED ORAL at 17:10

## 2021-01-01 RX ADMIN — METHYLPREDNISOLONE SODIUM SUCCINATE 40 MG: 40 INJECTION, POWDER, FOR SOLUTION INTRAMUSCULAR; INTRAVENOUS at 05:20

## 2021-01-01 RX ADMIN — NYSTATIN 1000000 UNITS: 100000 SUSPENSION ORAL at 09:16

## 2021-01-01 RX ADMIN — HYDROCODONE BITARTRATE AND ACETAMINOPHEN 1 TABLET: 5; 325 TABLET ORAL at 16:31

## 2021-01-01 RX ADMIN — METHYLPREDNISOLONE SODIUM SUCCINATE 40 MG: 40 INJECTION, POWDER, FOR SOLUTION INTRAMUSCULAR; INTRAVENOUS at 06:20

## 2021-01-01 RX ADMIN — AMOXICILLIN AND CLAVULANATE POTASSIUM 1 TABLET: 875; 125 TABLET, FILM COATED ORAL at 12:32

## 2021-01-01 RX ADMIN — IPRATROPIUM BROMIDE 0.5 MG: 0.5 SOLUTION RESPIRATORY (INHALATION) at 11:12

## 2021-01-01 RX ADMIN — METHYLPREDNISOLONE SODIUM SUCCINATE 40 MG: 40 INJECTION, POWDER, FOR SOLUTION INTRAMUSCULAR; INTRAVENOUS at 14:27

## 2021-01-01 RX ADMIN — IPRATROPIUM BROMIDE AND ALBUTEROL SULFATE 3 ML: .5; 2.5 SOLUTION RESPIRATORY (INHALATION) at 06:54

## 2021-01-01 RX ADMIN — METOPROLOL TARTRATE 50 MG: 50 TABLET, FILM COATED ORAL at 05:58

## 2021-01-01 RX ADMIN — Medication 500 MG: at 05:25

## 2021-01-01 RX ADMIN — DIBASIC SODIUM PHOSPHATE, MONOBASIC POTASSIUM PHOSPHATE AND MONOBASIC SODIUM PHOSPHATE 500 MG: 852; 155; 130 TABLET ORAL at 06:35

## 2021-01-01 RX ADMIN — DOXYCYCLINE 100 MG: 100 TABLET, FILM COATED ORAL at 06:35

## 2021-01-01 RX ADMIN — HUMAN ALBUMIN MICROSPHERES AND PERFLUTREN 3 ML: 10; .22 INJECTION, SOLUTION INTRAVENOUS at 15:00

## 2021-01-01 RX ADMIN — MAGNESIUM SULFATE HEPTAHYDRATE 2 G: 40 INJECTION, SOLUTION INTRAVENOUS at 08:32

## 2021-01-01 RX ADMIN — METOPROLOL TARTRATE 25 MG: 25 TABLET, FILM COATED ORAL at 09:18

## 2021-01-01 RX ADMIN — PREGABALIN 75 MG: 75 CAPSULE ORAL at 19:53

## 2021-01-01 RX ADMIN — BUDESONIDE AND FORMOTEROL FUMARATE DIHYDRATE 2 PUFF: 80; 4.5 AEROSOL RESPIRATORY (INHALATION) at 07:34

## 2021-01-01 RX ADMIN — TIOTROPIUM BROMIDE INHALATION SPRAY 5 MCG: 3.12 SPRAY, METERED RESPIRATORY (INHALATION) at 07:33

## 2021-01-01 RX ADMIN — FAMOTIDINE 20 MG: 20 TABLET ORAL at 12:39

## 2021-01-01 RX ADMIN — UMECLIDINIUM BROMIDE AND VILANTEROL TRIFENATATE 1 PUFF: 62.5; 25 POWDER RESPIRATORY (INHALATION) at 07:28

## 2021-01-01 RX ADMIN — LEVALBUTEROL 0.63 MG: 0.63 SOLUTION RESPIRATORY (INHALATION) at 02:50

## 2021-01-01 RX ADMIN — IPRATROPIUM BROMIDE AND ALBUTEROL SULFATE 3 ML: .5; 2.5 SOLUTION RESPIRATORY (INHALATION) at 10:20

## 2021-01-01 RX ADMIN — APIXABAN 5 MG: 5 TABLET, FILM COATED ORAL at 05:16

## 2021-01-01 RX ADMIN — METHYLPREDNISOLONE SODIUM SUCCINATE 62.5 MG: 125 INJECTION, POWDER, FOR SOLUTION INTRAMUSCULAR; INTRAVENOUS at 12:40

## 2021-01-01 RX ADMIN — PREDNISONE 40 MG: 20 TABLET ORAL at 12:31

## 2021-01-01 RX ADMIN — ENOXAPARIN SODIUM 80 MG: 80 INJECTION SUBCUTANEOUS at 05:43

## 2021-01-01 RX ADMIN — DOCUSATE SODIUM 50 MG AND SENNOSIDES 8.6 MG 2 TABLET: 8.6; 5 TABLET, FILM COATED ORAL at 18:00

## 2021-01-01 RX ADMIN — ENOXAPARIN SODIUM 100 MG: 100 INJECTION SUBCUTANEOUS at 19:51

## 2021-01-01 RX ADMIN — PREGABALIN 75 MG: 75 CAPSULE ORAL at 06:47

## 2021-01-01 RX ADMIN — SODIUM CHLORIDE 500 ML: 9 INJECTION, SOLUTION INTRAVENOUS at 17:45

## 2021-01-01 RX ADMIN — METOPROLOL TARTRATE 50 MG: 50 TABLET, FILM COATED ORAL at 17:02

## 2021-01-01 RX ADMIN — METHYLPREDNISOLONE SODIUM SUCCINATE 40 MG: 40 INJECTION, POWDER, FOR SOLUTION INTRAMUSCULAR; INTRAVENOUS at 22:17

## 2021-01-01 RX ADMIN — Medication 5 MG: at 21:00

## 2021-01-01 RX ADMIN — NYSTATIN 1000000 UNITS: 100000 SUSPENSION ORAL at 21:28

## 2021-01-01 RX ADMIN — IPRATROPIUM BROMIDE 0.5 MG: 0.5 SOLUTION RESPIRATORY (INHALATION) at 00:22

## 2021-01-01 RX ADMIN — METRONIDAZOLE 500 MG: 500 INJECTION, SOLUTION INTRAVENOUS at 06:34

## 2021-01-01 RX ADMIN — SODIUM CHLORIDE, POTASSIUM CHLORIDE, SODIUM LACTATE AND CALCIUM CHLORIDE 500 ML: 600; 310; 30; 20 INJECTION, SOLUTION INTRAVENOUS at 20:16

## 2021-01-01 RX ADMIN — APIXABAN 5 MG: 5 TABLET, FILM COATED ORAL at 17:37

## 2021-01-01 RX ADMIN — DILTIAZEM HYDROCHLORIDE 10 MG: 5 INJECTION INTRAVENOUS at 16:35

## 2021-01-01 RX ADMIN — AMIODARONE HYDROCHLORIDE 0.5 MG/MIN: 1.8 INJECTION, SOLUTION INTRAVENOUS at 13:20

## 2021-01-01 RX ADMIN — SODIUM CHLORIDE, POTASSIUM CHLORIDE, SODIUM LACTATE AND CALCIUM CHLORIDE 1000 ML: 600; 310; 30; 20 INJECTION, SOLUTION INTRAVENOUS at 05:47

## 2021-01-01 RX ADMIN — DIBASIC SODIUM PHOSPHATE, MONOBASIC POTASSIUM PHOSPHATE AND MONOBASIC SODIUM PHOSPHATE 500 MG: 852; 155; 130 TABLET ORAL at 19:56

## 2021-01-01 RX ADMIN — METOPROLOL TARTRATE 12.5 MG: 25 TABLET, FILM COATED ORAL at 05:40

## 2021-01-01 RX ADMIN — DILTIAZEM HYDROCHLORIDE 10 MG: 5 INJECTION INTRAVENOUS at 07:28

## 2021-01-01 RX ADMIN — TRIAMCINOLONE ACETONIDE: 1 CREAM TOPICAL at 06:47

## 2021-01-01 RX ADMIN — ASPIRIN 81 MG: 81 TABLET, COATED ORAL at 05:16

## 2021-01-01 RX ADMIN — BUDESONIDE AND FORMOTEROL FUMARATE DIHYDRATE 2 PUFF: 80; 4.5 AEROSOL RESPIRATORY (INHALATION) at 20:14

## 2021-01-01 RX ADMIN — PREGABALIN 75 MG: 75 CAPSULE ORAL at 17:14

## 2021-01-01 RX ADMIN — NYSTATIN 500000 UNITS: 100000 SUSPENSION ORAL at 00:20

## 2021-01-01 RX ADMIN — APIXABAN 5 MG: 5 TABLET, FILM COATED ORAL at 16:20

## 2021-01-01 RX ADMIN — FENTANYL CITRATE 50 MCG: 50 INJECTION, SOLUTION INTRAMUSCULAR; INTRAVENOUS at 00:37

## 2021-01-01 RX ADMIN — IPRATROPIUM BROMIDE AND ALBUTEROL SULFATE 3 ML: .5; 2.5 SOLUTION RESPIRATORY (INHALATION) at 14:56

## 2021-01-01 RX ADMIN — LEVALBUTEROL 1.25 MG: 1.25 SOLUTION RESPIRATORY (INHALATION) at 11:12

## 2021-01-01 RX ADMIN — SODIUM CHLORIDE, POTASSIUM CHLORIDE, SODIUM LACTATE AND CALCIUM CHLORIDE: 600; 310; 30; 20 INJECTION, SOLUTION INTRAVENOUS at 13:02

## 2021-01-01 RX ADMIN — BUDESONIDE AND FORMOTEROL FUMARATE DIHYDRATE 2 PUFF: 80; 4.5 AEROSOL RESPIRATORY (INHALATION) at 18:55

## 2021-01-01 RX ADMIN — BUDESONIDE AND FORMOTEROL FUMARATE DIHYDRATE 2 PUFF: 80; 4.5 AEROSOL RESPIRATORY (INHALATION) at 08:29

## 2021-01-01 RX ADMIN — PREGABALIN 75 MG: 75 CAPSULE ORAL at 17:26

## 2021-01-01 RX ADMIN — AZITHROMYCIN FOR INJECTION INJECTION, POWDER, LYOPHILIZED, FOR SOLUTION 500 MG: 500 INJECTION INTRAVENOUS at 16:09

## 2021-01-01 RX ADMIN — APIXABAN 5 MG: 5 TABLET, FILM COATED ORAL at 06:22

## 2021-01-01 RX ADMIN — BUDESONIDE AND FORMOTEROL FUMARATE DIHYDRATE 2 PUFF: 160; 4.5 AEROSOL RESPIRATORY (INHALATION) at 10:20

## 2021-01-01 RX ADMIN — PREGABALIN 75 MG: 75 CAPSULE ORAL at 17:11

## 2021-01-01 RX ADMIN — LORAZEPAM 2 MG: 2 INJECTION INTRAMUSCULAR; INTRAVENOUS at 14:34

## 2021-01-01 RX ADMIN — METOPROLOL TARTRATE 50 MG: 50 TABLET, FILM COATED ORAL at 16:30

## 2021-01-01 RX ADMIN — APIXABAN 5 MG: 5 TABLET, FILM COATED ORAL at 23:40

## 2021-01-01 RX ADMIN — DILTIAZEM HYDROCHLORIDE 10 MG/HR: 5 INJECTION INTRAVENOUS at 13:49

## 2021-01-01 RX ADMIN — Medication 5 MG: at 19:53

## 2021-01-01 RX ADMIN — TIOTROPIUM BROMIDE INHALATION SPRAY 5 MCG: 3.12 SPRAY, METERED RESPIRATORY (INHALATION) at 07:34

## 2021-01-01 RX ADMIN — TIOTROPIUM BROMIDE INHALATION SPRAY 5 MCG: 3.12 SPRAY, METERED RESPIRATORY (INHALATION) at 07:59

## 2021-01-01 RX ADMIN — TRAZODONE HYDROCHLORIDE 50 MG: 50 TABLET ORAL at 19:37

## 2021-01-01 RX ADMIN — METOPROLOL TARTRATE 50 MG: 50 TABLET, FILM COATED ORAL at 17:48

## 2021-01-01 RX ADMIN — POTASSIUM CHLORIDE 40 MEQ: 1500 TABLET, EXTENDED RELEASE ORAL at 14:32

## 2021-01-01 RX ADMIN — METOPROLOL TARTRATE 12.5 MG: 25 TABLET, FILM COATED ORAL at 06:47

## 2021-01-01 RX ADMIN — SODIUM CHLORIDE: 9 INJECTION, SOLUTION INTRAVENOUS at 09:36

## 2021-01-01 RX ADMIN — CEFTRIAXONE SODIUM 2 G: 2 INJECTION, POWDER, FOR SOLUTION INTRAMUSCULAR; INTRAVENOUS at 06:19

## 2021-01-01 RX ADMIN — METHYLPREDNISOLONE SODIUM SUCCINATE 40 MG: 40 INJECTION, POWDER, FOR SOLUTION INTRAMUSCULAR; INTRAVENOUS at 22:00

## 2021-01-01 RX ADMIN — LEVALBUTEROL 1.25 MG: 1.25 SOLUTION RESPIRATORY (INHALATION) at 15:29

## 2021-01-01 RX ADMIN — GUAIFENESIN SYRUP AND DEXTROMETHORPHAN 10 ML: 100; 10 SYRUP ORAL at 07:51

## 2021-01-01 RX ADMIN — PHENYLEPHRINE HYDROCHLORIDE 250 MCG/MIN: 10 INJECTION INTRAVENOUS at 07:48

## 2021-01-01 RX ADMIN — ASPIRIN 81 MG: 81 TABLET, COATED ORAL at 04:18

## 2021-01-01 RX ADMIN — AMIODARONE HYDROCHLORIDE 0.5 MG/MIN: 1.8 INJECTION, SOLUTION INTRAVENOUS at 01:48

## 2021-01-01 RX ADMIN — DOXYCYCLINE 100 MG: 100 TABLET, FILM COATED ORAL at 05:44

## 2021-01-01 RX ADMIN — PREDNISONE 40 MG: 20 TABLET ORAL at 05:42

## 2021-01-01 RX ADMIN — METOPROLOL TARTRATE 25 MG: 25 TABLET, FILM COATED ORAL at 05:25

## 2021-01-01 RX ADMIN — SODIUM CHLORIDE 3 G: 900 INJECTION INTRAVENOUS at 14:45

## 2021-01-01 RX ADMIN — MIDODRINE HYDROCHLORIDE 5 MG: 5 TABLET ORAL at 06:27

## 2021-01-01 RX ADMIN — CEFTRIAXONE SODIUM 2 G: 2 INJECTION, POWDER, FOR SOLUTION INTRAMUSCULAR; INTRAVENOUS at 06:39

## 2021-01-01 RX ADMIN — AMIODARONE HYDROCHLORIDE 0.5 MG/MIN: 1.8 INJECTION, SOLUTION INTRAVENOUS at 13:06

## 2021-01-01 RX ADMIN — METOPROLOL TARTRATE 25 MG: 25 TABLET, FILM COATED ORAL at 09:33

## 2021-01-01 RX ADMIN — ASPIRIN 81 MG: 81 TABLET, COATED ORAL at 04:04

## 2021-01-01 RX ADMIN — LORAZEPAM 0.5 MG: 0.5 TABLET ORAL at 04:17

## 2021-01-01 RX ADMIN — ENOXAPARIN SODIUM 100 MG: 100 INJECTION SUBCUTANEOUS at 10:14

## 2021-01-01 RX ADMIN — APIXABAN 5 MG: 5 TABLET, FILM COATED ORAL at 16:31

## 2021-01-01 RX ADMIN — NYSTATIN 500000 UNITS: 100000 SUSPENSION ORAL at 07:49

## 2021-01-01 RX ADMIN — DILTIAZEM HYDROCHLORIDE 10 MG: 5 INJECTION INTRAVENOUS at 03:58

## 2021-01-01 RX ADMIN — ENOXAPARIN SODIUM 80 MG: 80 INJECTION SUBCUTANEOUS at 17:11

## 2021-01-01 RX ADMIN — Medication 5 MG: at 20:18

## 2021-01-01 RX ADMIN — APIXABAN 5 MG: 5 TABLET, FILM COATED ORAL at 05:58

## 2021-01-01 RX ADMIN — GUAIFENESIN SYRUP AND DEXTROMETHORPHAN 10 ML: 100; 10 SYRUP ORAL at 16:31

## 2021-01-01 RX ADMIN — AMIODARONE HYDROCHLORIDE 150 MG: 1.5 INJECTION, SOLUTION INTRAVENOUS at 15:54

## 2021-01-01 RX ADMIN — METOPROLOL TARTRATE 50 MG: 50 TABLET, FILM COATED ORAL at 06:00

## 2021-01-01 RX ADMIN — SODIUM CHLORIDE: 9 INJECTION, SOLUTION INTRAVENOUS at 06:39

## 2021-01-01 RX ADMIN — METOPROLOL TARTRATE 12.5 MG: 25 TABLET, FILM COATED ORAL at 17:16

## 2021-01-01 RX ADMIN — PREGABALIN 75 MG: 75 CAPSULE ORAL at 04:37

## 2021-01-01 RX ADMIN — AMIODARONE HYDROCHLORIDE 0.51 MG/MIN: 1.8 INJECTION, SOLUTION INTRAVENOUS at 22:26

## 2021-01-01 RX ADMIN — APIXABAN 5 MG: 5 TABLET, FILM COATED ORAL at 17:06

## 2021-01-01 RX ADMIN — METOPROLOL TARTRATE 5 MG: 5 INJECTION INTRAVENOUS at 12:56

## 2021-01-01 RX ADMIN — ASPIRIN 81 MG: 81 TABLET, COATED ORAL at 09:18

## 2021-01-01 RX ADMIN — SODIUM CHLORIDE 3 G: 900 INJECTION INTRAVENOUS at 09:20

## 2021-01-01 RX ADMIN — METOPROLOL TARTRATE 5 MG: 1 INJECTION, SOLUTION INTRAVENOUS at 03:25

## 2021-01-01 RX ADMIN — SODIUM CHLORIDE 500 ML: 9 INJECTION, SOLUTION INTRAVENOUS at 21:06

## 2021-01-01 RX ADMIN — ASPIRIN 81 MG: 81 TABLET, COATED ORAL at 05:15

## 2021-01-01 RX ADMIN — FAMOTIDINE 20 MG: 20 TABLET ORAL at 09:27

## 2021-01-01 RX ADMIN — METHYLPREDNISOLONE SODIUM SUCCINATE 40 MG: 40 INJECTION, POWDER, FOR SOLUTION INTRAMUSCULAR; INTRAVENOUS at 20:59

## 2021-01-01 RX ADMIN — NYSTATIN 1000000 UNITS: 100000 SUSPENSION ORAL at 04:17

## 2021-01-01 RX ADMIN — ENOXAPARIN SODIUM 100 MG: 100 INJECTION SUBCUTANEOUS at 09:19

## 2021-01-01 RX ADMIN — DIGOXIN 125 MCG: 0.25 INJECTION INTRAMUSCULAR; INTRAVENOUS at 18:47

## 2021-01-01 RX ADMIN — MAGNESIUM SULFATE HEPTAHYDRATE 2 G: 40 INJECTION, SOLUTION INTRAVENOUS at 14:32

## 2021-01-01 RX ADMIN — FAMOTIDINE 20 MG: 20 TABLET ORAL at 20:06

## 2021-01-01 RX ADMIN — PREGABALIN 75 MG: 75 CAPSULE ORAL at 06:00

## 2021-01-01 RX ADMIN — TIOTROPIUM BROMIDE INHALATION SPRAY 5 MCG: 3.12 SPRAY, METERED RESPIRATORY (INHALATION) at 06:46

## 2021-01-01 RX ADMIN — DILTIAZEM HYDROCHLORIDE 20 MG: 5 INJECTION INTRAVENOUS at 13:07

## 2021-01-01 RX ADMIN — TIOTROPIUM BROMIDE INHALATION SPRAY 5 MCG: 3.12 SPRAY, METERED RESPIRATORY (INHALATION) at 06:29

## 2021-01-01 RX ADMIN — UMECLIDINIUM BROMIDE AND VILANTEROL TRIFENATATE 1 PUFF: 62.5; 25 POWDER RESPIRATORY (INHALATION) at 06:08

## 2021-01-01 RX ADMIN — SODIUM CHLORIDE 3 G: 900 INJECTION INTRAVENOUS at 02:12

## 2021-01-01 RX ADMIN — NYSTATIN 1000000 UNITS: 100000 SUSPENSION ORAL at 04:36

## 2021-01-01 RX ADMIN — FLUTICASONE PROPIONATE 88 MCG: 44 AEROSOL, METERED RESPIRATORY (INHALATION) at 06:46

## 2021-01-01 RX ADMIN — IPRATROPIUM BROMIDE AND ALBUTEROL SULFATE 3 ML: .5; 2.5 SOLUTION RESPIRATORY (INHALATION) at 23:30

## 2021-01-01 RX ADMIN — APIXABAN 5 MG: 5 TABLET, FILM COATED ORAL at 05:08

## 2021-01-01 RX ADMIN — METHYLPREDNISOLONE SODIUM SUCCINATE 40 MG: 40 INJECTION, POWDER, FOR SOLUTION INTRAMUSCULAR; INTRAVENOUS at 05:17

## 2021-01-01 RX ADMIN — PREGABALIN 75 MG: 75 CAPSULE ORAL at 06:35

## 2021-01-01 RX ADMIN — CEFTRIAXONE SODIUM 2 G: 2 INJECTION, POWDER, FOR SOLUTION INTRAMUSCULAR; INTRAVENOUS at 06:34

## 2021-01-01 RX ADMIN — PREGABALIN 75 MG: 75 CAPSULE ORAL at 05:42

## 2021-01-01 RX ADMIN — SODIUM CHLORIDE 3 G: 900 INJECTION INTRAVENOUS at 20:25

## 2021-01-01 RX ADMIN — FAMOTIDINE 20 MG: 20 TABLET ORAL at 21:24

## 2021-01-01 RX ADMIN — Medication 5 MG: at 01:26

## 2021-01-01 RX ADMIN — METOPROLOL TARTRATE 12.5 MG: 25 TABLET, FILM COATED ORAL at 17:44

## 2021-01-01 RX ADMIN — METOPROLOL TARTRATE 25 MG: 25 TABLET, FILM COATED ORAL at 06:26

## 2021-01-01 RX ADMIN — SODIUM CHLORIDE 3 G: 900 INJECTION INTRAVENOUS at 01:29

## 2021-01-01 RX ADMIN — METOPROLOL TARTRATE 12.5 MG: 25 TABLET, FILM COATED ORAL at 17:26

## 2021-01-01 RX ADMIN — IOHEXOL 60 ML: 350 INJECTION, SOLUTION INTRAVENOUS at 17:43

## 2021-01-01 RX ADMIN — AMOXICILLIN AND CLAVULANATE POTASSIUM 1 TABLET: 875; 125 TABLET, FILM COATED ORAL at 17:44

## 2021-01-01 RX ADMIN — BUDESONIDE AND FORMOTEROL FUMARATE DIHYDRATE 2 PUFF: 160; 4.5 AEROSOL RESPIRATORY (INHALATION) at 19:37

## 2021-01-01 RX ADMIN — APIXABAN 5 MG: 5 TABLET, FILM COATED ORAL at 17:44

## 2021-01-01 RX ADMIN — BUDESONIDE AND FORMOTEROL FUMARATE DIHYDRATE 2 PUFF: 80; 4.5 AEROSOL RESPIRATORY (INHALATION) at 18:29

## 2021-01-01 RX ADMIN — PREGABALIN 75 MG: 75 CAPSULE ORAL at 05:44

## 2021-01-01 RX ADMIN — BUDESONIDE AND FORMOTEROL FUMARATE DIHYDRATE 2 PUFF: 80; 4.5 AEROSOL RESPIRATORY (INHALATION) at 19:01

## 2021-01-01 RX ADMIN — DOXYCYCLINE 100 MG: 100 TABLET, FILM COATED ORAL at 16:52

## 2021-01-01 RX ADMIN — METOPROLOL TARTRATE 12.5 MG: 25 TABLET, FILM COATED ORAL at 05:20

## 2021-01-01 RX ADMIN — PREGABALIN 75 MG: 75 CAPSULE ORAL at 05:15

## 2021-01-01 RX ADMIN — MIDODRINE HYDROCHLORIDE 15 MG: 5 TABLET ORAL at 06:41

## 2021-01-01 RX ADMIN — DOXYCYCLINE 100 MG: 100 TABLET, FILM COATED ORAL at 19:56

## 2021-01-01 ASSESSMENT — COGNITIVE AND FUNCTIONAL STATUS - GENERAL
PERSONAL GROOMING: A LITTLE
MOVING TO AND FROM BED TO CHAIR: A LITTLE
DRESSING REGULAR LOWER BODY CLOTHING: A LOT
PERSONAL GROOMING: A LOT
DRESSING REGULAR LOWER BODY CLOTHING: A LOT
CLIMB 3 TO 5 STEPS WITH RAILING: A LOT
MOVING FROM LYING ON BACK TO SITTING ON SIDE OF FLAT BED: A LOT
SUGGESTED CMS G CODE MODIFIER MOBILITY: CK
CLIMB 3 TO 5 STEPS WITH RAILING: TOTAL
TURNING FROM BACK TO SIDE WHILE IN FLAT BAD: A LITTLE
STANDING UP FROM CHAIR USING ARMS: A LOT
DAILY ACTIVITIY SCORE: 16
SUGGESTED CMS G CODE MODIFIER MOBILITY: CK
MOVING FROM LYING ON BACK TO SITTING ON SIDE OF FLAT BED: A LITTLE
DRESSING REGULAR LOWER BODY CLOTHING: A LOT
MOBILITY SCORE: 17
EATING MEALS: A LITTLE
CLIMB 3 TO 5 STEPS WITH RAILING: A LOT
HELP NEEDED FOR BATHING: A LOT
MOVING FROM LYING ON BACK TO SITTING ON SIDE OF FLAT BED: A LOT
MOVING FROM LYING ON BACK TO SITTING ON SIDE OF FLAT BED: UNABLE
DAILY ACTIVITIY SCORE: 17
MOVING TO AND FROM BED TO CHAIR: UNABLE
MOVING TO AND FROM BED TO CHAIR: A LOT
TOILETING: A LITTLE
WALKING IN HOSPITAL ROOM: A LITTLE
DRESSING REGULAR LOWER BODY CLOTHING: TOTAL
MOBILITY SCORE: 7
TURNING FROM BACK TO SIDE WHILE IN FLAT BAD: A LOT
TOILETING: TOTAL
WALKING IN HOSPITAL ROOM: TOTAL
MOVING TO AND FROM BED TO CHAIR: A LOT
SUGGESTED CMS G CODE MODIFIER MOBILITY: CL
HELP NEEDED FOR BATHING: A LITTLE
STANDING UP FROM CHAIR USING ARMS: A LITTLE
EATING MEALS: A LITTLE
STANDING UP FROM CHAIR USING ARMS: A LOT
DRESSING REGULAR UPPER BODY CLOTHING: A LITTLE
TOILETING: A LOT
DRESSING REGULAR UPPER BODY CLOTHING: A LITTLE
MOBILITY SCORE: 16
HELP NEEDED FOR BATHING: A LITTLE
PERSONAL GROOMING: A LITTLE
SUGGESTED CMS G CODE MODIFIER DAILY ACTIVITY: CK
DAILY ACTIVITIY SCORE: 19
HELP NEEDED FOR BATHING: A LOT
TURNING FROM BACK TO SIDE WHILE IN FLAT BAD: A LOT
SUGGESTED CMS G CODE MODIFIER MOBILITY: CL
MOBILITY SCORE: 12
MOBILITY SCORE: 15
TURNING FROM BACK TO SIDE WHILE IN FLAT BAD: A LOT
TOILETING: A LOT
HELP NEEDED FOR BATHING: A LOT
DRESSING REGULAR LOWER BODY CLOTHING: A LOT
MOVING TO AND FROM BED TO CHAIR: A LOT
DAILY ACTIVITIY SCORE: 24
EATING MEALS: A LITTLE
EATING MEALS: A LITTLE
WALKING IN HOSPITAL ROOM: A LITTLE
MOBILITY SCORE: 13
DAILY ACTIVITIY SCORE: 24
CLIMB 3 TO 5 STEPS WITH RAILING: TOTAL
SUGGESTED CMS G CODE MODIFIER DAILY ACTIVITY: CK
TURNING FROM BACK TO SIDE WHILE IN FLAT BAD: A LOT
CLIMB 3 TO 5 STEPS WITH RAILING: A LITTLE
SUGGESTED CMS G CODE MODIFIER DAILY ACTIVITY: CH
MOVING FROM LYING ON BACK TO SITTING ON SIDE OF FLAT BED: A LOT
WALKING IN HOSPITAL ROOM: A LOT
DRESSING REGULAR UPPER BODY CLOTHING: A LITTLE
DRESSING REGULAR UPPER BODY CLOTHING: A LOT
WALKING IN HOSPITAL ROOM: A LOT
SUGGESTED CMS G CODE MODIFIER MOBILITY: CL
DRESSING REGULAR LOWER BODY CLOTHING: A LITTLE
SUGGESTED CMS G CODE MODIFIER DAILY ACTIVITY: CL
TOILETING: A LITTLE
MOVING TO AND FROM BED TO CHAIR: UNABLE
SUGGESTED CMS G CODE MODIFIER MOBILITY: CK
MOVING FROM LYING ON BACK TO SITTING ON SIDE OF FLAT BED: A LOT
WALKING IN HOSPITAL ROOM: A LITTLE
PERSONAL GROOMING: A LITTLE
MOVING FROM LYING ON BACK TO SITTING ON SIDE OF FLAT BED: A LITTLE
STANDING UP FROM CHAIR USING ARMS: A LOT
STANDING UP FROM CHAIR USING ARMS: A LITTLE
WALKING IN HOSPITAL ROOM: TOTAL
TURNING FROM BACK TO SIDE WHILE IN FLAT BAD: A LITTLE
CLIMB 3 TO 5 STEPS WITH RAILING: A LITTLE
WALKING IN HOSPITAL ROOM: A LITTLE
SUGGESTED CMS G CODE MODIFIER MOBILITY: CM
SUGGESTED CMS G CODE MODIFIER DAILY ACTIVITY: CK
MOVING FROM LYING ON BACK TO SITTING ON SIDE OF FLAT BED: A LOT
MOBILITY SCORE: 10
DAILY ACTIVITIY SCORE: 15
STANDING UP FROM CHAIR USING ARMS: TOTAL
DAILY ACTIVITIY SCORE: 12
MOVING TO AND FROM BED TO CHAIR: A LOT
SUGGESTED CMS G CODE MODIFIER DAILY ACTIVITY: CH
MOVING TO AND FROM BED TO CHAIR: A LOT
STANDING UP FROM CHAIR USING ARMS: TOTAL
TURNING FROM BACK TO SIDE WHILE IN FLAT BAD: A LOT
SUGGESTED CMS G CODE MODIFIER MOBILITY: CL
MOBILITY SCORE: 13
CLIMB 3 TO 5 STEPS WITH RAILING: A LOT
PERSONAL GROOMING: A LITTLE
CLIMB 3 TO 5 STEPS WITH RAILING: A LITTLE
SUGGESTED CMS G CODE MODIFIER DAILY ACTIVITY: CK
DRESSING REGULAR UPPER BODY CLOTHING: A LOT
DRESSING REGULAR UPPER BODY CLOTHING: A LITTLE
TURNING FROM BACK TO SIDE WHILE IN FLAT BAD: A LOT
TOILETING: A LOT
PERSONAL GROOMING: A LITTLE

## 2021-01-01 ASSESSMENT — PATIENT HEALTH QUESTIONNAIRE - PHQ9
SUM OF ALL RESPONSES TO PHQ QUESTIONS 1-9: 5
4. FEELING TIRED OR HAVING LITTLE ENERGY: SEVERAL DAYS
5. POOR APPETITE OR OVEREATING: NOT AT ALL
2. FEELING DOWN, DEPRESSED, IRRITABLE, OR HOPELESS: SEVERAL DAYS
2. FEELING DOWN, DEPRESSED, IRRITABLE, OR HOPELESS: NOT AT ALL
1. LITTLE INTEREST OR PLEASURE IN DOING THINGS: SEVERAL DAYS
6. FEELING BAD ABOUT YOURSELF - OR THAT YOU ARE A FAILURE OR HAVE LET YOURSELF OR YOUR FAMILY DOWN: NOT AL ALL
7. TROUBLE CONCENTRATING ON THINGS, SUCH AS READING THE NEWSPAPER OR WATCHING TELEVISION: NOT AT ALL
9. THOUGHTS THAT YOU WOULD BE BETTER OFF DEAD, OR OF HURTING YOURSELF: NOT AT ALL
5. POOR APPETITE OR OVEREATING: SEVERAL DAYS
9. THOUGHTS THAT YOU WOULD BE BETTER OFF DEAD, OR OF HURTING YOURSELF: NOT AT ALL
SUM OF ALL RESPONSES TO PHQ QUESTIONS 1-9: 0
7. TROUBLE CONCENTRATING ON THINGS, SUCH AS READING THE NEWSPAPER OR WATCHING TELEVISION: NOT AT ALL
8. MOVING OR SPEAKING SO SLOWLY THAT OTHER PEOPLE COULD HAVE NOTICED. OR THE OPPOSITE, BEING SO FIGETY OR RESTLESS THAT YOU HAVE BEEN MOVING AROUND A LOT MORE THAN USUAL: NOT AT ALL
SUM OF ALL RESPONSES TO PHQ9 QUESTIONS 1 AND 2: 0
3. TROUBLE FALLING OR STAYING ASLEEP OR SLEEPING TOO MUCH: SEVERAL DAYS
3. TROUBLE FALLING OR STAYING ASLEEP OR SLEEPING TOO MUCH: NOT AT ALL
4. FEELING TIRED OR HAVING LITTLE ENERGY: NOT AT ALL
8. MOVING OR SPEAKING SO SLOWLY THAT OTHER PEOPLE COULD HAVE NOTICED. OR THE OPPOSITE, BEING SO FIGETY OR RESTLESS THAT YOU HAVE BEEN MOVING AROUND A LOT MORE THAN USUAL: NOT AT ALL
6. FEELING BAD ABOUT YOURSELF - OR THAT YOU ARE A FAILURE OR HAVE LET YOURSELF OR YOUR FAMILY DOWN: NOT AL ALL
SUM OF ALL RESPONSES TO PHQ9 QUESTIONS 1 AND 2: 2
1. LITTLE INTEREST OR PLEASURE IN DOING THINGS: NOT AT ALL

## 2021-01-01 ASSESSMENT — ENCOUNTER SYMPTOMS
BLURRED VISION: 0
WHEEZING: 1
DEPRESSION: 1
TREMORS: 0
EYES NEGATIVE: 1
HEADACHES: 0
HEARTBURN: 0
COUGH: 0
BACK PAIN: 0
BLURRED VISION: 0
FEVER: 0
VOMITING: 0
CHILLS: 0
COUGH: 0
DOUBLE VISION: 0
PALPITATIONS: 0
WHEEZING: 1
DIZZINESS: 0
MYALGIAS: 0
VOMITING: 0
NAUSEA: 0
NECK PAIN: 0
ABDOMINAL PAIN: 0
CHILLS: 0
DIZZINESS: 0
DEPRESSION: 0
CHOKING: 0
ORTHOPNEA: 0
WEAKNESS: 1
CONSTITUTIONAL NEGATIVE: 1
BLURRED VISION: 0
ABDOMINAL PAIN: 0
SHORTNESS OF BREATH: 0
BACK PAIN: 0
FALLS: 1
WEAKNESS: 1
SHORTNESS OF BREATH: 0
VOMITING: 0
SHORTNESS OF BREATH: 1
BLURRED VISION: 0
CHILLS: 0
ABDOMINAL PAIN: 0
CHILLS: 0
NAUSEA: 0
LOSS OF CONSCIOUSNESS: 0
HEADACHES: 0
WHEEZING: 1
FEVER: 0
PSYCHIATRIC NEGATIVE: 1
WHEEZING: 1
DIZZINESS: 1
FALLS: 0
HEADACHES: 0
DOUBLE VISION: 0
TINGLING: 0
DEPRESSION: 1
SHORTNESS OF BREATH: 1
WEAKNESS: 1
DOUBLE VISION: 0
HEARTBURN: 0
FALLS: 1
FEVER: 0
VOMITING: 0
DIZZINESS: 0
SHORTNESS OF BREATH: 0
PALPITATIONS: 0
HEMOPTYSIS: 0
PHOTOPHOBIA: 0
LOSS OF CONSCIOUSNESS: 0
CARDIOVASCULAR NEGATIVE: 1
CHILLS: 0
DIZZINESS: 1
NERVOUS/ANXIOUS: 0
HEADACHES: 0
FALLS: 1
COUGH: 0
PALPITATIONS: 0
COUGH: 0
DOUBLE VISION: 0
FEVER: 0
FALLS: 1
PALPITATIONS: 0
COUGH: 0
SHORTNESS OF BREATH: 1
NAUSEA: 0
WEAKNESS: 1
GASTROINTESTINAL NEGATIVE: 1
FEVER: 0
FALLS: 0
VOMITING: 0
FEVER: 0
PALPITATIONS: 0
COUGH: 0
BACK PAIN: 0
DIZZINESS: 0
NERVOUS/ANXIOUS: 0
NAUSEA: 0
BACK PAIN: 0
FALLS: 1
FALLS: 0
ABDOMINAL PAIN: 0
ABDOMINAL PAIN: 0
DIZZINESS: 1
CHILLS: 0
DIZZINESS: 0
WEIGHT LOSS: 0
SPUTUM PRODUCTION: 0
COUGH: 0
LOSS OF CONSCIOUSNESS: 0
PALPITATIONS: 0
SHORTNESS OF BREATH: 1
CHOKING: 0
NERVOUS/ANXIOUS: 0
DIZZINESS: 0
WEAKNESS: 1
HEARTBURN: 0
VOMITING: 0
COUGH: 1
VOMITING: 0
ABDOMINAL PAIN: 0
HEADACHES: 0
HEADACHES: 0

## 2021-01-01 ASSESSMENT — GAIT ASSESSMENTS
GAIT LEVEL OF ASSIST: SUPERVISED
DISTANCE (FEET): 2
DEVIATION: OTHER (COMMENT)
ASSISTIVE DEVICE: FRONT WHEEL WALKER
GAIT LEVEL OF ASSIST: MINIMAL ASSIST
DEVIATION: SHUFFLED GAIT;BRADYKINETIC;DECREASED BASE OF SUPPORT
GAIT LEVEL OF ASSIST: UNABLE TO PARTICIPATE
ASSISTIVE DEVICE: FRONT WHEEL WALKER
ASSISTIVE DEVICE: FRONT WHEEL WALKER
DISTANCE (FEET): 150
DISTANCE (FEET): 30
DEVIATION: DECREASED BASE OF SUPPORT;DECREASED TOE OFF
DISTANCE (FEET): 2
ASSISTIVE DEVICE: SINGLE POINT CANE
GAIT LEVEL OF ASSIST: MINIMAL ASSIST
GAIT LEVEL OF ASSIST: MINIMAL ASSIST
DISTANCE (FEET): 35
ASSISTIVE DEVICE: SINGLE POINT CANE

## 2021-01-01 ASSESSMENT — CHA2DS2 SCORE
CHA2DS2 VASC SCORE: 4
AGE 65 TO 74: NO
PRIOR STROKE OR TIA OR THROMBOEMBOLISM: NO
CHA2DS2 VASC SCORE: 2
VASCULAR DISEASE: NO
CHF OR LEFT VENTRICULAR DYSFUNCTION: NO
SEX: MALE
DIABETES: NO
AGE 75 OR GREATER: YES
HYPERTENSION: NO
DIABETES: NO
CHF OR LEFT VENTRICULAR DYSFUNCTION: YES
SEX: MALE
AGE 65 TO 74: NO
AGE 75 OR GREATER: YES
HYPERTENSION: YES
PRIOR STROKE OR TIA OR THROMBOEMBOLISM: NO
VASCULAR DISEASE: NO

## 2021-01-01 ASSESSMENT — FIBROSIS 4 INDEX
FIB4 SCORE: 4.52
FIB4 SCORE: 7.15
FIB4 SCORE: 5.74
FIB4 SCORE: 5.74
FIB4 SCORE: 3.58
FIB4 SCORE: 4.41
FIB4 SCORE: 30.17
FIB4 SCORE: 19.12
FIB4 SCORE: 30.17
FIB4 SCORE: 3.28
FIB4 SCORE: 5.74
FIB4 SCORE: 7.15
FIB4 SCORE: 5.74

## 2021-01-01 ASSESSMENT — ACTIVITIES OF DAILY LIVING (ADL)
TOILETING: INDEPENDENT

## 2021-01-01 ASSESSMENT — PAIN DESCRIPTION - PAIN TYPE
TYPE: ACUTE PAIN

## 2021-01-01 ASSESSMENT — LIFESTYLE VARIABLES
TOTAL SCORE: 0
ON A TYPICAL DAY WHEN YOU DRINK ALCOHOL HOW MANY DRINKS DO YOU HAVE: 2
DOES PATIENT WANT TO STOP DRINKING: NO
DOES PATIENT WANT TO STOP DRINKING: NO
AVERAGE NUMBER OF DAYS PER WEEK YOU HAVE A DRINK CONTAINING ALCOHOL: 10
AVERAGE NUMBER OF DAYS PER WEEK YOU HAVE A DRINK CONTAINING ALCOHOL: 12
HOW MANY TIMES IN THE PAST YEAR HAVE YOU HAD 5 OR MORE DRINKS IN A DAY: 15
EVER FELT BAD OR GUILTY ABOUT YOUR DRINKING: NO
ALCOHOL_USE: YES
HAVE YOU EVER FELT YOU SHOULD CUT DOWN ON YOUR DRINKING: NO
HAVE PEOPLE ANNOYED YOU BY CRITICIZING YOUR DRINKING: NO
CONSUMPTION TOTAL: POSITIVE
ON A TYPICAL DAY WHEN YOU DRINK ALCOHOL HOW MANY DRINKS DO YOU HAVE: 1
HOW MANY TIMES IN THE PAST YEAR HAVE YOU HAD 5 OR MORE DRINKS IN A DAY: 12
HAVE PEOPLE ANNOYED YOU BY CRITICIZING YOUR DRINKING: NO
TOTAL SCORE: 0
TOTAL SCORE: 0
DO YOU DRINK ALCOHOL: NO
TOTAL SCORE: 0
SUBSTANCE_ABUSE: 0
TOTAL SCORE: 0
EVER HAD A DRINK FIRST THING IN THE MORNING TO STEADY YOUR NERVES TO GET RID OF A HANGOVER: NO
HAVE YOU EVER FELT YOU SHOULD CUT DOWN ON YOUR DRINKING: NO
EVER FELT BAD OR GUILTY ABOUT YOUR DRINKING: NO
EVER HAD A DRINK FIRST THING IN THE MORNING TO STEADY YOUR NERVES TO GET RID OF A HANGOVER: NO
TOTAL SCORE: 0
EVER HAD A DRINK FIRST THING IN THE MORNING TO STEADY YOUR NERVES TO GET RID OF A HANGOVER: NO
CONSUMPTION TOTAL: POSITIVE
TOTAL SCORE: 0
TOTAL SCORE: 0
DO YOU DRINK ALCOHOL: NO
SUBSTANCE_ABUSE: 0
HAVE PEOPLE ANNOYED YOU BY CRITICIZING YOUR DRINKING: NO
ALCOHOL_USE: YES
CONSUMPTION TOTAL: INCOMPLETE
EVER FELT BAD OR GUILTY ABOUT YOUR DRINKING: NO
TOTAL SCORE: 0
SUBSTANCE_ABUSE: 0
HAVE YOU EVER FELT YOU SHOULD CUT DOWN ON YOUR DRINKING: NO

## 2021-01-01 ASSESSMENT — PAIN SCALES - WONG BAKER: WONGBAKER_NUMERICALRESPONSE: HURTS JUST A LITTLE BIT

## 2021-09-30 PROBLEM — C85.90 LYMPHOMA (HCC): Chronic | Status: ACTIVE | Noted: 2021-01-01

## 2021-09-30 PROBLEM — D72.829 LEUKOCYTOSIS: Status: ACTIVE | Noted: 2021-01-01

## 2021-09-30 PROBLEM — M62.82 RHABDOMYOLYSIS: Status: ACTIVE | Noted: 2021-01-01

## 2021-09-30 PROBLEM — E86.0 DEHYDRATION: Status: ACTIVE | Noted: 2021-01-01

## 2021-09-30 PROBLEM — R55 SYNCOPE: Status: ACTIVE | Noted: 2021-01-01

## 2021-09-30 PROBLEM — R79.89 TROPONIN LEVEL ELEVATED: Status: ACTIVE | Noted: 2021-01-01

## 2021-09-30 NOTE — ED PROVIDER NOTES
ED Provider Note    Scribed for Kylie Garcia M.D. by Radha Brown. 9/30/2021, 3:14 PM.    Primary care provider: Ewdin Valentin M.D.  Means of arrival: EMS  History obtained from: Patient and Daughter  History limited by: none    CHIEF COMPLAINT  Chief Complaint   Patient presents with   • T-5000 FALL     Pt got up when he suddenly felt dizzy. Pt fell and is now complaining of bilateral shoulder pain. Pt denies hitting his head. -thinners. Pt has a hx of COPD. Pt wears 3L NC at baseline. Pt was 78% on RA when EMS arrived. Pt was not wearing his O2.        HPI  Gary Severino Gil is a 76 y.o. male who has lymphoma presents to the Emergency Department for evaluation of mild bilateral shoulder pain secondary to a fall onset today. He notes that last night he fell and was on the ground all night. He is unsure of how he fell down. He is unsure if during the fall, if he was lightheaded or dizzy. He thinks he was wearing his oxygen when he fell. The patient called EMS who helped him get back onto the couch. He slept for three hours. When he woke up he felt dizzy and he fell again. He admits to associated symptoms of bilateral shoulder pain, but denies shortness of breath, pain with deep breaths, fevers, or chills. No alleviating factors were reported. The patient has a history of COPD normally wears oxygen (3L). According to his daughter he has a weak back secondary to a back surgery he had. According to the daughter this is the first time he was been acting so weak. According to the daughter he is supposed to have chemotherapy done, however he has not had anything done. He notes he did have that PET-CT done a few months ago. He denies the use of blood thinners.    REVIEW OF SYSTEMS  Pertinent positives include bilateral shoulder pain,dizziness, fall x2. Pertinent negatives include no shortness of breath, pain with breaths, fevers, or chills.  All other systems reviewed and negative.    PAST MEDICAL HISTORY   has  "a past medical history of Chronic obstructive pulmonary disease (HCC), MRSA (methicillin resistant Staphylococcus aureus), Productive cough, and Snoring.    SURGICAL HISTORY   has a past surgical history that includes imelda rectal abscess incision and drainage (4/6/2009); rectal exploration (9/3/2009); debridement (9/3/2009); rectal exploration (11/17/2009); debridement (11/17/2009); lumbar fusion anterior (1983); lumbar fusion posterior (1987); flap graft (6/1/2010); and fistula in ano repair (6/1/2010).    SOCIAL HISTORY  Social History     Tobacco Use   • Smoking status: Former Smoker     Packs/day: 1.50     Years: 45.00     Pack years: 67.50     Types: Cigarettes     Quit date: 10/27/2017     Years since quitting: 3.9   • Smokeless tobacco: Never Used   Substance Use Topics   • Alcohol use: Yes     Alcohol/week: 8.0 oz     Types: 16 Standard drinks or equivalent per week     Comment: noted 4 per day   • Drug use: No      Social History     Substance and Sexual Activity   Drug Use No       FAMILY HISTORY  Family History   Problem Relation Age of Onset   • Stroke Unknown        CURRENT MEDICATIONS  Home Medications     Reviewed by Raheem Haywood (Pharmacy Tech) on 09/30/21 at 1929  Med List Status: Complete   Medication Last Dose Status   doxycycline (VIBRAMYCIN) 100 MG Tab 9/4/2021 Active   melatonin 5 mg Tab PRN Active   oxyCODONE-acetaminophen (PERCOCET-10)  MG Tab 9/28/2021 Active   pregabalin (LYRICA) 75 MG Cap UNK Active   TRELEGY ELLIPTA 100-62.5-25 MCG/INH AEROSOL POWDER, BREATH ACTIVATED 9/29/2021 Active                ALLERGIES  Allergies   Allergen Reactions   • Morphine Sulfate Unspecified     Hallucinations        PHYSICAL EXAM  VITAL SIGNS: /75   Pulse 90   Temp 35.8 °C (96.5 °F) (Temporal)   Resp 18   Ht 1.6 m (5' 3\")   Wt 90.7 kg (200 lb)   SpO2 100%   BMI 35.43 kg/m²   Constitutional: Alert in no apparent distress. Weak. Chronically ill appearing.   HENT: No signs of trauma, " Bilateral external ears normal, Nose normal.   Eyes: Pupils are equal and reactive, Conjunctiva normal, Non-icteric.   Neck:  No stridor.   Cardiovascular: Regular rate and rhythm, no murmurs.   Thorax & Lungs: Decreased air movements with expiratory wheeze, No acute respiratory distress,  No chest tenderness.   Abdomen: Bowel sounds normal, Soft, No tenderness, No masses, No peritoneal signs.  Skin: Scattered erythematous lesions throughout his body, a large irregular lesion over his left side of his face extending to his ear, multiple lesions on his extremities. Warm, Dry, No rash.   Musculoskeletal:  No major deformities noted.  Neurologic: Alert, moving all extremities without difficulty, no focal deficits.      LABS  Labs Reviewed   CBC WITH DIFFERENTIAL - Abnormal; Notable for the following components:       Result Value    WBC 25.5 (*)     MCHC 31.9 (*)     RDW 50.8 (*)     Neutrophils-Polys 90.10 (*)     Lymphocytes 2.00 (*)     Immature Granulocytes 1.30 (*)     Neutrophils (Absolute) 23.03 (*)     Lymphs (Absolute) 0.50 (*)     Monos (Absolute) 1.58 (*)     Immature Granulocytes (abs) 0.32 (*)     All other components within normal limits   COMP METABOLIC PANEL - Abnormal; Notable for the following components:    Glucose 114 (*)     AST(SGOT) 92 (*)     Alkaline Phosphatase 150 (*)     Albumin 2.3 (*)     Globulin 4.1 (*)     All other components within normal limits   TROPONIN - Abnormal; Notable for the following components:    Troponin T 28 (*)     All other components within normal limits   URINALYSIS - Abnormal; Notable for the following components:    Occult Blood Small (*)     All other components within normal limits   CREATINE KINASE - Abnormal; Notable for the following components:    CPK Total 2677 (*)     All other components within normal limits   URINE MICROSCOPIC (W/UA) - Abnormal; Notable for the following components:    WBC 2-5 (*)     RBC 2-5 (*)     Hyaline Cast 3-5 (*)     All other  components within normal limits   ESTIMATED GFR     All labs reviewed by me.    EKG  Results for orders placed or performed during the hospital encounter of 21   EKG (NOW)   Result Value Ref Range    Report       [facility] Emergency Dept.    Test Date:  2021  Pt Name:    CARLOS GO                     Department: ER  MRN:        [id number]                      Room:       [facility]  Gender:     Male                         Technician: [id number]  :        1945                   Requested By:VEL ROA  Order #:    [id number]                    Reading MD: VEL ROA    Measurements  Intervals                                Axis  Rate:       95                           P:  [place]:                                      QRS:        77  QRSD:       94                           T:          -22  QT:         368  QTc:        463    Interpretive Statements  ATRIAL FLUTTER, A-RATE 250  MULTIPLE PREMATURE COMPLEXES, VENT & SUPRAVEN  LOW VOLTAGE IN FRONTAL LEADS  BORDERLINE T ABNORMALITIES, INFERIOR LEADS  Compared to ECG 2012 13:50:27  Impression: intermittent PVCs, no acute ischemia  Electronically Signed On 2021 22:16:14 PDT by VEL ROA           RADIOLOGY  CT-CTA CHEST PULMONARY ARTERY W/ RECONS   Final Result      1.  There is no CT evidence of acute pulmonary embolism.   2.  There is underlying emphysema with interstitial lung disease in the left upper lobe.   3.  Bibasilar airspace disease is likely atelectasis. There is no acute pneumonia.   4.  There is a trace of dependent right pleural effusion.   5.  There is lymphadenopathy involving the left supraclavicular region, mediastinum, bilateral hilar regions and bilateral axillary regions. There are also subcutaneous nodules or right breast nodules. There are a few small new normal size lymph nodes in    the upper abdomen. These findings could represent a metastatic process versus lymphoma.            DX-CHEST-PORTABLE (1  VIEW)   Final Result      1.  Right basilar opacity may represent atelectasis or consolidation.   2.  Mild interstitial prominence may represent mild edema.   3.  Cardiomegaly.   4.  Atherosclerotic plaque.        The radiologist's interpretation of all radiological studies have been reviewed by me.    COURSE & MEDICAL DECISION MAKING  Pertinent Labs & Imaging studies reviewed. (See chart for details)    Reviewed old medical records that showed he has history of Lymphoma. He had a PET-CT in 2020. As of April he had new skin lesions that was progressing so he requested a new CT-scan      Differential diagnoses include but are not limited to: Pulmonary embolism ,pneumonia, electrolyte abnormality, rhabdomyolysis, renal failure, Progressive weakness secondary to untreated lymphoma, dehydration    3:14 PM - Patient seen and examined at bedside. Ordered Dx-Chest, EKG, UA, Creatine Kinase, CBC with diff, CMP, and Troponin to evaluate his symptoms.     6:45 PM Patient was reevaluated at bedside. Discussed lab and radiology results with the patient and informed them that they are extremely dehydration. I told him that I believe it's his lymphoma causing his weakness. I told him I don't see any incidence of pneumonia or pulmonary embolism. I informed him that he may need to stay over night. Patient verbalizes understanding and agreement to this plan of care.      6:55 PM I discussed the patient's case and the above findings with Dr. Summers (Hopitalist) who agrees to hospitalize the patient.      HYDRATION: Based on the patient's presentation of Dehydration the patient was given IV fluids. IV Hydration was used because oral hydration was not adequate alone. Upon recheck following hydration, the patient was improved.    Decision Making:  This is a 76 y.o. year old male who presents with weakness.  Patient has fallen twice over the last 12 to 24 hours.  He spent multiple hours on the floor last night and then was helped back into  bed and then had another syncopal episode when he tried to stand up.  This likely may have been secondary to orthostasis.  I do think he is quite dehydrated.  He has a significant white count at 25.  Source is unclear he does not appear to have any significant pneumonia on chest CT.  He does not have evidence of urinary tract infection.  He does have what looks like worsening lymphoma however this is understandable given he has not had any treatment for this recently.  CPK is elevated at 2600.  I do think he is quite dehydrated and may be in rhabdo.  His renal function is normal at this time.  Troponin is only minimally elevated.  I do think he requires hospitalization for fluids hydration and treatment of rhabdo.  I spoke with Dr. Summers who is agreeable to consult for hospitalization.    DISPOSITION:  Patient will be hospitalized by Dr. Summers in guarded condition.      FINAL IMPRESSION  1. Chronic obstructive pulmonary disease with acute exacerbation (HCC)    2. Weakness    3. Non-traumatic rhabdomyolysis           This dictation has been created using voice recognition software and/or scribes. The accuracy of the dictation is limited by the abilities of the software and the expertise of the scribes. I expect there may be some errors of grammar and possibly content. I made every attempt to manually correct the errors within my dictation. However, errors related to voice recognition software and/or scribes may still exist and should be interpreted within the appropriate context.     I, Radha Brown (Scribe), am scribing for, and in the presence of, Kylie Garcia M.D..    Electronically signed by: Radha Brown (Isaiasibhonorio), 9/30/2021    IKylie M.D. personally performed the services described in this documentation, as scribed by Radha Brown in my presence, and it is both accurate and complete.    The note accurately reflects work and decisions made by me.  Kylie Garcia M.D.  9/30/2021  10:19 PM

## 2021-09-30 NOTE — ED TRIAGE NOTES
"Chief Complaint   Patient presents with   • T-5000 FALL     Pt got up when he suddenly felt dizzy. Pt fell and is now complaining of bilateral shoulder pain. Pt denies hitting his head. -thinners. Pt has a hx of COPD. Pt wears 3L NC at baseline. Pt was 78% on RA when EMS arrived. Pt was not wearing his O2.      /75   Pulse 90   Temp 35.8 °C (96.5 °F) (Temporal)   Resp 18   Ht 1.6 m (5' 3\")   Wt 90.7 kg (200 lb)   SpO2 100%   BMI 35.43 kg/m²     Patient arrived to the ED via EMS. Pt placed on gurney. Chart up for ERP.   "
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
## 2021-10-01 PROBLEM — I48.91 A-FIB (HCC): Status: ACTIVE | Noted: 2021-01-01

## 2021-10-01 NOTE — PROGRESS NOTES
Report called to Angel T7 RN. Assessment, labs and plan of care reviewed. All questions answered. Will monitor until T7 bed ready. T7 room awaiting clean status before transferring patient upstairs.

## 2021-10-01 NOTE — THERAPY
Missed Therapy     Patient Name: Gary Severino Gil  Age:  76 y.o., Sex:  male  Medical Record #: [id number]  Today's Date: 10/1/2021    Discussed missed therapy with RN    PT eval order received and attempted. Took history and PLOF from patient. Prior to mobility noted that patient was sustaining  bpm. Notified RN who requested PT eval be deferred for further testing. Discussed this with patient who demonstrated understanding. Will perform full eval when medically stable.       10/01/21 1008   Prior Living Situation   Prior Services None   Housing / Facility [address]   Steps Into Home 0   Equipment Owned Single Point Cane   Lives with - Patient's Self Care Capacity Alone and Able to Care For Self   Prior Level of Functional Mobility   Bed Mobility Independent   Transfer Status Independent   Ambulation Independent   Distance Ambulation (Feet)   (community ambulator)   Assistive Devices Used Single Point Cane   Interdisciplinary Plan of Care Collaboration

## 2021-10-01 NOTE — ASSESSMENT & PLAN NOTE
WBC on admission 25.5, today 26.4  Likely secondary to lymphoma, however underlying infectious process could not be ruled out  Blood cultures pending  Procalcitonin 0.44  --- Continue PO augmentin X 5 days  --- Afebrile, VSS  --- Monitor for signs of infection

## 2021-10-01 NOTE — ED NOTES
Unable to get 2nd PIV with multiple u.s attempts ERP notified and Report to MARIA DEL CARMEN Galicia

## 2021-10-01 NOTE — H&P
Hospital Medicine History & Physical Note    Date of Service  9/30/2021    Primary Care Physician  Edwin Valentin M.D.    Consultants  None    Code Status  Full Code    Chief Complaint  Chief Complaint   Patient presents with   • T-5000 FALL     Pt got up when he suddenly felt dizzy. Pt fell and is now complaining of bilateral shoulder pain. Pt denies hitting his head. -thinners. Pt has a hx of COPD. Pt wears 3L NC at baseline. Pt was 78% on RA when EMS arrived. Pt was not wearing his O2.        History of Presenting Illness  Gary Severino Gil is a 76 y.o. male who presented 9/30/2021 with fall. Patient is drowsy and offers minimal history. He states that last night, he was getting up, felt dizzy, and fell down. He could not get up the entire night and was on the floor. This AM, he was able to call for help and got up and went to the couch where he slept, then woke up with dizziness and fell again. Patient has history of COPD is usually on 3L however reports that he was not on his oxygen when he woke up. He does live alone. Daughter is at bedside who states that patient has been weak and not eating or drinking well. He has lymphoma diagnosis and is scheduled for chemotherapy initiation next week. Patient in ED currently on 6L. He was found at 78% on RA. Patient does exhibit significant wheezing. Will admit patient for syncope and COPD exacerbation.     I discussed the plan of care with patient and family.    Review of Systems  Review of Systems   Constitutional: Negative.    HENT: Negative.    Eyes: Negative.    Respiratory: Positive for shortness of breath and wheezing.    Cardiovascular: Negative.    Gastrointestinal: Negative.    Genitourinary: Negative.    Musculoskeletal: Positive for falls.   Skin: Negative.    Neurological: Positive for dizziness and weakness.   Psychiatric/Behavioral: Negative.        Past Medical History   has a past medical history of Chronic obstructive pulmonary disease (HCC), MRSA  (methicillin resistant Staphylococcus aureus), Productive cough, and Snoring.    Surgical History   has a past surgical history that includes imelda rectal abscess incision and drainage (4/6/2009); rectal exploration (9/3/2009); debridement (9/3/2009); rectal exploration (11/17/2009); debridement (11/17/2009); lumbar fusion anterior (1983); lumbar fusion posterior (1987); flap graft (6/1/2010); and fistula in ano repair (6/1/2010).     Family History  family history includes Stroke in his unknown relative.   Family history reviewed with patient. There is no family history that is pertinent to the chief complaint.     Social History   reports that he quit smoking about 3 years ago. His smoking use included cigarettes. He has a 67.50 pack-year smoking history. He has never used smokeless tobacco. He reports current alcohol use of about 8.0 oz of alcohol per week. He reports that he does not use drugs.    Allergies  Allergies   Allergen Reactions   • Morphine Sulfate Unspecified     Hallucinations        Medications  Prior to Admission Medications   Prescriptions Last Dose Informant Patient Reported? Taking?   TRELEGY ELLIPTA 100-62.5-25 MCG/INH AEROSOL POWDER, BREATH ACTIVATED 9/29/2021 at AM Family Member Yes No   Sig: Take 1 Puff by mouth every day.   doxycycline (VIBRAMYCIN) 100 MG Tab 9/4/2021 at FINISHED Patient's Home Pharmacy Yes No   Sig: Take 100 mg by mouth 2 times a day.   melatonin 5 mg Tab PRN at PRN Family Member Yes Yes   Sig: Take 5 mg by mouth at bedtime as needed (Sleep).   oxyCODONE-acetaminophen (PERCOCET-10)  MG Tab 9/28/2021 at PRN Family Member Yes No   Sig: Take 1 Tablet by mouth 4 times a day as needed.   pregabalin (LYRICA) 75 MG Cap UNK at UNK Family Member Yes No   Sig: Take 75 mg by mouth 2 times a day.      Facility-Administered Medications: None       Physical Exam  Temp:  [35.8 °C (96.5 °F)] 35.8 °C (96.5 °F)  Pulse:  [90-93] 93  Resp:  [18-23] 23  BP: (110-127)/(56-76)  111/76  SpO2:  [97 %-100 %] 99 %  Blood Pressure : 111/76   Temperature: 35.8 °C (96.5 °F)   Pulse: 93   Respiration: (!) 23   Pulse Oximetry: 99 %       Physical Exam  Vitals and nursing note reviewed.   Constitutional:       Appearance: He is ill-appearing.   HENT:      Head: Normocephalic and atraumatic.      Mouth/Throat:      Mouth: Mucous membranes are dry.      Pharynx: Oropharynx is clear.   Eyes:      General: No scleral icterus.     Extraocular Movements: Extraocular movements intact.      Conjunctiva/sclera: Conjunctivae normal.      Pupils: Pupils are equal, round, and reactive to light.   Cardiovascular:      Rate and Rhythm: Regular rhythm. Tachycardia present.      Pulses: Normal pulses.      Heart sounds: Normal heart sounds.   Pulmonary:      Effort: Pulmonary effort is normal. No respiratory distress.      Breath sounds: Wheezing present.   Abdominal:      General: Abdomen is flat. Bowel sounds are normal. There is no distension.      Palpations: Abdomen is soft.      Tenderness: There is no abdominal tenderness. There is no guarding.   Musculoskeletal:         General: No swelling or tenderness. Normal range of motion.      Cervical back: Normal range of motion and neck supple. No rigidity.   Skin:     General: Skin is warm and dry.   Neurological:      General: No focal deficit present.      Mental Status: He is oriented to person, place, and time. Mental status is at baseline.         Laboratory:  Recent Labs     09/30/21  1611   WBC 25.5*   RBC 5.63   HEMOGLOBIN 15.9   HEMATOCRIT 49.9   MCV 88.6   MCH 28.2   MCHC 31.9*   RDW 50.8*   PLATELETCT 295   MPV 9.6     Recent Labs     09/30/21  1611   SODIUM 137   POTASSIUM 3.9   CHLORIDE 97   CO2 31   GLUCOSE 114*   BUN 8   CREATININE 0.74   CALCIUM 8.6     Recent Labs     09/30/21  1611   ALTSGPT 31   ASTSGOT 92*   ALKPHOSPHAT 150*   TBILIRUBIN 0.6   GLUCOSE 114*         No results for input(s): NTPROBNP in the last 72 hours.      Recent Labs      09/30/21  1611   TROPONINT 28*       Imaging:  CT-CTA CHEST PULMONARY ARTERY W/ RECONS   Final Result      1.  There is no CT evidence of acute pulmonary embolism.   2.  There is underlying emphysema with interstitial lung disease in the left upper lobe.   3.  Bibasilar airspace disease is likely atelectasis. There is no acute pneumonia.   4.  There is a trace of dependent right pleural effusion.   5.  There is lymphadenopathy involving the left supraclavicular region, mediastinum, bilateral hilar regions and bilateral axillary regions. There are also subcutaneous nodules or right breast nodules. There are a few small new normal size lymph nodes in    the upper abdomen. These findings could represent a metastatic process versus lymphoma.            DX-CHEST-PORTABLE (1 VIEW)   Final Result      1.  Right basilar opacity may represent atelectasis or consolidation.   2.  Mild interstitial prominence may represent mild edema.   3.  Cardiomegaly.   4.  Atherosclerotic plaque.          X-Ray:  I have personally reviewed the images and compared with prior images.    Assessment/Plan:  I anticipate this patient will require at least two midnights for appropriate medical management, necessitating inpatient admission.    * Acute exacerbation of chronic obstructive pulmonary disease (COPD) (HCC)- (present on admission)  Assessment & Plan  -Baseline is at 3L  -Will place on aerosol mask, patient's preference  -Solumedrol 40mg q6 hours, taper  -Nebs PRN      Leukocytosis- (present on admission)  Assessment & Plan  -Meets sepsis criteria with RR> 20 and leukocytosis however there is no source of infection  -Will hydrate patient and monitor for now    Dehydration- (present on admission)  Assessment & Plan  -Will be on fluid, monitor respiratory status and if showing signs of overload, stop fluids and give lasix    Lymphoma (HCC)- (present on admission)  Assessment & Plan  -oncologist is Dr. Garcia  -Will be starting chemo next  week     Troponin level elevated- (present on admission)  Assessment & Plan  -Trend trop  -Likely due to demand     Rhabdomyolysis- (present on admission)  Assessment & Plan  -LR, monitor BUN/Cr    Syncope- (present on admission)  Assessment & Plan  -Tele monitor  -Likely due to hypoxemia  -Trend trop  -ECHO  -Orthostatic vitals       VTE prophylaxis: enoxaparin ppx

## 2021-10-01 NOTE — ASSESSMENT & PLAN NOTE
History of per chart review  Oncologist is Dr. Garcia  Scheduled to begin chemo next week   Scattered cutaneous tumor lesions on body and face  --- Wound care consulted by ED, appreciate recomendations  --- Monitor  --- Follow-up with Dr. Garcia outpatient

## 2021-10-01 NOTE — ED NOTES
Pt ambulated from chair to bed, was slightly unsteady, but did not require assistance. Pt was short of breath during transfer.

## 2021-10-01 NOTE — ASSESSMENT & PLAN NOTE
On baseline 2L O2 requirement  Procalcitonin 0.44  CXR demonstrates right basilar opacity and mild interstitial prominence, which may represent atelectasis or consolidation  CTA negative for PE, shows emphysema, atelectasis, trace right pleural effusion and lymphadenopathy  --- Continue solu-medrol per COPD order set  --- Started formulary substitution for patient's home Trelegy  --- Respiratory and COPD Clinical Coordinator on board  --- Nebulizers available PRN  --- PO Augmentin for 5 days and 3 days of steroid per Dr. Webster

## 2021-10-01 NOTE — PROGRESS NOTES
PT/OT attempted to work with patient but HR was sustaining ~160 bpm. Dr. Valdovinos on unit and verbally notified. MD rounded on patient. Patient A/Ox4 and answering questions appropriately no complaints of chest pain at this time. Patient told to take deep breaths and HR slowed to 80s-90bpm. MD ordered STAT EKG for follow-up. Will continue to monitor.

## 2021-10-01 NOTE — FACE TO FACE
Face to Face Supporting Documentation - Home Health    The encounter with this patient was in whole or in part the primary reason for home health admission.    Date of encounter:   Patient:                    MRN:                       YOB: 2023  Gary Severino Gil  [id number]  1945     Home health to see patient for:  Skilled Nursing care for assessment, interventions & education    Skilled need for:  Exacerbation of Chronic Disease State COPD    Skilled nursing interventions to include:  Comment: debility    Homebound status evidenced by:  Need the aid of supportive devices such as crutches, canes, wheelchairs or walkers. Leaving home requires a considerable and taxing effort. There is a normal inability to leave the home.    Community Physician to provide follow up care: Edwin Valentin M.D.     Optional Interventions? No      I certify the face to face encounter for this home health care referral meets the CMS requirements and the encounter/clinical assessment with the patient was, in whole, or in part, for the medical condition(s) listed above, which is the primary reason for home health care. Based on my clinical findings: the service(s) are medically necessary, support the need for home health care, and the homebound criteria are met.  I certify that this patient has had a face to face encounter by myself.  Marylin Valdovinos M.D. - NPI: [id number]

## 2021-10-01 NOTE — ASSESSMENT & PLAN NOTE
Admitted to Tele 7 for cardiac monitoring  Given the Afib event in the ED, query paroxysmal Afib vs hypoxemia contributing to syncope  Troponins negative  Currently denies  --- ECHO ordered, EF 65% with RVSP 45  --- Orthostatic vitals done, but no HR recorded  --- Asked nursing staff to record both

## 2021-10-01 NOTE — ED NOTES
Pharmacy Medication Reconciliation    ~Med rec updated and complete per pt/pt family at bedside & pt home pharmacy  ~Allergies have been verified and updated  ~Pt home [facility]      ~Patient reports that he finished a 10 day course of Doxycycline that was started on 08/25/2021

## 2021-10-01 NOTE — RESPIRATORY CARE
COPD EDUCATION by COPD CLINICAL EDUCATOR  10/1/2021  at  1:00 PM by Edwige Pierre, RRT     Patient interviewed by COPD education team.  Patient unable to participate in full program.  A short intervention has been conducted.  A comprehensive packet including information about COPD, types of treatments to manage their disease and safe home Oxygen usage was provided and reviewed with patient at the bedside. Patient denies using any rescue inhalers at home, only Trelegy. Spacer instruction provided for albuterol inhaler.    COPD Screen  COPD Risk Screening  Do you have a history of COPD?: Yes    COPD Assessment  COPD Clinical Specialists ONLY  COPD Education Initiated: Yes--Short Intervention (requested pulmonary. patient was in program in 2017.)  DME Company: unknown  DME Equipment Type: 3-3.5L  Physician Name: Chucky Ramirez [facility]  Pulmonologist Name: patient is not interested in follow up. patient does not qualify for [facility] due to insurance  Referrals Initiated: Yes  Pulmonary Rehab: Yes  Smoking Cessation: N/A (quit smoking in 2017)  Palliative Care:  (no previous encounters, not ordered)  Home Health Care:  (requested new referral, established with [facility])  [facility] Outreach: N/A  [facility]: N/A  [facility]: Yes  Private In-Home Care Agency: N/A  Is this a COPD exacerbation patient?: Yes  $ Demo/Eval of SVN's, MDI's and Aerosols: Yes (albuterol MDI PRN added to MAR for discharge)  (OP) Pulmonary Function Testing:  (not on file)    Meds to Beds  Would the patient like to opt in for Bedside Medication Delivery at Discharge?: Yes, interested     MY COPD ACTION PLAN     It is recommended that patients and physicians /healthcare providers complete this action plan together. This plan should be discussed at each physician visit and updated as needed.    The green, yellow and red zones show groups of symptoms of COPD. This list of symptoms is not  "comprehensive, and you may experience other symptoms. In the \"Actions\" column, your healthcare provider has recommended actions for you to take based on your symptoms.    Patient Name: Gary Severino Gil   YOB: 1945   Last Updated on: 10/1/2021 12:56 PM   Green Zone:  I am doing well today Actions   •  Usual activitiy and exercise level •  Take daily medications   •  Usual amounts of cough and phlegm/mucus •  Use oxygen as prescribed   •  Sleep well at night •  Continue regular exercise/diet plan   •  Appetite is good •  At all times avoid cigarette smoke, inhaled irritants     Daily Medications (these medications are taken every day):   Fluticasone and Umeclidinium and Vilanterol (Trelogy) 1 Puff Once daily     Additional Information:  Rinse mouth after use    Yellow Zone:  I am having a bad day or a COPD flare Actions   •  More breathless than usual •  Continue daily medications   •  I have less energy for my daily activities •  Use quick relief inhaler as ordered   •  Increased or thicker phlegm/mucus •  Use oxygen as prescribed   •  Using quick relief inhaler/nebulizer more often •  Get plenty of rest   •  Swelling of ankles more than usual •  Use pursed lip breathing   •  More coughing than usual •  At all times avoid cigarette smoke, inhaled irritants   •  I feel like I have a \"chest cold\"   •  Poor sleep and my symptoms woke me up   •  My appetite is not good   •  My medicine is not helping    •  Call provider immediately if symptoms don’t improve     Continue daily medications, add rescue medications:               Medications to be used during a flare up, (as Discussed with Provider):              Red Zone:  I need urgent medical care Actions   •  Severe shortness of breath even at rest •  Call 911 or seek medical care immediately   •  Not able to do any activity because of breathing    •  Fever or shaking chills    •  Feeling confused or very drowsy     •  Chest pains    •  Coughing up blood  "

## 2021-10-01 NOTE — ASSESSMENT & PLAN NOTE
Two falls at home prior to arrival, patient was unable to get himself off the floor  Fall precautions while in hospital  --- PT assessment currently recommending post-acute placement, however feels patient may decline  --- Patient will need to demonstrate improved mobility prior to d/c home as he lives alone  --- Discussed placement with patient today, continues to decline transfer to a SNF  --- Continue to ambulate   --- Up to chair for meals

## 2021-10-01 NOTE — THERAPY
Missed Therapy     Patient Name: Gary Severino Gil  Age:  76 y.o., Sex:  male  Medical Record #: [id number]  Today's Date: 10/1/2021    Pt seen in ED for acute exacerbation of COPD, GLF, afib, lymphoma, elevated troponin levels, while interviewing patient about PLOF and history, pt became tachycardic with sustaining rhythms which RN and MD alerted to, deferred further evaluation to 10/2 pending further cardiac workup.       10/01/21 1019   Prior Living Situation   Prior Services Home-Independent   Housing / Facility [address]   Bathroom Set up Bathtub / Shower Combination   Equipment Owned Single Point Cane   Lives with - Patient's Self Care Capacity Alone and Able to Care For Self       Kan Juliocesar OTD, OTR/L

## 2021-10-01 NOTE — ASSESSMENT & PLAN NOTE
"New onset, denies history of Afib  One episode caught by EKG in ED  No Afib on telemetry since admission  TSH 0.740  Echo pending  Chads Vasc 2  --- Metoprolol tartrate 12.5 mg PO BID  --- Lovenox full dose initiated in ED  --- Per ACC guidelines, \"For patients with AF or atrial flutter of 48 hours’ duration or longer, or when the duration of AF is unknown, anticoagulation with warfarin (INR 2.0 to 3.0), a factor Xa inhibitor, or direct thrombin inhibitor is recommended for at least 3 weeks before and at least 4 weeks after cardioversion.\"  --- Shared decision making conversation again today with patient, outlined risks vs benefits of Afib contributing to stroke, including and up to death, and patient declines anticoagulation  "

## 2021-10-01 NOTE — ASSESSMENT & PLAN NOTE
-Will be on fluid, monitor respiratory status and if showing signs of overload, stop fluids and give lasix

## 2021-10-01 NOTE — PROGRESS NOTES
Nayeli, family member, called to check in on patient. Gave updates as I could, family member wanted to be updated by the physician. Dr. Valdovinos notified and phone number relayed. Will continue to monitor.

## 2021-10-02 PROBLEM — J96.01 ACUTE HYPOXEMIC RESPIRATORY FAILURE (HCC): Status: ACTIVE | Noted: 2021-01-01

## 2021-10-02 NOTE — PROGRESS NOTES
Assumed care of PT A&O 4. Pt resting in bed with no signs of labored breathing. On 3L NC. Tele monitor in place, cardiac rhythm being monitored. Call light within reach, bed in lowest position, upper bed rails up. Pt was updated on plan of care for the day . Will continue to monitor.     COVID 19 surge in effect

## 2021-10-02 NOTE — HOSPITAL COURSE
"\"Gary Severino Gil is a 76 y.o. male who presented 9/30/2021 with fall. Patient is drowsy and offers minimal history. He states that last night, he was getting up, felt dizzy, and fell down. He could not get up the entire night and was on the floor. This AM, he was able to call for help and got up and went to the couch where he slept, then woke up with dizziness and fell again. Patient has history of COPD is usually on 3L however reports that he was not on his oxygen when he woke up. He does live alone. Daughter is at bedside who states that patient has been weak and not eating or drinking well. He has lymphoma diagnosis and is scheduled for chemotherapy initiation next week. Patient in ED currently on 6L. He was found at 78% on RA. Patient does exhibit significant wheezing. Will admit patient for syncope and COPD exacerbation.\"    While holding in the ED awaiting a bed, he was found to have Afib. Patient denies history of, and was started on full dose anticoagulation. Chads-Vasc score of 2.   "

## 2021-10-02 NOTE — PROGRESS NOTES
Received bedside report from RN, pt care assumed, VSS. Patient asleep in bed, calm/unlabored breathing. No signs of acute distress noted at this time. Bed locked/in lowest position, call light within reach, hourly rounding initiated.

## 2021-10-02 NOTE — PROGRESS NOTES
Received bedside report from MARIA DEL CARMEN wilcox, pt care assumed. VS stable, pt assessment complete. Pt AAOx4, chronic c/o pain at this time. POC discussed with pt and verbalizes no questions. Pt denies any additional needs at this time. Bed locked and in lowest position, bed alarm on. Pt educated on fall risk and verbalized understanding, call light within reach, hourly rounding initiated. In a sinus rhythm.

## 2021-10-02 NOTE — PROGRESS NOTES
12 hour Chart Check completed.   COVID SURGE CRISIS CHARTING IN EFFECT    Monitor Summary-  In a sinus rhythm 18/06/34 with rare PAC's.

## 2021-10-02 NOTE — THERAPY
Physical Therapy   Initial Evaluation     Patient Name: Gary Severino Gil  Age:  76 y.o., Sex:  male  Medical Record #: [id number]  Today's Date: 10/2/2021     Precautions  Precautions: (P) Fall Risk  Comments: (P)  (increased supplemental 02 needs)    Assessment  Pt presents to PT with impaired balance, endurance, functional strength and general locomotion associated with deconditioning and medical co-morbidities. Once standing pt was able to demonstrate room distance ambulation with FWW with SBA and was primarily limited 2' to increasing SOB and 02 demands though recovers fair with seated rest break. However he did require mod A with re: bed mobility and was unable to come to EOB without physical assist (of concern as pt lives alone and would not have assist at home) though anticipate with practice and increased OOB/tranfser activity he will progress well with re: functional recovery. Will continue to visit with details/recs below.     Plan    Recommend Physical Therapy 4 times per week until therapy goals are met    DC Equipment Recommendations:  Unable to determine at this time, Front-Wheel Walker  Discharge Recommendations:  Currently would recommend post-acute placement for additional physical therapy services prior to discharge home given aforementioned concerns above (however note pt reports will likely decline placement though would need to demonstrate improvements with mobility prior to dc to home as currently appears unable to manage alone)        10/02/21 1117   Prior Living Situation   Prior Services Home-Independent   Housing / Facility 1 Story House   Steps Into Home 0   Bathroom Set up Bathtub / Shower Combination   Equipment Owned Single Point Cane   Lives with - Patient's Self Care Capacity Alone and Able to Care For Self   Comments reports dtr lives close and assists with essentially transport; unclear if able to assist with IADls or intermittent ADLs if needed   Prior Level of Functional Mobility    Bed Mobility Independent   Transfer Status Independent   Ambulation Independent   Distance Ambulation (Feet)   (mostly househol; limited community with assist for transport)   Assistive Devices Used Single Point Cane  (reports use of SPC in community; no AD in home)   Stairs Unable To Determine At This Time  (reports can perform if needed)   Comments I with ADLs and IADLs; has assist from dtr for transport   Cognition    Cognition / Consciousness WDL   Level of Consciousness Alert   Comments very pleasant and cooperative   Passive ROM Lower Body   Passive ROM Lower Body WDL   Active ROM Lower Body    Active ROM Lower Body  WDL   Strength Lower Body   Lower Body Strength  WDL   Sensation Lower Body   Lower Extremity Sensation   X   Comments diminished sensation though this appears to be baseline; intact pressure and has touch    Neurological Concerns   Neurological Concerns No   Balance Assessment   Sitting Balance (Static) Fair +   Sitting Balance (Dynamic) Fair +   Standing Balance (Static) Fair   Standing Balance (Dynamic) Fair -   Weight Shift Sitting Good   Weight Shift Standing Fair   Comments no velasquez LOB during ambulation with FWW; does appear somewhat reliant on FWW which is not baseline and limited more 2' to SOB/02 concerns   Gait Analysis   Gait Level Of Assist   (close SBA )   Assistive Device Front Wheel Walker   Distance (Feet) 30   # of Times Distance was Traveled 2   Deviation Other (Comment)  (reciprocal gait; dec alba/clearance; see activity toleanc)   # of Stairs Climbed 0   Weight Bearing Status no restrictions   Comments see balance; see activity tolerance   Bed Mobility    Supine to Sit Moderate Assist   Sit to Supine   (left sitting EOB)   Scooting Minimal Assist   Comments HOB elevated + HHA; increased effort and pt unable to come to EOB without physical assist   Functional Mobility   Sit to Stand Modified Independent  (see below)   Bed, Chair, Wheelchair Transfer   (SBA)   Transfer  Method Stand Step   Mobility with FWW   Comments increased effort to come to stand and notably uses anterior lean/momentum to come to stand   How much difficulty does the patient currently have...   Turning over in bed (including adjusting bedclothes, sheets and blankets)? 2   Sitting down on and standing up from a chair with arms (e.g., wheelchair, bedside commode, etc.) 2   Moving from lying on back to sitting on the side of the bed? 1   How much help from another person does the patient currently need...   Moving to and from a bed to a chair (including a wheelchair)? 4   Need to walk in a hospital room? 3   Climbing 3-5 steps with a railing? 3   6 clicks Mobility Score 15   Activity Tolerance   Sitting in Chair at least 10 mins   Sitting Edge of Bed at least 5 mins   Standing ~5 mins   Comments increased SOB and Sp02 dec <90% on 3L; however recovers 02 in <1 min with seated rest break; symptoms/SOB slower to recover though does improve with seated rest break   Edema / Skin Assessment   Edema / Skin  Not Assessed   Patient / Family Goals    Patient / Family Goal #1 to go home   Short Term Goals    Short Term Goal # 1 pt will be able to perform supine<>sit with HOB flat/no rails with Spv in 6tx to promote fnx progression towards I    Short Term Goal # 2 pt will be able to perform sit<>stand to FWW with Spv in 6tx to promote fnx progression towards I   Short Term Goal # 3 pt will be able to ambulate 150ft with FWW with Spv in 6tx to promote fnx progression towards I    Education Group   Education Provided Role of Physical Therapist;Use of Assistive Device   Role of Physical Therapist Patient Response Patient;Acceptance;Explanation;Verbal Demonstration;Action Demonstration   Use of Assistive Device Patient Response Patient;Acceptance;Explanation;Verbal Demonstration;Action Demonstration;Reinforcement Needed   Additional Comments education re: dc planning considerations, PT POC, activity recs to promote dc to home  plan and increasing OOB activity [facility] floor staff to promote plan

## 2021-10-02 NOTE — PROGRESS NOTES
"Hospital Medicine Daily Progress Note    Date of Service  10/2/2021    Chief Complaint  Gary Severino Gil is a 76 y.o. male admitted 9/30/2021 with syncope, dyspnea, and falling.    Hospital Course  \"Gary Severino Gil is a 76 y.o. male who presented 9/30/2021 with fall. Patient is drowsy and offers minimal history. He states that last night, he was getting up, felt dizzy, and fell down. He could not get up the entire night and was on the floor. This AM, he was able to call for help and got up and went to the couch where he slept, then woke up with dizziness and fell again. Patient has history of COPD is usually on 3L however reports that he was not on his oxygen when he woke up. He does live alone. Daughter is at bedside who states that patient has been weak and not eating or drinking well. He has lymphoma diagnosis and is scheduled for chemotherapy initiation next week. Patient in ED currently on 6L. He was found at 78% on RA. Patient does exhibit significant wheezing. Will admit patient for syncope and COPD exacerbation.\"    Interval Problem Update  10/2/2021: Patient sitting in bed eating breakfast.  - Lives alone, fell twice and was unable to get help.  - Still feels short of breath and feels uncomfortable in the bed. Denies dizziness, syncope (ambulating to bathroom), CP, or n/v/d.  - Denies pain from when he fell.  - Intermittently requires 1-2 pillows to sleep at home.  - Wheezing and rhonchi on exam, takes Trelegy at home, ordered formulary version.  - Shared decision making discussion with patient on risk vs benefit of anticoagulation with Afib. Patient declined anticoagulation at this time. However, appeared not very interested in engaging in discussion. Will reattempt to discuss tomorrow.     PT recommending post-acute placement.    I have personally seen and examined the patient at bedside. I discussed the plan of care with patient, bedside RN and Dr. Higuera.    Consultants/Specialty  none    Code " Status  Full Code    Disposition  Patient is not medically cleared.   Anticipate discharge to to an inpatient rehabilitation hospital.  I have placed the appropriate orders for post-discharge needs.    Review of Systems  Review of Systems   Constitutional: Negative for chills, fever and malaise/fatigue.   Respiratory: Positive for shortness of breath and wheezing. Negative for cough.    Cardiovascular: Negative for chest pain and palpitations.   Gastrointestinal: Negative for abdominal pain, nausea and vomiting.   Genitourinary: Negative for dysuria.   Musculoskeletal: Positive for falls.   Skin: Negative for itching.   Neurological: Positive for weakness. Negative for dizziness, loss of consciousness and headaches.   All other systems reviewed and are negative.       Physical Exam  Temp:  [35.9 °C (96.7 °F)-36.2 °C (97.2 °F)] 35.9 °C (96.7 °F)  Pulse:  [73-95] 81  Resp:  [17-19] 19  BP: (110-148)/(58-90) 110/58  SpO2:  [91 %-97 %] 91 %    Physical Exam  Vitals and nursing note reviewed.   Constitutional:       Appearance: He is obese.   HENT:      Head: Normocephalic.      Mouth/Throat:      Mouth: Mucous membranes are moist.   Eyes:      Pupils: Pupils are equal, round, and reactive to light.   Cardiovascular:      Rate and Rhythm: Normal rate and regular rhythm.      Heart sounds: Normal heart sounds.   Pulmonary:      Effort: Pulmonary effort is normal.      Comments: Rhonchi and wheezing throughout all lung fields  Musculoskeletal:      Right lower le+ Pitting Edema present.      Left lower le+ Pitting Edema present.   Skin:     General: Skin is warm and dry.      Comments: Scattered cutaneous tumor lesions body and face   Neurological:      General: No focal deficit present.      Mental Status: He is alert and oriented to person, place, and time.   Psychiatric:         Mood and Affect: Mood normal.     Fluids    Intake/Output Summary (Last 24 hours) at 10/2/2021 0919  Last data filed at 10/2/2021  0600  Gross per 24 hour   Intake --   Output 450 ml   Net -450 ml       Laboratory  Recent Labs     09/30/21  1611 10/01/21  0335 10/02/21  0500   WBC 25.5* 26.8* 28.8*   RBC 5.63 5.36 5.27   HEMOGLOBIN 15.9 14.8 14.5   HEMATOCRIT 49.9 47.0 46.8   MCV 88.6 87.7 88.8   MCH 28.2 27.6 27.5   MCHC 31.9* 31.5* 31.0*   RDW 50.8* 49.4 51.1*   PLATELETCT 295 292 285   MPV 9.6 9.7 9.8     Recent Labs     09/30/21  1611 10/01/21  0335 10/02/21  0500   SODIUM 137 141 141   POTASSIUM 3.9 4.3 4.3   CHLORIDE 97 103 100   CO2 31 29 32   GLUCOSE 114* 83 145*   BUN 8 7* 10   CREATININE 0.74 0.44* 0.50   CALCIUM 8.6 7.6* 8.5                   Imaging  CT-CTA CHEST PULMONARY ARTERY W/ RECONS   Final Result      1.  There is no CT evidence of acute pulmonary embolism.   2.  There is underlying emphysema with interstitial lung disease in the left upper lobe.   3.  Bibasilar airspace disease is likely atelectasis. There is no acute pneumonia.   4.  There is a trace of dependent right pleural effusion.   5.  There is lymphadenopathy involving the left supraclavicular region, mediastinum, bilateral hilar regions and bilateral axillary regions. There are also subcutaneous nodules or right breast nodules. There are a few small new normal size lymph nodes in    the upper abdomen. These findings could represent a metastatic process versus lymphoma.            DX-CHEST-PORTABLE (1 VIEW)   Final Result      1.  Right basilar opacity may represent atelectasis or consolidation.   2.  Mild interstitial prominence may represent mild edema.   3.  Cardiomegaly.   4.  Atherosclerotic plaque.      EC-ECHOCARDIOGRAM COMPLETE W/O CONT    (Results Pending)        Assessment/Plan  * Acute hypoxemic respiratory failure (HCC)  Assessment & Plan  78% SpO2 upon arrival to ED  Required 6L to maintain SpO2 >90%  Wheezing on exam  --- Started on duoneb, prednisone, oxygen in ED  --- Improving, down to baseline 4L NC  --- Monitor    Fall- (present on  "admission)  Assessment & Plan  Two falls at home prior to arrival, patient was unable to get himself off the floor  Fall precautions while in hospital  --- PT assessment currently recommending post-acute placement, however feels patient may decline  --- Patient will need to demonstrate improved mobility prior to d/c home as he lives alone  --- Continue to ambulate   --- Up to chair for meals    A-fib (HCC)- (present on admission)  Assessment & Plan  New onset, denies history of Afib  One episode caught by EKG in ED  No Afib on telemetry since admission  TSH 0.740  Echo pending  Chads Vasc 2  --- Metoprolol tartrate 12.5 mg PO BID  --- Lovenox full dose initiated in ED  --- Per ACC guidelines, \"For patients with AF or atrial flutter of 48 hours’ duration or longer, or when the duration of AF is unknown, anticoagulation with warfarin (INR 2.0 to 3.0), a factor Xa inhibitor, or direct thrombin inhibitor is recommended for at least 3 weeks before and at least 4 weeks after cardioversion.\"  --- Attempted conversation with patient regarding anticoagulation initiation, declined at this time  --- Will readdress with patient tomorrow when he is more awake and interactive    Leukocytosis- (present on admission)  Assessment & Plan  WBC on admission 25.5, today 28.8  Likely secondary to lymphoma, however underlying infectious process could not be ruled out  Blood cultures pending  Procalcitonin 0.44  --- Continue unasyn and azithromycin as above  --- Afebrile, VSS  --- Monitor for signs of infection    Lymphoma (HCC)- (present on admission)  Assessment & Plan  History of per chart review  Oncologist is Dr. Garcia  Scheduled to begin chemo next week   Scattered cutaneous tumor lesions on body and face  --- Wound care consulted by ED, appreciate recomendations  --- Monitor    Troponin level elevated- (present on admission)  Assessment & Plan  Troponins trended downward  Likely due to demand   --- Monitor for signs of " ischemia    Rhabdomyolysis- (present on admission)  Assessment & Plan  CPK on admission 2677  After fluid administration 461  --- LR discontinued  --- AM labs    Syncope- (present on admission)  Assessment & Plan  Admitted to [facility] for cardiac monitoring  Given the Afib event in the ED, query paroxysmal Afib vs hypoxemia contributing to syncope  Troponins negative  Currently denies, minimally out of bed  --- ECHO ordered, follow results  --- Orthostatic vitals not done, reordered for qshift    Acute exacerbation of chronic obstructive pulmonary disease (COPD) (HCC)- (present on admission)  Assessment & Plan  Baseline is at 3L  Will place on aerosol mask, patient's preference  Procalcitonin 0.44  CXR demonstrates right basilar opacity and mild interstitial prominence, which may represent atelectasis or consolidation  CTA negative for PE, shows emphysema, atelectasis, trace right pleural effusion and lymphadenopathy  --- Continue solu-medrol per COPD order set  --- Started formulary substitution for patient's home Trelegy  Continue empirical unasyn and azithromycin in the setting of leukocytosis and consolidation on CXR   --- Respiratory and COPD Clinical Coordinator on board  --- Nebulizers available PRN       VTE prophylaxis: enoxaparin ppx    I have performed a physical exam and reviewed and updated ROS and Plan today (10/2/2021). In review of yesterday's note (10/1/2021), there are no changes except as documented above.

## 2021-10-03 NOTE — PROGRESS NOTES
Monitoring Summary:  Rhythm: SR 74-93 bpm  Ectopy: coup(o)/PVC(o)/high of 160s bpm  Measurements: 0.19/0.08/0.34

## 2021-10-03 NOTE — ASSESSMENT & PLAN NOTE
78% SpO2 upon arrival to ED  Required 6L to maintain SpO2 >90%  Wheezing on exam  --- Started on duoneb, prednisone, oxygen in ED  --- Improving, on 3L NC with SpO2 90-95%  --- Monitor

## 2021-10-03 NOTE — WOUND TEAM
[facility]  Inpatient Services  Initial Wound and Skin Care Evaluation    Admission Date: 9/30/2021     Last order of IP CONSULT TO WOUND CARE was found on 10/1/2021 from Hospital Encounter on 9/30/2021     HPI, PMH, SH: Reviewed    Past Surgical History:   Procedure Laterality Date   • FLAP GRAFT  6/1/2010    Performed by FRANCESCA VIDAL at SURGERY [facility] ORS   • FISTULA IN ANO REPAIR  6/1/2010    Performed by FRANCESCA VIDAL at SURGERY [facility]   • RECTAL EXPLORATION  11/17/2009    Performed by FRANCESCA VIDAL at SURGERY SAME DAY [facility] ORS   • DEBRIDEMENT  11/17/2009    Performed by FRANCESCA VIDAL at SURGERY SAME DAY [facility] ORS   • RECTAL EXPLORATION  9/3/2009    Performed by FRANCESCA VIDAL at SURGERY SAME DAY [facility] ORS   • DEBRIDEMENT  9/3/2009    Performed by FRANCESCA VIDAL at SURGERY SAME DAY [facility] ORS   • LUIS ENRIQUE RECTAL ABSCESS INCISION AND DRAINAGE  4/6/2009    Performed by FRANCESCA VIDAL at SURGERY [facility] ORS   • LUMBAR FUSION POSTERIOR  1987   • LUMBAR FUSION ANTERIOR  1983     Social History     Tobacco Use   • Smoking status: Former Smoker     Packs/day: 1.50     Years: 45.00     Pack years: 67.50     Types: Cigarettes     Quit date: 10/27/2017     Years since quitting: 3.9   • Smokeless tobacco: Never Used   Substance Use Topics   • Alcohol use: Yes     Alcohol/week: 8.0 oz     Types: 16 Standard drinks or equivalent per week     Comment: noted 4 per day     Chief Complaint   Patient presents with   • T-5000 FALL     Pt got up when he suddenly felt dizzy. Pt fell and is now complaining of bilateral shoulder pain. Pt denies hitting his head. -thinners. Pt has a hx of COPD. Pt wears 3L NC at baseline. Pt was 78% on RA when EMS arrived. Pt was not wearing his O2.      Diagnosis: Syncope and collapse [R55]    Unit where seen by Wound Team: [id number]/02     WOUND CONSULT/FOLLOW UP RELATED TO:  Generalized sores     WOUND HISTORY:  Patient states  "that he has had these wounds for multiple years, and only a few of them hurt.      \"Gary Severino Gil is a 76 y.o. male who presented 9/30/2021 with fall. Patient is drowsy and offers minimal history. He states that last night, he was getting up, felt dizzy, and fell down. He could not get up the entire night and was on the floor. This AM, he was able to call for help and got up and went to the couch where he slept, then woke up with dizziness and fell again. Patient has history of COPD is usually on 3L however reports that he was not on his oxygen when he woke up. He does live alone. Daughter is at bedside who states that patient has been weak and not eating or drinking well. He has lymphoma diagnosis and is scheduled for chemotherapy initiation next week. Patient in ED currently on 6L. He was found at 78% on RA. Patient does exhibit significant wheezing. Will admit patient for syncope and COPD exacerbation.\"  WOUND ASSESSMENT/LDA  Wound 10/03/21 Generalized skin lesions (Active)   Wound Image      10/03/21 0900   Site Assessment Pink;Red;Yellow 10/03/21 0900   Periwound Assessment Pink 10/03/21 0900   Margins Defined edges 10/03/21 0900   Closure Open to air 10/03/21 0900   Drainage Amount None 10/03/21 0900   Treatments Cleansed;Site care 10/03/21 0900   Wound Cleansing Approved Wound Cleanser 10/03/21 0900   Periwound Protectant Not Applicable 10/03/21 0900   Dressing Cleansing/Solutions Not Applicable 10/03/21 0900   Dressing Changed New 10/03/21 0900   Dressing Status Open to Air 10/03/21 0900   NEXT Weekly Photo (Inpatient Only) 10/10/21 10/03/21 0900   Shape circular 10/03/21 0900   Wound Odor None 10/03/21 0900   Pulses N/A 10/03/21 0900   Exposed Structures None 10/03/21 0900   WOUND NURSE ONLY - Time Spent with Patient (mins) 45 10/03/21 0900   Number of days: 0        Vascular:    CINTHIA:   No results found.    Lab Values:    Lab Results   Component Value Date/Time    WBC 26.4 (H) 10/03/2021 05:08 AM    RBC " 4.99 10/03/2021 05:08 AM    HEMOGLOBIN 13.9 (L) 10/03/2021 05:08 AM    HEMATOCRIT 43.8 10/03/2021 05:08 AM        Culture Results show:  No results found for this or any previous visit (from the past 720 hour(s)).    Pain Level/Medicated:  Patient denied pain       INTERVENTIONS BY WOUND TEAM:  Chart and images reviewed. Discussed with bedside RN. All areas of concern (based on picture review, LDA review and discussion with bedside RN) have been thoroughly assessed. Documentation of areas based on significant findings. This RN in to assess patient. Performed standard wound care which includes appropriate positioning, dressing removal and non-selective debridement. Pictures and measurements obtained weekly if/when required.  Preparation for Dressing removal: Dressing soaked with n/a  Non-selectively Debrided with:  normal saline and gauze.  Sharp debridement: n/a  Tanvi wound: Cleansed with normal saline, Prepped with n/a  Primary Dressing: steroid topical  Secondary (Outer) Dressing: JOSIE    Interdisciplinary consultation: Patient, Bedside RN (),     EVALUATION / RATIONALE FOR TREATMENT:  Most Recent Date:  10/3/21: unknown etiology wounds that cover majority of his body.      Goals: Steady decrease in wound area and depth weekly.    WOUND TEAM PLAN OF CARE ([X] for frequency of wound follow up,): X  Nursing to follow orders written for wound care. Contact wound team if area fails to progress, deteriorates or with any questions/concerns  Dressing changes by wound team:                   Follow up 3 times weekly:                NPWT change 3 times weekly:     Follow up 1-2 times weekly:      Follow up Bi-Monthly:                   Follow up as needed:   X  Other (explain):     NURSING PLAN OF CARE ORDERS (X):  Dressing changes: See Dressing Care orders: X  Skin care: See Skin Care orders: X  RN Prevention Protocol: X  Rectal tube care: See Rectal Tube Care orders:   Other orders:    RSKIN:   CURRENTLY IN PLACE (X),  APPLIED THIS VISIT (A), ORDERED (O):   Q shift Jreemias:  X  Q shift pressure point assessments:  X    Surface/Positioning X  Pressure redistribution mattress          X  Low Airloss          Bariatric foam      Bariatric GRISEL     Waffle cushion        Waffle Overlay          Reposition q 2 hours      TAPs Turning system     Z Samuel Pillow     Offloading/Redistribution RACHEL  Sacral Mepilex (Silicone dressing)     Heel Mepilex (Silicone dressing)         Heel float boots (Prevalon boot)             Float Heels off Bed with Pillows           Respiratory RACHEL  Silicone O2 tubing         Gray Foam Ear protectors     Cannula fixation Device (Tender )          High flow offloading Clip    Elastic head band offloading device      Anchorfast                                                         Trach with Optifoam split foam             Containment/Moisture Prevention RACHEL    Rectal tube or BMS    Purwick/Condom Cath        Moore Catheter    Barrier wipes           Barrier paste       Antifungal tx      Interdry        Mobilization x1 assist      Up to chair        Ambulate      PT/OT      Nutrition PO      Dietician        Diabetes Education      PO     TF     TPN     NPO   # days     Other        Anticipated discharge plans: RACHEL  LTACH:        SNF/Rehab:                  Home Health Care:           Outpatient Wound Center:            Self/Family Care:        Other:                  Vac Discharge Needs:   Not Applicable Pt not on a wound vac:     X  Regular Vac while inpatient, alternative dressing at DC:        Regular Vac in use and continued at DC:            Reg. Vac w/ Skin Sub/Biologic in use. Will need to be changed 2x wkly:      Veraflo Vac while inpatient, ok to transition to Regular Vac on Discharge:           Veraflo Vac while inpatient, will need to remain on Veraflo Vac upon discharge:

## 2021-10-03 NOTE — PROGRESS NOTES
Bedside report received and patient care assumed. Pt is resting in bed, A&Ox4, with no complaints of pain, and is on 3L humidified NC. Tele box on. Fall precautions are in place, belongings at bedside table.  Pt was updated on POC, no questions or concerns. Pt educated on use of call light for assistance. Will continue to monitor.     Crisis Charting: COVID-19 surge in effect

## 2021-10-03 NOTE — PROGRESS NOTES
"[facility] Medicine Daily Progress Note    Date of Service  10/3/2021    Chief Complaint  Gary Severino Gil is a 76 y.o. male admitted 9/30/2021 with syncope, dyspnea, and falling.    Hospital Course  \"Gary Severino Gil is a 76 y.o. male who presented 9/30/2021 with fall. Patient is drowsy and offers minimal history. He states that last night, he was getting up, felt dizzy, and fell down. He could not get up the entire night and was on the floor. This AM, he was able to call for help and got up and went to the couch where he slept, then woke up with dizziness and fell again. Patient has history of COPD is usually on 3L however reports that he was not on his oxygen when he woke up. He does live alone. Daughter is at bedside who states that patient has been weak and not eating or drinking well. He has lymphoma diagnosis and is scheduled for chemotherapy initiation next week. Patient in ED currently on 6L. He was found at 78% on RA. Patient does exhibit significant wheezing. Will admit patient for syncope and COPD exacerbation.\"    While holding in the ED awaiting a bed, he was found to have Afib. Patient denies history of, and was started on full dose anticoagulation. Chads-Vasc score of 2.     Interval Problem Update  10/3/2021:  - Patient is sitting in the chair and is difficult to arouse and nursing reports the same. However, I returned in about 15 minutes and patient was wide awake and interactive.   - Adamant that he will not go to a facility. I observed him standing to go to the restroom and it required 2 people to assist him with a FWW. I don't believe he is going to be safe to go home alone. Will continue discussions.   - VSS, afebrile, on baseline 3L NC with SpO2 90-96%. Spoke to nursing that his sats should not be over 96%, and we should target 88-92%.  - Attempted discussion regarding anticoagulation, patient is adamant that he does not want to be anticoagulated. States \"if that happens, it's my time and I'm " "ok with that.\"  - Daughter informed Dr. Britoika that he is in the hospital.    PT recommending post-acute placement.    I have personally seen and examined the patient at bedside. I discussed the plan of care with patient, bedside RN and Dr. Higuera.    Consultants/Specialty  none    Code Status  Full Code    Disposition  Patient is not medically cleared.   Anticipate discharge to to an inpatient rehabilitation hospital.  I have placed the appropriate orders for post-discharge needs.    Review of Systems  Review of Systems   Constitutional: Negative for chills, fever and malaise/fatigue.   Respiratory: Positive for shortness of breath and wheezing. Negative for cough.    Cardiovascular: Positive for leg swelling. Negative for chest pain and palpitations.   Gastrointestinal: Negative for abdominal pain, nausea and vomiting.   Genitourinary: Negative for dysuria.   Musculoskeletal: Positive for falls.   Skin: Negative for itching.   Neurological: Positive for weakness. Negative for dizziness, loss of consciousness and headaches.   All other systems reviewed and are negative.       Physical Exam  Temp:  [35.9 °C (96.6 °F)-36.4 °C (97.5 °F)] 36.2 °C (97.1 °F)  Pulse:  [76-85] 81  Resp:  [16-18] 18  BP: (110-134)/(64-86) 118/85  SpO2:  [90 %-96 %] 96 %    Physical Exam  Vitals and nursing note reviewed.   Constitutional:       Appearance: He is obese.   HENT:      Head: Normocephalic.      Mouth/Throat:      Mouth: Mucous membranes are moist.   Eyes:      Pupils: Pupils are equal, round, and reactive to light.   Cardiovascular:      Rate and Rhythm: Normal rate and regular rhythm.      Heart sounds: Normal heart sounds.   Pulmonary:      Effort: Pulmonary effort is normal.      Comments: Rhonchi and wheezing throughout all lung fields  Musculoskeletal:      Right lower le+ Pitting Edema present.      Left lower le+ Pitting Edema present.   Skin:     General: Skin is warm and dry.      Comments: Scattered cutaneous " tumor lesions body and face   Neurological:      General: No focal deficit present.      Mental Status: He is oriented to person, place, and time. He is lethargic.   Psychiatric:         Mood and Affect: Mood normal.     Fluids    Intake/Output Summary (Last 24 hours) at 10/3/2021 1315  Last data filed at 10/3/2021 0900  Gross per 24 hour   Intake 240 ml   Output 1 ml   Net 239 ml       Laboratory  Recent Labs     10/01/21  0335 10/02/21  0500 10/03/21  0508   WBC 26.8* 28.8* 26.4*   RBC 5.36 5.27 4.99   HEMOGLOBIN 14.8 14.5 13.9*   HEMATOCRIT 47.0 46.8 43.8   MCV 87.7 88.8 87.8   MCH 27.6 27.5 27.9   MCHC 31.5* 31.0* 31.7*   RDW 49.4 51.1* 50.0   PLATELETCT 292 285 258   MPV 9.7 9.8 10.4     Recent Labs     10/01/21  0335 10/02/21  0500 10/03/21  0508   SODIUM 141 141 142   POTASSIUM 4.3 4.3 4.0   CHLORIDE 103 100 102   CO2 29 32 33   GLUCOSE 83 145* 133*   BUN 7* 10 10   CREATININE 0.44* 0.50 0.34*   CALCIUM 7.6* 8.5 8.4*     Recent Labs     10/03/21  0508   INR 1.09               Imaging  CT-CTA CHEST PULMONARY ARTERY W/ RECONS   Final Result      1.  There is no CT evidence of acute pulmonary embolism.   2.  There is underlying emphysema with interstitial lung disease in the left upper lobe.   3.  Bibasilar airspace disease is likely atelectasis. There is no acute pneumonia.   4.  There is a trace of dependent right pleural effusion.   5.  There is lymphadenopathy involving the left supraclavicular region, mediastinum, bilateral hilar regions and bilateral axillary regions. There are also subcutaneous nodules or right breast nodules. There are a few small new normal size lymph nodes in    the upper abdomen. These findings could represent a metastatic process versus lymphoma.            DX-CHEST-PORTABLE (1 VIEW)   Final Result      1.  Right basilar opacity may represent atelectasis or consolidation.   2.  Mild interstitial prominence may represent mild edema.   3.  Cardiomegaly.   4.  Atherosclerotic plaque.     "  EC-ECHOCARDIOGRAM COMPLETE W/O CONT    (Results Pending)        Assessment/Plan  * Acute hypoxemic respiratory failure (HCC)  Assessment & Plan  78% SpO2 upon arrival to ED  Required 6L to maintain SpO2 >90%  Wheezing on exam  --- Started on duoneb, prednisone, oxygen in ED  --- Improving, on 3L NC with SpO2 90-95%  --- Monitor    Fall- (present on admission)  Assessment & Plan  Two falls at home prior to arrival, patient was unable to get himself off the floor  Fall precautions while in hospital  --- PT assessment currently recommending post-acute placement, however feels patient may decline  --- Patient will need to demonstrate improved mobility prior to d/c home as he lives alone  --- Discussed placement with patient today, adamantly declines a facility  --- Continue to ambulate   --- Up to chair for meals    A-fib (HCC)- (present on admission)  Assessment & Plan  New onset, denies history of Afib  One episode caught by EKG in ED  No Afib on telemetry since admission  TSH 0.740  Echo pending  Chads Vasc 2  --- Metoprolol tartrate 12.5 mg PO BID  --- Lovenox full dose initiated in ED  --- Per ACC guidelines, \"For patients with AF or atrial flutter of 48 hours’ duration or longer, or when the duration of AF is unknown, anticoagulation with warfarin (INR 2.0 to 3.0), a factor Xa inhibitor, or direct thrombin inhibitor is recommended for at least 3 weeks before and at least 4 weeks after cardioversion.\"  --- Shared decision making conversation again today with patient, outlined risks vs benefits of Afib contributing to stroke, including and up to death, and patient declines anticoagulation    Leukocytosis- (present on admission)  Assessment & Plan  WBC on admission 25.5, today 26.4  Likely secondary to lymphoma, however underlying infectious process could not be ruled out  Blood cultures pending  Procalcitonin 0.44  --- Continue unasyn and azithromycin as above  --- Afebrile, VSS  --- Monitor for signs of " infection    Lymphoma (HCC)- (present on admission)  Assessment & Plan  History of per chart review  Oncologist is Dr. Garcia  Scheduled to begin chemo next week   Scattered cutaneous tumor lesions on body and face  --- Wound care consulted by ED, appreciate recomendations  --- Monitor    Troponin level elevated- (present on admission)  Assessment & Plan  Troponins trended downward  Likely due to demand   --- Monitor for signs of ischemia    Rhabdomyolysis- (present on admission)  Assessment & Plan  CPK on admission 2677  After fluid administration 461  --- LR discontinued  --- AM labs    Syncope- (present on admission)  Assessment & Plan  Admitted to Tele 7 for cardiac monitoring  Given the Afib event in the ED, query paroxysmal Afib vs hypoxemia contributing to syncope  Troponins negative  Currently denies, minimally out of bed  --- ECHO ordered, follow results  --- Orthostatic vitals done, but no HR recorded  --- Asked nursing staff to record both    Acute exacerbation of chronic obstructive pulmonary disease (COPD) (HCC)- (present on admission)  Assessment & Plan  On baseline 2L O2 requirement  Procalcitonin 0.44  CXR demonstrates right basilar opacity and mild interstitial prominence, which may represent atelectasis or consolidation  CTA negative for PE, shows emphysema, atelectasis, trace right pleural effusion and lymphadenopathy  --- Continue solu-medrol per COPD order set  --- Started formulary substitution for patient's home Trelegy  Continue empirical unasyn and azithromycin in the setting of leukocytosis and consolidation on CXR   --- Respiratory and COPD Clinical Coordinator on board  --- Nebulizers available PRN       VTE prophylaxis: enoxaparin ppx    I have performed a physical exam and reviewed and updated ROS and Plan today (10/3/2021). In review of yesterday's note (10/2/2021), there are no changes except as documented above.

## 2021-10-04 PROBLEM — Z71.89 ADVANCE CARE PLANNING: Status: ACTIVE | Noted: 2021-01-01

## 2021-10-04 PROBLEM — I27.20 PULMONARY HYPERTENSION (HCC): Status: ACTIVE | Noted: 2021-01-01

## 2021-10-04 NOTE — CONSULTS
Pulmonary Consultation    Date of consult: 10/4/2021    Referring Physician  Pam Higuera M.D.    Reason for Consultation  COPD exacerbation     History of Presenting Illness  76 y.o. male who presented 9/30/2021 with medical H/o COPD ( no PFT's available in the chart), active tobacco use, secondary polycythemia and Lymphoma is admitted on 09/30 with fall due to syncope and Acute on chronic hypoxic respiratory failure. Patient reports that he is usually on 2-3L of oxygen at home and he uses Trelegy inhaler. He also reports that he smokes half a pack for day for more than 50 years. As per the daughter, patient is also feeling more weak and not having enough nutrition. He lives alone.   He was also hypoxic in the ED and was requiring 6L of oxygen. He was started on solumedrol 62.5 mg Q 8 hr and has been discontinued yesterday. He was sitting in the chair at bed side and states he is feeling okay and wants to go home tomorrow.     Labs on admission showed leucocytosis but otherwise in normal limits. Chest x ray on admission did not show any acute abnormality.     Code Status  Full Code    Review of Systems  Review of Systems   Constitutional: Positive for malaise/fatigue. Negative for chills, fever and weight loss.   HENT: Negative for ear pain, hearing loss and tinnitus.    Eyes: Negative for blurred vision, double vision and photophobia.   Respiratory: Positive for cough. Negative for hemoptysis and sputum production.    Cardiovascular: Negative for chest pain, palpitations and orthopnea.   Gastrointestinal: Negative for abdominal pain, nausea and vomiting.   Genitourinary: Negative for dysuria, frequency and urgency.   Musculoskeletal: Negative for back pain, myalgias and neck pain.   Skin: Negative for itching and rash.   Neurological: Positive for dizziness. Negative for tingling, tremors and headaches.   Psychiatric/Behavioral: Negative for depression and suicidal ideas.         Past Medical History   has a  past medical history of Acute hypoxemic respiratory failure (HCC) (10/2/2021), Chronic obstructive pulmonary disease (HCC), MRSA (methicillin resistant Staphylococcus aureus), Productive cough, and Snoring.    Surgical History   has a past surgical history that includes imelda rectal abscess incision and drainage (4/6/2009); rectal exploration (9/3/2009); debridement (9/3/2009); rectal exploration (11/17/2009); debridement (11/17/2009); lumbar fusion anterior (1983); lumbar fusion posterior (1987); flap graft (6/1/2010); and fistula in ano repair (6/1/2010).    Family History  family history includes Stroke in his unknown relative.    Social History   reports that he quit smoking about 3 years ago. His smoking use included cigarettes. He has a 67.50 pack-year smoking history. He has never used smokeless tobacco. He reports current alcohol use of about 8.0 oz of alcohol per week. He reports that he does not use drugs.    Medications  Home Medications     Reviewed by Raheem Haywood (Pharmacy Tech) on 09/30/21 at 1929  Med List Status: Complete   Medication Last Dose Status   doxycycline (VIBRAMYCIN) 100 MG Tab 9/4/2021 Active   melatonin 5 mg Tab PRN Active   oxyCODONE-acetaminophen (PERCOCET-10)  MG Tab 9/28/2021 Active   pregabalin (LYRICA) 75 MG Cap UNK Active   TRELEGY ELLIPTA 100-62.5-25 MCG/INH AEROSOL POWDER, BREATH ACTIVATED 9/29/2021 Active              Current Facility-Administered Medications   Medication Dose Route Frequency Provider Last Rate Last Admin   • budesonide-formoterol (SYMBICORT) 160-4.5 MCG/ACT inhaler 2 Puff  2 Puff Inhalation BID (RT) Rajan Webster M.D.       • tiotropium (Spiriva Respimat) 2.5 mcg/Act inhalation spray 5 mcg  5 mcg Inhalation QDAILY (RT) Rajan Webster M.D.       • ipratropium-albuterol (DUONEB) nebulizer solution  3 mL Nebulization Q4HRS WHILE AWAKE (RT) Rajan Webster M.D.       • predniSONE (DELTASONE) tablet 40 mg  40 mg Oral DAILY Rajan Webster  M.D.       • triamcinolone acetonide (KENALOG) 0.1 % cream   Topical BID Pam Higuera M.D.   Given at 10/04/21 0647   • melatonin tablet 5 mg  5 mg Oral Nightly Jairo Bowens M.D.   5 mg at 10/03/21 2100   • ipratropium-albuterol (DUONEB) nebulizer solution  3 mL Nebulization Q4H PRN (RT) Marylin Valdovinos M.D.       • albuterol inhaler 2 Puff  2 Puff Inhalation Q4H PRN (RT) Marylin Valdovinos M.D.       • ampicillin/sulbactam (UNASYN) 3 g in  mL IVPB  3 g Intravenous Q6HRS Marylin Valdovinos M.D.   Stopped at 10/04/21 0858   • enoxaparin (LOVENOX) inj 100 mg  100 mg Subcutaneous BID Marylin Valdovinos M.D.   100 mg at 10/04/21 0828   • metoprolol tartrate (LOPRESSOR) tablet 12.5 mg  12.5 mg Oral BID Marylin Valdovinos M.D.   12.5 mg at 10/04/21 0647   • Metoprolol Tartrate (LOPRESSOR) injection 5 mg  5 mg Intravenous Q5 MIN PRN Marylin Valdovinos M.D.       • senna-docusate (PERICOLACE or SENOKOT S) 8.6-50 MG per tablet 2 Tablet  2 Tablet Oral BID Darlene Summers M.D.   2 Tablet at 10/01/21 1800    And   • polyethylene glycol/lytes (MIRALAX) PACKET 1 Packet  1 Packet Oral QDAY PRN Darlene Summers M.D.        And   • magnesium hydroxide (MILK OF MAGNESIA) suspension 30 mL  30 mL Oral QDAY PRN Darlene Summers M.D.        And   • bisacodyl (DULCOLAX) suppository 10 mg  10 mg Rectal QDAY PRN Darlene Summers M.D.       • pregabalin (LYRICA) capsule 75 mg  75 mg Oral BID Darlene Summers M.D.   75 mg at 10/04/21 0647   • Respiratory Therapy Consult   Nebulization Continuous RT Darlene Summers M.D.           Allergies  Allergies   Allergen Reactions   • Morphine Sulfate Unspecified     Hallucinations        Vital Signs last 24 hours  Temp:  [36 °C (96.8 °F)-36.8 °C (98.3 °F)] 36.4 °C (97.5 °F)  Pulse:  [60-81] 60  Resp:  [18-20] 20  BP: (106-142)/(51-85) 106/66  SpO2:  [92 %-97 %] 97 %    Physical Exam  Physical Exam  Constitutional:       Appearance: He is ill-appearing.   HENT:      Head: Normocephalic.      Nose: Nose normal.      Mouth/Throat:      Mouth:  Mucous membranes are moist.   Eyes:      Extraocular Movements: Extraocular movements intact.      Pupils: Pupils are equal, round, and reactive to light.   Cardiovascular:      Rate and Rhythm: Normal rate and regular rhythm.      Pulses: Normal pulses.      Heart sounds: Normal heart sounds.   Pulmonary:      Effort: Pulmonary effort is normal.      Comments: Mild expiratory wheezing heard   Abdominal:      General: Bowel sounds are normal.      Palpations: Abdomen is soft.   Musculoskeletal:         General: Normal range of motion.      Cervical back: Normal range of motion.   Skin:     General: Skin is warm.   Neurological:      General: No focal deficit present.      Mental Status: He is alert and oriented to person, place, and time.   Psychiatric:         Mood and Affect: Mood normal.         Behavior: Behavior normal.           Fluids    Intake/Output Summary (Last 24 hours) at 10/4/2021 1121  Last data filed at 10/3/2021 2000  Gross per 24 hour   Intake 360 ml   Output --   Net 360 ml       Laboratory  Recent Results (from the past 48 hour(s))   CBC WITH DIFFERENTIAL    Collection Time: 10/03/21  5:08 AM   Result Value Ref Range    WBC 26.4 (H) 4.8 - 10.8 K/uL    RBC 4.99 4.70 - 6.10 M/uL    Hemoglobin 13.9 (L) 14.0 - 18.0 g/dL    Hematocrit 43.8 42.0 - 52.0 %    MCV 87.8 81.4 - 97.8 fL    MCH 27.9 27.0 - 33.0 pg    MCHC 31.7 (L) 33.7 - 35.3 g/dL    RDW 50.0 35.9 - 50.0 fL    Platelet Count 258 164 - 446 K/uL    MPV 10.4 9.0 - 12.9 fL    Neutrophils-Polys 95.60 (H) 44.00 - 72.00 %    Lymphocytes 0.80 (L) 22.00 - 41.00 %    Monocytes 2.60 0.00 - 13.40 %    Eosinophils 0.00 0.00 - 6.90 %    Basophils 0.30 0.00 - 1.80 %    Immature Granulocytes 0.70 0.00 - 0.90 %    Nucleated RBC 0.00 /100 WBC    Neutrophils (Absolute) 25.23 (H) 1.82 - 7.42 K/uL    Lymphs (Absolute) 0.21 (L) 1.00 - 4.80 K/uL    Monos (Absolute) 0.68 0.00 - 0.85 K/uL    Eos (Absolute) 0.00 0.00 - 0.51 K/uL    Baso (Absolute) 0.07 0.00 - 0.12  K/uL    Immature Granulocytes (abs) 0.18 (H) 0.00 - 0.11 K/uL    NRBC (Absolute) 0.00 K/uL   Comp Metabolic Panel    Collection Time: 10/03/21  5:08 AM   Result Value Ref Range    Sodium 142 135 - 145 mmol/L    Potassium 4.0 3.6 - 5.5 mmol/L    Chloride 102 96 - 112 mmol/L    Co2 33 20 - 33 mmol/L    Anion Gap 7.0 7.0 - 16.0    Glucose 133 (H) 65 - 99 mg/dL    Bun 10 8 - 22 mg/dL    Creatinine 0.34 (L) 0.50 - 1.40 mg/dL    Calcium 8.4 (L) 8.5 - 10.5 mg/dL    AST(SGOT) 53 (H) 12 - 45 U/L    ALT(SGPT) 39 2 - 50 U/L    Alkaline Phosphatase 115 (H) 30 - 99 U/L    Total Bilirubin 0.2 0.1 - 1.5 mg/dL    Albumin 2.1 (L) 3.2 - 4.9 g/dL    Total Protein 5.5 (L) 6.0 - 8.2 g/dL    Globulin 3.4 1.9 - 3.5 g/dL    A-G Ratio 0.6 g/dL   Prothrombin Time    Collection Time: 10/03/21  5:08 AM   Result Value Ref Range    PT 13.8 12.0 - 14.6 sec    INR 1.09 0.87 - 1.13   ESTIMATED GFR    Collection Time: 10/03/21  5:08 AM   Result Value Ref Range    GFR If African American >60 >60 mL/min/1.73 m 2    GFR If Non African American >60 >60 mL/min/1.73 m 2   EC-ECHOCARDIOGRAM COMPLETE W/ CONT    Collection Time: 10/03/21  2:59 PM   Result Value Ref Range    Eject.Frac. MOD BP 60.69     Eject.Frac. MOD 4C 62.07     Eject.Frac. MOD 2C 55.96     Left Ventrical Ejection Fraction 60    CBC WITH DIFFERENTIAL    Collection Time: 10/04/21  3:19 AM   Result Value Ref Range    WBC 23.7 (H) 4.8 - 10.8 K/uL    RBC 5.11 4.70 - 6.10 M/uL    Hemoglobin 14.5 14.0 - 18.0 g/dL    Hematocrit 48.5 42.0 - 52.0 %    MCV 94.9 81.4 - 97.8 fL    MCH 28.4 27.0 - 33.0 pg    MCHC 29.9 (L) 33.7 - 35.3 g/dL    RDW 55.3 (H) 35.9 - 50.0 fL    Platelet Count 202 164 - 446 K/uL    MPV 10.2 9.0 - 12.9 fL    Neutrophils-Polys 98.20 (H) 44.00 - 72.00 %    Lymphocytes 0.90 (L) 22.00 - 41.00 %    Monocytes 0.90 0.00 - 13.40 %    Eosinophils 0.00 0.00 - 6.90 %    Basophils 0.00 0.00 - 1.80 %    Nucleated RBC 0.00 /100 WBC    Neutrophils (Absolute) 23.27 (H) 1.82 - 7.42 K/uL     Lymphs (Absolute) 0.21 (L) 1.00 - 4.80 K/uL    Monos (Absolute) 0.21 0.00 - 0.85 K/uL    Eos (Absolute) 0.00 0.00 - 0.51 K/uL    Baso (Absolute) 0.00 0.00 - 0.12 K/uL    NRBC (Absolute) 0.00 K/uL   Basic Metabolic Panel    Collection Time: 10/04/21  3:19 AM   Result Value Ref Range    Sodium 139 135 - 145 mmol/L    Potassium 4.7 3.6 - 5.5 mmol/L    Chloride 101 96 - 112 mmol/L    Co2 32 20 - 33 mmol/L    Glucose 108 (H) 65 - 99 mg/dL    Bun 15 8 - 22 mg/dL    Creatinine 0.43 (L) 0.50 - 1.40 mg/dL    Calcium 8.6 8.5 - 10.5 mg/dL    Anion Gap 6.0 (L) 7.0 - 16.0   ESTIMATED GFR    Collection Time: 10/04/21  3:19 AM   Result Value Ref Range    GFR If African American >60 >60 mL/min/1.73 m 2    GFR If Non African American >60 >60 mL/min/1.73 m 2   DIFFERENTIAL MANUAL    Collection Time: 10/04/21  3:19 AM   Result Value Ref Range    Manual Diff Status PERFORMED    PERIPHERAL SMEAR REVIEW    Collection Time: 10/04/21  3:19 AM   Result Value Ref Range    Peripheral Smear Review see below    PLATELET ESTIMATE    Collection Time: 10/04/21  3:19 AM   Result Value Ref Range    Plt Estimation Normal    MORPHOLOGY    Collection Time: 10/04/21  3:19 AM   Result Value Ref Range    RBC Morphology Normal      Imaging  EC-ECHOCARDIOGRAM COMPLETE W/ CONT   Final Result      CT-CTA CHEST PULMONARY ARTERY W/ RECONS   Final Result      1.  There is no CT evidence of acute pulmonary embolism.   2.  There is underlying emphysema with interstitial lung disease in the left upper lobe.   3.  Bibasilar airspace disease is likely atelectasis. There is no acute pneumonia.   4.  There is a trace of dependent right pleural effusion.   5.  There is lymphadenopathy involving the left supraclavicular region, mediastinum, bilateral hilar regions and bilateral axillary regions. There are also subcutaneous nodules or right breast nodules. There are a few small new normal size lymph nodes in    the upper abdomen. These findings could represent a  metastatic process versus lymphoma.            DX-CHEST-PORTABLE (1 VIEW)   Final Result      1.  Right basilar opacity may represent atelectasis or consolidation.   2.  Mild interstitial prominence may represent mild edema.   3.  Cardiomegaly.   4.  Atherosclerotic plaque.          Assessment/Plan    1. Acute on chronic hypoxic respiratory failure   2. COPD exacerbation   3. Fall due to syncope  4. Lymphoma  5. Secondary polycythemia   6. Active tobacco use  7. Generalized weakness     -Patient uses oxygen of 2-3L at baseline and takes Trelegy at home. As per the notes, patient was not on oxygen when he had fall. He was also noted to have wheezing on admission. He received Solumedrol until 10/03  -Will add prednisone 40 mg for two more days to complete total of 5 day duration   -Added symbicort and Spiriva while in the hospital to help with breathing. Can use his Trelegy after discharge  -Duo nebs Q 4 hr while awake   -Encourage patient to use IS  -Titrate oxygen needs/RT  -Will schedule follow up in pulmonology clinic in 2 weeks.   -Patient lives alone and is high risk for falls, he might need a placement to improve physical strength and ability.   -Pulmonology will sign off, please don't hesitate to meg us for any questions      Discussed patient condition and risk of morbidity and/or mortality with Hospitalist, RN and Patient.      __________  Rajan Webster MD  Pulmonary and Critical Care Medicine  [facility]

## 2021-10-04 NOTE — PROGRESS NOTES
Received report from Day shift RN. Assumed care of patient at 1900. Patient A/Ox3, off by two days.  On 3L O2, no signs of respiratory distress - signs of a cough. No acute complaints of pain or discomfort. Call light and belongings within reach. Bed in lowest locked position. Upper side rails raised. Hourly rounding in place. Will continue to monitor.           COVID-19 Surge in effect

## 2021-10-04 NOTE — DISCHARGE PLANNING
"Anticipated Discharge Disposition: HH    Action: Lsw met with pt to complete assessment. Pt verified information on facesheet. Pt lives alone in an apartment. Pt's support is daughter who lives locally and assists as needed. Pt states being independent prior to admission. Pt denies any DME use. Pt states that he does have home O2 with a company on \"[address] Lsw went over DME list and he states that he uses [facility]. Pt states that his daughter assists with transportation and will be able to  pt when she is off work at 1600 or tomorrow as she will not work. Pt continues to refuse SNF and would like to do HH. Pt is agreeable with any HH who is contracted with his insurance. Pt confirmed that he does not have any safety concerns with going home.     Barriers to Discharge: HH acceptance    Plan: Continue to follow     "

## 2021-10-04 NOTE — DIETARY
"Nutrition services: Day 4 of admit.  Gary Severino Gil is a 76 y.o. male with admitting DX of acute hypoxemic respiratory failure.    Consult received for malnutrition admit screen score 3 for unsure wt loss and poor PO intake. RD visited pt @ chairside. Pt reports has not been eating well - eats when he feels like and sometimes skips meals. Pt unable to further describe extent of decreased PO intake or onset/duration. RD encouraged pt with strategies to maintain good PO intake in setting of living alone.    Assessment:  Height: 160 cm (5' 3\")  Weight: 88.9 kg (195 lb 15.8 oz)  Body mass index is 34.72 kg/m²., BMI classification: Obesity class I  Diet/Intake: Cardiac: >75% x 4, 50-75% x 1 per ADLs    Evaluation:   1. PMHx includes COPD on 3L O2.  2. Dx this admit include lymphoma, pulmonary HTN, rhabdomyolysis, a-fib  3. Labs: Crea 0.43  4. Meds: abx, deltasone. Completed: methylprednisone (10/3), perflutren protein A  5. Skin: generalized skin lesions  6. Per admit screen,  lbs, 2% wt loss over unknown time period to current wt. Would be signficant/severe if occurring in </=7 days.  7. Pt appears normally nourished for age.  8. Current discharge plan for HH.    Malnutrition Risk: Criteria not met    Recommendations/Plan:   1. Encourage intake of meals; document as % taken in ADL's.   2. Monitor weight.  3. Nutrition rep will continue to see patient for ongoing meal and snack preferences.     RD following.    "

## 2021-10-04 NOTE — ASSESSMENT & PLAN NOTE
"Discussed CPR and intubation with patient  Patient states \"I don't want none of that, I just want my daughter to see me\"  I explained the likely poor prognosis if his heart were to stop or he were to stop breathing and we started CPR or attempted to intubate  He states \"no way, I want to die in peace at home\"  Agreed to DNR/DNI status  "

## 2021-10-04 NOTE — ASSESSMENT & PLAN NOTE
Echo completed 10/3/21  EF 65%, severe pulmonary hypertension with estimated RVSP 60 mmHg and right atrial pressure 15 mmHg.  --- Discussed with Dr. Webster, RVSP likely elevated secondary to hypoxia causing constriction of the pulmonary vasculature  --- Repeat Echo in 3 months

## 2021-10-04 NOTE — CARE PLAN
Problem: Nutritional:  Goal: Achieve adequate nutritional intake  Description: Patient will consume >50-75% of meals  Outcome: Progressing  See RD note.

## 2021-10-04 NOTE — DISCHARGE PLANNING
Agency/Facility Name: [facility]  Spoke To: Aurelia  Outcome: Aurelia stated that he would f/u with this DPA    Agency/Facility Name: [facility]  Spoke To: Neelima  Outcome: Neelima stated she pushed order out to .  will contact the pt and the ETA is 2-3 hours    Agency/Facility Name: [facility]  Spoke To: Aurelia  Outcome: Aurelia stated pt is accepted

## 2021-10-04 NOTE — PROGRESS NOTES
"Hospital Medicine Daily Progress Note    Date of Service  10/4/2021    Chief Complaint  Gary Severino Gil is a 76 y.o. male admitted 9/30/2021 with syncope, dyspnea, and falling.    Hospital Course  \"Gary Severino Gil is a 76 y.o. male who presented 9/30/2021 with fall. Patient is drowsy and offers minimal history. He states that last night, he was getting up, felt dizzy, and fell down. He could not get up the entire night and was on the floor. This AM, he was able to call for help and got up and went to the couch where he slept, then woke up with dizziness and fell again. Patient has history of COPD is usually on 3L however reports that he was not on his oxygen when he woke up. He does live alone. Daughter is at bedside who states that patient has been weak and not eating or drinking well. He has lymphoma diagnosis and is scheduled for chemotherapy initiation next week. Patient in ED currently on 6L. He was found at 78% on RA. Patient does exhibit significant wheezing. Will admit patient for syncope and COPD exacerbation.\"    While holding in the ED awaiting a bed, he was found to have Afib. Patient denies history of, and was started on full dose anticoagulation. Chads-Vasc score of 2.     Interval Problem Update  10/96044: Patient seen and examined.Sitting in chair, no complaints, ready to go home tomorrow.  - Will not go to SNF, wants to go home to \"die in peace\" if that's what's going to happen. He is agreeable to home health. Discussion had with patient about goals of care, and he would not want to have CPR or be on a machine, he only wants to be able to say goodbye to his daughter. He agrees to be a DNR/DNI.  - Per Dr. Webster, will continue PO Augmentin for 5 days.  - Patient not eligible to see [facility] d/t insurance, Dr. Webster referred for outside pulmonologist.  - New onset pulmonary hypertension noted on Echo today with RVSP of 60. Per Dr. Webster, repeat echo in 3 months.  - Although PT " recommending post-acute placement, patient is refusing. I strongly advised that this was not a safe discharge plan, however he continues to refuse. I recommended looking into something like a life alert button or safety phone, he will discuss with his daughter.    Addendum: 1630 - Spoke with daughter Nayeli and updated her on the plan of care.    Patient will discharge home tomorrow 10/5/2021 with home health and prior oxygen company.    I have personally seen and examined the patient at bedside. I discussed the plan of care with patient, bedside RN and Dr. Higuera.    Consultants/Specialty  none    Code Status  DNAR/DNI    Disposition  Patient is not medically cleared.   Anticipate discharge to to an inpatient rehabilitation hospital.  I have placed the appropriate orders for post-discharge needs.    Review of Systems  Review of Systems   Constitutional: Negative for chills, fever and malaise/fatigue.   Respiratory: Positive for shortness of breath and wheezing. Negative for cough.    Cardiovascular: Positive for leg swelling. Negative for chest pain and palpitations.   Gastrointestinal: Negative for abdominal pain, nausea and vomiting.   Genitourinary: Negative for dysuria.   Musculoskeletal: Positive for falls.   Skin: Negative for itching.   Neurological: Positive for weakness. Negative for dizziness, loss of consciousness and headaches.   All other systems reviewed and are negative.       Physical Exam  Temp:  [36 °C (96.8 °F)-36.8 °C (98.3 °F)] 36.7 °C (98 °F)  Pulse:  [65-81] 65  Resp:  [18-20] 20  BP: (118-142)/(51-85) 128/51  SpO2:  [92 %-96 %] 95 %    Physical Exam  Vitals and nursing note reviewed.   Constitutional:       Appearance: He is obese.   HENT:      Head: Normocephalic.      Mouth/Throat:      Mouth: Mucous membranes are moist.   Eyes:      Pupils: Pupils are equal, round, and reactive to light.   Cardiovascular:      Rate and Rhythm: Normal rate and regular rhythm.      Heart sounds: Normal heart  sounds.   Pulmonary:      Effort: Pulmonary effort is normal.      Comments: Rhonchi and wheezing throughout all lung fields  Musculoskeletal:      Right lower le+ Pitting Edema present.      Left lower le+ Pitting Edema present.   Skin:     General: Skin is warm and dry.      Comments: Scattered cutaneous tumor lesions body and face   Neurological:      General: No focal deficit present.      Mental Status: He is alert and oriented to person, place, and time.   Psychiatric:         Mood and Affect: Mood normal.     Fluids    Intake/Output Summary (Last 24 hours) at 10/4/2021 0805  Last data filed at 10/3/2021 2000  Gross per 24 hour   Intake 480 ml   Output --   Net 480 ml       Laboratory  Recent Labs     10/02/21  0500 10/03/21  0508 10/04/21  0319   WBC 28.8* 26.4* 23.7*   RBC 5.27 4.99 5.11   HEMOGLOBIN 14.5 13.9* 14.5   HEMATOCRIT 46.8 43.8 48.5   MCV 88.8 87.8 94.9   MCH 27.5 27.9 28.4   MCHC 31.0* 31.7* 29.9*   RDW 51.1* 50.0 55.3*   PLATELETCT 285 258 202   MPV 9.8 10.4 10.2     Recent Labs     10/02/21  0500 10/03/21  0508 10/04/21  0319   SODIUM 141 142 139   POTASSIUM 4.3 4.0 4.7   CHLORIDE 100 102 101   CO2 32 33 32   GLUCOSE 145* 133* 108*   BUN 10 10 15   CREATININE 0.50 0.34* 0.43*   CALCIUM 8.5 8.4* 8.6     Recent Labs     10/03/21  0508   INR 1.09               Imaging  EC-ECHOCARDIOGRAM COMPLETE W/ CONT   Final Result      CT-CTA CHEST PULMONARY ARTERY W/ RECONS   Final Result      1.  There is no CT evidence of acute pulmonary embolism.   2.  There is underlying emphysema with interstitial lung disease in the left upper lobe.   3.  Bibasilar airspace disease is likely atelectasis. There is no acute pneumonia.   4.  There is a trace of dependent right pleural effusion.   5.  There is lymphadenopathy involving the left supraclavicular region, mediastinum, bilateral hilar regions and bilateral axillary regions. There are also subcutaneous nodules or right breast nodules. There are a few  "small new normal size lymph nodes in    the upper abdomen. These findings could represent a metastatic process versus lymphoma.            DX-CHEST-PORTABLE (1 VIEW)   Final Result      1.  Right basilar opacity may represent atelectasis or consolidation.   2.  Mild interstitial prominence may represent mild edema.   3.  Cardiomegaly.   4.  Atherosclerotic plaque.           Assessment/Plan  * Acute hypoxemic respiratory failure (HCC)  Assessment & Plan  78% SpO2 upon arrival to ED  Required 6L to maintain SpO2 >90%  Wheezing on exam  --- Started on duoneb, prednisone, oxygen in ED  --- Improving, on 3L NC with SpO2 90-95%  --- Monitor    Advance care planning  Assessment & Plan  Discussed CPR and intubation with patient  Patient states \"I don't want none of that, I just want my daughter to see me\"  I explained the likely poor prognosis if his heart were to stop or he were to stop breathing and we started CPR or attempted to intubate  He states \"no way, I want to die in peace at home\"  Agreed to DNR/DNI status    Pulmonary hypertension (HCC)- (present on admission)  Assessment & Plan  Echo completed 10/3/21  EF 65%, severe pulmonary hypertension with estimated RVSP 60 mmHg and right atrial pressure 15 mmHg.  --- Discussed with Dr. eWbster, RVSP likely elevated secondary to hypoxia causing constriction of the pulmonary vasculature  --- Repeat Echo in 3 months    Fall- (present on admission)  Assessment & Plan  Two falls at home prior to arrival, patient was unable to get himself off the floor  Fall precautions while in hospital  --- PT assessment currently recommending post-acute placement, however feels patient may decline  --- Patient will need to demonstrate improved mobility prior to d/c home as he lives alone  --- Discussed placement with patient today, continues to decline transfer to a SNF  --- Continue to ambulate   --- Up to chair for meals    A-fib (HCC)- (present on admission)  Assessment & Plan  New onset, " "denies history of Afib  One episode caught by EKG in ED  No Afib on telemetry since admission  TSH 0.740  Echo pending  Chads Vasc 2  --- Metoprolol tartrate 12.5 mg PO BID  --- Lovenox full dose initiated in ED  --- Per ACC guidelines, \"For patients with AF or atrial flutter of 48 hours’ duration or longer, or when the duration of AF is unknown, anticoagulation with warfarin (INR 2.0 to 3.0), a factor Xa inhibitor, or direct thrombin inhibitor is recommended for at least 3 weeks before and at least 4 weeks after cardioversion.\"  --- Shared decision making conversation again today with patient, outlined risks vs benefits of Afib contributing to stroke, including and up to death, and patient declines anticoagulation    Leukocytosis- (present on admission)  Assessment & Plan  WBC on admission 25.5, today 26.4  Likely secondary to lymphoma, however underlying infectious process could not be ruled out  Blood cultures pending  Procalcitonin 0.44  --- Continue PO augmentin X 5 days  --- Afebrile, VSS  --- Monitor for signs of infection    Lymphoma (HCC)- (present on admission)  Assessment & Plan  History of per chart review  Oncologist is Dr. Garcia  Scheduled to begin chemo next week   Scattered cutaneous tumor lesions on body and face  --- Wound care consulted by ED, appreciate recomendations  --- Monitor  --- Follow-up with Dr. Garcia outpatient    Troponin level elevated- (present on admission)  Assessment & Plan  Troponins trended downward  Likely due to demand   --- Monitor for signs of ischemia    Rhabdomyolysis- (present on admission)  Assessment & Plan  CPK on admission 2677  After fluid administration 461  --- LR discontinued  --- AM labs    Syncope- (present on admission)  Assessment & Plan  Admitted to Tele 7 for cardiac monitoring  Given the Afib event in the ED, query paroxysmal Afib vs hypoxemia contributing to syncope  Troponins negative  Currently denies  --- ECHO ordered, EF 65% with RVSP 45  --- " Orthostatic vitals done, but no HR recorded  --- Asked nursing staff to record both    Acute exacerbation of chronic obstructive pulmonary disease (COPD) (HCC)- (present on admission)  Assessment & Plan  On baseline 2L O2 requirement  Procalcitonin 0.44  CXR demonstrates right basilar opacity and mild interstitial prominence, which may represent atelectasis or consolidation  CTA negative for PE, shows emphysema, atelectasis, trace right pleural effusion and lymphadenopathy  --- Continue solu-medrol per COPD order set  --- Started formulary substitution for patient's home Trelegy  --- Respiratory and COPD Clinical Coordinator on board  --- Nebulizers available PRN  --- PO Augmentin for 5 days and 3 days of steroid per Dr. Webster       VTE prophylaxis: enoxaparin ppx    I have performed a physical exam and reviewed and updated ROS and Plan today (10/4/2021). In review of yesterday's note (10/3/2021), there are no changes except as documented above.

## 2021-10-04 NOTE — PROGRESS NOTES
Monitor summary:      Rhythm: SR  Rate: 67-72  Ectopy: rPVC, rPAC, Coup  Measurements: .16/.05/.34      12hr chart check

## 2021-10-05 PROBLEM — M62.82 RHABDOMYOLYSIS: Status: RESOLVED | Noted: 2021-01-01 | Resolved: 2021-01-01

## 2021-10-05 PROBLEM — R55 SYNCOPE: Status: RESOLVED | Noted: 2021-01-01 | Resolved: 2021-01-01

## 2021-10-05 NOTE — DISCHARGE SUMMARY
Discharge Summary    CHIEF COMPLAINT ON ADMISSION  Chief Complaint   Patient presents with   • T-5000 FALL     Pt got up when he suddenly felt dizzy. Pt fell and is now complaining of bilateral shoulder pain. Pt denies hitting his head. -thinners. Pt has a hx of COPD. Pt wears 3L NC at baseline. Pt was 78% on RA when EMS arrived. Pt was not wearing his O2.        Reason for Admission  EMS     Admission Date  9/30/2021    CODE STATUS  DNAR/DNI    HPI & HOSPITAL COURSE  76 y.o. male who presented 9/30/2021 with fall and dyspnea. Patient fell dizzy and fell down twice before he was able to get up and call for help. Patient has history of COPD is usually on 3L however he reports that was not on his oxygen when he woke up. Patient was hypoxic in the ED requiring 6L. On admission CXR with atelectasis or consolidation, WBC 25.5, procalcitonin 0.44, antibiotics and steroids recommended by pulmonology, patient completed 5 day course of antibiotics and steroids. He is to continue trelegy upon discharge. Patient was noted to be in atrial fibrillation in the ED then returned to sinus rhythm. CHADS VASc score of 2, HAS-BLED score 1. Discussed anticoagulation and stroke risk with patient, informed him he has a 3% chance of stroke in 1 year. After much deliberation patient has agreed to anticoagulation. Patient has confirmed that he can afford apixiban for $131.14. Patient educated to monitor for signs of bleeding.     PT and OT have recommended post acute placement. However patient is adamant about going home and is refusing placement options. Home health has been arranged, and his daughter Nayeli is arranging for life alert. Patient to follow up in one week with his PCP and follow-up with Dr. Garcia.    Pulmonology saw him and have submitted a referral for outpatient pulmonology.     Therefore, he is discharged in fair and stable condition to home with organized home healthcare and close outpatient follow-up.    The patient met  2-midnight criteria for an inpatient stay at the time of discharge.    Discharge Date  10/5/2021    FOLLOW UP ITEMS POST DISCHARGE  Follow up with PCP, pulmonary, and Dr. Garcia  Repeat echo in 3 months    DISCHARGE DIAGNOSES  Principal Problem:    Acute hypoxemic respiratory failure (HCC) POA: Unknown  Active Problems:    Acute exacerbation of chronic obstructive pulmonary disease (COPD) (HCC) POA: Yes    Troponin level elevated POA: Yes    Lymphoma (HCC) (Chronic) POA: Yes    Leukocytosis POA: Yes    A-fib (HCC) POA: Yes    Fall POA: Yes    Pulmonary hypertension (HCC) POA: Yes    Advance care planning POA: Unknown  Resolved Problems:    Syncope POA: Yes    Rhabdomyolysis POA: Yes      FOLLOW UP  [facility]  [address]  [facility] [address]  [phone]        pulmonology  Referral sent for [facility] pulmonology. They will contact you for an appointment.  Schedule an appointment as soon as possible for a visit      Ivan Garcia M.D.  [address]  [place] [address]  [phone]    Schedule an appointment as soon as possible for a visit      Edwin Valentin M.D.  [address]  [place] [id number] [phone]    Call  Please call your primary care provider to schedule a hospital follow up appointment. Thank you.      MEDICATIONS ON DISCHARGE     Medication List      START taking these medications      Instructions   apixaban 5mg Tabs  Commonly known as: ELIQUIS   Take 1 Tablet by mouth 2 times a day. Indications: Thromboembolism secondary to Atrial Fibrillation  Dose: 5 mg        CONTINUE taking these medications      Instructions   doxycycline 100 MG Tabs  Commonly known as: VIBRAMYCIN   Take 100 mg by mouth 2 times a day.  Dose: 100 mg     melatonin 5 mg Tabs   Take 5 mg by mouth at bedtime as needed (Sleep).  Dose: 5 mg     oxyCODONE-acetaminophen  MG Tabs  Commonly known as: PERCOCET-10   Take 1 Tablet by mouth 4 times a day as  needed.  Dose: 1 Tablet     pregabalin 75 MG Caps  Commonly known as: LYRICA   Take 75 mg by mouth 2 times a day.  Dose: 75 mg     Trelegy Ellipta 100-62.5-25 MCG/INH Aepb  Generic drug: Fluticasone-Umeclidin-Vilant   Take 1 Puff by mouth every day.  Dose: 1 Puff            Allergies  Allergies   Allergen Reactions   • Morphine Sulfate Unspecified     Hallucinations        DIET  Orders Placed This Encounter   Procedures   • Diet Order Diet: Cardiac     Standing Status:   Standing     Number of Occurrences:   1     Order Specific Question:   Diet:     Answer:   Cardiac [6]       ACTIVITY  As tolerated.  Weight bearing as tolerated    CONSULTATIONS  Pulmonology    PROCEDURES  None    LABORATORY  Lab Results   Component Value Date    SODIUM 139 10/04/2021    POTASSIUM 4.7 10/04/2021    CHLORIDE 101 10/04/2021    CO2 32 10/04/2021    GLUCOSE 108 (H) 10/04/2021    BUN 15 10/04/2021    CREATININE 0.43 (L) 10/04/2021    CREATININE 0.9 04/07/2009        Lab Results   Component Value Date    WBC 23.7 (H) 10/04/2021    HEMOGLOBIN 14.5 10/04/2021    HEMATOCRIT 48.5 10/04/2021    PLATELETCT 202 10/04/2021        Total time of the discharge process exceeds 37 minutes.

## 2021-10-05 NOTE — THERAPY
Occupational Therapy   Initial Evaluation     Patient Name: Gary Severino Gil  Age:  76 y.o., Sex:  male  Medical Record #: [id number]  Today's Date: 10/4/2021     Precautions  Precautions: Fall Risk  Comments:  (increased supplemental 02 needs)    Assessment  Patient is 76 y.o. male with PMHx of COPD and lymphoma. Pt admitted for multiple falls, A. fib, syncope, and COPD exacerbation. Pt presents to occupational therapy today able to complete all ADLs, ADL transfers, and functional mobility with supervision. Pt lives alone but has a daughter who can provide intermittent support. Pt reports he has been having difficulty with showering and LB dressing. Pt has compensated by completing sponge baths at home and avoids wearing socks, preferring to wear slip on shoes. He is resistant to post-acute placement at this time but would benefit from home health occupational therapy to address increased independence with ADLs and transfers as he will begin chemo soon. Patient will not be actively followed for occupational therapy services at this time, however may be seen if requested by physician for 1 more visit within 30 days to address any discharge or equipment needs.     Plan    Recommend Occupational Therapy for Evaluation only.    DC Equipment Recommendations: None  Discharge Recommendations: Recommend home health for continued occupational therapy services     Subjective    Pt is alert pleasant and cooperative. Pt reports he is ready to go home.     Objective       10/04/21 2979   Prior Living Situation   Prior Services Home-Independent   Housing / Facility 1 Story House   Steps Into Home 0   Bathroom Set up Bathtub / Shower Combination   Equipment Owned Single Point Cane   Lives with - Patient's Self Care Capacity Alone and Able to Care For Self   Comments pt reports daughter lives in town   Prior Level of ADL Function   Self Feeding Independent   Grooming / Hygiene Independent   Bathing Independent   Dressing Independent    Toileting Independent   Prior Level of IADL Function   Medication Management Independent   Laundry Independent   Kitchen Mobility Independent   Finances Independent   Home Management Independent   Shopping Independent   Prior Level Of Mobility Independent With Device in Community;Independent Without Device in Home   Driving / Transportation Driving Independent   Occupation (Pre-Hospital Vocational) Retired Due To Age   Precautions   Precautions Fall Risk   Vitals   O2 (LPM) 4   O2 Delivery Device Nasal Cannula   Pain 0 - 10 Group   Therapist Pain Assessment   (no c/o pain)   Cognition    Cognition / Consciousness WDL   Level of Consciousness Alert   Comments pleasant and cooperative   Passive ROM Upper Body   Passive ROM Upper Body WDL   Active ROM Upper Body   Active ROM Upper Body  WDL   Strength Upper Body   Upper Body Strength  WDL   Sensation Upper Body   Upper Extremity Sensation  Not Tested   Upper Body Muscle Tone   Upper Body Muscle Tone  WDL   Coordination Upper Body   Coordination WDL   Balance Assessment   Sitting Balance (Static) Fair +   Sitting Balance (Dynamic) Fair +   Standing Balance (Static) Fair +   Standing Balance (Dynamic) Fair +   Weight Shift Sitting Good   Weight Shift Standing Good   Comments no AD, no LOB   Bed Mobility    Supine to Sit   (pt in chair at start of session)   Sit to Supine   (pt in chair at end of session)   Comments Pt in chair pre/post session   ADL Assessment   Grooming Supervision  (oral care)   Lower Body Dressing Supervision  (pants)   Toileting   (declined need)   How much help from another person does the patient currently need...   Putting on and taking off regular lower body clothing? 4   Bathing (including washing, rinsing, and drying)? 4   Toileting, which includes using a toilet, bedpan, or urinal? 4   Putting on and taking off regular upper body clothing? 4   Taking care of personal grooming such as brushing teeth? 4   Eating meals? 4   6 Clicks Daily  Activity Score 24   Functional Mobility   Sit to Stand Supervised   Bed, Chair, Wheelchair Transfer Supervised   Toilet Transfers Supervised   Transfer Method Stand Step   Mobility sit to stand x3, in room/bathroom, no AD, no LOB   Activity Tolerance   Sitting in Chair 8+ min   Sitting Edge of Bed NT   Standing 10 min   Comments no c/o fatigue   Patient / Family Goals   Patient / Family Goal #1 to go home   Education Group   Education Provided Role of Occupational Therapist;Activities of Daily Living   Role of Occupational Therapist Patient Response Patient;Acceptance;Explanation;Verbal Demonstration   ADL Patient Response Patient;Explanation;Teaching Refused;Acceptance   Problem List   Problem List Decreased Homemaking Skills

## 2021-10-05 NOTE — PROGRESS NOTES
Received bedside report from MARIA DEL CARMEN Benito, pt care assumed. VSS, pt assessment complete. Pt AAOx4, denies pain at this time. POC discussed with pt and verbalizes no questions. Pt denies any additional needs at this time. Bed locked and in lowest position, bed alarm on. Pt educated on fall risk and verbalized understanding, call light within reach, hourly rounding initiated.     Crisis charting in effect due to COVID-19 surge.

## 2021-10-05 NOTE — PROGRESS NOTES
Informed interdisciplinary team during rounds that patient did not have a tank of oxygen in room fr discharge, tank empty.   Call to patients daughter at [phone] to see if he had another tank at home that can be brought, per daughter no tank available. Informed CM, will follow up with oxygen.  1152: informed CM that patients daughter could on  patient before 1400 or after 1800 due to her working, no other family available to get patient. Informed patient may need transport.

## 2021-10-05 NOTE — DISCHARGE PLANNING
Agency/Facility Name: [facility]   Spoke To: Crystal   Outcome: Per Crystal she will forward the request to the dispatch and have them give this DPA a call.     LSW notified

## 2021-10-05 NOTE — DISCHARGE PLANNING
Anticipated Discharge Disposition: Home with Hh    Action: Lsw called pt's pharmacy to f/u with Eliquis Rx. Per pharmacist, pt's co-pay is $131.14 since he has a gap insurance he will have a high co-pay. Pharmacist explained that Xarelto will be the same price as it can only be name brand and pt does not qualify for coupon since he has Medicare.      Barriers to Discharge: O2 delivery    Plan: continue to follow

## 2021-10-05 NOTE — DISCHARGE PLANNING
Anticipated Discharge Disposition: home    Action: Lsw spoke with [facility] to get O2 update. Per tech, O2 has been dispatched about 20 mins ago. Tech stated that o2 can be en route, dispatch will reach out to this Lsw for confirmation and ETA. Lsw sent in PCS forms to [employer] or [phone] transport in anticipation.    Barriers to Discharge:  O2 delivery    Plan: continue to follow

## 2021-10-05 NOTE — DISCHARGE INSTRUCTIONS
Discharge Instructions per KATTY Perez.  Please take your medications as prescribed. Follow up with your primary care physician. Please monitor and contact your physician if you have any signs of bleeding (blood in stool, vomiting blood, coughing up blood).      DIET: healthy    ACTIVITY: as tolerated    DIAGNOSIS: Acute respiratory failure secondary to COPD exacerbation, new onset atrial fibrillation     Return to ER if you experience chest pain, shortness of breath, loss of consciousness, confusion, uncontrolled bleeding, or uncontrolled pain.       Discharge Instructions    Discharged to home by car with self. Discharged via wheelchair, hospital escort: Yes.  Special equipment needed: Not Applicable    Be sure to schedule a follow-up appointment with your primary care doctor or any specialists as instructed.     Discharge Plan: Home with Home oxygen and Home Health    Influenza Vaccine Indication: Indicated: 65 years and older    I understand that a diet low in cholesterol, fat, and sodium is recommended for good health. Unless I have been given specific instructions below for another diet, I accept this instruction as my diet prescription.       Special Instructions: None    · Is patient discharged on Warfarin / Coumadin?   No     Depression / Suicide Risk    As you are discharged from this [facility] facility, it is important to learn how to keep safe from harming yourself.    Recognize the warning signs:  · Abrupt changes in personality, positive or negative- including increase in energy   · Giving away possessions  · Change in eating patterns- significant weight changes-  positive or negative  · Change in sleeping patterns- unable to sleep or sleeping all the time   · Unwillingness or inability to communicate  · Depression  · Unusual sadness, discouragement and loneliness  · Talk of wanting to die  · Neglect of personal appearance   · Rebelliousness- reckless behavior  · Withdrawal from  people/activities they love  · Confusion- inability to concentrate     If you or a loved one observes any of these behaviors or has concerns about self-harm, here's what you can do:  · Talk about it- your feelings and reasons for harming yourself  · Remove any means that you might use to hurt yourself (examples: pills, rope, extension cords, firearm)  · Get professional help from the community (Mental Health, Substance Abuse, psychological counseling)  · Do not be alone:Call your Safe Contact- someone whom you trust who will be there for you.  · Call your local CRISIS HOTLINE 171-1481 or 581-186-6447  · Call your local Children's Mobile Crisis Response Team [place] (796) 252-2798 or www.TaDaweb  · Call the toll free National Suicide Prevention Hotlines   · National Suicide Prevention Lifeline 663-655-RCUM (0061)  · The Political Student Line Network 800-SUICIDE (266-2021)        Acute Respiratory Failure, Adult    Acute respiratory failure occurs when there is not enough oxygen passing from your lungs to your body. When this happens, your lungs have trouble removing carbon dioxide from the blood. This causes your blood oxygen level to drop too low as carbon dioxide builds up.  Acute respiratory failure is a medical emergency. It can develop quickly, but it is temporary if treated promptly. Your lung capacity, or how much air your lungs can hold, may improve with time, exercise, and treatment.  What are the causes?  There are many possible causes of acute respiratory failure, including:  · Lung injury.  · Chest injury or damage to the ribs or tissues near the lungs.  · Lung conditions that affect the flow of air and blood into and out of the lungs, such as pneumonia, acute respiratory distress syndrome, and cystic fibrosis.  · Medical conditions, such as strokes or spinal cord injuries, that affect the muscles and nerves that control breathing.  · Blood infection (sepsis).  · Inflammation of the pancreas  (pancreatitis).  · A blood clot in the lungs (pulmonary embolism).  · A large-volume blood transfusion.  · Burns.  · Near-drowning.  · Seizure.  · Smoke inhalation.  · Reaction to medicines.  · Alcohol or drug overdose.  What increases the risk?  This condition is more likely to develop in people who have:  · A blocked airway.  · Asthma.  · A condition or disease that damages or weakens the muscles, nerves, bones, or tissues that are involved in breathing.  · A serious infection.  · A health problem that blocks the unconscious reflex that is involved in breathing, such as hypothyroidism or sleep apnea.  · A lung injury or trauma.  What are the signs or symptoms?  Trouble breathing is the main symptom of acute respiratory failure. Symptoms may also include:  · Rapid breathing.  · Restlessness or anxiety.  · Skin, lips, or fingernails that appear blue (cyanosis).  · Rapid heart rate.  · Abnormal heart rhythms (arrhythmias).  · Confusion or changes in behavior.  · Tiredness or loss of energy.  · Feeling sleepy or having a loss of consciousness.  How is this diagnosed?  Your health care provider can diagnose acute respiratory failure with a medical history and physical exam. During the exam, your health care provider will listen to your heart and check for crackling or wheezing sounds in your lungs. Your may also have tests to confirm the diagnosis and determine what is causing respiratory failure. These tests may include:  · Measuring the amount of oxygen in your blood (pulse oximetry). The measurement comes from a small device that is placed on your finger, earlobe, or toe.  · Other blood tests to measure blood gases and to look for signs of infection.  · Sampling your cerebral spinal fluid or tracheal fluid to check for infections.  · Chest X-ray to look for fluid in spaces that should be filled with air.  · Electrocardiogram (ECG) to look at the heart's electrical activity.  How is this treated?  Treatment for this  condition usually takes places in a hospital intensive care unit (ICU). Treatment depends on what is causing the condition. It may include one or more treatments until your symptoms improve. Treatment may include:  · Supplemental oxygen. Extra oxygen is given through a tube in the nose, a face mask, or a morillo.  · A device such as a continuous positive airway pressure (CPAP) or bi-level positive airway pressure (BiPAP or BPAP) machine. This treatment uses mild air pressure to keep the airways open. A mask or other device will be placed over your nose or mouth. A tube that is connected to a motor will deliver oxygen through the mask.  · Ventilator. This treatment helps move air into and out of the lungs. This may be done with a bag and mask or a machine. For this treatment, a tube is placed in your windpipe (trachea) so air and oxygen can flow to the lungs.  · Extracorporeal membrane oxygenation (ECMO). This treatment temporarily takes over the function of the heart and lungs, supplying oxygen and removing carbon dioxide. ECMO gives the lungs a chance to recover. It may be used if a ventilator is not effective.  · Tracheostomy. This is a procedure that creates a hole in the neck to insert a breathing tube.  · Receiving fluids and medicines.  · Rocking the bed to help breathing.  Follow these instructions at home:  · Take over-the-counter and prescription medicines only as told by your health care provider.  · Return to normal activities as told by your health care provider. Ask your health care provider what activities are safe for you.  · Keep all follow-up visits as told by your health care provider. This is important.  How is this prevented?  Treating infections and medical conditions that may lead to acute respiratory failure can help prevent the condition from developing.  Contact a health care provider if:  · You have a fever.  · Your symptoms do not improve or they get worse.  Get help right away if:  · You are  having trouble breathing.  · You lose consciousness.  · Your have cyanosis or turn blue.  · You develop a rapid heart rate.  · You are confused.  These symptoms may represent a serious problem that is an emergency. Do not wait to see if the symptoms will go away. Get medical help right away. Call your local emergency services (911 in the [place] Do not drive yourself to the hospital.  This information is not intended to replace advice given to you by your health care provider. Make sure you discuss any questions you have with your health care provider.  Document Released: 12/23/2014 Document Revised: 11/30/2018 Document Reviewed: 07/05/2017  [employer] Patient Education © 2020 [employer]      Chronic Obstructive Pulmonary Disease  Chronic obstructive pulmonary disease (COPD) is a long-term (chronic) lung problem. When you have COPD, it is hard for air to get in and out of your lungs. Usually the condition gets worse over time, and your lungs will never return to normal. There are things you can do to keep yourself as healthy as possible.  · Your doctor may treat your condition with:  ? Medicines.  ? Oxygen.  ? Lung surgery.  · Your doctor may also recommend:  ? Rehabilitation. This includes steps to make your body work better. It may involve a team of specialists.  ? Quitting smoking, if you smoke.  ? Exercise and changes to your diet.  ? Comfort measures (palliative care).  Follow these instructions at home:  Medicines  · Take over-the-counter and prescription medicines only as told by your doctor.  · Talk to your doctor before taking any cough or allergy medicines. You may need to avoid medicines that cause your lungs to be dry.  Lifestyle  · If you smoke, stop. Smoking makes the problem worse. If you need help quitting, ask your doctor.  · Avoid being around things that make your breathing worse. This may include smoke, chemicals, and fumes.  · Stay active, but remember to rest as well.  · Learn and use tips on  how to relax.  · Make sure you get enough sleep. Most adults need at least 7 hours of sleep every night.  · Eat healthy foods. Eat smaller meals more often. Rest before meals.  Controlled breathing  Learn and use tips on how to control your breathing as told by your doctor. Try:  · Breathing in (inhaling) through your nose for 1 second. Then, pucker your lips and breath out (exhale) through your lips for 2 seconds.  · Putting one hand on your belly (abdomen). Breathe in slowly through your nose for 1 second. Your hand on your belly should move out. Pucker your lips and breathe out slowly through your lips. Your hand on your belly should move in as you breathe out.    Controlled coughing  Learn and use controlled coughing to clear mucus from your lungs. Follow these steps:  1. Lean your head a little forward.  2. Breathe in deeply.  3. Try to hold your breath for 3 seconds.  4. Keep your mouth slightly open while coughing 2 times.  5. Spit any mucus out into a tissue.  6. Rest and do the steps again 1 or 2 times as needed.  General instructions  · Make sure you get all the shots (vaccines) that your doctor recommends. Ask your doctor about a flu shot and a pneumonia shot.  · Use oxygen therapy and pulmonary rehabilitation if told by your doctor. If you need home oxygen therapy, ask your doctor if you should buy a tool to measure your oxygen level (oximeter).  · Make a COPD action plan with your doctor. This helps you to know what to do if you feel worse than usual.  · Manage any other conditions you have as told by your doctor.  · Avoid going outside when it is very hot, cold, or humid.  · Avoid people who have a sickness you can catch (contagious).  · Keep all follow-up visits as told by your doctor. This is important.  Contact a doctor if:  · You cough up more mucus than usual.  · There is a change in the color or thickness of the mucus.  · It is harder to breathe than usual.  · Your breathing is faster than  usual.  · You have trouble sleeping.  · You need to use your medicines more often than usual.  · You have trouble doing your normal activities such as getting dressed or walking around the house.  Get help right away if:  · You have shortness of breath while resting.  · You have shortness of breath that stops you from:  ? Being able to talk.  ? Doing normal activities.  · Your chest hurts for longer than 5 minutes.  · Your skin color is more blue than usual.  · Your pulse oximeter shows that you have low oxygen for longer than 5 minutes.  · You have a fever.  · You feel too tired to breathe normally.  Summary  · Chronic obstructive pulmonary disease (COPD) is a long-term lung problem.  · The way your lungs work will never return to normal. Usually the condition gets worse over time. There are things you can do to keep yourself as healthy as possible.  · Take over-the-counter and prescription medicines only as told by your doctor.  · If you smoke, stop. Smoking makes the problem worse.  This information is not intended to replace advice given to you by your health care provider. Make sure you discuss any questions you have with your health care provider.  Document Released: 06/05/2009 Document Revised: 11/30/2018 Document Reviewed: 01/22/2018  [employer] Patient Education © 2020 [employer]      Atrial Fibrillation    Atrial fibrillation is a type of heartbeat that is irregular or fast (rapid). If you have this condition, your heart beats without any order. This makes it hard for your heart to pump blood in a normal way. Having this condition gives you more risk for stroke, heart failure, and other heart problems.  Atrial fibrillation may start all of a sudden and then stop on its own, or it may become a long-lasting problem.  What are the causes?  This condition may be caused by heart conditions, such as:  · High blood pressure.  · Heart failure.  · Heart valve disease.  · Heart surgery.  Other causes  include:  · Pneumonia.  · Obstructive sleep apnea.  · Lung cancer.  · Thyroid disease.  · Drinking too much alcohol.  Sometimes the cause is not known.  What increases the risk?  You are more likely to develop this condition if:  · You smoke.  · You are older.  · You have diabetes.  · You are overweight.  · You have a family history of this condition.  · You exercise often and hard.  What are the signs or symptoms?  Common symptoms of this condition include:  · A feeling like your heart is beating very fast.  · Chest pain.  · Feeling short of breath.  · Feeling light-headed or weak.  · Getting tired easily.  Follow these instructions at home:  Medicines  · Take over-the-counter and prescription medicines only as told by your doctor.  · If your doctor gives you a blood-thinning medicine, take it exactly as told. Taking too much of it can cause bleeding. Taking too little of it does not protect you against clots. Clots can cause a stroke.  Lifestyle         · Do not use any tobacco products. These include cigarettes, chewing tobacco, and e-cigarettes. If you need help quitting, ask your doctor.  · Do not drink alcohol.  · Do not drink beverages that have caffeine. These include coffee, soda, and tea.  · Follow diet instructions as told by your doctor.  · Exercise regularly as told by your doctor.  General instructions  · If you have a condition that causes breathing to stop for a short period of time (apnea), treat it as told by your doctor.  · Keep a healthy weight. Do not use diet pills unless your doctor says they are safe for you. Diet pills may make heart problems worse.  · Keep all follow-up visits as told by your doctor. This is important.  Contact a doctor if:  · You notice a change in the speed, rhythm, or strength of your heartbeat.  · You are taking a blood-thinning medicine and you see more bruising.  · You get tired more easily when you move or exercise.  · You have a sudden change in weight.  Get help  "right away if:    · You have pain in your chest or your belly (abdomen).  · You have trouble breathing.  · You have blood in your vomit, poop, or pee (urine).  · You have any signs of a stroke. \"BE FAST\" is an easy way to remember the main warning signs:  ? B - Balance. Signs are dizziness, sudden trouble walking, or loss of balance.  ? E - Eyes. Signs are trouble seeing or a change in how you see.  ? F - Face. Signs are sudden weakness or loss of feeling in the face, or the face or eyelid drooping on one side.  ? A - Arms. Signs are weakness or loss of feeling in an arm. This happens suddenly and usually on one side of the body.  ? S - Speech. Signs are sudden trouble speaking, slurred speech, or trouble understanding what people say.  ? T - Time. Time to call emergency services. Write down what time symptoms started.  · You have other signs of a stroke, such as:  ? A sudden, very bad headache with no known cause.  ? Feeling sick to your stomach (nausea).  ? Throwing up (vomiting).  ? Jerky movements you cannot control (seizure).  These symptoms may be an emergency. Do not wait to see if the symptoms will go away. Get medical help right away. Call your local emergency services (911 in the [place] Do not drive yourself to the hospital.  Summary  · Atrial fibrillation is a type of heartbeat that is irregular or fast (rapid).  · You are at higher risk of this condition if you smoke, are older, have diabetes, or are overweight.  · Follow your doctor's instructions about medicines, diet, exercise, and follow-up visits.  · Get help right away if you think that you have signs of a stroke.  This information is not intended to replace advice given to you by your health care provider. Make sure you discuss any questions you have with your health care provider.  Document Released: 09/26/2009 Document Revised: 02/21/2019 Document Reviewed: 02/08/2019  [employer] Patient Education © 2020 [employer]    Home Oxygen Use, " Adult  When a medical condition keeps you from getting enough oxygen, your health care provider may instruct you to take extra oxygen at home. Your health care provider will let you know:  · When to take oxygen.  · For how long to take oxygen.  · How quickly oxygen should be delivered (flow rate), in liters per minute (LPM or L/M).  Home oxygen can be given through:  · A mask.  · A nasal cannula. This is a device or tube that goes in the nostrils.  · A transtracheal catheter. This is a small, flexible tube placed in the trachea.  · A tracheostomy. This is a surgically made opening in the trachea.  These devices are connected with tubing to an oxygen source, such as:  · A tank. Tanks hold oxygen in gas form. They must be replaced when the oxygen is used up.  · A liquid oxygen device. This holds oxygen in liquid form. It must be replaced when the oxygen is used up.  · An oxygen concentrator machine. This filters oxygen in the room. It uses electricity, so you must have a backup cylinder of oxygen in case the power goes out.  Supplies needed:  To use oxygen, you will need:  · A mask, nasal cannula, transtracheal catheter, or tracheostomy.  · An oxygen tank, a liquid oxygen device, or an oxygen concentrator.  · The tape that your health care provider recommends (optional).  If you use a transtracheal catheter and your prescribed flow rate is 1 LPM or greater, you will also need a humidifier.  Risks and complications  · Fire. This can happen if the oxygen is exposed to a heat source, flame, or spark.  · Injury to skin. This can happen if liquid oxygen touches your skin.  · Organ damage. This can happen if you get too little oxygen.  How to use oxygen  Your health care provider or a representative from your medical device company will show you how to use your oxygen device. Follow her or his instructions. The instructions may look something like this:  1. Wash your hands.  2. If you use an oxygen concentrator, make sure  "it is plugged in.  3. Place one end of the tube into the port on the tank, device, or machine.  4. Place the mask over your nose and mouth. Or, place the nasal cannula and secure it with tape if instructed. If you use a tracheostomy or transtracheal catheter, connect it to the oxygen source as directed.  5. Make sure the liter-flow setting on the machine is at the level prescribed by your health care provider.  6. Turn on the machine or adjust the knob on the tank or device to the correct liter-flow setting.  7. When you are done, turn off and unplug the machine, or turn the knob to OFF.  How to clean and care for the oxygen supplies  Nasal cannula  · Clean it with a warm, wet cloth daily or as needed.  · Wash it with a liquid soap once a week.  · Rinse it thoroughly once or twice a week.  · Replace it every 2-4 weeks.  · If you have an infection, such as a cold or pneumonia, change the cannula when you get better.  Mask  · Replace it every 2-4 weeks.  · If you have an infection, such as a cold or pneumonia, change the mask when you get better.  Humidifier bottle  · Wash the bottle between each refill:  ? Wash it with soap and warm water.  ? Rinse it thoroughly.  ? Disinfect it and its top.  ? Air-dry it.  · Make sure it is dry before you refill it.  Oxygen concentrator  · Clean the air filter at least twice a week according to directions from your home medical equipment and service company.  · Wipe down the cabinet every day. To do this:  ? Unplug the unit.  ? Wipe down the cabinet with a damp cloth.  ? Dry the cabinet.  Other equipment  · Change any extra tubing every 1-3 months.  · Follow instructions from your health care provider about taking care of any other equipment.  Safety tips  Fire safety tips    · Keep your oxygen and oxygen supplies at least 5 ft away from sources of heat, flames, and evangelista at all times.  · Do not allow smoking near your oxygen. Put up \"no smoking\" signs in your home. Avoid smoking " areas when in public.  · Do not use materials that can burn (are flammable) while you use oxygen.  · When you go to a restaurant with portable oxygen, ask to be seated in the nonsmoking section.  · Keep a fire extinguisher close by. Let your fire department know that you have oxygen in your home.  · Test your home smoke detectors regularly.  Traveling  · Secure your oxygen tank in the vehicle so that it does not move around. Follow instructions from your medical device company about how to safely secure your tank.  · Make sure you have enough oxygen for the amount of time you will be away from home.  · If you are planning air travel, contact the airline to find out if they allow the use of an approved portable oxygen concentrator. You may also need documents from your health care provider and medical device company before you travel.  General safety tips  · If you use an oxygen cylinder, make sure it is in a stand or secured to an object that will not move (fixed object).  · If you use liquid oxygen, make sure its container is kept upright.  · If you use an oxygen concentrator:  ? Tell your electric company. Make sure you are given priority service in the event that your power goes out.  ? Avoid using extension cords, if possible.  Follow these instructions at home:  · Use oxygen only as told by your health care provider.  · Do not use alcohol or other drugs that make you relax (sedating drugs) unless instructed. They can slow down your breathing rate and make it hard to get in enough oxygen.  · Know how and when to order a refill of oxygen.  · Always keep a spare tank of oxygen. Plan ahead for holidays when you may not be able to get a prescription filled.  · Use water-based lubricants on your lips or nostrils. Do not use oil-based products like petroleum jelly.  · To prevent skin irritation on your cheeks or behind your ears, tuck some gauze under the tubing.  Contact a health care provider if:  · You get  headaches often.  · You have shortness of breath.  · You have a lasting cough.  · You have anxiety.  · You are sleepy all the time.  · You develop an illness that affects your breathing.  · You cannot exercise at your regular level.  · You are restless.  · You have difficult or irregular breathing, and it is getting worse.  · You have a fever.  · You have persistent redness under your nose.  Get help right away if:  · You are confused.  · You have blue lips or fingernails.  · You are struggling to breathe.  Summary  · Your health care provider or a representative from your medical device company will show you how to use your oxygen device. Follow her or his instructions.  · If you use an oxygen concentrator, make sure it is plugged in.  · Make sure the liter-flow setting on the machine is at the level prescribed by your health care provider.  · Keep your oxygen and oxygen supplies at least 5 ft away from sources of heat, flames, and evangelista at all times.  This information is not intended to replace advice given to you by your health care provider. Make sure you discuss any questions you have with your health care provider.  Document Released: 03/09/2005 Document Revised: 06/06/2019 Document Reviewed: 07/11/2017  [employer] Patient Education © 2020 [employer]      Understanding Your Risk for Falls  Each year, millions of people suffer serious injuries from falls. It is important to understand your risk for falling. Talk with your health care provider about your risk and what you can do to lower it. There are actions you can take at home to lower your risk.  If you do have a serious fall, it is important to tell your health care provider. Falling once raises your risk for falling again.  How can falls affect me?  Serious injuries from falls are common. These include:  · Broken bones. Most hip fractures are caused by falls.  · Traumatic brain injury (TBI). Falls are the most common cause of TBI.  Fear of falling can  also cause you to avoid activities and stay at home. This can make your muscles weaker and actually raise your risk for a fall.  What can increase my risk?  Serious injuries from a fall most often happen to people older than age 65. Children and young adults ages 15-29 are also at higher risk. The more risk factors you have for falling, the higher your risk. Risk factors include:  · Weakness in the lower body.  · Lack (deficiency) of vitamin D.  · Weak bones (osteoporosis).  · Being generally weak or confused due to long-term (chronic) illness.  · Dizziness or balance problems.  · Poor vision.  · Having depression.  · Medicine that causes dizziness or drowsiness. These can include medicines for your blood pressure, heart, anxiety, insomnia, or edema, as well as pain medicines and muscle relaxants.  · Drinking alcohol.  · Foot pain or improper footwear.  · Working at a dangerous job.  · Having had a fall in the past.  · Tripping hazards at home, such as floor clutter or loose rugs, or poor lighting.  · Having pets or clutter in your home.  What actions can I take to lower my risk of falling?         · Maintain physical fitness:  ? Do strength and balance exercises. Consider taking a regular class to build strength and balance. Yoga and valente chi are good options.  ? Have your eyes checked every year and your vision prescription updated as needed.  · Remove all clutter from walkways and stairways, including extension cords.  · Use a cordless phone.  · Do not use throw rugs. Make sure all carpeting is taped or tacked down securely.  · Use good lighting in all rooms. Keep a flashlight near your bed.  · Make sure there is a clear path from your bed to the bathroom. Use night-lights.  · Install grab bars for your tub, shower, and toilet. Use a bath mat in your tub or shower.  · Attach secure railings on both sides of your stairs.  · Repair uneven or broken steps.  · Use a cane or walker as directed by your health care  provider.  · Wear nonskid shoes. Do not wear high heels. Do not walk around the house in socks or slippers.  · Avoid walking on icy or slippery surfaces. Walk on the grass instead of on icy or slick sidewalks. Where you can, use ice melt to get rid of ice on walkways.  Questions to ask your health care provider  · Can you help me evaluate my risk for a fall?  · Do any of my medicines make me more likely to fall?  · Should I take a vitamin D supplement?  · What exercises can I do to improve my strength and balance?  · Should I make an appointment to have my vision checked?  · Do I need a bone density test to check for osteoporosis?  · Would it help to use a cane or a walker?  Where to find more information  · Centers for Disease Control and Prevention, STEADI: cdc.gov  · Community-Based Fall Prevention Programs: cdc.gov  · National [place] on Aging: lm0ktrk.jacqueline.nih.gov  Contact a health care provider if:  · You fall at home.  · You are afraid of falling at home.  · You feel weak, drowsy, or dizzy at home.  Summary  · People 65 and older are at high risk for falling. However, older people are not the only ones injured in falls. Children and young adults have a higher-than-normal risk, too.  · Talk with your health care provider about your risks for falling and how to lower those risks.  · Taking certain precautions at home can lower your risk for falling.  · If you fall, always tell your health care provider.  This information is not intended to replace advice given to you by your health care provider. Make sure you discuss any questions you have with your health care provider.  Document Released: 08/01/2018 Document Revised: 03/19/2019 Document Reviewed: 08/01/2018  Elsevier Patient Education © 2020 [employer].      Fall Prevention in the Home, Adult  Falls can cause injuries. They can happen to people of all ages. There are many things you can do to make your home safe and to help prevent falls. Ask for help  when making these changes, if needed.  What actions can I take to prevent falls?  General Instructions  · Use good lighting in all rooms. Replace any light bulbs that burn out.  · Turn on the lights when you go into a dark area. Use night-lights.  · Keep items that you use often in easy-to-reach places. Lower the shelves around your home if necessary.  · Set up your furniture so you have a clear path. Avoid moving your furniture around.  · Do not have throw rugs and other things on the floor that can make you trip.  · Avoid walking on wet floors.  · If any of your floors are uneven, fix them.  · Add color or contrast paint or tape to clearly brittaney and help you see:  ? Any grab bars or handrails.  ? First and last steps of stairways.  ? Where the edge of each step is.  · If you use a stepladder:  ? Make sure that it is fully opened. Do not climb a closed stepladder.  ? Make sure that both sides of the stepladder are locked into place.  ? Ask someone to hold the stepladder for you while you use it.  · If there are any pets around you, be aware of where they are.  What can I do in the bathroom?         · Keep the floor dry. Clean up any water that spills onto the floor as soon as it happens.  · Remove soap buildup in the tub or shower regularly.  · Use non-skid mats or decals on the floor of the tub or shower.  · Attach bath mats securely with double-sided, non-slip rug tape.  · If you need to sit down in the shower, use a plastic, non-slip stool.  · Install grab bars by the toilet and in the tub and shower. Do not use towel bars as grab bars.  What can I do in the bedroom?  · Make sure that you have a light by your bed that is easy to reach.  · Do not use any sheets or blankets that are too big for your bed. They should not hang down onto the floor.  · Have a firm chair that has side arms. You can use this for support while you get dressed.  What can I do in the kitchen?  · Clean up any spills right away.  · If you  need to reach something above you, use a strong step stool that has a grab bar.  · Keep electrical cords out of the way.  · Do not use floor polish or wax that makes floors slippery. If you must use wax, use non-skid floor wax.  What can I do with my stairs?  · Do not leave any items on the stairs.  · Make sure that you have a light switch at the top of the stairs and the bottom of the stairs. If you do not have them, ask someone to add them for you.  · Make sure that there are handrails on both sides of the stairs, and use them. Fix handrails that are broken or loose. Make sure that handrails are as long as the stairways.  · Install non-slip stair treads on all stairs in your home.  · Avoid having throw rugs at the top or bottom of the stairs. If you do have throw rugs, attach them to the floor with carpet tape.  · Choose a carpet that does not hide the edge of the steps on the stairway.  · Check any carpeting to make sure that it is firmly attached to the stairs. Fix any carpet that is loose or worn.  What can I do on the outside of my home?  · Use bright outdoor lighting.  · Regularly fix the edges of walkways and driveways and fix any cracks.  · Remove anything that might make you trip as you walk through a door, such as a raised step or threshold.  · Trim any bushes or trees on the path to your home.  · Regularly check to see if handrails are loose or broken. Make sure that both sides of any steps have handrails.  · Install guardrails along the edges of any raised decks and porches.  · Clear walking paths of anything that might make someone trip, such as tools or rocks.  · Have any leaves, snow, or ice cleared regularly.  · Use sand or salt on walking paths during winter.  · Clean up any spills in your garage right away. This includes grease or oil spills.  What other actions can I take?  · Wear shoes that:  ? Have a low heel. Do not wear high heels.  ? Have rubber bottoms.  ? Are comfortable and fit you  well.  ? Are closed at the toe. Do not wear open-toe sandals.  · Use tools that help you move around (mobility aids) if they are needed. These include:  ? Canes.  ? Walkers.  ? Scooters.  ? Crutches.  · Review your medicines with your doctor. Some medicines can make you feel dizzy. This can increase your chance of falling.  Ask your doctor what other things you can do to help prevent falls.  Where to find more information  · Centers for Disease Control and Prevention, STEADI: https://cdc.gov  · National [place] on Aging: https://ot8smon.jacqueline.nih.gov  Contact a doctor if:  · You are afraid of falling at home.  · You feel weak, drowsy, or dizzy at home.  · You fall at home.  Summary  · There are many simple things that you can do to make your home safe and to help prevent falls.  · Ways to make your home safe include removing tripping hazards and installing grab bars in the bathroom.  · Ask for help when making these changes in your home.  This information is not intended to replace advice given to you by your health care provider. Make sure you discuss any questions you have with your health care provider.  Document Released: 10/14/2010 Document Revised: 04/09/2020 Document Reviewed: 08/02/2018  Elsevier Patient Education © 2020 [employer]    Apixaban oral tablets  What is this medicine?  APIXABAN (a PIX a ban) is an anticoagulant (blood thinner). It is used to lower the chance of stroke in people with a medical condition called atrial fibrillation. It is also used to treat or prevent blood clots in the lungs or in the veins.  This medicine may be used for other purposes; ask your health care provider or pharmacist if you have questions.  COMMON BRAND NAME(S): Eliquis  What should I tell my health care provider before I take this medicine?  They need to know if you have any of these conditions:  · antiphospholipid antibody syndrome  · bleeding disorders  · bleeding in the brain  · blood in your stools (black or  tarry stools) or if you have blood in your vomit  · history of blood clots  · history of stomach bleeding  · kidney disease  · liver disease  · mechanical heart valve  · an unusual or allergic reaction to apixaban, other medicines, foods, dyes, or preservatives  · pregnant or trying to get pregnant  · breast-feeding  How should I use this medicine?  Take this medicine by mouth with a glass of water. Follow the directions on the prescription label. You can take it with or without food. If it upsets your stomach, take it with food. Take your medicine at regular intervals. Do not take it more often than directed. Do not stop taking except on your doctor's advice. Stopping this medicine may increase your risk of a blood clot. Be sure to refill your prescription before you run out of medicine.  Talk to your pediatrician regarding the use of this medicine in children. Special care may be needed.  Overdosage: If you think you have taken too much of this medicine contact a poison control center or emergency room at once.  NOTE: This medicine is only for you. Do not share this medicine with others.  What if I miss a dose?  If you miss a dose, take it as soon as you can. If it is almost time for your next dose, take only that dose. Do not take double or extra doses.  What may interact with this medicine?  This medicine may interact with the following:  · aspirin and aspirin-like medicines  · certain medicines for fungal infections like ketoconazole and itraconazole  · certain medicines for seizures like carbamazepine and phenytoin  · certain medicines that treat or prevent blood clots like warfarin, enoxaparin, and dalteparin  · clarithromycin  · NSAIDs, medicines for pain and inflammation, like ibuprofen or naproxen  · rifampin  · ritonavir  · Miri's wort  This list may not describe all possible interactions. Give your health care provider a list of all the medicines, herbs, non-prescription drugs, or dietary supplements  you use. Also tell them if you smoke, drink alcohol, or use illegal drugs. Some items may interact with your medicine.  What should I watch for while using this medicine?  Visit your healthcare professional for regular checks on your progress. You may need blood work done while you are taking this medicine. Your condition will be monitored carefully while you are receiving this medicine. It is important not to miss any appointments.  Avoid sports and activities that might cause injury while you are using this medicine. Severe falls or injuries can cause unseen bleeding. Be careful when using sharp tools or knives. Consider using an electric razor. Take special care brushing or flossing your teeth. Report any injuries, bruising, or red spots on the skin to your healthcare professional.  If you are going to need surgery or other procedure, tell your healthcare professional that you are taking this medicine.  Wear a medical ID bracelet or chain. Carry a card that describes your disease and details of your medicine and dosage times.  What side effects may I notice from receiving this medicine?  Side effects that you should report to your doctor or health care professional as soon as possible:  · allergic reactions like skin rash, itching or hives, swelling of the face, lips, or tongue  · signs and symptoms of bleeding such as bloody or black, tarry stools; red or dark-brown urine; spitting up blood or brown material that looks like coffee grounds; red spots on the skin; unusual bruising or bleeding from the eye, gums, or nose  · signs and symptoms of a blood clot such as chest pain; shortness of breath; pain, swelling, or warmth in the leg  · signs and symptoms of a stroke such as changes in vision; confusion; trouble speaking or understanding; severe headaches; sudden numbness or weakness of the face, arm or leg; trouble walking; dizziness; loss of coordination  This list may not describe all possible side effects.  Call your doctor for medical advice about side effects. You may report side effects to FDA at 4-033-CLS-3647.  Where should I keep my medicine?  Keep out of the reach of children.  Store at room temperature between 20 and 25 degrees C (68 and 77 degrees F). Throw away any unused medicine after the expiration date.  NOTE: This sheet is a summary. It may not cover all possible information. If you have questions about this medicine, talk to your doctor, pharmacist, or health care provider.  © 2020 Elsevier/Gold Standard (2019-08-28 17:39:34)

## 2021-10-06 NOTE — ED NOTES
Pt repositioned on cart for comfort, HOB elevated, blankets in place. Pt questions answered, resting with easy, unlabored respirs, remains on home O2 at 2L NC. Hospital bed ordered for patient for comfort, call light within reach. Patient denies additional needs, remains AAOx4. Siderailsx2, cart in low, locked position for safety, lights dimmed for comfort.

## 2021-10-06 NOTE — ED NOTES
Social work at bedside updating patient on pending referrals to skilled nursing. Patient aware of POC, remains on monitoring, resting in room with lights dimmed.

## 2021-10-06 NOTE — DISCHARGE PLANNING
Anticipated Discharge Disposition:   SNF    Action:   Updated PT/OT notes obtained. CM called Yi at [facility] and she will screen pt.     Barriers to Discharge:   Pending SNF acceptance    Plan:   Follow up with DPA.

## 2021-10-06 NOTE — ED NOTES
Patient is resting comfortably, remains on hospital bed, transferred intermittently to chair at bedside per request. Denies additional needs. Call light within reach.

## 2021-10-06 NOTE — ED NOTES
Patient is resting in chair at bedside, pt updated on insurance delay and pending transfer awaiting upon insurance completion. Pt agitated re: delay, notified will update immediately when transport and time arranged. Pt assisted in calling daughter, message left. Pt resting in chair in no distress, call light within reach, offered food/fluids, declined.

## 2021-10-06 NOTE — ED NOTES
Pt resting on cart, patient lunch tray delivered to bedside at this time. Pt consuming without difficulty, awaiting ETA for transfer.

## 2021-10-06 NOTE — DISCHARGE PLANNING
Anticipated Discharge Disposition: SNF-pt was accepted by [facility].     Action:   Pt is agreeable to go to [facility]. Pt requested for CM to notify daughter Hina . CM LVM for daughter.     Per [facility], [facility] working on getting prior auth from Insurance. Email to leaders for [facility].     Daughter returned call and she is agreeable with transfer. Dr. Doe agreeable to discharge. Nursing aware.       Barriers to Discharge:   Pending Insurance Auth    Plan:   Waiting for [facility] to obtain insurance auth.

## 2021-10-06 NOTE — PROGRESS NOTES
AVS reviewed with patient, patient understanding of information. AVS also reviewed with daughter at bedside per patient request. Patient discharged with home o2. Tele removed, PIV removed.

## 2021-10-06 NOTE — DISCHARGE PLANNING
Agency/Facility Name: [facility]  Spoke To: Meri   Outcome: Still waiting on authorization from insurance, pt's insurance usually takes about a day to go through. Will most likely be accepting pt until tomorrow.

## 2021-10-06 NOTE — ED NOTES
Patient is resting comfortably, remains on hospital bed, awaiting breakfast delivery. Pt denies needs, aware of pending plan. Positioned with HOB elevated, call light within reach.

## 2021-10-06 NOTE — PROGRESS NOTES
Med rec complete per MAR and discharge medication list from inpatient stay 9/30/21-10/5/21.  Patient was discharged yesterday but brought back in a few hours later for transfer to rehab facility.     Nadine Ingram, PharmD  PGY1 Pharmacy Practice Resident

## 2021-10-06 NOTE — ED NOTES
Patient is resting comfortably, sleeping on cart, breakfast tray delivered to bedside at this time.

## 2021-10-06 NOTE — DISCHARGE PLANNING
note:  MANNY called Yi at [facility]. She is still waiting for insurance auth. CM will call her before 5 pm to check again.

## 2021-10-06 NOTE — DISCHARGE PLANNING
@[id number]  Agency/Facility Name: [facility]  Spoke To: Meri  Outcome: Pt has been accepted to skilled facility, will get insurance authorization and will call me right back. Updated RN MANNY Prather.

## 2021-10-06 NOTE — THERAPY
"Occupational Therapy   Initial Evaluation     Patient Name: Gary Severino Gil  Age:  76 y.o., Sex:  male  Medical Record #: [id number]  Today's Date: 10/6/2021     Precautions  Precautions: (P) Fall Risk    Assessment  Patient is 76 y.o. male seen in ED following [facility] pt complaining of weakness in BLEs and unable to care for self at home alone. Pt normally able to complete all ADLs and functional mobility with a SPC independently, but over last month pt has noticed increased weakness and multiple falls, pt recently admitted and discharged home after initially refusing skilled placement due to confusion on length of time staying at facility, pt willing and motivated to discharge to facility to increase strength and functional independence prior to returning home.    Plan    Recommend Occupational Therapy 3 times per week until therapy goals are met for the following treatments:  Adaptive Equipment, Self Care/Activities of Daily Living, Therapeutic Activities and Therapeutic Exercises.    DC Equipment Recommendations: (P) Unable to determine at this time  Discharge Recommendations: (P) Recommend post-acute placement for additional occupational therapy services prior to discharge home     Subjective    \"I messed up, I should not have gone home\"     Objective       10/06/21 1058   Prior Living Situation   Prior Services Home-Independent   Housing / Facility 1 Story Apartment / Condo   Bathroom Set up Bathtub / Shower Combination   Equipment Owned Single Point Cane   Lives with - Patient's Self Care Capacity Alone and Able to Care For Self   Comments Pt reports daughter comes by 5x/wk    Prior Level of ADL Function   Self Feeding Independent   Grooming / Hygiene Independent   Bathing Independent   Dressing Independent   Toileting Independent   Prior Level of IADL Function   Medication Management Independent   Laundry Independent   Kitchen Mobility Independent   Finances Independent   Home Management Independent   Shopping " Independent   Prior Level Of Mobility Independent With Device in Community   History of Falls   History of Falls Yes   Date of Last Fall   (mulitple falls prior to admission)   Precautions   Precautions Fall Risk   Pain 0 - 10 Group   Therapist Pain Assessment Post Activity Pain Same as Prior to Activity;Nurse Notified   Cognition    Cognition / Consciousness X   Speech/ Communication Hard of Hearing   Comments Pleasant, cooperative, receptive to therapy   Active ROM Upper Body   Active ROM Upper Body  WDL   Dominant Hand Right   Strength Upper Body   Upper Body Strength  X   Gross Strength Generalized Weakness, Equal Bilaterally.    Sensation Upper Body   Upper Extremity Sensation  WDL   Upper Body Muscle Tone   Upper Body Muscle Tone  WDL   Neurological Concerns   Neurological Concerns No   Coordination Upper Body   Coordination WDL   Balance Assessment   Sitting Balance (Static) Fair   Sitting Balance (Dynamic) Fair   Standing Balance (Static) Fair   Standing Balance (Dynamic) Fair -   Weight Shift Sitting Fair   Weight Shift Standing Fair   Comments w/ SPC    Bed Mobility    Scooting Supervised   Comments up in chair before/after session   ADL Assessment   Grooming Minimal Assist;Standing   Upper Body Dressing Minimal Assist   Lower Body Dressing Maximal Assist   Toileting   (NT-refused need)   How much help from another person does the patient currently need...   Putting on and taking off regular lower body clothing? 2   Bathing (including washing, rinsing, and drying)? 3   Toileting, which includes using a toilet, bedpan, or urinal? 3   Putting on and taking off regular upper body clothing? 3   Taking care of personal grooming such as brushing teeth? 3   Eating meals? 3   6 Clicks Daily Activity Score 17   Functional Mobility   Sit to Stand Minimal Assist   Bed, Chair, Wheelchair Transfer Minimal Assist   Toilet Transfers Minimal Assist   Transfer Method Stand Step   Mobility STS from chair, hallway mobility,  bathroom mobility, back to chair   Comments w/ SPC   Visual Perception   Visual Perception  Not Tested   Activity Tolerance   Sitting in Chair left seated in chair   Standing 10 min   Patient / Family Goals   Patient / Family Goal #1 To get stronger   Short Term Goals   Short Term Goal # 1 Pt will complete ADL transfers with supervision   Short Term Goal # 2 Pt will complete LB dressing with supervision   Short Term Goal # 3 Pt will complete toileting with supervisoin   Education Group   Education Provided Role of Occupational Therapist   Role of Occupational Therapist Patient Response Patient;Acceptance;Explanation   Problem List   Problem List Decreased Active Daily Living Skills;Decreased Homemaking Skills;Decreased Upper Extremity Strength Right;Decreased Upper Extremity Strength Left;Decreased Functional Mobility;Decreased Activity Tolerance;Impaired Postural Control / Balance   Interdisciplinary Plan of Care Collaboration   IDT Collaboration with  Nursing;Physical Therapist   Patient Position at End of Therapy Seated;Tray Table within Reach;Call Light within Reach;Phone within Reach   Collaboration Comments RN updated

## 2021-10-06 NOTE — DISCHARGE SUMMARY
"  ED Observation Discharge Summary    Patient:Gary Severino Gil  Patient : 1945  Patient MRN: [id number]  Patient PCP: Edwin Valentin M.D.    Admit Date: 10/5/2021  Discharge Date and Time: 10/06/21 1:37 PM  Discharge Diagnosis: weakness  Discharge Attending: Linda Doe M.D.  Discharge Service: ED Observation    ED Course  Vlad is a 76 y.o. male who was evaluated at [facility] for weakness and falls.  He has been accepted at [facility] and has had no interim events since I saw him this morning.    Discharge Exam:  /58   Pulse (!) 118   Temp 36.5 °C (97.7 °F) (Temporal)   Resp (!) 31   Ht 1.6 m (5' 3\")   Wt 89 kg (196 lb 3.4 oz)   SpO2 94%   BMI 34.76 kg/m² .    Constitutional: Awake and alert. Nontoxic  HENT:  Grossly normal  Eyes: Grossly normal  Neck: Normal range of motion  Cardiovascular: Irregularly irregular rhythm tachycardic  Thorax & Lungs: No respiratory distress  Abdomen: Nontender  Skin:  No pathologic rash.   Extremities: Well perfused  Psychiatric: Affect normal    Labs  Results for orders placed or performed during the hospital encounter of 10/05/21   CBC WITH DIFFERENTIAL   Result Value Ref Range    WBC 17.9 (H) 4.8 - 10.8 K/uL    RBC 5.80 4.70 - 6.10 M/uL    Hemoglobin 16.5 14.0 - 18.0 g/dL    Hematocrit 51.1 42.0 - 52.0 %    MCV 88.1 81.4 - 97.8 fL    MCH 28.4 27.0 - 33.0 pg    MCHC 32.3 (L) 33.7 - 35.3 g/dL    RDW 50.6 (H) 35.9 - 50.0 fL    Platelet Count 185 164 - 446 K/uL    MPV 11.1 9.0 - 12.9 fL    Neutrophils-Polys 91.20 (H) 44.00 - 72.00 %    Lymphocytes 2.10 (L) 22.00 - 41.00 %    Monocytes 4.60 0.00 - 13.40 %    Eosinophils 0.10 0.00 - 6.90 %    Basophils 0.30 0.00 - 1.80 %    Immature Granulocytes 1.70 (H) 0.00 - 0.90 %    Nucleated RBC 0.00 /100 WBC    Neutrophils (Absolute) 16.33 (H) 1.82 - 7.42 K/uL    Lymphs (Absolute) 0.37 (L) 1.00 - 4.80 K/uL    Monos (Absolute) 0.82 0.00 - 0.85 K/uL    Eos (Absolute) 0.01 0.00 - 0.51 K/uL    Baso (Absolute) 0.06 " 0.00 - 0.12 K/uL    Immature Granulocytes (abs) 0.31 (H) 0.00 - 0.11 K/uL    NRBC (Absolute) 0.00 K/uL   APTT   Result Value Ref Range    APTT 36.7 (H) 24.7 - 36.0 sec   PROTHROMBIN TIME (INR)   Result Value Ref Range    PT 14.6 12.0 - 14.6 sec    INR 1.17 (H) 0.87 - 1.13   Basic Metabolic Panel   Result Value Ref Range    Sodium 140 135 - 145 mmol/L    Potassium 5.1 3.6 - 5.5 mmol/L    Chloride 99 96 - 112 mmol/L    Co2 32 20 - 33 mmol/L    Glucose 98 65 - 99 mg/dL    Bun 12 8 - 22 mg/dL    Creatinine 0.44 (L) 0.50 - 1.40 mg/dL    Calcium 8.3 (L) 8.5 - 10.5 mg/dL    Anion Gap 9.0 7.0 - 16.0   ESTIMATED GFR   Result Value Ref Range    GFR If African American >60 >60 mL/min/1.73 m 2    GFR If Non African American >60 >60 mL/min/1.73 m 2   EKG (NOW)   Result Value Ref Range    Report       [facility] Emergency Dept[facility]    Test Date:  2021-10-06  Pt Name:    CARLOS GO                     Department: ER  MRN:        [id number]                      Room:        03  Gender:     Male                         Technician: [id number]  :        1945                   Requested By:ASTER LEBLANC  Order #:    [id number]                    Reading MD: SONIDO CHAVEZ MD    Measurements  Intervals                                Axis  Rate:       93                           P:          60  VT:         136                          QRS:        73  QRSD:       94                           T:          -13  QT:         348  QTc:        433    Interpretive Statements  SINUS RHYTHM  MULTIPLE PREMATURE COMPLEXES, VENT & SUPRAVEN  BORDERLINE LOW VOLTAGE IN FRONTAL LEADS  BORDERLINE T ABNORMALITIES, INFERIOR LEADS  Compared to ECG 10/01/2021 10:16:20  Atrial fibrillation no longer present  Ventricular premature complex(es) no longer present  T-wave abnormality still present  El ectronically Signed On 10-6-2021 13:37:05 PDT by SONIDO CHAVEZ MD         Radiology  CT-HEAD W/O   Final Result         1.  No acute intracranial  abnormality is identified, there are nonspecific white matter changes, commonly associated with small vessel ischemic disease.  Associated mild cerebral atrophy is noted.   2.  Atherosclerosis.          Disposition: discharged from ED obs at 2pm 10/6/2021 to [facility]    Follow up: Dr Garcia  Medications:   New Prescriptions    No medications on file       Discharge Condition: Stable    Electronically signed by: Linda Doe M.D., 10/6/2021 1:37 PM

## 2021-10-06 NOTE — DISCHARGE PLANNING
Agency/Facility Name: [facility], and [facility]    Outcome: Declined, no skilled need. Per RN MANNY Prather pt was advised he needed skilled nursing services on previous admission. Resent referral with previous admission notes at 0910. Will follow up.    Agency/Facility Name: [facility]  Spoke To: Chante  Outcome: Declined, non contracted insurance provider.

## 2021-10-06 NOTE — ED NOTES
Patient is resting comfortably, remains on home O2, positioned for comfort, resting in no distress. Call light within reach. ERP notified of request for home medication reorder.

## 2021-10-06 NOTE — ED TRIAGE NOTES
"Chief Complaint   Patient presents with   • Weakness   • Fall     75 yo male BIB EMS in wheelchair to triage for above complaint. Pt was discharged earlier today after refusing to go to rehab facility, called EMS because he was too weak to get off of his toilet and now would like to be transferred to rehab. Pt also reports he slipped on some gravel and fell onto his bottom earlier today, denies hitting head, - LOC, denies back or tailbone pain. AOx4, GCS 15, wears 3L at baseline.     Educated on triage process, encourage to inform staff of any changes.     /77   Pulse (!) 123   Temp 36.1 °C (96.9 °F) (Temporal)   Resp (!) 22   Ht 1.6 m (5' 3\")   Wt 89 kg (196 lb 3.4 oz)   SpO2 92%   BMI 34.76 kg/m²   "

## 2021-10-06 NOTE — DISCHARGE PLANNING
Received Choice form at 2428  Agency/Facility Name: [facility]  Referral sent per Choice form @ 2680

## 2021-10-06 NOTE — ED NOTES
Pt transferred onto hospital bed, positioned for comfort. HOB elevated, call light within reach, positioned with clean linens, blankets, pillows in place. Pt voids in urinal while standing at bedside, pt requires 1-2 staff assist for transfer and standing. Remains on O2 at home dose of 2L via NC. Patient denies additional needs.

## 2021-10-06 NOTE — DISCHARGE PLANNING
All SNF declined but [facility] CM spoke to [facility] and adv that pt has new PT notes. Referral will be reviewed, will follow up.

## 2021-10-06 NOTE — DISCHARGE PLANNING
Medical Social Work    MSW reviewed pt's chart.  Pt was discharged from inpatient approximately 2 hours before coming back into the ER.  Per review PT/OT recommended skilled care for pt prior to returning home and pt declined.  Pt returned home and was too weak to care for self.  Pt had PT/OT evals 3 days ago.  Referrals will need to be sent to SNF's for placement.

## 2021-10-06 NOTE — ED NOTES
Patient is resting comfortably, sitting in chair, ambulates to restroom & back with upright gait with cane and standby assist.

## 2021-10-06 NOTE — THERAPY
"Physical Therapy   Initial Evaluation     Patient Name: Gary Severino Gil  Age:  76 y.o., Sex:  male  Medical Record #: [id number]  Today's Date: 10/6/2021     Precautions  Precautions: Fall Risk    Assessment  Patient is 76 y.o. male that presented to acute after being BIB EMS when patient was unable to get off toilet. He was recently DC home 10/5 from [facility] after patient refused recommendation for post acute placement. Several recent falls per chart and patient. He presented to PT with impaired balance, functional weakness, and decreased activity tolerance which are limiting his ability to safely perform mobility at [facility]. He required min A for ambulation with SPC and patient was initially reaching out for support so HHA provided to other hand (no FWW readily available). Will follow.    Plan    Recommend Physical Therapy 3 times per week until therapy goals are met for the following treatments:  Bed Mobility, Community Re-integration, Equipment, Gait Training, Manual Therapy, Neuro Re-Education / Balance, Self Care/Home Evaluation, Therapeutic Activities and Therapeutic Exercises    DC Equipment Recommendations: Unable to determine at this time, Front-Wheel Walker  Discharge Recommendations: Recommend post-acute placement for additional physical therapy services prior to discharge home       Subjective    \"I made a big mistake.\"     Objective       10/06/21 1106   Total Time Spent   Total Time Spent (Mins) 21   Charge Group   PT Evaluation PT Evaluation Low   Initial Contact Note    Initial Contact Note Order Received and Verified, Physical Therapy Evaluation in Progress with Full Report to Follow.   Precautions   Precautions Fall Risk   Vitals   O2 (LPM) 2   O2 Delivery Device Silicone Nasal Cannula   Pain 0 - 10 Group   Therapist Pain Assessment   (no pain complaint during session)   Prior Living Situation   Prior Services None   Housing / Facility 1 Story Apartment / Condo   Steps Into Home 0   Steps In Home 0 "   Equipment Owned Single Point Cane   Lives with - Patient's Self Care Capacity Alone and Able to Care For Self   Comments Patient reported his daughter lives nearby but works, he reported independence prior to recent onset of weakness   Prior Level of Functional Mobility   Bed Mobility Independent   Transfer Status Independent   Ambulation Independent   Distance Ambulation (Feet)   (community)   Assistive Devices Used Single Point Cane   History of Falls   History of Falls Yes  (multiple recent falls)   Cognition    Cognition / Consciousness X   Speech/ Communication Hard of Hearing   Safety Awareness Impaired   New Learning Impaired   Comments pleasant, cooperative   Passive ROM Lower Body   Passive ROM Lower Body WDL   Comments not formally tested, WFL for mobility   Active ROM Lower Body    Active ROM Lower Body  WDL   Comments as above   Strength Lower Body   Lower Body Strength  X   Comments gross BLE weakness, functional for mobility   Sensation Lower Body   Lower Extremity Sensation   Not Tested   Lower Body Muscle Tone   Lower Body Muscle Tone  WDL   Coordination Lower Body    Coordination Lower Body  WDL   Balance Assessment   Sitting Balance (Static) Fair +   Sitting Balance (Dynamic) Fair   Standing Balance (Static) Fair   Standing Balance (Dynamic) Fair -   Weight Shift Sitting Fair   Weight Shift Standing Fair   Comments with SPC and HHA, no overt LOB   Gait Analysis   Gait Level Of Assist Minimal Assist   Assistive Device Single Point Cane   Distance (Feet) 35   # of Times Distance was Traveled 2   Deviation   (inconsistent MEREDITH and step length)   # of Stairs Climbed 0   Weight Bearing Status no restrictions   Vision Deficits Impacting Mobility NT   Bed Mobility    Supine to Sit   (NT, sitting in chair pre and post)   Scooting Supervised  (seated)   Functional Mobility   Sit to Stand Minimal Assist   Bed, Chair, Wheelchair Transfer Minimal Assist   Toilet Transfers Minimal Assist   Transfer Method  Stand Step   How much difficulty does the patient currently have...   Turning over in bed (including adjusting bedclothes, sheets and blankets)? 3   Sitting down on and standing up from a chair with arms (e.g., wheelchair, bedside commode, etc.) 3   Moving from lying on back to sitting on the side of the bed? 3   How much help from another person does the patient currently need...   Moving to and from a bed to a chair (including a wheelchair)? 3   Need to walk in a hospital room? 3   Climbing 3-5 steps with a railing? 2   6 clicks Mobility Score 17   Activity Tolerance   Sitting in Chair in chair pre and post   Sitting Edge of Bed NT   Standing 10 min   Comments no overt pain, dizziness. Increased WOB with activity and fatigue at end of session   Edema / Skin Assessment   Edema / Skin  Not Assessed   Comments patient report melanoma   Patient / Family Goals    Patient / Family Goal #1 get better   Short Term Goals    Short Term Goal # 1 Patient will move supine<>sitting EOB with supervision within 6tx in order to get in/out of bed   Short Term Goal # 2 Patient will move sitting<>standing with supervision within 6tx in order to initiate gait and transfers   Short Term Goal # 3 Patient will ambulate 150ft with supervision within 6tx in order to access environment   Education Group   Education Provided Role of Physical Therapist   Role of Physical Therapist Patient Response Patient;Acceptance;Explanation;Verbal Demonstration   Problem List    Problems Impaired Bed Mobility;Impaired Transfers;Impaired Ambulation;Functional Strength Deficit;Impaired Balance;Decreased Activity Tolerance   Anticipated Discharge Equipment and Recommendations   DC Equipment Recommendations Unable to determine at this time;Front-Wheel Walker   Discharge Recommendations Recommend post-acute placement for additional physical therapy services prior to discharge home   Interdisciplinary Plan of Care Collaboration   IDT Collaboration with   Nursing;Occupational Therapist   Patient Position at End of Therapy Seated;Call Light within Reach   Collaboration Comments RN aware of visit, response   Session Information   Date / Session Number  10/6-1 (1/3, 10/12)   Priority   (.)

## 2021-10-06 NOTE — ED NOTES
Patient is resting on hospital bed, patient is repositioned in bed and sitting comfortably, consuming breakfast tray. Pt aware of pending placement. Denies needs, call light within reach.

## 2021-10-06 NOTE — ED PROVIDER NOTES
ER Provider Note     Scribed for Johny Pena M.D. by Kalyn Hameed. 10/5/2021, 11:58 PM.    Primary Care Provider: Edwin Valentin M.D.  Means of Arrival: EMS   History obtained from: Patient  History limited by: None     CHIEF COMPLAINT  Chief Complaint   Patient presents with    Weakness    Fall       HPI  Gary Severino Gil is a 76 y.o. male who presents to the Emergency Department via EMS for evaluation following a fall that occurred prior to arrival. Earlier today he slipped on some gravel and fell because his legs gave out. He did not lose consciousness, but did hit the back of his head on the ground. He was discharged earlier today after declining to go to a rehab facility. Tonight he called EMS because he was feeling too weak to get off the toilet. This weakness is unchanged from prior, however he now feels that he should not have gone home and would like to go to a rehab facility. No back or neck pain.     REVIEW OF SYSTEMS  See HPI for further details. All other systems are negative.     PAST MEDICAL HISTORY   has a past medical history of Acute hypoxemic respiratory failure (HCC) (10/2/2021), Chronic obstructive pulmonary disease (HCC), MRSA (methicillin resistant Staphylococcus aureus), Productive cough, and Snoring.    SURGICAL HISTORY   has a past surgical history that includes imelda rectal abscess incision and drainage (4/6/2009); rectal exploration (9/3/2009); debridement (9/3/2009); rectal exploration (11/17/2009); debridement (11/17/2009); lumbar fusion anterior (1983); lumbar fusion posterior (1987); flap graft (6/1/2010); and fistula in ano repair (6/1/2010).    SOCIAL HISTORY  Social History     Tobacco Use    Smoking status: Former Smoker     Packs/day: 1.50     Years: 45.00     Pack years: 67.50     Types: Cigarettes     Quit date: 10/27/2017     Years since quitting: 3.9    Smokeless tobacco: Never Used   Substance Use Topics    Alcohol use: Yes     Alcohol/week: 8.0 oz     Types: 16  "Standard drinks or equivalent per week     Comment: noted 4 per day    Drug use: No      Social History     Substance and Sexual Activity   Drug Use No       FAMILY HISTORY  Family History   Problem Relation Age of Onset    Stroke Unknown        CURRENT MEDICATIONS  Home Medications       Reviewed by Feliz Hall R.N. (Registered Nurse) on 10/05/21 at 2107  Med List Status: <None>     Medication Last Dose Status   apixaban (ELIQUIS) 5mg Tab  Active   doxycycline (VIBRAMYCIN) 100 MG Tab  Active   melatonin 5 mg Tab  Active   oxyCODONE-acetaminophen (PERCOCET-10)  MG Tab  Active   pregabalin (LYRICA) 75 MG Cap  Active   TRELEGY ELLIPTA 100-62.5-25 MCG/INH AEROSOL POWDER, BREATH ACTIVATED  Active                    ALLERGIES  Allergies   Allergen Reactions    Morphine Sulfate Unspecified     Hallucinations        PHYSICAL EXAM  VITAL SIGNS: /76   Pulse 81   Temp 36.5 °C (97.7 °F) (Temporal)   Resp 20   Ht 1.6 m (5' 3\")   Wt 89 kg (196 lb 3.4 oz)   SpO2 96%   BMI 34.76 kg/m²      Constitutional: Alert in no apparent distress.  HENT: No signs of trauma, Bilateral external ears normal, Nose normal.   Eyes: Pupils are equal and reactive, Conjunctiva normal, Non-icteric.   Neck: Normal range of motion, No tenderness, Supple, No stridor.   Lymphatic: No lymphadenopathy noted.   Cardiovascular: Regular rate and rhythm, no palpable thrill  Thorax & Lungs: Has supplemental oxygen on. No respiratory distress,  No chest tenderness.   Abdomen: Bowel sounds normal, Soft, No tenderness, No masses, No pulsatile masses. No peritoneal signs.  Skin: Warm, Dry, No erythema, No rash.   Back: No bony tenderness, No CVA tenderness.   Extremities: Intact distal pulses, No edema, No tenderness, No cyanosis.  Musculoskeletal: Some scrapes and bruising noted on arms. Good range of motion in all major joints. No tenderness to palpation or major deformities noted.   Neurologic: Alert , Normal motor function, Normal " sensory function, No focal deficits noted.   Psychiatric: Affect normal, Judgment normal, Mood normal.     DIAGNOSTIC STUDIES / PROCEDURES    EKG Interpretation:  Interpreted by me    12 Lead EKG interpreted by me to show:  Normal sinus rhythm  Rate 93  Isolated T wave inversion in lead 3  1 PVC noted  No ST elevation or depression  My impression of this EKG: Does not indicate ischemia or arrhythmia at this time. No significant change from prior    LABS  Labs Reviewed   CBC WITH DIFFERENTIAL - Abnormal; Notable for the following components:       Result Value    WBC 17.9 (*)     MCHC 32.3 (*)     RDW 50.6 (*)     Neutrophils-Polys 91.20 (*)     Lymphocytes 2.10 (*)     Immature Granulocytes 1.70 (*)     Neutrophils (Absolute) 16.33 (*)     Lymphs (Absolute) 0.37 (*)     Immature Granulocytes (abs) 0.31 (*)     All other components within normal limits    Narrative:     Indicate which anticoagulants the patient is on:->UNKNOWN   APTT - Abnormal; Notable for the following components:    APTT 36.7 (*)     All other components within normal limits    Narrative:     Indicate which anticoagulants the patient is on:->UNKNOWN   PROTHROMBIN TIME - Abnormal; Notable for the following components:    INR 1.17 (*)     All other components within normal limits    Narrative:     Indicate which anticoagulants the patient is on:->UNKNOWN   BASIC METABOLIC PANEL - Abnormal; Notable for the following components:    Creatinine 0.44 (*)     Calcium 8.3 (*)     All other components within normal limits    Narrative:     SPECIMEN IS A RECOLLECT   ESTIMATED GFR    Narrative:     SPECIMEN IS A RECOLLECT     All labs reviewed by me.    RADIOLOGY  CT-HEAD W/O   Final Result         1.  No acute intracranial abnormality is identified, there are nonspecific white matter changes, commonly associated with small vessel ischemic disease.  Associated mild cerebral atrophy is noted.   2.  Atherosclerosis.         The radiologist's interpretation of  all radiological studies have been reviewed by me.    COURSE & MEDICAL DECISION MAKING  Pertinent Labs & Imaging studies reviewed. (See chart for details)    This is a 76 y.o. male that presents with need for a rehabilitation facility.  The patient was discharged yesterday and refused to go to this facility.  He said he took another fall in his backyard and is unable to care for himself.  He is on blood thinners therefore we will get a CT scan of his head and evaluate him for anemia as well as electrolyte derangement.  We will get a screening EKG and then reassess. .     11:58 PM - Patient seen and examined at bedside. Ordered CT-head, CBC with differential, BMP, APTT, PT/INR, and EKG.      3:36 AM - I discussed the patient's case and the above findings with Dr. Fontana (Hospitalist) who agrees to evaluate the patient.    3:42 AM - Spoke with Dr. Fontana and he has spoken with case management. Called case management and discussed the patient's case. They will be transferring patient in the morning.     Admitted to ED observation at  342am 10/6  for continued care waiting for placement in a rehabilitation facility    Family Hx: No history of sudden cardiac death.      . Patient does not have a positive CT scan of the head and does have a mild elevated INR and PTT.  The patient has no significant anemia and a leukocytosis of 17.  In addition the patient is not exhibiting any other traumatic injury.  We will continue to reevaluate the patient while here in the emergency department and transfer to a facility that can help with his continued care.    DISPOSITION:  Patient will be transferred to a rehab facility     FINAL IMPRESSION  1. Weakness          Kalyn IZAGUIRRE), am scribing for, and in the presence of, Johny Pena M.D..    Electronically signed by: Kalyn Matson), 10/5/2021    Johny IZAGUIRRE M.D. personally performed the services described in this documentation, as scribed by Kalyn  Yoseph in my presence, and it is both accurate and complete.     The note accurately reflects work and decisions made by me.  Johny Pena M.D.  10/6/2021  5:35 AM

## 2021-10-06 NOTE — PROGRESS NOTES
"ED Observation Progress Note    Date of Service: 10/06/21    Interval History  Patient is awaiting skilled nursing facility due to failure to thrive.     Physical Exam  BP (!) 168/87   Pulse (!) 112   Temp 36.5 °C (97.7 °F) (Temporal)   Resp 20   Ht 1.6 m (5' 3\")   Wt 89 kg (196 lb 3.4 oz)   SpO2 95%   BMI 34.76 kg/m² .    Constitutional: Awake and alert. Nontoxic  HENT:  Grossly normal  Eyes: Grossly normal  Neck: Normal range of motion  Cardiovascular: Tachycardic, irregular irregular rhythm, Afib  Thorax & Lungs: No respiratory distress  Abdomen: Nontender  Skin:  No pathologic rash.   Extremities: chronic venous stasis changes to extremities  Psychiatric: Affect normal    Labs  Results for orders placed or performed during the hospital encounter of 10/05/21   CBC WITH DIFFERENTIAL   Result Value Ref Range    WBC 17.9 (H) 4.8 - 10.8 K/uL    RBC 5.80 4.70 - 6.10 M/uL    Hemoglobin 16.5 14.0 - 18.0 g/dL    Hematocrit 51.1 42.0 - 52.0 %    MCV 88.1 81.4 - 97.8 fL    MCH 28.4 27.0 - 33.0 pg    MCHC 32.3 (L) 33.7 - 35.3 g/dL    RDW 50.6 (H) 35.9 - 50.0 fL    Platelet Count 185 164 - 446 K/uL    MPV 11.1 9.0 - 12.9 fL    Neutrophils-Polys 91.20 (H) 44.00 - 72.00 %    Lymphocytes 2.10 (L) 22.00 - 41.00 %    Monocytes 4.60 0.00 - 13.40 %    Eosinophils 0.10 0.00 - 6.90 %    Basophils 0.30 0.00 - 1.80 %    Immature Granulocytes 1.70 (H) 0.00 - 0.90 %    Nucleated RBC 0.00 /100 WBC    Neutrophils (Absolute) 16.33 (H) 1.82 - 7.42 K/uL    Lymphs (Absolute) 0.37 (L) 1.00 - 4.80 K/uL    Monos (Absolute) 0.82 0.00 - 0.85 K/uL    Eos (Absolute) 0.01 0.00 - 0.51 K/uL    Baso (Absolute) 0.06 0.00 - 0.12 K/uL    Immature Granulocytes (abs) 0.31 (H) 0.00 - 0.11 K/uL    NRBC (Absolute) 0.00 K/uL   APTT   Result Value Ref Range    APTT 36.7 (H) 24.7 - 36.0 sec   PROTHROMBIN TIME (INR)   Result Value Ref Range    PT 14.6 12.0 - 14.6 sec    INR 1.17 (H) 0.87 - 1.13   Basic Metabolic Panel   Result Value Ref Range    Sodium 140 " 135 - 145 mmol/L    Potassium 5.1 3.6 - 5.5 mmol/L    Chloride 99 96 - 112 mmol/L    Co2 32 20 - 33 mmol/L    Glucose 98 65 - 99 mg/dL    Bun 12 8 - 22 mg/dL    Creatinine 0.44 (L) 0.50 - 1.40 mg/dL    Calcium 8.3 (L) 8.5 - 10.5 mg/dL    Anion Gap 9.0 7.0 - 16.0   ESTIMATED GFR   Result Value Ref Range    GFR If African American >60 >60 mL/min/1.73 m 2    GFR If Non African American >60 >60 mL/min/1.73 m 2   EKG (NOW)   Result Value Ref Range    Report       [facility] Emergency Dept.    Test Date:  2021-10-06  Pt Name:    CARLOS GO                     Department: ER  MRN:        [id number]                      Room:        02 H  Gender:     Male                         Technician: [id number]  :        [id number]                   Requested By:ASTER LEBLANC  Order #:    [id number]                    Reading MD:    Measurements  Intervals                                Axis  Rate:       93                           P:          60  MD:         136                          QRS:        73  QRSD:       94                           T:          -13  QT:         348  QTc:        433    Interpretive Statements  SINUS RHYTHM  MULTIPLE PREMATURE COMPLEXES, VENT & SUPRAVEN  BORDERLINE LOW VOLTAGE IN FRONTAL LEADS  BORDERLINE T ABNORMALITIES, INFERIOR LEADS  Compared to ECG 10/01/2021 10:16:20  Atrial fibrillation no longer present  Ventricular premature complex(es) no longer present  T-wave abnormality still present         Radiology  CT-HEAD W/O   Final Result         1.  No acute intracranial abnormality is identified, there are nonspecific white matter changes, commonly associated with small vessel ischemic disease.  Associated mild cerebral atrophy is noted.   2.  Atherosclerosis.          Problem List  1. Weakness    2. COPD, will be treated with doxycycline and respiratory support needed  3. Atrial fibrillation, continue eliquis  Plan: Patient will be put into a nursing facility      Electronically signed  by: Castillo Valentine, 10/6/2021 6:09 AM

## 2021-10-06 NOTE — DISCHARGE PLANNING
"Anticipated Discharge Disposition:   SNF    Action:   Received handoff from [facility] SW that pt will need to go to SNF.     Received SNF referral order from Dr. Doe.     Met with pt . He said \"I messed up\" We discussed going to SNF and he is agreeable. He is agreeable with CM sending a blanket referral.     Barriers to Discharge:   Pending SNF acceptance.    Plan:   Follow up with DPA.     "

## 2021-10-07 NOTE — ED NOTES
Transport arrived to take patient to assisted living facility. Pt wheeled out in transport wheelchair with all belongings. On 3L NC.

## 2021-10-07 NOTE — DISCHARGE PLANNING
note:  MANNY called Meri at [facility]. She is still waiting for insurance to respond. [employer] has not responded to her request for pt's transfer to [facility].     MANNY escalated case to MANNY and ANAMARIA leaders.

## 2021-10-07 NOTE — ED NOTES
Patient is resting comfortably, sitting in chair at bedside. Pt offered food/fluids, declines, assisted in making phone call.

## 2021-10-07 NOTE — DISCHARGE PLANNING
note:  MANNY followed up on dc plan to [facility]. Talked to [facility] admission coordinator, who will call [employer] to follow up on prior auth.

## 2021-10-07 NOTE — DISCHARGE PLANNING
DPA called [facility] and admission coordinator there is still waiting for [employer] to approve the transfer.

## 2021-10-07 NOTE — ED NOTES
Patient situated to bed, bed fixed up with new blankets and given night medications as requested by patient. Call light within reach.

## 2021-10-07 NOTE — ED NOTES
ERP messaged re: request for patient home med reordering as placement will not be until tomorrow.

## 2021-10-07 NOTE — DISCHARGE PLANNING
Agency/Facility Name: [facility]  Spoke To: Meri  Outcome: Insurance has been verified, [facility] will be here at 1500 to  patient. Needs discharge summary, will send as soon as it is put in.     @[phone]  Sent Discharge Summary and Meds list to [facility].

## 2021-10-07 NOTE — ED NOTES
Pt resting in chair with family at bedside. Patient awake, alert, oriented. Pt family updated on POC & pending transfer, awaiting ETA from . Patient provided with snacks, fluids, multiple portillo, consuming without difficulty. Updated on pending meal tray delivery. Pt denies additional needs, call light within reach.

## 2021-10-07 NOTE — DISCHARGE PLANNING
Anticipated Discharge Disposition:   SNF-[facility]    Action:   Per DPA , Auth was received by [facility].   Transport  at 3pm via wheelchair with O2 at 2l.  Karen Correia signed the Med Rec and provided the 3 day hard script for the narcotic prescription. Placed in the packet.     Pt is agreeable. Requested for daughter to be notified.   CM called [facility].       Barriers to Discharge:   none    Plan:   Dc at 3pm.

## 2021-10-19 NOTE — ED NOTES
Pt BIB EMS from [facility].  Pt has elevated heart rate for staff at rehab, pt given 1 liter of fluids last night and has since refused any other fluids.  Pt 's - 200's.  /70 for EMS.  Pt denies any symptoms.  Pt has h/o afib.  ERP to see.

## 2021-10-19 NOTE — ED NOTES
Med rec completed per pt's MAR  Allergies reviewed    Pt started a 10 day course of Nystatin on 10/9/2021    Pt completed a 5 day course of Doxycycline on 10/12/2021    Pt only takes Metoprolol Tartrate once a day

## 2021-10-19 NOTE — ED PROVIDER NOTES
ED Provider Note    Scribed for Kylie Garcia M.D. by Kiel Webster. 10/19/2021, 12:59 PM.    This patient was cared for during the COVID-19 pandemic. History and physical exam may be limited/truncated by the inherent challenges of PPE and the need to decrease staff exposure to novel coronavirus. Some aspects of disease management may be different to protect staff and help slow the spread of disease. I verified that, if possible, the patient was wearing a mask and I was wearing appropriate PPE every time I encountered the patient.     Primary care provider: Edwin Valentin M.D.  Means of arrival: EMS  History obtained from: Patient  History limited by: None    CHIEF COMPLAINT  Chief Complaint   Patient presents with   • Rapid Heart Beat       HPI  Gary Severino Gil is a 76 y.o. male with a history of COPD and leukemia who presents to the Emergency Department via EMS [facility] for rapid heart rate. Patient was reportedly sent here after staff at the rehab facility noticed his heart rate was in the 180-200's. He has a history of atrial fibrillation and takes apixaban.There are no known alleviating or exacerbating factors. Patient otherwise has not acute medical complaints and denies any shortness of breath, chest pain, fevers, nausea, vomiting, headaches.     REVIEW OF SYSTEMS  Pertinent positives include rapid heart rate. Pertinent negatives include no shortness of breath, chest pain, fevers, nausea, vomiting, or headaches.  All other systems reviewed and negative.    PAST MEDICAL HISTORY   has a past medical history of A-fib (HCC), Acute hypoxemic respiratory failure (HCC) (10/2/2021), Chronic obstructive pulmonary disease (HCC), MRSA (methicillin resistant Staphylococcus aureus), Productive cough, and Snoring.    SURGICAL HISTORY   has a past surgical history that includes imelda rectal abscess incision and drainage (4/6/2009); rectal exploration (9/3/2009); debridement (9/3/2009); rectal exploration  (11/17/2009); debridement (11/17/2009); lumbar fusion anterior (1983); lumbar fusion posterior (1987); flap graft (6/1/2010); and fistula in ano repair (6/1/2010).    SOCIAL HISTORY  Social History     Tobacco Use   • Smoking status: Former Smoker     Packs/day: 1.50     Years: 45.00     Pack years: 67.50     Types: Cigarettes     Quit date: 10/27/2017     Years since quitting: 3.9   • Smokeless tobacco: Never Used   Substance Use Topics   • Alcohol use: Yes     Alcohol/week: 8.0 oz     Types: 16 Standard drinks or equivalent per week     Comment: noted 4 per day   • Drug use: No      Social History     Substance and Sexual Activity   Drug Use No       FAMILY HISTORY  Family History   Problem Relation Age of Onset   • Stroke Other        CURRENT MEDICATIONS  Home Medications     Reviewed by Raheem Amato (Pharmacy Tech) on 10/19/21 at 1342  Med List Status: Complete   Medication Last Dose Status   acetaminophen (TYLENOL) 325 MG Tab 10/16/2021 Active   apixaban (ELIQUIS) 5mg Tab 10/19/2021 Active   ascorbic acid (VITAMIN C) 500 MG tablet 10/19/2021 Active   Cholecalciferol (VITAMIN D3) 125 MCG (5000 UT) Cap 10/19/2021 Active   doxycycline (VIBRAMYCIN) 100 MG Tab 10/12/2021 Active   famotidine (PEPCID) 20 MG Tab 10/19/2021 Active   fluticasone-salmeterol (WIXELA INHUB) 100-50 MCG/DOSE AEROSOL POWDER, BREATH ACTIVATED 10/19/2021 Active   loperamide (IMODIUM) 2 MG Cap 10/19/2021 Active   melatonin 5 mg Tab 10/16/2021 Active   metoprolol tartrate (LOPRESSOR) 25 MG Tab 10/18/2021 Active   multivitamin (THERAGRAN) Tab 10/19/2021 Active   Non Formulary Request 10/18/2021 Active   nystatin (MYCOSTATIN) 825722 UNIT/ML Suspension 10/19/2021 Active   oxyCODONE-acetaminophen (PERCOCET-10)  MG Tab 10/10/2021 Active   Pollen Extracts (PROSTAT PO) 10/18/2021 Active   pregabalin (LYRICA) 75 MG Cap 10/19/2021 Active   Probiotic Product (PROBITROL PO) 10/17/2021 Active   QUERCETIN PO 10/18/2021 Active   Umeclidinium Bromide  (INCRUSE ELLIPTA) 62.5 MCG/INH AEROSOL POWDER, BREATH ACTIVATED 10/19/2021 Active   zinc sulfate (ZINCATE) 220 (50 Zn) MG Cap 10/18/2021 Active                ALLERGIES  Allergies   Allergen Reactions   • Morphine Sulfate Unspecified     Hallucinations        PHYSICAL EXAM  VITAL SIGNS: /56   Pulse (!) 150   Resp (!) 39   Ht 1.524 m (5')   Wt 88.9 kg (196 lb)   SpO2 96%   BMI 38.28 kg/m²   Constitutional: Alert in no apparent distress.  HENT: No signs of trauma, Bilateral external ears normal, Nose normal.   Eyes: Pupils are equal and reactive, Conjunctiva normal, Non-icteric.   Neck: Normal range of motion, No tenderness, Supple, No stridor.   Cardiovascular: Tachycardic with irregular rhythm. no murmurs.   Thorax & Lungs: Normal breath sounds, No respiratory distress, No wheezing, No chest tenderness.   Abdomen: Bowel sounds normal, Soft, No tenderness, No masses, No peritoneal signs.  Skin: Warm, Dry, No erythema, No rash.   Musculoskeletal:  No major deformities noted.   Neurologic: Alert, moving all extremities without difficulty, no focal deficits.    LABS  Labs Reviewed   CBC WITH DIFFERENTIAL - Abnormal; Notable for the following components:       Result Value    WBC 13.6 (*)     RBC 4.42 (*)     Hemoglobin 12.2 (*)     Hematocrit 37.5 (*)     MCHC 32.5 (*)     RDW 51.0 (*)     Platelet Count 144 (*)     Neutrophils-Polys 91.70 (*)     Lymphocytes 1.40 (*)     Immature Granulocytes 1.50 (*)     Neutrophils (Absolute) 12.50 (*)     Lymphs (Absolute) 0.19 (*)     Immature Granulocytes (abs) 0.20 (*)     All other components within normal limits   COMP METABOLIC PANEL - Abnormal; Notable for the following components:    Chloride 95 (*)     Bun 7 (*)     Calcium 8.3 (*)     AST(SGOT) 114 (*)     ALT(SGPT) 72 (*)     Alkaline Phosphatase 177 (*)     Albumin 2.3 (*)     Globulin 3.7 (*)     All other components within normal limits   TROPONIN - Abnormal; Notable for the following components:     Troponin T 58 (*)     All other components within normal limits   ESTIMATED GFR     All labs reviewed by me.    EKG  12 Lead EKG interpreted by me as indicated below    Results for orders placed or performed during the hospital encounter of 10/19/21   EKG   Result Value Ref Range    Report       [facility]    Test Date:  2021-10-19  Pt Name:    CARLOS GO                     Department: ER  MRN:        [id number]                      Room:        11  Gender:     Male                         Technician: [id number]  :        1945                   Requested By:ER TRIAGE PROTOCOL  Order #:    [id number]                    Reading MD: VEL ROA    Measurements  Intervals                                Axis  Rate:       190                          P:          64  OH:         176                          QRS:        86  QRSD:       86                           T:          39  QT:         265  QTc:        471    Interpretive Statements  SUPRAVENTRICULAR TACHYCARDIA  BORDERLINE RIGHT AXIS DEVIATION  LOW VOLTAGE IN FRONTAL LEADS  REPOLARIZATION ABNORMALITY, PROB RATE RELATED  Compared to ECG 10/06/2021 00:21:25  Early repolarization now present  Sinus rhythm no longer present  T-wave abnormality no longer present  Impression: SVT with likely rat e related ST depressions in the precordial  leads.  Electronically Signed On 10- 15:58:18 PDT by VEL ROA         RADIOLOGY  DX-CHEST-PORTABLE (1 VIEW)   Final Result      1.  Bihilar prominence with mild diffuse interstitial thickening may represent mild interstitial edema.        The radiologist's interpretation of all radiological studies have been reviewed by me.    COURSE & MEDICAL DECISION MAKING  Pertinent Labs & Imaging studies reviewed. (See chart for details)      12:59 PM - Called to patient's bedside to evaluate him emergently; He initially had a heart rate in the 190's in SVT, but occasionally he will spontaneously  convert to Afib in the 120's. He was then treated with diltiazem 20 mg which brought him down to the 100's; he continues to spontaneously convert between afib and SVT. Patient started on diltiazem 100 mg IV drip. Ordered DX-chest, CBC w/ diff, CMP, Troponin, and EKG to evaluate his symptoms.     1:58 PM - Patient reevaluated at bedside; his rate continues to hover in the 120's; plan to have him evaluated for admisssion. He was understanding and agreeable to the plan of care. .     2:12 PM - I discussed the patient's case and the above findings with Dr. Lozano (Intensivist) who agrees to evaluate the patient.    3:54 PM - Spoke with Dr. Lozano (Intensivist) who evaluated the patient; plan to place the patient on amiodarone drip and admit them to the floor.    6:21 PM - I discussed the patient's case and the above findings with Dr. Lopes (Hospitalist) who agrees to evaluate the patient for hospitalization. Patient's care transferred at this time.        Decision Making:  This is a 76 y.o. year old male who presents with SVT and A. fib with RVR.  Patient initially was in SVT but would rapidly convert to A. fib with RVR.  He was initially rate controlled with diltiazem which did work to help rate control him.  His troponin was elevated at 58.  He was placed on a diltiazem drip given the need for close titration initially.  Given the dill drip he would require hospitalization to the ICU.  I spoke with Dr. Lozano who evaluated the patient.  He felt that he could be switched to amiodarone and then therefore admitted to the floor.  Patient's LFTs are slightly elevated as well of unclear etiology.  His hemoglobin is 12.2 which is slightly down from previous although there is no evidence of active bleeding.  He will require hospitalization at this time for rate control of his A. fib.  He is anticoagulated on Eliquis.    DISPOSITION:  Patient will be admitted by Dr. Lopes in guarded condition.    CRITICAL CARE  The very  real possibilty of a deterioration of this patient's condition required the highest level of my preparedness for sudden, emergent intervention.  I provided critical care services, which included medication orders, frequent reevaluations of the patient's condition and response to treatment, ordering and reviewing test results, and discussing the case with various consultants.  The critical care time associated with the care of the patient was 30 minutes. Review chart for interventions. This time is exclusive of any other billable procedures.     FINAL IMPRESSION  1. Atrial fibrillation with RVR (HCC)    2. Elevated LFTs           This dictation has been created using voice recognition software and/or scribes. The accuracy of the dictation is limited by the abilities of the software and the expertise of the scribes. I expect there may be some errors of grammar and possibly content. I made every attempt to manually correct the errors within my dictation. However, errors related to voice recognition software and/or scribes may still exist and should be interpreted within the appropriate context.     I, Kiel Webster (Scribe), am scribing for, and in the presence of, Kylie Garcia M.D..    Electronically signed by: Kiel Webster (Scribe), 10/19/2021    IKylie M.D. personally performed the services described in this documentation, as scribed by Kiel Webster in my presence, and it is both accurate and complete.    The note accurately reflects work and decisions made by me.  Kylie Garcia M.D.  10/19/2021  8:22 PM

## 2021-10-20 PROBLEM — A41.9 SEPSIS (HCC): Status: ACTIVE | Noted: 2021-01-01

## 2021-10-20 NOTE — PROGRESS NOTES
"4 Eyes Skin Assessment Completed by MARIA DEL CARMEN Jarvis and MARIA DEL CARMEN Saldana.    Head Scab and Redness, dry, calloused, Pt states it is his, \"multiple melanoma\" on left side of face (see photo)  Ears Redness and Blanching, scabs, multiple myleoma  Nose Redness, Blanching and Scab  Mouth Redness and Discoloration, pt has sore on bottom of left lip. Pt stated he has some broken teeth from previous fall  Neck Redness, Blanching and Scab  Breast/Chest Redness, Blanching, Discoloration and Scab, multiple myleoma scabs (see photo)  Shoulder Blades Redness and Blanching  Spine Redness and Blanching  (R) Arm/Elbow/Hand Redness, Blanching and Scab  (L) Arm/Elbow/Hand Redness, Blanching and Scab  Abdomen Redness, Blanching, Scar and Scab  Groin Redness and Blanching  Scrotum/Coccyx/Buttocks Redness, Blanching and Scab, multiple open myleoma scabs,mepilex applied (see photo)   (R) Leg Redness, Blanching and Scab  (L) Leg Redness, Blanching and Scab  (R) Heel/Foot/Toe Redness, Blanching, Boggy and Scab, dry, flaky, multiple myleoma scabs  (L) Heel/Foot/Toe Redness, Blanching, Boggy and Scab, dry, flaky, multiple myleoma scabs    PT HAS THROUGH BODY MULTIPLE MYLEOMA SCABS         Devices In Places ECG, Tele Box and Pulse Ox      Interventions In Place Gray Ear Foams, Heel Mepilex, Sacral Mepilex, Waffle Overlay, Pillows, Elbow Mepilex, Barrier Cream and Pressure Redistribution Mattress    Possible Skin Injury Yes    Pictures Uploaded Into Epic Yes  Wound Consult Placed Yes  RN Wound Prevention Protocol Ordered Yes         Chest        Right Butt Cheek        Left back thigh        Left foot        Left side of face        Back of left thigh       "

## 2021-10-20 NOTE — ASSESSMENT & PLAN NOTE
In setting of t cell lymphoma chest mass, and chronic copd, now in uncontrolled afib on presentation  Oxygen support  Duonebs, copd treatments  Treat underlying conditions.  Incentive spirometry

## 2021-10-20 NOTE — ED NOTES
Bedside report received, Assessment completed.     A&O x 4, pt calls appropriately  Mobility: Stand by assist with walker    Skin dry and flaky, tung and lips open and red  Pain Assessment: 0/10. Medication provided per MAR.  Diet: Cardiac, 2000 fluid restriction  LDA: 20 RAC, 22 LFA  DVT Prophylaxis: Eliquis  GI: LBM 10/18/21  : cont.    Reviewed plan of care with patient, bed in low position and locked, pt resting comfortably now, call light with in reach, all needs met at this time. Interventions will be executed per plan of care.

## 2021-10-20 NOTE — THERAPY
Physical Therapy   Initial Evaluation     Patient Name: Gary Severino Gil  Age:  76 y.o., Sex:  male  Medical Record #: [id number]  Today's Date: 10/20/2021     Precautions: Fall Risk    Assessment  Patient is a 76 y.o. male admitted from SNF with a fib with RVR, COPD exacerbation, and sepsis. Pt seen for PT evaluation at this time. Pt completed transfers and ambulation with SPC with SPV, no overt LOB but some unsteadiness at times especially when distracted. Pt reports he wishes to use a FWW at [place] and would benefit from this for improved stability during gait. Pt would also benefit from HHPT to facilitate return to PLOF and transition back home. Encouraged continued ambulation while hospitalized. Patient will not be actively followed for physical therapy services at this time, however may be seen if requested by physician for 1 more visit within 30 days to address any discharge or equipment needs.     Plan  Recommend Physical Therapy for Evaluation only   DC Equipment Recommendations: Front-Wheel Walker  Discharge Recommendations: Recommend home health for continued physical therapy services            10/20/21 0923   Prior Living Situation   Prior Services None   Housing / Facility 1 Story Apartment / Condo   Steps Into Home 0   Steps In Home 0   Bathroom Set up Bathtub / Shower Combination   Equipment Owned Single Point Cane   Lives with -  Alone and Able to Care For Self   Comments pt was admitted from SNF. reports prior to SNF he was indep with mobility and ADLs. states he was going to [place] from SNF in the next few days.    Prior Level of Functional Mobility   Bed Mobility Independent   Transfer Status Independent   Ambulation Independent   Distance Ambulation (Feet) limited community   Assistive Devices Used Single Point Cane   Comments assist from dtr for transport   Cognition    Level of Consciousness Alert   Comments pleasant and eager to return home   Balance Assessment   Sitting Balance (Static) Fair +    Sitting Balance (Dynamic) Fair +   Standing Balance (Static) Fair +   Standing Balance (Dynamic) Fair   Weight Shift Sitting Good   Weight Shift Standing Fair   Comments standing with SPC   Gait Analysis   Gait Level Of Assist Supervised   Assistive Device Single Point Cane   Distance (Feet) 150   Deviation slight unsteadiness at times/distracted easily   Weight Bearing Status no restrictions   Comments no overt LOB. likely would demo incr stability with FWW vs SPC. pt reports wishes to use a FWW upon DC.    Bed Mobility    Supine to Sit Minimal Assist   Sit to Supine Supervised   Scooting Supervised   Comments slight assist for moving supine > sit from [facility]   Functional Mobility   Sit to Stand Supervised

## 2021-10-20 NOTE — PROGRESS NOTES
Received report from MARIA DEL CARMEN Salomon. Assumed care at 1040. Pt on the monitor, monitor tech notified. Initial assessment completed, orders reviewed, call light within reach, bed alarm in use, and hourly rounding in place. POC addressed with patient, no additional questions at this time.

## 2021-10-20 NOTE — DISCHARGE PLANNING
Anticipated Discharge Disposition: Home with Home health    Action: Patient agreeable to home health. Choice for HH received.     Barriers to Discharge: HH acceptance    Plan: LSW to f/u on HH referral    Care Transition Team Assessment  LSW met with patient at bedside. Patient reports that he lives alone. Patient uses O2 through [employer] (2.5L-3L). Patient has support from daughter. Daughter will be available to  on dc.    Information Source  Orientation Level: Oriented X4  Information Given By: Patient  Who is responsible for making decisions for patient? : Patient    Readmission Evaluation  Is this a readmission?: Yes - unplanned readmission  Why do you think you were readmitted?: rapid heart beat    Elopement Risk  Legal Hold: No  Ambulatory or Self Mobile in Wheelchair: No-Not an Elopement Risk  Elopement Risk: Not at Risk for Elopement    Interdisciplinary Discharge Planning  Lives with - Patient's Self Care Capacity: Alone and Able to Care For Self  Patient or legal guardian wants to designate a caregiver: No  Support Systems: Children  Housing / Facility: 1 Story Apartment / Condo  Prior Services: None    Discharge Preparedness  What is your plan after discharge?: Home health care  What are your discharge supports?: Child  Prior Functional Level: Ambulatory, Independent with Activities of Daily Living, Independent with Medication Management  Difficulity with ADLs: None  Difficulity with IADLs: None    Functional Assesment  Prior Functional Level: Ambulatory, Independent with Activities of Daily Living, Independent with Medication Management    Finances  Financial Barriers to Discharge: No  Prescription Coverage: Yes    Vision / Hearing Impairment  Vision Impairment : Yes  Right Eye Vision: Wears Glasses  Left Eye Vision: Wears Glasses  Hearing Impairment : No         Advance Directive  Advance Directive?: None    Domestic Abuse  Have you ever been the victim of abuse or violence?: No  Physical Abuse or  Sexual Abuse: No  Verbal Abuse or Emotional Abuse: No  Possible Abuse/Neglect Reported to:: Not Applicable    Psychological Assessment  History of Substance Abuse: None  History of Psychiatric Problems: No  Non-compliant with Treatment: No  Newly Diagnosed Illness: No    Discharge Risks or Barriers  Discharge risks or barriers?: No    Anticipated Discharge Information  Discharge Disposition: D/T to home under A care in anticipation of covered skilled care (06)  Discharge Address: [facility] [address]  Discharge Contact Phone Number: Nayeli [phone]

## 2021-10-20 NOTE — FACE TO FACE
Face to Face Note  -  Durable Medical Equipment    Lyubov Valentin D.O. - NPI: [id number]  I certify that this patient is under my care and that they have had a durable medical equipment(DME)face to face encounter by myself that meets the physician DME face-to-face encounter requirements with this patient on:    Date of encounter:   Patient:                    MRN:                       YOB: 2023  Gary Severino Gil  [id number]  1945     The encounter with the patient was in whole, or in part, for the following medical condition, which is the primary reason for durable medical equipment:  Other - Unsteady Gait    I certify that, based on my findings, the following durable medical equipment is medically necessary:  Walkers.    HOME O2 Saturation Measurements:(Values must be present for Home Oxygen orders)         ,     ,         My Clinical findings support the need for the above equipment due to:  Abnormal Gait    Supporting Symptoms: N/A     ------------------------------------------------------------------------------------------------------------------    Face to Face Supporting Documentation - Home Health    The encounter with this patient was in whole or in part the primary reason for home health admission.    Date of encounter:   Patient:                    MRN:                       YOB: 2023  Gary Severino Gil  [id number]  1945     Home health to see patient for:  Skilled Nursing care for assessment, interventions & education, Home health aide, Physical Therapy evaluation and treatment and Occupational therapy evaluation and treatment    Skilled need for:  Comment: PT/OT    Skilled nursing interventions to include:  Comment: PT/OT    Homebound evidenced status by:  Need the aid of supportive devices such as crutches, canes, wheelchairs or walkers. Leaving home must require a considerable and taxing effort. There must exist a normal inability to leave the  home.    Community Physician to provide follow up care: Edwin Valentin M.D.     Optional Interventions    Wound information & treatment:    Home Infusion Therapy orders:    Line/Drain/Airway:    I certify the face to face encounter for this home care referral meets the CMS requirements and the encounter/clinical assessment with the patient was, in whole, or in part, for the medical condition(s) listed above, which is the primary reason for home health care. Based on my clinical findings: the service(s) are medically necessary, support the need for home health care, and the homebound criteria are met.  I certify that this patient has had a face to face encounter by myself.  Lyubov Valentin D.O. - NPI: [id number]    *Debility, frailty and advanced age in the absence of an acute deterioration or exacerbation of a condition do not qualify a patient for home health.

## 2021-10-20 NOTE — ASSESSMENT & PLAN NOTE
This is Sepsis Present on admission  SIRS criteria identified on my evaluation include: Tachycardia, with heart rate greater than 90 BPM, Tachypnea, with respirations greater than 20 per minute and Leukocytosis, with WBC greater than 12,000  Source is Unknown  Sepsis protocol initiated  Fluid resuscitation ordered per protocol  IV antibiotics as appropriate for source of sepsis  While organ dysfunction may be noted elsewhere in this problem list or in the chart, degree of organ dysfunction does not meet CMS criteria for severe sepsis

## 2021-10-20 NOTE — HOSPITAL COURSE
This is a 76 year old male with PMHx of chronic respiratory failure secondary to COPD on 3L, t-cell lymphoma/Leukemia awaiting induction chemo follows with Onc, atrial fibrillation on Eliquis who was admitted on 10/19/2021 due to atrial fibrillation with RVR.    Patient was diagnosed with atrial fibrillation on previous admission 09/30-10/5/21, started on Eliquis only. On last admission patient was evaluated by PT/OT, they recommended SNF however patient was very adamant about going home, home health was arranged and patient's daughter was instructed to set up life alert.    On admission patient initially required a Cardizem and an amnio drip, patient was started on metoprolol 25 twice daily. Patient was initially increased to 50 mg BID.

## 2021-10-20 NOTE — DISCHARGE PLANNING
Received Choice form at 1456  Agency/Facility Name: [facility]   Referral sent per Choice form @ 1512     Received Choice form at 1454  Agency/Facility Name: [facility]  / [facility]   Referral sent per Choice form @ 1513     1542-  Agency/Facility Name: [facility]  Spoke To: Aurelia  Outcome: Referral has not been reviewed at this time, Aurelia to expedite process and return DPA call.     1545-  Agency/Facility Name: [facility]  Spoke To: Aurelia   Outcome: Due to Pt insurance auth will take aprox 1 day to obtain, Aurelia advised DPA Pt may be discharged when ready and [facility] would follow up with Pt directly.     1620-  Agency/Facility Name: [facility]  Spoke To: Deborah   Outcome: Pt accepted to service, DPA requested if agency has further needs to callback.

## 2021-10-20 NOTE — THERAPY
"Occupational Therapy   Initial Evaluation     Patient Name: Gary Severino Gil  Age:  76 y.o., Sex:  male  Medical Record #: [id number]  Today's Date: 10/20/2021     Precautions  Precautions: (P) Fall Risk    Assessment  Patient is 76 y.o. male seen in ED following transfer from rehab facility due to uncontrolled afib w/ RVR. Pt normally able to complete all ADLs and functional mobility with a SPC independently prior to recent admission, pt eager to return home versus go back to rehab as he feels ready to discharge home. Pt able to complete all mobility and ADLs with supervision and use of a SPC but patient plans to use a FWW when he gets home. Will recommend home health therapy at discharge for continued therapy in the home setting. Patient will not be actively followed for occupational therapy services at this time, however may be seen if requested by physician for 1 more visit within 30 days to address any discharge or equipment needs.     Plan    Recommend Occupational Therapy for Evaluation only.    DC Equipment Recommendations: (P) None  Discharge Recommendations: (P) Recommend home health for continued occupational therapy services     Subjective    \"I was about to discharge in a few days\"     Objective       10/20/21 0928   Prior Living Situation   Prior Services None   Housing / Facility 1 Story Apartment / Condo   Steps Into Home 0   Steps In Home 0   Bathroom Set up Bathtub / Shower Combination   Equipment Owned Single Point Cane   Lives with - Patient's Self Care Capacity Alone and Able to Care For Self   Comments Prior to SNF was independent   Prior Level of ADL Function   Self Feeding Independent   Grooming / Hygiene Independent   Bathing Independent   Dressing Independent   Toileting Independent   Prior Level of IADL Function   Medication Management Independent   Laundry Independent   Kitchen Mobility Independent   Finances Independent   Home Management Independent   Shopping Independent   Prior Level Of " Mobility Independent With Device in Community;Independent Without Device in Home   Driving / Transportation Driving Independent   Occupation (Pre-Hospital Vocational) Retired Due To Age   History of Falls   History of Falls Yes   Date of Last Fall   (hx of recent falls)   Precautions   Precautions Fall Risk   Pain   Non Verbal Scale  Calm   Pain 0 - 10 Group   Therapist Pain Assessment Post Activity Pain Same as Prior to Activity;Nurse Notified   Cognition    Cognition / Consciousness WDL   Level of Consciousness Alert   Comments Pleasant, cooperative, eager to discharge home   Active ROM Upper Body   Active ROM Upper Body  WDL   Strength Upper Body   Upper Body Strength  WDL   Upper Body Muscle Tone   Upper Body Muscle Tone  WDL   Coordination Upper Body   Coordination WDL   Balance Assessment   Sitting Balance (Static) Fair +   Sitting Balance (Dynamic) Fair +   Standing Balance (Static) Fair +   Standing Balance (Dynamic) Fair   Weight Shift Sitting Good   Weight Shift Standing Fair   Comments w/ SPC   Bed Mobility    Supine to Sit Minimal Assist   Sit to Supine Supervised   Scooting Supervised   ADL Assessment   Upper Body Dressing Supervision   Lower Body Dressing Supervision   Toileting   (NT-refused need)   How much help from another person does the patient currently need...   Putting on and taking off regular lower body clothing? 4   Bathing (including washing, rinsing, and drying)? 4   Toileting, which includes using a toilet, bedpan, or urinal? 4   Putting on and taking off regular upper body clothing? 4   Taking care of personal grooming such as brushing teeth? 4   Eating meals? 4   6 Clicks Daily Activity Score 24   Functional Mobility   Sit to Stand Supervised   Bed, Chair, Wheelchair Transfer Supervised   Toilet Transfers Refused   Transfer Method Stand Step   Mobility bed mobility, hallway mobility, back to bed   Comments w/ SPC   Activity Tolerance   Sitting Edge of Bed 10 min   Standing 5 min    Education Group   Education Provided Role of Occupational Therapist   Role of Occupational Therapist Patient Response Patient;Acceptance;Explanation;Verbal Demonstration   Problem List   Problem List None   Interdisciplinary Plan of Care Collaboration   IDT Collaboration with  Nursing;Physical Therapist   Patient Position at End of Therapy In Bed;Call Light within Reach;Tray Table within Reach;Phone within Reach;Other (Comments)  (DANIELA hillman)   Collaboration Comments RN updated

## 2021-10-20 NOTE — H&P
Hospital Medicine History & Physical Note    Date of Service    Primary Care Physician  Edwin Valentin M.D.    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 Fleming, copd on 3L home oxygen and afib on eliquis (not rate-control medication) presented with heart racing and some sob was found by ED to be in afib with rvr initially 180's. Patient responsive to Diltiazem initially, weaned off drip to amiodarone drip and metoprolol. Patient has no other complaints, correction of afib restored patients' sob to baseline, he states he feels a bit weak but otherwise comfortable, denies fevers, chills, night sweats, N/V diarrhea, constipation, chest pain, abdominal pain.     Last echo performed 10/3/21 LVEF 60% summary Grade II diastolic dysfunction, some valvular abnormalities noted. RV systolic pressure 60mmHg severe pulmonary hypertension. Right atrial pressure estimated at 15mmHg    On admission, wbc 13.6 ANC 12.5 (also elevated previous admissions, likely 2/2 malignancy presence), Trop flat (58, 61) BNP 90, Transaminitis mild but uptrending, albumin 2.3, 21, cholesterol low, alcohol wnl    Will admit patient for uncontrolled afib with rvr and anticipate him needing a rate controlling regimen on discharge unless another cause exacerbating afib is found and rectified.    I discussed the plan of care with patient.    Review of Systems  ROS      Past Medical History   has a past medical history of A-fib (HCC), Acute hypoxemic respiratory failure (HCC) (10/2/2021), Chronic obstructive pulmonary disease (HCC), MRSA (methicillin resistant Staphylococcus aureus), Productive cough, and Snoring.    Surgical History   has a past surgical history that includes imelda rectal abscess incision and drainage (4/6/2009); rectal exploration  (9/3/2009); debridement (9/3/2009); rectal exploration (11/17/2009); debridement (11/17/2009); lumbar fusion anterior (1983); lumbar fusion posterior (1987); flap graft (6/1/2010); and fistula in ano repair (6/1/2010).     Family History  family history includes Stroke in an other family member.   Family history reviewed with patient. There is family history that is pertinent to the chief complaint.     Social History   reports that he quit smoking about 3 years ago. His smoking use included cigarettes. He has a 67.50 pack-year smoking history. He has never used smokeless tobacco. He reports current alcohol use of about 8.0 oz of alcohol per week. He reports that he does not use drugs.    Allergies  Allergies   Allergen Reactions   • Morphine Sulfate Unspecified     Hallucinations        Medications  Prior to Admission Medications   Prescriptions Last Dose Informant Patient Reported? Taking?   Cholecalciferol (VITAMIN D3) 125 MCG (5000 UT) Cap 10/19/2021 at 0800 MAR from Other Facility Yes Yes   Sig: Take 5,000 Units by mouth every day.   Non Formulary Request 10/18/2021 at 1200 MAR from Other Facility Yes Yes   Sig: Take 1 Tablet by mouth every day. Therbiotic Complete   Pollen Extracts (PROSTAT PO) 10/18/2021 at 1200 MAR from Other Facility Yes Yes   Sig: Take 1 Tablet by mouth every day.   Probiotic Product (PROBITROL PO) 10/17/2021 at 1200 MAR from Other Facility Yes Yes   Sig: Take 1 Tablet by mouth 2 times a day. 10 day course   QUERCETIN PO 10/18/2021 at 2000 MAR from Other Facility Yes Yes   Sig: Take 1 Tablet by mouth at bedtime. Take for 14 days   Umeclidinium Bromide (INCRUSE ELLIPTA) 62.5 MCG/INH AEROSOL POWDER, BREATH ACTIVATED 10/19/2021 at 0800 MAR from Other Facility Yes Yes   Sig: Inhale 1 Puff every day.   acetaminophen (TYLENOL) 325 MG Tab 10/16/2021 at 1811 MAR from Other Facility Yes Yes   Sig: Take 650 mg by mouth every four hours as needed.   apixaban (ELIQUIS) 5mg Tab 10/19/2021 at 0800 MAR  from Other Facility No No   Sig: Take 1 Tablet by mouth 2 times a day. Indications: Thromboembolism secondary to Atrial Fibrillation   ascorbic acid (VITAMIN C) 500 MG tablet 10/19/2021 at 0800 MAR from Other Facility Yes Yes   Sig: Take 500 mg by mouth every day.   famotidine (PEPCID) 20 MG Tab 10/19/2021 at 0800 MAR from Other Facility Yes Yes   Sig: Take 20 mg by mouth 2 times a day.   fluticasone-salmeterol (WIXELA INHUB) 100-50 MCG/DOSE AEROSOL POWDER, BREATH ACTIVATED 10/19/2021 at 0800 MAR from Other Facility Yes Yes   Sig: Inhale 1 Puff every 12 hours.   loperamide (IMODIUM) 2 MG Cap 10/19/2021 at 0350 MAR from Other Facility Yes Yes   Sig: Take 2 mg by mouth every 8 hours as needed for Diarrhea.   melatonin 5 mg Tab 10/16/2021 at 1950 MAR from Other Facility Yes No   Sig: Take 5 mg by mouth at bedtime as needed (Sleep).   metoprolol tartrate (LOPRESSOR) 25 MG Tab 10/18/2021 at 1500 MAR from Other Facility Yes Yes   Sig: Take 12.5 mg by mouth every evening.   multivitamin (THERAGRAN) Tab 10/19/2021 at 0800 MAR from Other Facility Yes Yes   Sig: Take 1 Tablet by mouth every day.   nystatin (MYCOSTATIN) 747036 UNIT/ML Suspension 10/19/2021 at 0800 MAR from Other Facility Yes Yes   Sig: Take 400,000 Units by mouth 4 times a day. 10 day course   oxyCODONE-acetaminophen (PERCOCET-10)  MG Tab 10/10/2021 at PRN MAR from Other Facility Yes No   Sig: Take 1 Tablet by mouth every 6 hours as needed.   pregabalin (LYRICA) 75 MG Cap 10/19/2021 at 0800 MAR from Other Facility Yes No   Sig: Take 75 mg by mouth 2 times a day.   zinc sulfate (ZINCATE) 220 (50 Zn) MG Cap 10/18/2021 at 2000 MAR from Other Facility Yes Yes   Sig: Take 220 mg by mouth every day.      Facility-Administered Medications: None       Physical Exam  Temp:  [36.7 °C (98.1 °F)] 36.7 °C (98.1 °F)  Pulse:  [] 130  Resp:  [18-40] 27  BP: ()/(47-70) 113/53  SpO2:  [70 %-100 %] 91 %  Blood Pressure : 105/52   Temperature: 36.7 °C (98.1 °F)  "  Pulse: (!) 120   Respiration: (!) 27   Pulse Oximetry: 91 %       Physical Exam  Constitutional:       Appearance: Normal appearance.   HENT:      Head: Normocephalic and atraumatic.      Right Ear: External ear normal.      Left Ear: External ear normal.      Nose: Nose normal.      Mouth/Throat:      Mouth: Mucous membranes are moist.      Pharynx: No oropharyngeal exudate or posterior oropharyngeal erythema.   Eyes:      Extraocular Movements: Extraocular movements intact.      Pupils: Pupils are equal, round, and reactive to light.   Cardiovascular:      Rate and Rhythm: Tachycardia present. Rhythm irregular.      Pulses: Normal pulses.      Comments: No murmurs, rubs, gallops appreciated on auscultation  Pulmonary:      Effort: Pulmonary effort is normal.      Breath sounds: Normal breath sounds.      Comments: On 3L NC o2 sat >93%  Diminished breath sounds, but otherwise CTA  Abdominal:      General: Abdomen is flat.      Palpations: Abdomen is soft.   Musculoskeletal:         General: Normal range of motion.      Cervical back: Normal range of motion and neck supple.   Skin:     General: Skin is warm and dry.      Capillary Refill: Capillary refill takes less than 2 seconds.      Coloration: Skin is pale.      Comments: 3cm gordy raised brown lesion mid-sternum patient commented \"that's been there since the lymphoma\"  Similar, but smaller lesion on lower extremity near ankle but protruding from skin in a skin-tag like manner.  Vascular changes of lower extremities.   Neurological:      Mental Status: He is alert and oriented to person, place, and time.      Coordination: Coordination normal.      Gait: Gait normal.   Psychiatric:         Mood and Affect: Mood normal.         Behavior: Behavior normal.         Thought Content: Thought content normal.         Judgment: Judgment normal.         Laboratory:  Recent Labs     10/19/21  1300   WBC 13.6*   RBC 4.42*   HEMOGLOBIN 12.2*   HEMATOCRIT 37.5*   MCV 84.8 "   MCH 27.6   MCHC 32.5*   RDW 51.0*   PLATELETCT 144*   MPV 11.1     Recent Labs     10/19/21  1300 10/19/21  2349   SODIUM 135 130*   POTASSIUM 3.8 4.2   CHLORIDE 95* 94*   CO2 29 30   GLUCOSE 98 108*   BUN 7* 6*   CREATININE 0.57 0.52   CALCIUM 8.3* 8.0*     Recent Labs     10/19/21  1300 10/19/21  2349   ALTSGPT 72* 78*   ASTSGOT 114* 152*   ALKPHOSPHAT 177* 148*   TBILIRUBIN 1.2 0.9   GLUCOSE 98 108*         Recent Labs     10/19/21  2349   NTPROBNP 909*     Recent Labs     10/19/21  2349   TRIGLYCERIDE 131   HDL 9*   LDL 24     Recent Labs     10/19/21  1300 10/19/21  2349   TROPONINT 58* 61*       Imaging:  DX-CHEST-PORTABLE (1 VIEW)   Final Result      1.  Bihilar prominence with mild diffuse interstitial thickening may represent mild interstitial edema.          X-Ray:  I have personally reviewed the images and compared with prior images.  EKG:  I have personally reviewed the images and compared with prior images.    Assessment/Plan:  I anticipate this patient will require at least two midnights for appropriate medical management, necessitating inpatient admission.    Acute hypoxemic respiratory failure (HCC)- (present on admission)  Assessment & Plan  In setting of t cell lymphoma chest mass, and chronic copd, now in uncontrolled afib on presentation  Oxygen support  Duonebs, copd treatments  Treat underlying conditions.  Incentive spirometry    A-fib (HCC)- (present on admission)  Assessment & Plan  160's on arrival to ED, given diltiazem w/ drip, transitioned to amiodarone, controlled rate to 120's,   Home dose metoprolol 12.5, will increase to 25mg  Wean off amiodarone drip, tsition to BB's only if possible  Trop trend  Cont. eliquis  Last echo 10/3/21      Advanced care planning/counseling discussion- (present on admission)  Assessment & Plan  Per prior records, has been dnr/dni in the past, however patient wishes to be FULL CODE  I spoke with patient at length regarding advanced care planning, patient  wishes to be FULL CODE including cpr chest compressions and intubation.  ACP: 16 mins      Acute exacerbation of chronic obstructive pulmonary disease (COPD) (HCC)- (present on admission)  Assessment & Plan  Comorbid chest mass, afib uncontrolled on presentation  Continue advair ellipta  dujazmine  Consider steroids  Has wbc count on presentation, but leukocytosis chronically elevated  procal pending, f/u    Sepsis (HCC)- (present on admission)  Assessment & Plan  This is Sepsis Present on admission  SIRS criteria identified on my evaluation include: Tachycardia, with heart rate greater than 90 BPM, Tachypnea, with respirations greater than 20 per minute and Leukocytosis, with WBC greater than 12,000  Source is Unknown  Sepsis protocol initiated  Fluid resuscitation ordered per protocol  IV antibiotics as appropriate for source of sepsis  While organ dysfunction may be noted elsewhere in this problem list or in the chart, degree of organ dysfunction does not meet CMS criteria for severe sepsis          Pulmonary hypertension (HCC)- (present on admission)  Assessment & Plan  Chronic, cont home meds    Lymphoma (HCC)- (present on admission)  Assessment & Plan  To start induction chemo soon. Follows with Dr. Fleming      EtOH dependence (HCC)- (present on admission)  Assessment & Plan  Alcohol level  Consider ciwa   Denies recent use       VTE prophylaxis: SCDs/TEDs and therapeutic anticoagulation with Eliquis 5mg BID

## 2021-10-20 NOTE — ASSESSMENT & PLAN NOTE
Comorbid chest mass, afib uncontrolled on presentation  Continue advair ellipta  duonebs  Consider steroids if needed  Has wbc count on presentation, but leukocytosis chronically elevated

## 2021-10-20 NOTE — ASSESSMENT & PLAN NOTE
Last echo 10/3/21  Patient admitted with atrial fibrillation with RVR.  On previous admission patient was diagnosed with atrial fibrillation but was initially rate controlled with metoprolol 12.5mg BID    On admission patient initially required a Cardizem and an amnio drip, patient was started on metoprolol 25 twice daily, I increased this to 50 mg twice daily and patient is responding appropriately.    Oral Amio added 10/21.  CTM.      We will continue to monitor patient's heart rate overnight, and adjust medications as needed.  Continue with Eliquis.    10/26: Patient had been going on and off RVR today with a HR as high as 180. We have ordered metoprolol IV pushes PRN and have consulted cardiology. Patient on amiodarone, we appreciate further recommendations by cardiology.    10/28: Cardiology have ordered metoprolol 25 mg q6 hours.

## 2021-10-20 NOTE — ASSESSMENT & PLAN NOTE
Per prior records, has been dnr/dni in the past, however patient wishes to be FULL CODE  I spoke with patient at length regarding advanced care planning, patient wishes to be FULL CODE including cpr chest compressions and intubation.  ACP: 16 mins

## 2021-10-20 NOTE — ASSESSMENT & PLAN NOTE
Alcohol level  Consider ciwa   Denies recent use     10/28: Patient has not had symptoms of withdrawal. Counseled on lifestyle modifications.

## 2021-10-20 NOTE — CARE PLAN
The patient is Watcher - Medium risk of patient condition declining or worsening    Shift Goals  Clinical Goals: Monitor VS, O2 levels   Patient Goals: comfort, rest   Family Goals: NA    Progress made toward(s) clinical / shift goals:    Problem: Knowledge Deficit - Standard  Goal: Patient and family/care givers will demonstrate understanding of plan of care, disease process/condition, diagnostic tests and medications  Outcome: Progressing     Problem: Pain - Standard  Goal: Alleviation of pain or a reduction in pain to the patient’s comfort goal  Outcome: Progressing     Problem: Fall Risk  Goal: Patient will remain free from falls  Outcome: Progressing       Patient is not progressing towards the following goals:  NA

## 2021-10-20 NOTE — PROGRESS NOTES
Hospital Medicine Daily Progress Note    Date of Service  10/20/2021    Chief Complaint  Gary Severino Gil is a 76 y.o. male admitted 10/19/2021 with palpitations    Hospital Course  This is a 76 year old male with PMHx of chronic respiratory failure secondary to COPD on 3L, t-cell lymphoma/Leukemia awaiting induction chemo follows with Onc, atrial fibrillation on Eliquis who was admitted on 10/19/2021 due to atrial fibrillation with RVR.    Patient was diagnosed with atrial fibrillation on previous admission 09/30-10/5/21, started on Eliquis only. On last admission patient was evaluated by PT/OT, they recommended SNF however patient was very adamant about going home, home health was arranged and patient's daughter was instructed to set up life alert.    On admission patient initially required a Cardizem and an amnio drip, patient was started on metoprolol 25 twice daily, I increased this to 50 mg twice daily and patient is responding appropriately.    Patient evaluated by PT/OT recommend HH and FWW. Orders placed.    Interval Problem Update  Patient admitted with atrial fibrillation with RVR.  On previous admission patient was diagnosed with atrial fibrillation but was initially rate controlled with metoprolol 12.5mg BID    On admission patient initially required a Cardizem and an amnio drip, patient was started on metoprolol 25 twice daily, I increased this to 50 mg twice daily and patient is responding appropriately.    Patient evaluated by PT/OT recommend HH and FWW. Orders placed.  We will continue to monitor patient's heart rate overnight, and adjust medications as needed.  Continue with Eliquis.    I have personally seen and examined the patient at bedside. I discussed the plan of care with patient and bedside RN.    Consultants/Specialty  None    Code Status  Full Code    Disposition  Patient is not medically cleared.   Anticipate discharge to to home with organized home healthcare and close outpatient  follow-up.  I have placed the appropriate orders for post-discharge needs.    Review of Systems  Review of Systems   All other systems reviewed and are negative.       Physical Exam  Temp:  [36.7 °C (98.1 °F)] 36.7 °C (98.1 °F)  Pulse:  [] 68  Resp:  [18-33] 22  BP: ()/(47-66) 99/58  SpO2:  [91 %-100 %] 99 %    Physical Exam  Vitals and nursing note reviewed.   Constitutional:       General: He is not in acute distress.     Appearance: Normal appearance.   HENT:      Head: Normocephalic and atraumatic.   Eyes:      Extraocular Movements: Extraocular movements intact.      Conjunctiva/sclera: Conjunctivae normal.      Pupils: Pupils are equal, round, and reactive to light.   Cardiovascular:      Rate and Rhythm: Tachycardia present. Rhythm irregular.      Pulses: Normal pulses.      Heart sounds: No murmur heard.   No friction rub. No gallop.    Pulmonary:      Effort: Pulmonary effort is normal. No respiratory distress.      Breath sounds: Normal breath sounds. No wheezing, rhonchi or rales.   Abdominal:      General: Bowel sounds are normal. There is no distension.      Palpations: Abdomen is soft.      Tenderness: There is no abdominal tenderness.   Musculoskeletal:         General: No swelling or tenderness. Normal range of motion.      Cervical back: Normal range of motion and neck supple. No muscular tenderness.      Right lower leg: No edema.      Left lower leg: No edema.   Skin:     General: Skin is warm and dry.      Capillary Refill: Capillary refill takes less than 2 seconds.      Findings: No bruising, erythema or rash.   Neurological:      General: No focal deficit present.      Mental Status: He is alert and oriented to person, place, and time.         Fluids  No intake or output data in the 24 hours ending 10/20/21 1508    Laboratory  Recent Labs     10/19/21  1300 10/20/21  0306   WBC 13.6* 12.3*   RBC 4.42* 3.85*   HEMOGLOBIN 12.2* 10.4*   HEMATOCRIT 37.5* 32.1*   MCV 84.8 83.4   MCH  27.6 27.0   MCHC 32.5* 32.4*   RDW 51.0* 49.1   PLATELETCT 144* 153*   MPV 11.1 11.7     Recent Labs     10/19/21  1300 10/19/21  2349   SODIUM 135 130*   POTASSIUM 3.8 4.2   CHLORIDE 95* 94*   CO2 29 30   GLUCOSE 98 108*   BUN 7* 6*   CREATININE 0.57 0.52   CALCIUM 8.3* 8.0*             Recent Labs     10/19/21  2349   TRIGLYCERIDE 131   HDL 9*   LDL 24       Imaging  DX-CHEST-PORTABLE (1 VIEW)   Final Result      1.  Bihilar prominence with mild diffuse interstitial thickening may represent mild interstitial edema.           Assessment/Plan  * Atrial fibrillation with RVR (HCC)- (present on admission)  Assessment & Plan  Last echo 10/3/21  Patient admitted with atrial fibrillation with RVR.  On previous admission patient was diagnosed with atrial fibrillation but was initially rate controlled with metoprolol 12.5mg BID    On admission patient initially required a Cardizem and an amnio drip, patient was started on metoprolol 25 twice daily, I increased this to 50 mg twice daily and patient is responding appropriately.    We will continue to monitor patient's heart rate overnight, and adjust medications as needed.  Continue with Eliquis.    Sepsis (HCC)- (present on admission)  Assessment & Plan  This is Sepsis Present on admission  SIRS criteria identified on my evaluation include: Tachycardia, with heart rate greater than 90 BPM, Tachypnea, with respirations greater than 20 per minute and Leukocytosis, with WBC greater than 12,000  Source is Unknown  Sepsis protocol initiated  Fluid resuscitation ordered per protocol  IV antibiotics as appropriate for source of sepsis  While organ dysfunction may be noted elsewhere in this problem list or in the chart, degree of organ dysfunction does not meet CMS criteria for severe sepsis          Advanced care planning/counseling discussion- (present on admission)  Assessment & Plan  Per prior records, has been dnr/dni in the past, however patient wishes to be FULL CODE  I  spoke with patient at length regarding advanced care planning, patient wishes to be FULL CODE including cpr chest compressions and intubation.  ACP: 16 mins      Pulmonary hypertension (HCC)- (present on admission)  Assessment & Plan  Chronic, cont home meds    Acute hypoxemic respiratory failure (HCC)- (present on admission)  Assessment & Plan  In setting of t cell lymphoma chest mass, and chronic copd, now in uncontrolled afib on presentation  Oxygen support  Duonebs, copd treatments  Treat underlying conditions.  Incentive spirometry    Lymphoma (HCC)- (present on admission)  Assessment & Plan  To start induction chemo soon. Follows with Dr. Fleming      EtOH dependence (HCC)- (present on admission)  Assessment & Plan  Alcohol level  Consider ciwa   Denies recent use     COPD without exacerbation (HCC)- (present on admission)  Assessment & Plan  Comorbid chest mass, afib uncontrolled on presentation  Continue advair ellipta  lian  Consider steroids if needed  Has wbc count on presentation, but leukocytosis chronically elevated       VTE prophylaxis: SCDs/TEDs and therapeutic anticoagulation with Eliquis    I have performed a physical exam and reviewed and updated ROS and Plan today (10/20/2021). In review of yesterday's note (10/19/2021), there are no changes except as documented above.

## 2021-10-21 NOTE — DOCUMENTATION QUERY
[facility]                                                                       Query Response Note      PATIENT:               CARLOS GO  ACCT #:                  [id number]  MRN:                     [id number]  :                      1945  ADMIT DATE:       10/19/2021 12:47 PM  DISCH DATE:          RESPONDING  PROVIDER #:        [id number]           QUERY TEXT:    Atrial fibrillation is documented in the Medical Record.  Please specify the type.    NOTE:  If an appropriate response is not listed below, please respond with a new note.    The patient's Clinical Indicators include:  Clinical indicators-  Per PN: Diagnosed w/ a-fib on previous admission (-10/5). Admitted on 10/19 d/t a-fib w/ RVR. Initially required Cardizem & Amio gtt. Started on metoprolol, responding appropriately.    Treatments-  EKG; Metoprolol; Amio & Cardizem gtt; Eliquis    Risks-  A-fib; COPD; Acute on chronic respiratory failure; Pulm HTN    Thank you,  Pam Alvarado, LUZMA RN  Connect via Voalte  Options provided:   -- Permanent atrial fibrillation (persistent or longstanding persistent AF where cardioversion cannot or will not be performed)   -- Longstanding persistent atrial fibrillation (AF that is persistent and continuous, lasting longer than 1 year)   -- Chronic atrial fibrillation, unspecified (may refer to persistent, longstanding persistent or permanent AF.  A more specific descriptive term is preferred over the nonspecific AF)   -- Other persistent atrial fibrillation (AF that does not terminate within 7 days or that requires repeat pharmacological or electrical cardioversion)   -- Paroxysmal atrial fibrillation (self-terminating or intermittent spontaneously or with intervention within 7 days of onset)   -- Unable to determine      Query created by: Pam Alvarado on 10/21/2021 12:03 PM    RESPONSE TEXT:    Chronic atrial fibrillation, unspecified  (may refer to persistent, longstanding persistent or permanent AF. A more specific descriptive term is preferred over the nonspecific AF)          Electronically signed by:  DAGOBERTO VIVAR MD 10/21/2021 12:10 PM

## 2021-10-21 NOTE — DISCHARGE PLANNING
Agency/Facility Name: [facility]  Spoke To: Deborah   Outcome: Pt approved, therapies running one week behind.

## 2021-10-21 NOTE — CARE PLAN
The patient is Stable - Low risk of patient condition declining or worsening    Shift Goals  Clinical Goals: monitor VS   Patient Goals: comfort, rest  Family Goals: NA    Progress made toward(s) clinical / shift goals:  IV metoprolol given PRN for heart rate >120.       Problem: Hemodynamics  Goal: Patient's hemodynamics, fluid balance and neurologic status will be stable or improve  Outcome: Progressing     Problem: Skin Integrity  Goal: Skin integrity is maintained or improved  Outcome: Progressing     Problem: Knowledge Deficit - Standard  Goal: Patient and family/care givers will demonstrate understanding of plan of care, disease process/condition, diagnostic tests and medications  10/21/2021 1338 by Hallie Goodwin R.N.  Outcome: Progressing

## 2021-10-21 NOTE — THERAPY
Missed Therapy     Patient Name: Gary Severino Gil  Age:  76 y.o., Sex:  male  Medical Record #: [id number]  Today's Date: 10/21/2021       10/21/21 1307   Treatment Variance   Reason For Missed Therapy Non-Medical - Other (Please Comment)   Initial Contact Note    Initial Contact Note  Order Received and Verified, Speech Therapy Evaluation in Progress with Full Report to Follow.   Interdisciplinary Plan of Care Collaboration   IDT Collaboration with  Nursing   Collaboration Comments Orders received for a clinical swallow evaluation. RN reported no difficulty with current, regular diet. RN to clarify order with MD. SLP following as appropriate.

## 2021-10-21 NOTE — CARE PLAN
The patient is Stable - Low risk of patient condition declining or worsening    Shift Goals  Clinical Goals: Monitor VS, O2 levels   Patient Goals: comfort, rest   Family Goals: NA    Progress made toward(s) clinical / shift goals:  Pt whiteboard updated on plan of care. Pt encouraged to call for assistance. Pt encouraged to voice any concerns or questions regarding care plan. Pt updated on plan of care as it develops and changes. Pt will verbalize pain using 0-10 pain scale, when to ask for pain medications, and medication information. Fall precautions in place. Bed in lowest position. Non-skid socks in place. Personal possessions within reach. Mobility sign on door. Bed-alarm on. Call light within reach. Pt educated regarding fall prevention and states understanding.       Problem: Knowledge Deficit - Standard  Goal: Patient and family/care givers will demonstrate understanding of plan of care, disease process/condition, diagnostic tests and medications  Outcome: Progressing     Problem: Pain - Standard  Goal: Alleviation of pain or a reduction in pain to the patient’s comfort goal  Outcome: Progressing     Problem: Skin Integrity  Goal: Skin integrity is maintained or improved  Outcome: Progressing     Problem: Fall Risk  Goal: Patient will remain free from falls  Outcome: Progressing

## 2021-10-21 NOTE — PROGRESS NOTES
Hospital Medicine Daily Progress Note    Date of Service  10/21/2021    Chief Complaint  Gary Severino Gil is a 76 y.o. male admitted 10/19/2021 with palpitations    Hospital Course  This is a 76 year old male with PMHx of chronic respiratory failure secondary to COPD on 3L, t-cell lymphoma/Leukemia awaiting induction chemo follows with Onc, atrial fibrillation on Eliquis who was admitted on 10/19/2021 due to atrial fibrillation with RVR.    Patient was diagnosed with atrial fibrillation on previous admission 09/30-10/5/21, started on Eliquis only. On last admission patient was evaluated by PT/OT, they recommended SNF however patient was very adamant about going home, home health was arranged and patient's daughter was instructed to set up life alert.    On admission patient initially required a Cardizem and an amnio drip, patient was started on metoprolol 25 twice daily, I increased this to 50 mg twice daily and patient is responding appropriately.    Patient evaluated by PT/OT recommend HH and FWW. Orders placed.    Interval Problem Update  Patient admitted with atrial fibrillation with RVR.  On previous admission patient was diagnosed with atrial fibrillation but was initially rate controlled with metoprolol 12.5mg BID    Beta-blockade was increased to 50 twice daily on 10/20, unfortunately patient continued to have some mild breakthrough tachycardia, oral amiodarone will be added on 10/21.    I have personally seen and examined the patient at bedside. I discussed the plan of care with patient and bedside RN.    Consultants/Specialty  None    Code Status  Full Code    Disposition  Patient is not medically cleared.   Anticipate discharge to to home with organized home healthcare and close outpatient follow-up.  I have placed the appropriate orders for post-discharge needs.    Review of Systems  Review of Systems   All other systems reviewed and are negative.       Physical Exam  Temp:  [36.3 °C (97.4 °F)-37.4 °C  (99.3 °F)] 37.1 °C (98.7 °F)  Pulse:  [] 128  Resp:  [17-22] 20  BP: ()/(49-69) 119/54  SpO2:  [92 %-97 %] 94 %    Physical Exam  Vitals and nursing note reviewed.   Constitutional:       General: He is not in acute distress.     Appearance: Normal appearance.   HENT:      Head: Normocephalic and atraumatic.   Eyes:      Extraocular Movements: Extraocular movements intact.      Conjunctiva/sclera: Conjunctivae normal.      Pupils: Pupils are equal, round, and reactive to light.   Cardiovascular:      Rate and Rhythm: Tachycardia present. Rhythm irregular.      Pulses: Normal pulses.      Heart sounds: No murmur heard.   No friction rub. No gallop.    Pulmonary:      Effort: Pulmonary effort is normal. No respiratory distress.      Breath sounds: Normal breath sounds. No wheezing, rhonchi or rales.   Abdominal:      General: Bowel sounds are normal. There is no distension.      Palpations: Abdomen is soft.      Tenderness: There is no abdominal tenderness.   Musculoskeletal:         General: No swelling or tenderness. Normal range of motion.      Cervical back: Normal range of motion and neck supple. No muscular tenderness.      Right lower leg: No edema.      Left lower leg: No edema.   Skin:     General: Skin is warm and dry.      Capillary Refill: Capillary refill takes less than 2 seconds.      Findings: No bruising, erythema or rash.   Neurological:      General: No focal deficit present.      Mental Status: He is alert and oriented to person, place, and time.         Fluids    Intake/Output Summary (Last 24 hours) at 10/21/2021 1446  Last data filed at 10/21/2021 1200  Gross per 24 hour   Intake 580 ml   Output 250 ml   Net 330 ml       Laboratory  Recent Labs     10/19/21  1300 10/20/21  0306 10/21/21  0028   WBC 13.6* 12.3* 12.3*   RBC 4.42* 3.85* 3.95*   HEMOGLOBIN 12.2* 10.4* 10.5*   HEMATOCRIT 37.5* 32.1* 33.7*   MCV 84.8 83.4 85.3   MCH 27.6 27.0 26.6*   MCHC 32.5* 32.4* 31.2*   RDW 51.0* 49.1  51.0*   PLATELETCT 144* 153* 159*   MPV 11.1 11.7 11.6     Recent Labs     10/19/21  1300 10/19/21  2349 10/21/21  0028   SODIUM 135 130* 132*   POTASSIUM 3.8 4.2 4.5   CHLORIDE 95* 94* 95*   CO2 29 30 28   GLUCOSE 98 108* 93   BUN 7* 6* 7*   CREATININE 0.57 0.52 0.48*   CALCIUM 8.3* 8.0* 8.1*             Recent Labs     10/19/21  2349   TRIGLYCERIDE 131   HDL 9*   LDL 24       Imaging  DX-CHEST-PORTABLE (1 VIEW)   Final Result      1.  Bihilar prominence with mild diffuse interstitial thickening may represent mild interstitial edema.           Assessment/Plan  * Atrial fibrillation with RVR (HCC)- (present on admission)  Assessment & Plan  Last echo 10/3/21  Patient admitted with atrial fibrillation with RVR.  On previous admission patient was diagnosed with atrial fibrillation but was initially rate controlled with metoprolol 12.5mg BID    On admission patient initially required a Cardizem and an amnio drip, patient was started on metoprolol 25 twice daily, I increased this to 50 mg twice daily and patient is responding appropriately.    Oral Amio added 10/21.  CTM.      We will continue to monitor patient's heart rate overnight, and adjust medications as needed.  Continue with Eliquis.    Sepsis (HCC)- (present on admission)  Assessment & Plan  This is Sepsis Present on admission  SIRS criteria identified on my evaluation include: Tachycardia, with heart rate greater than 90 BPM, Tachypnea, with respirations greater than 20 per minute and Leukocytosis, with WBC greater than 12,000  Source is Unknown  Sepsis protocol initiated  Fluid resuscitation ordered per protocol  IV antibiotics as appropriate for source of sepsis  While organ dysfunction may be noted elsewhere in this problem list or in the chart, degree of organ dysfunction does not meet CMS criteria for severe sepsis          Advanced care planning/counseling discussion- (present on admission)  Assessment & Plan  Per prior records, has been dnr/dni in the  past, however patient wishes to be FULL CODE  I spoke with patient at length regarding advanced care planning, patient wishes to be FULL CODE including cpr chest compressions and intubation.  ACP: 16 mins      Pulmonary hypertension (HCC)- (present on admission)  Assessment & Plan  Chronic, cont home meds    Acute hypoxemic respiratory failure (HCC)- (present on admission)  Assessment & Plan  In setting of t cell lymphoma chest mass, and chronic copd, now in uncontrolled afib on presentation  Oxygen support  Duonebs, copd treatments  Treat underlying conditions.  Incentive spirometry    Lymphoma (HCC)- (present on admission)  Assessment & Plan  To start induction chemo soon. Follows with Dr. Fleming      EtOH dependence (HCC)- (present on admission)  Assessment & Plan  Alcohol level  Consider ciwa   Denies recent use     COPD without exacerbation (HCC)- (present on admission)  Assessment & Plan  Comorbid chest mass, afib uncontrolled on presentation  Continue advair ellipta  duonebs  Consider steroids if needed  Has wbc count on presentation, but leukocytosis chronically elevated       VTE prophylaxis: SCDs/TEDs and therapeutic anticoagulation with Eliquis    I have performed a physical exam and reviewed and updated ROS and Plan today (10/21/2021). In review of yesterday's note (10/20/2021), there are no changes except as documented above.

## 2021-10-21 NOTE — PROGRESS NOTES
Received report from day shift RNSimona. Assumed care of pt @ 1900. Pt reports no pain at this time. Updated pt on plan of care. Pt resting comfortably in bed. Fall precautions in place. Educated on use of call light. Hourly rounding and continuous monitoring in place.

## 2021-10-21 NOTE — RESPIRATORY CARE
"COPD EDUCATION by COPD CLINICAL EDUCATOR  10/21/2021  at  12:21 PM by Karma Trevizo, RRT     Patient interviewed by COPD education team.  Patient unable to participate in full program.  A short intervention has been conducted.  A comprehensive packet including information about COPD, types of treatments to manage their disease and safe home Oxygen usage was provided and reviewed with patient at the bedside. Spoke with patient about purse lip breathing and Inhaler with spacer.    COPD Screen  COPD Risk Screening  Do you have a history of COPD?: Yes  Do you have a Pulmonologist?: Yes    COPD Assessment   Patient is not eligible for remote monitoring - insurance, patient was referred to meds to bed, patient not interested in dispatch health. [facility] for delivery of oxygen. Seen on previous admission.     Meds to Beds  Would the patient like to opt in for Bedside Medication Delivery at Discharge?: Yes, interested     MY COPD ACTION PLAN     It is recommended that patients and physicians /healthcare providers complete this action plan together. This plan should be discussed at each physician visit and updated as needed.    The green, yellow and red zones show groups of symptoms of COPD. This list of symptoms is not comprehensive, and you may experience other symptoms. In the \"Actions\" column, your healthcare provider has recommended actions for you to take based on your symptoms.    Patient Name: Gary Severino Gil   YOB: 1945   Last Updated on: 10/1/2021 12:56 PM   Green Zone:  I am doing well today Actions   •  Usual activitiy and exercise level •  Take daily medications   •  Usual amounts of cough and phlegm/mucus •  Use oxygen as prescribed   •  Sleep well at night •  Continue regular exercise/diet plan   •  Appetite is good •  At all times avoid cigarette smoke, inhaled irritants     Daily Medications (these medications are taken every day):   Fluticasone and Umeclidinium and Vilanterol " "(Trelogy) 1 Puff Once daily     Additional Information:  Rinse mouth after use    Yellow Zone:  I am having a bad day or a COPD flare Actions   •  More breathless than usual •  Continue daily medications   •  I have less energy for my daily activities •  Use quick relief inhaler as ordered   •  Increased or thicker phlegm/mucus •  Use oxygen as prescribed   •  Using quick relief inhaler/nebulizer more often •  Get plenty of rest   •  Swelling of ankles more than usual •  Use pursed lip breathing   •  More coughing than usual •  At all times avoid cigarette smoke, inhaled irritants   •  I feel like I have a \"chest cold\"   •  Poor sleep and my symptoms woke me up   •  My appetite is not good   •  My medicine is not helping    •  Call provider immediately if symptoms don’t improve     Continue daily medications, add rescue medications:               Medications to be used during a flare up, (as Discussed with Provider):              Red Zone:  I need urgent medical care Actions   •  Severe shortness of breath even at rest •  Call 911 or seek medical care immediately   •  Not able to do any activity because of breathing    •  Fever or shaking chills    •  Feeling confused or very drowsy     •  Chest pains    •  Coughing up blood              "

## 2021-10-21 NOTE — PROGRESS NOTES
Report received from  NOC Rn. Assumed pt care. Pt is resting in bed on 4 L 02.. Pt A&O x 3, disoriented to place. Fall precautions in place, call light and belongings within reach, bed in lowest position. No signs of distress.

## 2021-10-22 PROBLEM — A41.9 SEPSIS (HCC): Status: RESOLVED | Noted: 2021-01-01 | Resolved: 2021-01-01

## 2021-10-22 NOTE — PROGRESS NOTES
Received report from day shift RNHallie. Assumed care of pt @ 1900. Pt reports no pain at this time. Updated pt on plan of care. Pt resting comfortably in bed. Fall precautions in place. Educated on use of call light. Hourly rounding and continuous monitoring in place.

## 2021-10-22 NOTE — CARE PLAN
The patient is Stable - Low risk of patient condition declining or worsening    Shift Goals  Clinical Goals: monitor VS   Patient Goals: comfort, rest  Family Goals: NA    Progress made toward(s) clinical / shift goals:  Pt whiteboard updated on plan of care. Pt encouraged to call for assistance. Pt encouraged to voice any concerns or questions regarding care plan. Pt updated on plan of care as it develops and changes. Fall precautions in place. Bed in lowest position. Non-skid socks in place. Personal possessions within reach. Mobility sign on door. Bed-alarm on. Call light within reach. Pt educated regarding fall prevention and states understanding. Pt will verbalize pain using 0-10 pain scale, when to ask for pain medications, and medication information.       Problem: Knowledge Deficit - Standard  Goal: Patient and family/care givers will demonstrate understanding of plan of care, disease process/condition, diagnostic tests and medications  Outcome: Progressing     Problem: Pain - Standard  Goal: Alleviation of pain or a reduction in pain to the patient’s comfort goal  Outcome: Progressing     Problem: Fall Risk  Goal: Patient will remain free from falls  Outcome: Progressing

## 2021-10-22 NOTE — PROGRESS NOTES
1800  pt. Lethargic unable to keep eyes open, nodding in and out of sleep, unable to answer orientation questions appropriately. Neuro exam unremarkable other than orientation. /58, HR 90, RR 18 pulse ox 98% on 4 L oxymask. Pt.on continuous pulse ox monitoring. MD notified.

## 2021-10-22 NOTE — DISCHARGE SUMMARY
Discharge Summary    CHIEF COMPLAINT ON ADMISSION  Chief Complaint   Patient presents with   • Rapid Heart Beat       Reason for Admission  EMS     Admission Date  10/19/2021    CODE STATUS  Full Code    HPI & HOSPITAL COURSE  This is a 76 year old male with PMHx of chronic respiratory failure secondary to COPD on 3L, t-cell lymphoma/Leukemia awaiting induction chemo follows with Onc, atrial fibrillation on Eliquis who was admitted on 10/19/2021 due to atrial fibrillation with RVR.    Patient was diagnosed with atrial fibrillation on previous admission 09/30-10/5/21, started on Eliquis only. On last admission patient was evaluated by PT/OT, they recommended SNF however patient was very adamant about going home, home health was arranged and patient's daughter was instructed to set up life alert.    On admission patient initially required a Cardizem and an amnio drip, patient was started on metoprolol 25 twice daily, I increased this to 50 mg twice daily and patient is responding appropriately.    Patient evaluated by PT/OT recommend HH and FWW. Orders placed.    Amiodarone drip was able to be weaned off, beta-blockade uptitrated.  Patient had breakthrough RVR even with higher dose of beta-blockade, oral amiodarone reintroduced.  Patient had improvement in his rate thereafter.  He was deemed stable for discharge after these interventions resulted in a reasonable heart rate for 24 hours on 10/22/2021.  He is to follow-up with cardiology.    Therefore, he is discharged in good and stable condition to home with close outpatient follow-up.    The patient met 2-midnight criteria for an inpatient stay at the time of discharge.    Discharge Date  10/22/21      FOLLOW UP ITEMS POST DISCHARGE  None    DISCHARGE DIAGNOSES  Principal Problem:    Atrial fibrillation with RVR (HCC) POA: Yes  Active Problems:    COPD without exacerbation (HCC) POA: Yes    EtOH dependence (HCC) POA: Yes    Lymphoma (HCC) (Chronic) POA: Yes    Acute  hypoxemic respiratory failure (HCC) POA: Yes    Pulmonary hypertension (HCC) POA: Yes    Advanced care planning/counseling discussion POA: Yes  Resolved Problems:    Sepsis (HCC) POA: Yes      FOLLOW UP  No future appointments.  [facility]  [address]  [place] [address]  [phone]          MEDICATIONS ON DISCHARGE     Medication List      START taking these medications      Instructions   amiodarone 200 MG Tabs  Start taking on: October 22, 2021  Commonly known as: Cordarone   Take 1 Tablet by mouth 2 times a day for 4 days, THEN 1 Tablet every day for 30 days.        CHANGE how you take these medications      Instructions   metoprolol tartrate 50 MG Tabs  What changed:   · medication strength  · how much to take  · when to take this  Commonly known as: LOPRESSOR   Take 1 Tablet by mouth 2 times a day.  Dose: 50 mg        CONTINUE taking these medications      Instructions   acetaminophen 325 MG Tabs  Commonly known as: Tylenol   Take 650 mg by mouth every four hours as needed.  Dose: 650 mg     apixaban 5mg Tabs  Commonly known as: ELIQUIS   Take 1 Tablet by mouth 2 times a day. Indications: Thromboembolism secondary to Atrial Fibrillation  Dose: 5 mg     ascorbic acid 500 MG tablet  Commonly known as: Vitamin C   Take 500 mg by mouth every day.  Dose: 500 mg     famotidine 20 MG Tabs  Commonly known as: PEPCID   Take 20 mg by mouth 2 times a day.  Dose: 20 mg     Incruse Ellipta 62.5 MCG/INH Aepb  Generic drug: Umeclidinium Bromide   Inhale 1 Puff every day.  Dose: 1 Puff     loperamide 2 MG Caps  Commonly known as: IMODIUM   Take 2 mg by mouth every 8 hours as needed for Diarrhea.  Dose: 2 mg     melatonin 5 mg Tabs   Take 5 mg by mouth at bedtime as needed (Sleep).  Dose: 5 mg     multivitamin Tabs   Take 1 Tablet by mouth every day.  Dose: 1 Tablet     Non Formulary Request   Take 1 Tablet by mouth every day. Therbiotic Complete  Dose: 1 Tablet     nystatin [id number] UNIT/ML  Susp  Commonly known as: MYCOSTATIN   Take 400,000 Units by mouth 4 times a day. 10 day course  Dose: 400,000 Units     oxyCODONE-acetaminophen  MG Tabs  Commonly known as: PERCOCET-10   Take 1 Tablet by mouth every 6 hours as needed.  Dose: 1 Tablet     pregabalin 75 MG Caps  Commonly known as: LYRICA   Take 75 mg by mouth 2 times a day.  Dose: 75 mg     PROBITROL PO   Take 1 Tablet by mouth 2 times a day. 10 day course  Dose: 1 Tablet     PROSTAT PO   Take 1 Tablet by mouth every day.  Dose: 1 Tablet     QUERCETIN PO   Take 1 Tablet by mouth at bedtime. Take for 14 days  Dose: 1 Tablet     vitamin D3 125 MCG (5000 UT) Caps   Take 5,000 Units by mouth every day.  Dose: 5,000 Units     Wixela Inhub 100-50 MCG/DOSE Aepb  Generic drug: fluticasone-salmeterol   Inhale 1 Puff every 12 hours.  Dose: 1 Puff     zinc sulfate 220 (50 Zn) MG Caps  Commonly known as: ZINCATE   Take 220 mg by mouth every day.  Dose: 220 mg            Allergies  Allergies   Allergen Reactions   • Morphine Sulfate Unspecified     Hallucinations        DIET  Orders Placed This Encounter   Procedures   • Diet Order Diet: Level 7 - Easy to Chew; Liquid level: Level 0 - Thin; Fluid modifications: (optional): 2000 ml Fluid Restriction     Standing Status:   Standing     Number of Occurrences:   1     Order Specific Question:   Diet:     Answer:   Level 7 - Easy to Chew [22]     Order Specific Question:   Liquid level     Answer:   Level 0 - Thin     Order Specific Question:   Fluid modifications: (optional)     Answer:   2000 ml Fluid Restriction [11]       ACTIVITY  As tolerated.  Weight bearing as tolerated    CONSULTATIONS  None    RADIOLOGY  DX-CHEST-PORTABLE (1 VIEW)   Final Result      1.  Bihilar prominence with mild diffuse interstitial thickening may represent mild interstitial edema.            PROCEDURES  None    LABORATORY  Lab Results   Component Value Date    SODIUM 132 (L) 10/21/2021    POTASSIUM 4.5 10/21/2021    CHLORIDE 95 (L)  10/21/2021    CO2 28 10/21/2021    GLUCOSE 93 10/21/2021    BUN 7 (L) 10/21/2021    CREATININE 0.48 (L) 10/21/2021    CREATININE 0.9 04/07/2009        Lab Results   Component Value Date    WBC 12.3 (H) 10/21/2021    HEMOGLOBIN 10.5 (L) 10/21/2021    HEMATOCRIT 33.7 (L) 10/21/2021    PLATELETCT 159 (L) 10/21/2021        Total time of the discharge process exceeds 32 minutes.

## 2021-10-22 NOTE — PROGRESS NOTES
Monitor Summary:    SR/ST   (R) PVC's + PAC's  PSVT up to 160's non-sustaining    .16/.08/.31

## 2021-10-22 NOTE — WOUND TEAM
[facility]  Inpatient Services  Initial Wound and Skin Care Evaluation    Admission Date: 10/19/2021     Last order of IP CONSULT TO WOUND CARE was found on 10/20/2021 from Hospital Encounter on 10/19/2021     HPI, PMH, SH: Reviewed    Past Surgical History:   Procedure Laterality Date   • FLAP GRAFT  6/1/2010    Performed by FRANCESCA VIDAL at SURGERY [facility] ORS   • FISTULA IN ANO REPAIR  6/1/2010    Performed by FRANCESCA VIDAL at SURGERY [facility]   • RECTAL EXPLORATION  11/17/2009    Performed by FRANCESCA VIDAL at SURGERY SAME DAY [facility] ORS   • DEBRIDEMENT  11/17/2009    Performed by FRANCESCA VIDAL at SURGERY SAME DAY [facility] ORS   • RECTAL EXPLORATION  9/3/2009    Performed by FRANCESCA VIDAL at SURGERY SAME DAY [facility] ORS   • DEBRIDEMENT  9/3/2009    Performed by FRANCESCA VIDAL at SURGERY SAME DAY [facility] ORS   • LUIS ENRIQUE RECTAL ABSCESS INCISION AND DRAINAGE  4/6/2009    Performed by FRANCESCA VIDAL at SURGERY [facility] ORS   • LUMBAR FUSION POSTERIOR  1987   • LUMBAR FUSION ANTERIOR  1983     Social History     Tobacco Use   • Smoking status: Former Smoker     Packs/day: 1.50     Years: 45.00     Pack years: 67.50     Types: Cigarettes     Quit date: 10/27/2017     Years since quitting: 3.9   • Smokeless tobacco: Never Used   Substance Use Topics   • Alcohol use: Yes     Alcohol/week: 8.0 oz     Types: 16 Standard drinks or equivalent per week     Comment: noted 4 per day     Chief Complaint   Patient presents with   • Rapid Heart Beat     Diagnosis: A-fib (HCC) [I48.91]    Unit where seen by Wound Team: T813/02     WOUND CONSULT/FOLLOW UP RELATED TO:  Scattered lesions     WOUND HISTORY:  Pt with chronic lesions scattered on body. Pt states dry lesions on chest and body are cancer. Sacral and leg lesions are of unknown  Etiology.    WOUND ASSESSMENT/LDA      Wound 10/22/21 Full Thickness Wound Sacrum and posterior thighs (Active)   Wound Image     10/22/21 1400   Site Assessment Red 10/22/21 1400   Periwound Assessment Intact 10/22/21 1400   Margins Unattached edges 10/22/21 1400   Closure Secondary intention 10/22/21 1400   Drainage Amount Scant 10/22/21 1400   Drainage Description Serosanguineous 10/22/21 1400   Treatments Pharmaceutical agent 10/22/21 1400   Wound Cleansing Normal Saline Irrigation 10/22/21 1400   Periwound Protectant Not Applicable 10/22/21 1400   Dressing Cleansing/Solutions Not Applicable 10/22/21 1400   Dressing Options Hydrocolloid Thin;Mepilex 10/22/21 1400   Dressing Changed New 10/22/21 1400   Dressing Status Intact 10/22/21 1400   Dressing Change/Treatment Frequency Daily, and As Needed 10/22/21 1400   NEXT Dressing Change/Treatment Date 10/23/21 10/22/21 1400   NEXT Weekly Photo (Inpatient Only) 10/29/21 10/22/21 1400   Shape oval, scattered 10/22/21 1400   Exposed Structures None 10/22/21 1400   Number of days: 0        Vascular:    CINTHIA:   No results found.    Lab Values:    Lab Results   Component Value Date/Time    WBC 12.3 (H) 10/21/2021 12:28 AM    RBC 3.95 (L) 10/21/2021 12:28 AM    HEMOGLOBIN 10.5 (L) 10/21/2021 12:28 AM    HEMATOCRIT 33.7 (L) 10/21/2021 12:28 AM        Culture Results show:  No results found for this or any previous visit (from the past 720 hour(s)).    Pain Level/Medicated:  None       INTERVENTIONS BY WOUND TEAM:  Chart and images reviewed. Discussed with bedside RN. All areas of concern (based on picture review, LDA review and discussion with bedside RN) have been thoroughly assessed. Documentation of areas based on significant findings. This RN in to assess patient. Performed standard wound care which includes appropriate positioning, dressing removal and non-selective debridement. Pictures and measurements obtained weekly if/when required.  Preparation for Dressing removal: NA  Non-selectively Debrided with:  NS and gauze.  Sharp debridement: NA  Tanvi wound: Cleansed with NS  Primary Dressing:  Hydrocolloid thin; will switch to viscopaste dressing instead  Secondary (Outer) Dressing: mepielx    Interdisciplinary consultation: Patient, Bedside RN     EVALUATION / RATIONALE FOR TREATMENT:  Most Recent Date:  10/22: Areas of concern are lesions on thigh and sacral area. Lesions are shallow with red, viable tissue. Hydrocolloid thin place to maintain moist wound  Bed, but will switch to viscopaste to encourage granulation. Continue mepilex for offloading.     Goals: Steady decrease in wound area and depth weekly.    WOUND TEAM PLAN OF CARE ([X] for frequency of wound follow up,):   Nursing to follow orders written for wound care. Contact wound team if area fails to progress, deteriorates or with any questions/concerns  Dressing changes by wound team:                   Follow up 3 times weekly:                NPWT change 3 times weekly:     Follow up 1-2 times weekly:      Follow up Bi-Monthly:                   Follow up as needed:   X Nursing to manage  Other (explain):     NURSING PLAN OF CARE ORDERS (X):  Dressing changes: See Dressing Care orders: x  Skin care: See Skin Care orders: x  RN Prevention Protocol: x  Rectal tube care: See Rectal Tube Care orders:   Other orders:    RSKIN:   CURRENTLY IN PLACE (X), APPLIED THIS VISIT (A), ORDERED (O):   Q shift Jeremias:  X  Q shift pressure point assessments:  X    Surface/Positioning   Pressure redistribution mattress          x  Low Airloss          Bariatric foam      Bariatric GRISEL     Waffle cushion        Waffle Overlay   x       Reposition q 2 hours   x   TAPs Turning system     Z Samuel Pillow     Offloading/Redistribution   Sacral Mepilex (Silicone dressing)   x  Heel Mepilex (Silicone dressing)         Heel float boots (Prevalon boot)             Float Heels off Bed with Pillows    x       Respiratory   Silicone O2 tubing       x  Gray Foam Ear protectors     Cannula fixation Device (Tender )          High flow offloading Clip    Elastic head band  offloading device      Anchorfast                                                         Trach with Optifoam split foam             Containment/Moisture Prevention     Rectal tube or BMS    Purwick/Condom Cath        Moore Catheter    Barrier wipes           Barrier paste     x  Antifungal tx      Interdry        Mobilization       Up to chair        Ambulate      PT/OT      Nutrition       Dietician        Diabetes Education      PO  x   TF     TPN     NPO   # days     Other        Anticipated discharge plans:   LTACH:        SNF/Rehab:                  Home Health Care:       x    Outpatient Wound Center:            Self/Family Care:        Other:                  Vac Discharge Needs:   Not Applicable Pt not on a wound vac:  x     Regular Vac while inpatient, alternative dressing at DC:        Regular Vac in use and continued at DC:            Reg. Vac w/ Skin Sub/Biologic in use. Will need to be changed 2x wkly:      Veraflo Vac while inpatient, ok to transition to Regular Vac on Discharge:           Veraflo Vac while inpatient, will need to remain on Veraflo Vac upon discharge:

## 2021-10-22 NOTE — CARE PLAN
The patient is Stable - Low risk of patient condition declining or worsening    Shift Goals  Clinical Goals: Monitor VS and labs  Patient Goals: Rest  Family Goals: NA    Progress made toward(s) clinical / shift goals:  Plan to DC today home with [facility]    Patient is not progressing towards the following goals:      Problem: Knowledge Deficit - Standard  Goal: Patient and family/care givers will demonstrate understanding of plan of care, disease process/condition, diagnostic tests and medications  Outcome: Progressing     Problem: Pain - Standard  Goal: Alleviation of pain or a reduction in pain to the patient’s comfort goal  Outcome: Progressing     Problem: Fall Risk  Goal: Patient will remain free from falls  Outcome: Progressing     Problem: Hemodynamics  Goal: Patient's hemodynamics, fluid balance and neurologic status will be stable or improve  Outcome: Progressing

## 2021-10-23 NOTE — DISCHARGE PLANNING
"Anticipated Discharge Disposition: Home with [facility] vs SNF    Action: LSW notified by MD Cuellar that pt was supposed to DC yesterday, however his dtr never came to pick him up.    LSW met with pt at bedside to discuss transportation home. Pt reported he does not have a ride home and has not been able to get a hold of his dtr. Pt stated that his dtr has the keys to get into his house.    LSW made phone call to pt's dtr Nayeli. Nayeli reported that she was under the impression the pt was supposed to DC back to [facility] for more therapies before returning home. Nayeli expressed concern about the pt returning home prior to receiving more inpatient therapy.    MARIA DEL CARMEN Tierney also expressed concern about the pt DCing home and believes SNF would be more appropriate for this pt. Per chart review pt previously discharged home, was too weak to care for himself, and immediately came back to the hospital when he was then sent to [facility].    LSW messaged PT/OT to request they re-evaluate the pt since their previous recommendations was for home with [facility].    LSW met with pt at bedside to discuss DC plan and concerns. Pt agreeable with being re-evaluated by PT/OT and stated \"send me to any place to get therapy\".    MD Cuellar updated.    Addendum @3476  LSW notified that recommendation is for SNF. LSW met with pt at bedside. Pt reported he would like to go back to [facility]. LSW faxed choice form to Millie ALMODOVAR.    Addendum @7551  LSW made phone call to [facility] and was notified that they don't have anyone in admissions over the weekend. LSW met with pt at bedside to obtain additional SNF choice. Pt reported he only wants to go to [facility]. LSW explained we need to send out additional referrals since he is medically clear and [facility] doesn't have admissions over the weekend. Pt gave consent to send a referral to [facility]. LSW asked for additional choices however pt refused at this time. LSW faxed choice form to NISHI, " Millie.    Barriers to Discharge: SNF acceptance.    Plan: Care coordination will follow up with SNF referral.

## 2021-10-23 NOTE — THERAPY
Physical Therapy   Initial Evaluation     Patient Name: Gary Severino Gil  Age:  76 y.o., Sex:  male  Medical Record #: [id number]  Today's Date: 10/23/2021     Precautions  Precautions: Fall Risk    Assessment  Patient is 76 y.o. male presenting w/ a significant decline in functional mobility since his previous evaluation.  He was initially admitted for GLF d/t dizziness, and dc'd to [facility].  He was readmitted w/ sepsis, COPD exacerbation, and found to be in afib w/ rvr.  The pt was previously independent w/ functional mobility tasks, and ambulating community distances w/ a spc.  He lives in a SS apt w/ no SHIV, and his dtr assists occasionally to provide transport.  Currently, the pt requires Isac to complete bed mobility tasks w/ hob flat and use of bed rails.  Immediately upon sitting eob pt c/o unchanging dizziness, BP 96/49.  He performed STS eob w/ fww Isac for extension and stability, reporting of no change in symptoms, BP 95/55.  The pt was able to take a couple steps forward Isac w/ fww however reporting of increasing severity of symptoms.  Pt was returned to eob, and attempted ambulation again w/ the same outcome.  Returned pt to supine, BP 98/65.  SpO2 >90% throughout.  Recommend placement at this time given pt's limited activity tolerance and high risk of future falls.  Will follow for acute PT needs.      Plan    Recommend Physical Therapy 4 times per week until therapy goals are met for the following treatments:  Bed Mobility, Community Re-integration, Equipment, Gait Training, Manual Therapy, Neuro Re-Education / Balance, Orthotics Training, Self Care/Home Evaluation, Stair Training, Therapeutic Activities and Therapeutic Exercises    DC Equipment Recommendations: Front-Wheel Walker  Discharge Recommendations: Recommend post-acute placement for additional physical therapy services prior to discharge home       Subjective/Objective       10/23/21 1052   Prior Living Situation   Prior Services  Home-Independent   Housing / Facility 1 Story Apartment / Condo   Steps Into Home 0   Equipment Owned Single Point Cane   Lives with - Patient's Self Care Capacity Alone and Able to Care For Self   Comments Pt's dtr assists w/ transport, otherwise pt previously independent   Prior Level of Functional Mobility   Bed Mobility Independent   Transfer Status Independent   Ambulation Independent   Distance Ambulation (Feet)   (community distances)   Assistive Devices Used Single Point Cane   Stairs Independent   History of Falls   History of Falls Yes   Date of Last Fall   (reason for admit)   Cognition    Cognition / Consciousness WDL   Level of Consciousness Alert   Comments pt pleasant and cooperative   Passive ROM Lower Body   Passive ROM Lower Body WDL   Active ROM Lower Body    Active ROM Lower Body  WDL   Comments observed during functional mobility   Strength Lower Body   Lower Body Strength  X   Comments generalized weakness BLE   Sensation Lower Body   Lower Extremity Sensation   WDL   Comments sensation intact to LT BLE   Coordination Lower Body    Coordination Lower Body  WDL   Balance Assessment   Sitting Balance (Static) Fair   Sitting Balance (Dynamic) Fair   Standing Balance (Static) Fair -   Standing Balance (Dynamic) Poor +   Weight Shift Sitting Fair   Weight Shift Standing Fair   Comments stand w/ fww   Gait Analysis   Gait Level Of Assist Minimal Assist  (for stability)   Assistive Device Front Wheel Walker   Distance (Feet) 2   # of Times Distance was Traveled 1   Deviation Decreased Base Of Support;Decreased Toe Off   Weight Bearing Status no restrictions   Comments distance limited by unsteadiness and symptoms of lightheadedness   Bed Mobility    Supine to Sit Minimal Assist   Sit to Supine Minimal Assist   Scooting Supervised   Rolling Supervised   Comments hob flat, use of bed rail, assist to LEs and trunk   Functional Mobility   Sit to Stand Minimal Assist   Bed, Chair, Wheelchair Transfer  Minimal Assist   Transfer Method Stand Step   Mobility STS eob w/ fww   Activity Tolerance   Sitting Edge of Bed 25min   Standing 5min total   Edema / Skin Assessment   Edema / Skin  Not Assessed   Short Term Goals    Short Term Goal # 1 The pt will demo supine<>sit eob w/ hob flat and no bed rails spv in 6 visits for independence w/ bed mobility.   Short Term Goal # 2 The pt will demo gait distance >150' using fww spv in a moving environment in 6 visits for household ambulation.   Short Term Goal # 3 The pt will demo stand-step txr eob<>chair using fww spv in 6 visits for functional mobility.   Short Term Goal # 4 The pt will demo ability to ascend/descend 1 platform step w/ fww spv in 6 visits for access to the community.   Education Group   Education Provided Role of Physical Therapist;Use of Assistive Device   Role of Physical Therapist Patient Response Patient;Acceptance;Explanation;Demonstration;Action Demonstration   Use of Assistive Device Patient Response Patient;Acceptance;Explanation;Demonstration;Action Demonstration   Additional Comments pt receptive of edu provided   Problem List    Problems Pain;Impaired Bed Mobility;Impaired Transfers;Impaired Ambulation;Functional Strength Deficit;Impaired Balance;Decreased Activity Tolerance   Session Information   Date / Session Number  10/23- 1 (1/4, 10/29)

## 2021-10-23 NOTE — PROGRESS NOTES
Monitor Summary:    SR-ST     Occasional PVC; Frequent PAC    Sustained up to 167 for 22 seconds; asymptomatic    .17/.08/.41

## 2021-10-23 NOTE — PROGRESS NOTES
Hospital Medicine Daily Progress Note    Date of Service  10/23/2021    Chief Complaint  Gary Severino Gil is a 76 y.o. male admitted 10/19/2021 with palpitations    Hospital Course  This is a 76 year old male with PMHx of chronic respiratory failure secondary to COPD on 3L, t-cell lymphoma/Leukemia awaiting induction chemo follows with Onc, atrial fibrillation on Eliquis who was admitted on 10/19/2021 due to atrial fibrillation with RVR.    Patient was diagnosed with atrial fibrillation on previous admission 09/30-10/5/21, started on Eliquis only. On last admission patient was evaluated by PT/OT, they recommended SNF however patient was very adamant about going home, home health was arranged and patient's daughter was instructed to set up life alert.    On admission patient initially required a Cardizem and an amnio drip, patient was started on metoprolol 25 twice daily, I increased this to 50 mg twice daily and patient is responding appropriately.    Patient evaluated by PT/OT recommend HH and FWW. Orders placed.    Interval Problem Update  Patient admitted with atrial fibrillation with RVR.  On previous admission patient was diagnosed with atrial fibrillation but was initially rate controlled with metoprolol 12.5mg BID    Beta-blockade was increased to 50 twice daily on 10/20, unfortunately patient continued to have some mild breakthrough tachycardia, oral amiodarone will be added on 10/21.  Patient's tachyarrhythmia resolved after reintroduction of amiodarone, patient was medically cleared to return home on the above regimen on 10/22/2021, given that he had refused skilled placement.  Family is unable to care for him, and faced with this prospect patient is now agreeable to skilled nursing placement.  Referral to skilled nursing facilities have been made, medically cleared for placement.    I have personally seen and examined the patient at bedside. I discussed the plan of care with patient and bedside  RN.    Consultants/Specialty  None    Code Status  Full Code    Disposition  Patient is medically cleared.   Anticipate discharge to to skilled nursing facility.  I have placed the appropriate orders for post-discharge needs.    Review of Systems  Review of Systems   All other systems reviewed and are negative.       Physical Exam  Temp:  [37 °C (98.6 °F)-37.3 °C (99.1 °F)] 37.1 °C (98.7 °F)  Pulse:  [73-98] 81  Resp:  [16-19] 16  BP: ()/(51-58) 103/51  SpO2:  [94 %-98 %] 94 %    Physical Exam  Vitals and nursing note reviewed.   Constitutional:       General: He is not in acute distress.     Appearance: Normal appearance.   HENT:      Head: Normocephalic and atraumatic.   Eyes:      Extraocular Movements: Extraocular movements intact.      Conjunctiva/sclera: Conjunctivae normal.      Pupils: Pupils are equal, round, and reactive to light.   Cardiovascular:      Rate and Rhythm: Tachycardia present. Rhythm irregular.      Pulses: Normal pulses.      Heart sounds: No murmur heard.   No friction rub. No gallop.    Pulmonary:      Effort: Pulmonary effort is normal. No respiratory distress.      Breath sounds: Normal breath sounds. No wheezing, rhonchi or rales.   Abdominal:      General: Bowel sounds are normal. There is no distension.      Palpations: Abdomen is soft.      Tenderness: There is no abdominal tenderness.   Musculoskeletal:         General: No swelling or tenderness. Normal range of motion.      Cervical back: Normal range of motion and neck supple. No muscular tenderness.      Right lower leg: No edema.      Left lower leg: No edema.   Skin:     General: Skin is warm and dry.      Capillary Refill: Capillary refill takes less than 2 seconds.      Findings: No bruising, erythema or rash.   Neurological:      General: No focal deficit present.      Mental Status: He is alert and oriented to person, place, and time.         Fluids  No intake or output data in the 24 hours ending 10/23/21  1503    Laboratory  Recent Labs     10/21/21  0028   WBC 12.3*   RBC 3.95*   HEMOGLOBIN 10.5*   HEMATOCRIT 33.7*   MCV 85.3   MCH 26.6*   MCHC 31.2*   RDW 51.0*   PLATELETCT 159*   MPV 11.6     Recent Labs     10/21/21  0028   SODIUM 132*   POTASSIUM 4.5   CHLORIDE 95*   CO2 28   GLUCOSE 93   BUN 7*   CREATININE 0.48*   CALCIUM 8.1*                   Imaging  DX-CHEST-PORTABLE (1 VIEW)   Final Result      1.  Bihilar prominence with mild diffuse interstitial thickening may represent mild interstitial edema.           Assessment/Plan  * Atrial fibrillation with RVR (HCC)- (present on admission)  Assessment & Plan  Last echo 10/3/21  Patient admitted with atrial fibrillation with RVR.  On previous admission patient was diagnosed with atrial fibrillation but was initially rate controlled with metoprolol 12.5mg BID    On admission patient initially required a Cardizem and an amnio drip, patient was started on metoprolol 25 twice daily, I increased this to 50 mg twice daily and patient is responding appropriately.    Oral Amio added 10/21.  CTM.      We will continue to monitor patient's heart rate overnight, and adjust medications as needed.  Continue with Eliquis.    Advanced care planning/counseling discussion- (present on admission)  Assessment & Plan  Per prior records, has been dnr/dni in the past, however patient wishes to be FULL CODE  I spoke with patient at length regarding advanced care planning, patient wishes to be FULL CODE including cpr chest compressions and intubation.  ACP: 16 mins      Pulmonary hypertension (HCC)- (present on admission)  Assessment & Plan  Chronic, cont home meds    Acute hypoxemic respiratory failure (HCC)- (present on admission)  Assessment & Plan  In setting of t cell lymphoma chest mass, and chronic copd, now in uncontrolled afib on presentation  Oxygen support  Duonebs, copd treatments  Treat underlying conditions.  Incentive spirometry    Lymphoma (HCC)- (present on  admission)  Assessment & Plan  To start induction chemo soon. Follows with Dr. Fleming      EtOH dependence (HCC)- (present on admission)  Assessment & Plan  Alcohol level  Consider ciwa   Denies recent use     COPD without exacerbation (HCC)- (present on admission)  Assessment & Plan  Comorbid chest mass, afib uncontrolled on presentation  Continue advair ellipta  duonebs  Consider steroids if needed  Has wbc count on presentation, but leukocytosis chronically elevated       VTE prophylaxis: SCDs/TEDs and therapeutic anticoagulation with Eliquis    I have performed a physical exam and reviewed and updated ROS and Plan today (10/23/2021). In review of yesterday's note (10/22/2021), there are no changes except as documented above.

## 2021-10-23 NOTE — DISCHARGE PLANNING
Received Choice form at 6746  Agency/Facility Name: [facility]  Referral sent per Choice form @ 1113

## 2021-10-23 NOTE — CARE PLAN
The patient is Watcher - Medium risk of patient condition declining or worsening    Shift Goals  Clinical Goals: Monitor VS and labs  Patient Goals: Rest  Family Goals: NA    Progress made toward(s) clinical / shift goals:    Problem: Fall Risk  Goal: Patient will remain free from falls  Outcome: Met  Note: Fall precautions in place. Bed in lowest position. Non-skid socks in place. Personal possessions within reach. Mobility sign on door. Bed-alarm on. Call light within reach. Pt educated regarding fall prevention and states understanding.       Problem: Discharge Barriers/Planning  Goal: Patient's continuum of care needs are met  Outcome: Progressing  Flowsheets (Taken 10/22/2021 5869)  Continuum of Care Needs:   Assessed for discharge barriers   Involved patient/family/support system in discharge process

## 2021-10-24 NOTE — PROGRESS NOTES
Pt experiencing intermittent chills and body aches overnight. Dr. Fontana updated, orders received for CBC; CMP; Lactic and CXR. Dr. Fontana updated on results. Cardiac monitoring reordered.

## 2021-10-24 NOTE — THERAPY
"Occupational Therapy  Daily Treatment     Patient Name: Gary Severino Gil  Age:  76 y.o., Sex:  male  Medical Record #: [id number]  Today's Date: 10/24/2021     Precautions  Precautions: Fall Risk    Assessment    Patient seen for reeval necessitating Mod A mobility and Max A standing ADLs. Patient would benefit from post acute placement.     Plan    Treatment plan modified to 3 times per week until therapy goals are met for the following treatments:  Self Care/Activities of Daily Living, Therapeutic Activities and Therapeutic Exercises.    DC Equipment Recommendations: Unable to determine at this time  Discharge Recommendations: Recommend post-acute placement for additional occupational therapy services prior to discharge home    Subjective    \"I don't know what happened, just can't do stuff\"     Objective       10/24/21 0940   Bed Mobility    Supine to Sit Moderate Assist   Activities of Daily Living   Eating Supervision  (seated)   Grooming Moderate Assist  (standing)   Upper Body Dressing Minimal Assist   Lower Body Dressing Maximal Assist   Toileting Moderate Assist   Functional Mobility   Toilet Transfers Minimal Assist   Mobility with FWW   Patient / Family Goals   Patient / Family Goal #1 to go home   Short Term Goals   Short Term Goal # 1 Min A toileting   Short Term Goal # 2 S toilet transfer with LRAD   Short Term Goal # 3 Min A LB ADLs   Short Term Goal # 4 10 minute stand for ADLs         "

## 2021-10-24 NOTE — PROGRESS NOTES
Second redraw for CMP required due to hemolyzation in lab. Lab lead requested for draw as order was placed STAT at 0000

## 2021-10-24 NOTE — CARE PLAN
The patient is Watcher - Medium risk of patient condition declining or worsening    Shift Goals  Clinical Goals: Monitor HR/VS  Patient Goals: rest  Family Goals: NA    Patient is not progressing towards the following goals:      Problem: Discharge Barriers/Planning  Goal: Patient's continuum of care needs are met  Outcome: Not Progressing  Note: Pt lacking motivation for home care. Possible reassessment for SNF required

## 2021-10-24 NOTE — PROGRESS NOTES
Received report from nightshift RN and patients worsening condition overnight. Still awaiting results from midnight lab results due to machine malfunction downstairs. Patient expressing wishes to just be comfortable and not pursue any further treatment. Will reach out to dayshift MD regarding palliative consult, code status, and further planning. Nightshift RN unable to reach patients daughter on patients request, will attempt to call her again this morning.

## 2021-10-24 NOTE — PROGRESS NOTES
Hospital Medicine Daily Progress Note    Date of Service  10/24/2021    Chief Complaint  Gary Severino Gil is a 76 y.o. male admitted 10/19/2021 with palpitations    Hospital Course  This is a 76 year old male with PMHx of chronic respiratory failure secondary to COPD on 3L, t-cell lymphoma/Leukemia awaiting induction chemo follows with Onc, atrial fibrillation on Eliquis who was admitted on 10/19/2021 due to atrial fibrillation with RVR.    Patient was diagnosed with atrial fibrillation on previous admission 09/30-10/5/21, started on Eliquis only. On last admission patient was evaluated by PT/OT, they recommended SNF however patient was very adamant about going home, home health was arranged and patient's daughter was instructed to set up life alert.    On admission patient initially required a Cardizem and an amnio drip, patient was started on metoprolol 25 twice daily, I increased this to 50 mg twice daily and patient is responding appropriately.    Patient evaluated by PT/OT recommend HH and FWW. Orders placed.    Interval Problem Update  Patient admitted with atrial fibrillation with RVR.  On previous admission patient was diagnosed with atrial fibrillation but was initially rate controlled with metoprolol 12.5mg BID    Beta-blockade was increased to 50 twice daily on 10/20, unfortunately patient continued to have some mild breakthrough tachycardia, oral amiodarone will be added on 10/21.  Patient's tachyarrhythmia resolved after reintroduction of amiodarone, patient was medically cleared to return home on the above regimen on 10/22/2021, given that he had refused skilled placement.  Family is unable to care for him, and faced with this prospect patient is now agreeable to skilled nursing placement.  Referral to skilled nursing facilities have been made, medically cleared for placement.    Patient today is declining treatment for his lymphoma/T-cell leukemia.  Remains willing to be placed in skilled.   Palliative consult will be obtained, consider hospice per their evaluation.    I have personally seen and examined the patient at bedside. I discussed the plan of care with patient and bedside RN.    Consultants/Specialty  None    Code Status  Full Code    Disposition  Patient is medically cleared.   Anticipate discharge to to skilled nursing facility.  I have placed the appropriate orders for post-discharge needs.    Review of Systems  Review of Systems   All other systems reviewed and are negative.       Physical Exam  Temp:  [36.2 °C (97.2 °F)-37.3 °C (99.1 °F)] 36.2 °C (97.2 °F)  Pulse:  [] 95  Resp:  [16-19] 19  BP: ()/(43-66) 101/51  SpO2:  [92 %-97 %] 92 %    Physical Exam  Vitals and nursing note reviewed.   Constitutional:       General: He is not in acute distress.     Appearance: Normal appearance.   HENT:      Head: Normocephalic and atraumatic.   Eyes:      Extraocular Movements: Extraocular movements intact.      Conjunctiva/sclera: Conjunctivae normal.      Pupils: Pupils are equal, round, and reactive to light.   Cardiovascular:      Rate and Rhythm: Tachycardia present. Rhythm irregular.      Pulses: Normal pulses.      Heart sounds: No murmur heard.   No friction rub. No gallop.    Pulmonary:      Effort: Pulmonary effort is normal. No respiratory distress.      Breath sounds: Normal breath sounds. No wheezing, rhonchi or rales.   Abdominal:      General: Bowel sounds are normal. There is no distension.      Palpations: Abdomen is soft.      Tenderness: There is no abdominal tenderness.   Musculoskeletal:         General: No swelling or tenderness. Normal range of motion.      Cervical back: Normal range of motion and neck supple. No muscular tenderness.      Right lower leg: No edema.      Left lower leg: No edema.   Skin:     General: Skin is warm and dry.      Capillary Refill: Capillary refill takes less than 2 seconds.      Findings: No bruising, erythema or rash.   Neurological:       General: No focal deficit present.      Mental Status: He is alert and oriented to person, place, and time.         Fluids    Intake/Output Summary (Last 24 hours) at 10/24/2021 1334  Last data filed at 10/24/2021 0900  Gross per 24 hour   Intake 240 ml   Output 275 ml   Net -35 ml       Laboratory  Recent Labs     10/24/21  0256   WBC 17.6*   RBC 4.14*   HEMOGLOBIN 11.3*   HEMATOCRIT 34.5*   MCV 83.3   MCH 27.3   MCHC 32.8*   RDW 50.8*   PLATELETCT 147*   MPV 11.9     Recent Labs     10/24/21  0607   SODIUM 135   POTASSIUM 4.5   CHLORIDE 96   CO2 33   GLUCOSE 107*   BUN 10   CREATININE 0.58   CALCIUM 8.4*                   Imaging  DX-CHEST-PORTABLE (1 VIEW)   Final Result      Cardiomegaly and mild interstitial edema suggesting CHF.      DX-CHEST-PORTABLE (1 VIEW)   Final Result      1.  Bihilar prominence with mild diffuse interstitial thickening may represent mild interstitial edema.           Assessment/Plan  * Atrial fibrillation with RVR (HCC)- (present on admission)  Assessment & Plan  Last echo 10/3/21  Patient admitted with atrial fibrillation with RVR.  On previous admission patient was diagnosed with atrial fibrillation but was initially rate controlled with metoprolol 12.5mg BID    On admission patient initially required a Cardizem and an amnio drip, patient was started on metoprolol 25 twice daily, I increased this to 50 mg twice daily and patient is responding appropriately.    Oral Amio added 10/21.  CTM.      We will continue to monitor patient's heart rate overnight, and adjust medications as needed.  Continue with Eliquis.    Advanced care planning/counseling discussion- (present on admission)  Assessment & Plan  Per prior records, has been dnr/dni in the past, however patient wishes to be FULL CODE  I spoke with patient at length regarding advanced care planning, patient wishes to be FULL CODE including cpr chest compressions and intubation.  ACP: 16 mins      Pulmonary hypertension (HCC)-  (present on admission)  Assessment & Plan  Chronic, cont home meds    Acute hypoxemic respiratory failure (HCC)- (present on admission)  Assessment & Plan  In setting of t cell lymphoma chest mass, and chronic copd, now in uncontrolled afib on presentation  Oxygen support  Duonebs, copd treatments  Treat underlying conditions.  Incentive spirometry    Lymphoma (HCC)- (present on admission)  Assessment & Plan  To start induction chemo soon. Follows with Dr. Fleming      EtOH dependence (HCC)- (present on admission)  Assessment & Plan  Alcohol level  Consider ciwa   Denies recent use     COPD without exacerbation (HCC)- (present on admission)  Assessment & Plan  Comorbid chest mass, afib uncontrolled on presentation  Continue advair ellipta  duonebs  Consider steroids if needed  Has wbc count on presentation, but leukocytosis chronically elevated       VTE prophylaxis: SCDs/TEDs and therapeutic anticoagulation with Eliquis    I have performed a physical exam and reviewed and updated ROS and Plan today (10/24/2021). In review of yesterday's note (10/23/2021), there are no changes except as documented above.

## 2021-10-24 NOTE — DISCHARGE PLANNING
Care Transition Team Discharge Planning    Anticipated Discharge Disposition: TBD    Action: Lsw received report from assigned d/c planner on Saturday.    Pt is medically cleared and pending SNF acceptance ([facility]).    LSw completed chart review, and pt medically declined over night, labs are pending. Pt told medical team he would like to be made comfort care only.    Medical team attempting to contact family, and MD completed order for palliative.    Barriers to Discharge: TBD    Plan: HCM will continue to follow, and assist w/ d/c planning.

## 2021-10-25 NOTE — THERAPY
Missed Therapy     Patient Name: Gary Severino Gil  Age:  76 y.o., Sex:  male  Medical Record #: [id number]  Today's Date: 10/25/2021    Discussed missed therapy with RN       10/25/21 0920   Treatment Variance   Reason For Missed Therapy Non-Medical - Other (Please Comment)   Interdisciplinary Plan of Care Collaboration   Collaboration Comments Per RN, patient with no overt s/sx of aspiration on current diet.  No swallow evaluation warranted.  SLP will complete the order

## 2021-10-25 NOTE — THERAPY
Physical Therapy   Daily Treatment     Patient Name: Gary Severino Gil  Age:  76 y.o., Sex:  male  Medical Record #: [id number]  Today's Date: 10/25/2021     Precautions  Precautions: Fall Risk  Comments: desat with activity    Assessment    Pt seen for follow up PT tx. Pt cooperative and reporting he had not been out of bed in several days despite being seen from therapy the last 2 days in a row. Pt educated on current recommendations for dc and explained why he is not safe to dc. Pt reported a WC is not accessible in his apartment and he only needs to be able to ambulate 50ft. During mobility, pt desaturated into mid 70s on 5L (RN made aware). Unable to ambulate due to fatigue and weakness making him unsafe to dc home. PT will cont while in acute     Plan    Continue current treatment plan.    DC Equipment Recommendations: Unable to determine at this time  Discharge Recommendations: Recommend post-acute placement for additional physical therapy services prior to discharge home       10/25/21 1052   Vitals   O2 (LPM) 5   O2 Delivery Device Silicone Nasal Cannula   Vitals Comments desaturated to 70s   Pain 0 - 10 Group   Therapist Pain Assessment During Activity;0   Non Verbal Descriptors   Non Verbal Scale  Calm   Cognition    Level of Consciousness Alert   Other Treatments   Other Treatments Provided significant time explaining to pt dc recommendations and what he needs to be able to demosntrate prior to dc home alone. Pt did not seem to be understanding or aware of functional deficits   Balance   Sitting Balance (Static) Fair   Sitting Balance (Dynamic) Fair   Standing Balance (Static) Fair -   Standing Balance (Dynamic) Poor +   Weight Shift Sitting Fair   Weight Shift Standing Fair   Skilled Intervention Verbal Cuing;Compensatory Strategies   Comments FWW   Gait Analysis   Gait Level Of Assist Minimal Assist   Assistive Device Front Wheel Walker   Distance (Feet) 2   # of Times Distance was Traveled 1   Deviation  Shuffled Gait;Bradykinetic;Decreased Base Of Support   Weight Bearing Status no restrictions   Skilled Intervention Verbal Cuing;Compensatory Strategies   Comments desaturated to 70s on 5L   Bed Mobility    Supine to Sit Supervised   Sit to Supine Moderate Assist   Scooting Minimal Assist   Skilled Intervention Verbal Cuing   Comments use of bed controls   Functional Mobility   Sit to Stand Minimal Assist   Bed, Chair, Wheelchair Transfer Minimal Assist   Transfer Method Stand Step   Mobility with FWW   Skilled Intervention Verbal Cuing   Comments mod assist from bedside chair   Short Term Goals    Short Term Goal # 1 The pt will demo supine<>sit eob w/ hob flat and no bed rails spv in 6 visits for independence w/ bed mobility.   Goal Outcome # 1 goal not met   Short Term Goal # 2 The pt will demo gait distance >150' using fww spv in a moving environment in 6 visits for household ambulation.   Goal Outcome # 2 Goal not met   Short Term Goal # 3 The pt will demo stand-step txr eob<>chair using fww spv in 6 visits for functional mobility.   Goal Outcome # 3 Goal not met   Short Term Goal # 4 The pt will demo ability to ascend/descend 1 platform step w/ fww spv in 6 visits for access to the community.   Goal Outcome # 4 Goal not met   Anticipated Discharge Equipment and Recommendations   DC Equipment Recommendations Unable to determine at this time   Discharge Recommendations Recommend post-acute placement for additional physical therapy services prior to discharge home

## 2021-10-25 NOTE — PROGRESS NOTES
Report received, patient care assumed. Pt O2 93% on 3L, repositioned in bed, provided with ice water. Bed in lowest, locked position, call light within reach.

## 2021-10-25 NOTE — DISCHARGE PLANNING
Anticipated Discharge Disposition: SNF vs home with hospice    Action: Discussed pt in IDT rounds. Pt declined by both SNF choices. Pt is pending a palliative consult.    Addendum @4246  Per palliative note, pt would like to pursue chemotherapy. DPA unable to send additional SNF choices at this time due to epic not working at this time.    Barriers to Discharge: SNF acceptance.    Plan: Care coordination will follow up with additional SNF choices.

## 2021-10-25 NOTE — THERAPY
"Occupational Therapy  Daily Treatment     Patient Name: Gary Severino Gil  Age:  76 y.o., Sex:  male  Medical Record #: [id number]  Today's Date: 10/25/2021     Precautions  Precautions: Fall Risk  Comments: desat with activity    Assessment    Pt seen for OT session. Pt with poor insight into SOB w/ ADLs and into deficits. Attempted to sit sooner than lined up with chair d/t fatigue req mod A. Desaturated to mid 70%s w/3L O2 after txf to chair, increased O2 to 5L; req 5L to return to 89-90% in supine. RN aware. Spent time educating pt on why d/c home at this time isn't safe, continued therapy w/SNF, and encouraged OOB activity w/nsg staff. Pt reports dtr can provide physical assist, but per chart she is unable to provide assist needed at pt's CLOF. Continues to be limited by decreased functional mobility, activity tolerance, cognition, strength, balance, and pain which are currently affecting pt's ability to complete ADLs/IADLs at baseline. Will continue to follow.     Plan    Continue current treatment plan.    DC Equipment Recommendations: Unable to determine at this time  Discharge Recommendations: Recommend post-acute placement for additional occupational therapy services prior to discharge home    Subjective    \"When can I go home?\"     Objective     10/25/21 1020   Precautions   Precautions Fall Risk   Vitals   O2 (LPM) 5   O2 Delivery Device Silicone Nasal Cannula   Vitals Comments desaturated to mid 70%s w/3L O2 after txf to chair, increased O2 to 5L; req 5L to return to 89-90% in supine   Pain 0 - 10 Group   Therapist Pain Assessment Post Activity;During Activity;Nurse Notified  (no c/o pain)   Cognition    Cognition / Consciousness X   Level of Consciousness Alert   Safety Awareness Impaired   New Learning Impaired   Comments cooperative with therapy; decreased safety, poor insight into SOB and req v/cs for safety   Passive ROM Upper Body   Passive ROM Upper Body WDL   Active ROM Upper Body   Active ROM " Upper Body  WDL   Strength Upper Body   Upper Body Strength  X   Gross Strength Generalized Weakness, Equal Bilaterally.    Other Treatments   Other Treatments Provided Spent time educating pt on why d/c home at this time isn't safe, therapy w/SNF, and encouraged OOB activity w/nsg staff. Reports dtr can provide physical assist, but per chart she is unable to provide enough assist at pt's CLOF   Balance   Sitting Balance (Static) Fair   Sitting Balance (Dynamic) Fair -   Standing Balance (Static) Fair -   Standing Balance (Dynamic) Poor +   Weight Shift Sitting Fair   Weight Shift Standing Poor   Skilled Intervention Verbal Cuing;Tactile Cuing;Facilitation;Compensatory Strategies   Comments w/FWW   Bed Mobility    Supine to Sit Minimal Assist   Sit to Supine Moderate Assist   Scooting Minimal Assist   Rolling Supervised   Skilled Intervention Verbal Cuing;Tactile Cuing;Compensatory Strategies;Facilitation   Comments HOB elevated and heavy use of rails   Activities of Daily Living   Grooming Supervision;Seated  (wiping face)   Lower Body Dressing Maximal Assist  (pt's shoes with velcro)   Toileting   (declined need)   Skilled Intervention Verbal Cuing;Tactile Cuing;Facilitation;Compensatory Strategies   Comments limited by fatigue   How much help from another person does the patient currently need...   Putting on and taking off regular lower body clothing? 2   Bathing (including washing, rinsing, and drying)? 2   Toileting, which includes using a toilet, bedpan, or urinal? 2   Putting on and taking off regular upper body clothing? 3   Taking care of personal grooming such as brushing teeth? 3   Eating meals? 3   6 Clicks Daily Activity Score 15   Functional Mobility   Sit to Stand Minimal Assist   Bed, Chair, Wheelchair Transfer Moderate Assist   Transfer Method Stand Step   Mobility w/ FWW; stand step to chair from EOB, then mod A for SPTxf BTB. Req max v/cs for safety as pt attempted to sit too soon d/t c/o fatigue    Skilled Intervention Verbal Cuing;Tactile Cuing;Facilitation;Compensatory Strategies   Activity Tolerance   Comments limited by fatigue for single txf from EOB<>chair   Patient / Family Goals   Patient / Family Goal #1 to go home   Short Term Goals   Short Term Goal # 1 Min A toileting   Goal Outcome # 1 Goal not met   Short Term Goal # 2 S toilet transfer with LRAD   Goal Outcome # 2 Goal not met   Short Term Goal # 3 Min A LB ADLs   Goal Outcome # 3 Progressing slower than expected   Short Term Goal # 4 10 minute stand for ADLs   Goal Outcome # 4 Goal not met   Education Group   Education Provided Pathology of bedrest;Transfers   Transfers Patient Response Patient;Acceptance;Explanation;Reinforcement Needed;No Learning Evidence   Pathology of Bedrest Patient Response Patient;Acceptance;Explanation;Reinforcement Needed;No Learning Evidence

## 2021-10-25 NOTE — PROGRESS NOTES
MONITOR SUMMARY    .15/.08/.40    SR/ST     Ectopy: Freq PAC/ PSVT up to 180 asymptomatic, self limiting              COVID surge in effect

## 2021-10-25 NOTE — CONSULTS
"Reason for Palliative Care Consult: Advance Care Planning    Consulted by: Jered Cuellar M.D.    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  Patient initially required cardizem, and an amniodarone drip, and was started on metoprolol 25 twice daily, which was subsequently up-titrated to 50 mg BID, with approriate response.  Patient evaluated by PT/OT this admission, who recommend HH and FWW.       Patient recently admitted 09/30-10/5/21 at which time he was diagnosed with atrial fibrillation and started on Eliquis only. During that admission, patient was evaluated by PT/OT with recommendation for SNF.  Patient was discharged to [facility].     Past medical/surgical history:   Past Medical History:   Diagnosis Date   • A-fib (HCC)    • Acute hypoxemic respiratory failure (HCC) 10/2/2021   • Chronic obstructive pulmonary disease (HCC)    • MRSA (methicillin resistant Staphylococcus aureus)    • Productive cough     \"smoker\"   • Snoring     sleep apnea questionairre completed     Additional consults:   Hospital Medicine    Assessment:  Neuro: Patient alert & oriented X4  Dyspnea:  Yes; 95% on 5L NC  Last BM: 10/25/21-    Pain: No -    Depression:  Yes    Dementia:  No;       Living situation & psychosocial: Patient is retired,  (states for the past 30 years), and lives alone in an apartment in [place]. He has one daughter named Nayeli who also lives in [place].     Spiritual:  Is Shinto or spirituality important for coping with this illness?  Patient is non-Anabaptist Sabianist.    Has a  or spiritual provider visit been requested?      Palliative Performance Scale:  40%    Advance Directive: N -    DPOA: N -    POLST: N  -      To locate and/or review the " "AD/POLST, please hover the cursor over the patient's code status, then scroll down under Documents to access ACP document links.    Code Status:  FULL    Discussion:  This APRN and Ashley Puentes MS4 met with patient at bedside. Introduced ourselves and explained the role of Palliative Care (PC). From a functional standpoint, the patient appears very weak and cannot hold his beverages to drink without assistance. He requires help to reposition his nasal cannula.  However, patient reports he is independent with ADL's at home including showering, personal cares, laundry, and meal preparation.  He still drives, but states his daughter Nayeli takes him to medical appointments.  From my perspective, there seems to be a disconnect from what patient states and how he appears.  He appears weak, debilitated, and poorly groomed.  I expressed my concern that it may not be safe for patient to discharge home alone.    When queried about his understanding of his current health situation and why he was admitted to the hospital, the patient stated \"I'm here because something went wrong with my brain.\" I then asked the patient if he was aware that he had palpitations and he confirmed that this was, in fact, the reason he was admitted. When asked if he knew of any other health conditions he had, patient declined knowledge. However, when I mentioned chart review showed he had T-cell lymphoma, patient confirmed that he did know this information.  Vlad affirms he wishes to receive chemotherapy with Dr. Garcia stating, \"Yeah, why not?\"  The patient is unsure of how many chemotherapy treatments he would be willing to undergo.  Prior to patient encounter, I had spoken with Dr. Cuellar, who had been told by patient that he did not want to receive chemotherapy, and instead wanted to transition to comfort care.  When I mentioned this to patient he said, \"I don't know what you're talking about.\"     I asked the patient if he was feeling " "depressed and he confirmed that he was. He states he doesn't want to be in the hospital any longer, that this is part of the cause of his depression, and wants to go home. I shared my concern that his depression may affect the choices he makes regarding treatment. He conveyed understanding of this and when I asked if he would be willing to trial some kind of anti-depressive medication, he was open to it.     Discussed goals of care (GOC) with the patient and informed him that he will likely need to be cared for in a SNF for several weeks. He reaffirmed that he wants to leave the hospital as soon as possible and if he has to stay here \"I'll get up and walk outta here myself.\" The patient believes that he can adequately care for himself independently at home, including feeding himself, bathing, ambulating to the bathroom, and other activities of daily living. Patient believes he is capable to return to his apartment and is resistant to the idea of remaining in the hospital. However, he is open to going to a SNF, stating \"Why not? Anything that will get me outta here.\"    I briefly mentioned hospice and patient said he had heard of it.  He shared his understanding that hospice is \"people caring for other people.\"  I shared hospice is employed when physician feel a patient has ~6 months or less to live.  I advised patient that he always has the option of transitioning to hospice down the road, if he no longer wishes to pursue aggressive disease-modifying treatment for his T-cell lymphoma.  Vlad verbalized understanding.    I discussed that with no Advance Directive (AD) in place, his daughter Nayeli would be his default healthcare surrogate.  Vlad verbalized he was okay with Nayeli making medical decisions on his behalf.  He declined offer to complete AD at this time.    Discussed code status and educated patient about term.  Described measures employed in a full code including defibrillation, CPR, and intubation.  " "Patient affirmed that he would want to allow for natural death.    Patient said in reference to CPR and intubation, \"I don't want that. I want it to be natural.\"  I received patient's permission to change code status from Full to DNR/DNI.      Discussed POLST form and educated patient about purpose of form.  POLST form reviewed, completed and scanned into EMR.  Patient selected A) DNR/DNI  B) Selective treatment  C) deferred.  Copy of POLST hung above patient bed.  Instructed patient to place on refrigerator at home, when he discharges.  All questions answered.    14:15:  Spoke with patient's daughter Nayeli via telephone.  Introduced myself and explained my role in PC.  I provided brief overview of my conversation with patient.  Her understanding is that patient wishes to pursue chemotherapy at this time stating, \"We were in the process of getting it all set up with Dr. Garcia before all this happened.\"  I shared my perspective that patient appears somewhat weak and debilitated.  She said, \"He came from [place].  It seemed like he was getting stronger before he was hospitalized.\"  She expressed she would like patient to go to SNF to receive additional therapies, gain strength, with long-term goal of discharging back home with HH.  She reports she lives \"5-10 minutes away\" and affirms she drives her dad to his medical appointments.  She affirms she is willing to be patient's default healthcare surrogate, but is also interested in completing DPOA-HC paperwork.  Plan made for me to stop by patient room ~16:00 today with copy of blank AD for Nayeli to review.  All questions answered.    Provided therapeutic communication including open-ended questions, therapeutic silence, reflective listening, normalization of feelings, and empathic support throughout encounter.      Outcome:  Changed code status from Full Code to DNR/DNI.  Completed POLST form with patient and scanned into EMR.  Blank AD left at bedside for daughter " Nayeli to review.    Plan: Palliative care to continue to follow, provide support, and help facilitate decision making as clinical picture evolves.    Recommendations: I do not recommend an ethics or hospice consult at this time because patient verbalized he wishes to pursue chemotherapy for his T-cell lymphoma at this time.    Updated: Dr. Cuellar and bedside RN Katherin in-person,.    Thank you for allowing Palliative Care to participate in Vlad's care. Please call our team with questions and/or additional needs.    As appropriate, Palliative Care encourages medical team to engage in further conversation, education for patient/family at bedside regarding code status, GOC/POC.    Total visit time was 50 minutes discussing and coordinating advance care planning.     LONG Morejon, [facility]  Palliative Care Nurse Practitioner  [phone]

## 2021-10-25 NOTE — CARE PLAN
The patient is Stable - Low risk of patient condition declining or worsening    Shift Goals  Clinical Goals: palliative meeting  Patient Goals: rest  Family Goals: NA    Progress made toward(s) clinical / shift goals:  contact palliative for consult    Problem: Discharge Barriers/Planning  Goal: Patient's continuum of care needs are met  Outcome: Progressing

## 2021-10-26 NOTE — DISCHARGE PLANNING
Anticipated Discharge Disposition: SNF    Action: Pt pursuing chemotherapy and no hospice indicated at this time. PT/OT notes recommending SNF. [facility] choice faxed to [facility] since pt declined by initial SNF choices.    Barriers to Discharge: SNF acceptance.    Plan: Care coordination will follow up with SNF referrals.

## 2021-10-26 NOTE — DIETARY
Nutrition Services: Late entry. Visited pt yesterday. Computers down yesterday afternoon.   Day 7 of admit.  77 yo male admitted with a-fib.  Follow up for adequacy of nutritional intake.    Evaluation:  1. Pt with history of COPD on 3 liters O2 at home, T cell lymphoma/leukemia, a-fib.   2. Pt diagnosed with a-fib on previous admit 3 weeks ago. SNF was recommended however pt wanted to go home.admitted with a-fib with RVR. Rate controlled on previous admit.   3. Family unable to care for pt and he is agreeable for skilled nursing placement.   4. RN consulted dietitian secondary to pt with poor appetite - refusing meals meals since admit, others with minimal intake.   5. Met with pt and RN. Pt agreeable to supplements with meals - boost plus and magic cups to be sent TID with meals.     Malnutrition risk: not identified.     Recommendations/Plan:  1. encourage intake of meals and supplements  2. document intake of all meals and supplements as % taken in ADL's to provide interdisciplinary communication across all shifts   3. monitor daily weights  4. Nutrition rep will continue to see patient for ongoing meal and snack preferences.

## 2021-10-26 NOTE — DISCHARGE PLANNING
Received Choice form at 0811  Agency/Facility Name: [facility]  Referral sent per Choice form @ [phone]

## 2021-10-26 NOTE — PROGRESS NOTES
Bedside report received. Pt A&Ox3-4. 3L NC. POC discussed with pt. Pt verbalizes understanding. Call light and belongings within reach. Bed locked and in lowest position. Alarm and fall precautions in place.     Covid surge in effect.

## 2021-10-26 NOTE — PROGRESS NOTES
"Palliative Care   Met with patient's daughter Nayeli at patient bedside.  She felt it was a good idea to complete Advance Directive, as we had discussed during previous telephone conversation this afternoon.  Patient amenable.  Discussed Advance Directive and educated patient/Nayeli about purpose of form.  Patient selected daughter Nayeli Hassan ([phone]) as DPOA-HC; with ex-wife  Delfina Ohara ([phone]) as 1st Alternate.  Statement of desires reviewed and completed.  Patient selected #2, #3, and #5, meaning that he would not want life prolonging treatments to be provided if there was no reasonable hope of recovery, long-term survival or if the patient was in a coma.  He would want his POA-HC to consider quality, relief of suffering, and preservation of function.  Emailed notary request.  Completed AD, signed certificate of competency, and copy of patient identification placed in \"to be notarized\" basket in floor care coordintaion office. Once AD is notarized, I will scan into EMR.    To locate and/or review the AD/POLST, please hover the cursor over the patient's code status to find all linked ACP documents.     LONG Morejon, [facility]  Palliative Care Nurse Practitioner  [phone]      "

## 2021-10-26 NOTE — PROGRESS NOTES
"Monitor room called to inform RN that patient HR jumped up to 170. 20 seconds of afib. Patient denies chest pain or palpitations. patient states he is just \"lonely\". Blood pressure 100/41  now ST.   "

## 2021-10-26 NOTE — PROGRESS NOTES
monitor check called on patient. Patient sustaining 190's.   RN just in room and placed patient into chair. Vitals obtained . . Patient encouraged to vagal. patient HR responded dropping to 140.   Vagal response only lasting coupe minutes patient HR back to 170 patient continue to deny palpations, chest pain or dizziness.   STAT EKG obtained. . Patient placed back into bed. Patient orthostatic hypotension at this time. SBP 75 . patient does endorse dizziness at this time. Patient rested back in bed.  with breathing. patient vitals improved once laying down.     MD updated MD to review chart.

## 2021-10-26 NOTE — PROGRESS NOTES
Hospital Medicine Daily Progress Note    Date of Service  10/25/2021    Chief Complaint  Gary Severino Gil is a 76 y.o. male admitted 10/19/2021 with palpitations    Hospital Course  This is a 76 year old male with PMHx of chronic respiratory failure secondary to COPD on 3L, t-cell lymphoma/Leukemia awaiting induction chemo follows with Onc, atrial fibrillation on Eliquis who was admitted on 10/19/2021 due to atrial fibrillation with RVR.    Patient was diagnosed with atrial fibrillation on previous admission 09/30-10/5/21, started on Eliquis only. On last admission patient was evaluated by PT/OT, they recommended SNF however patient was very adamant about going home, home health was arranged and patient's daughter was instructed to set up life alert.    On admission patient initially required a Cardizem and an amnio drip, patient was started on metoprolol 25 twice daily, I increased this to 50 mg twice daily and patient is responding appropriately.    Patient evaluated by PT/OT recommend HH and FWW. Orders placed.    Interval Problem Update  Patient admitted with atrial fibrillation with RVR.  On previous admission patient was diagnosed with atrial fibrillation but was initially rate controlled with metoprolol 12.5mg BID    Beta-blockade was increased to 50 twice daily on 10/20, unfortunately patient continued to have some mild breakthrough tachycardia, oral amiodarone will be added on 10/21.  Patient's tachyarrhythmia resolved after reintroduction of amiodarone, patient was medically cleared to return home on the above regimen on 10/22/2021, given that he had refused skilled placement.  Family is unable to care for him, and faced with this prospect patient is now agreeable to skilled nursing placement.  Referral to skilled nursing facilities have been made, medically cleared for placement. Today willing to undergo chemotherapy for his lymphoma.      I have personally seen and examined the patient at bedside. I  discussed the plan of care with patient and bedside RN.    Consultants/Specialty  None    Code Status  DNAR/DNI     Disposition  Patient is medically cleared.   Anticipate discharge to to skilled nursing facility pending bed availiability.  I have placed the appropriate orders for post-discharge needs.    Review of Systems  Review of Systems   All other systems reviewed and are negative.       Physical Exam  Temp:  [36.3 °C (97.3 °F)-37.3 °C (99.1 °F)] 37.3 °C (99.1 °F)  Pulse:  [] 102  Resp:  [17-24] 17  BP: ()/(51-95) 105/59  SpO2:  [91 %-97 %] 94 %    Physical Exam  Vitals and nursing note reviewed.   Constitutional:       General: He is not in acute distress.     Appearance: Normal appearance.   HENT:      Head: Normocephalic and atraumatic.   Eyes:      Extraocular Movements: Extraocular movements intact.      Conjunctiva/sclera: Conjunctivae normal.      Pupils: Pupils are equal, round, and reactive to light.   Cardiovascular:      Rate and Rhythm: Tachycardia present. Rhythm irregular.      Pulses: Normal pulses.      Heart sounds: No murmur heard.   No friction rub. No gallop.    Pulmonary:      Effort: Pulmonary effort is normal. No respiratory distress.      Breath sounds: Normal breath sounds. No wheezing, rhonchi or rales.   Abdominal:      General: Bowel sounds are normal. There is no distension.      Palpations: Abdomen is soft.      Tenderness: There is no abdominal tenderness.   Musculoskeletal:         General: No swelling or tenderness. Normal range of motion.      Cervical back: Normal range of motion and neck supple. No muscular tenderness.      Right lower leg: No edema.      Left lower leg: No edema.   Skin:     General: Skin is warm and dry.      Capillary Refill: Capillary refill takes less than 2 seconds.      Findings: No bruising, erythema or rash.   Neurological:      General: No focal deficit present.      Mental Status: He is alert and oriented to person, place, and time.          Fluids    Intake/Output Summary (Last 24 hours) at 10/25/2021 1723  Last data filed at 10/25/2021 1000  Gross per 24 hour   Intake --   Output 175 ml   Net -175 ml       Laboratory  Recent Labs     10/24/21  0256   WBC 17.6*   RBC 4.14*   HEMOGLOBIN 11.3*   HEMATOCRIT 34.5*   MCV 83.3   MCH 27.3   MCHC 32.8*   RDW 50.8*   PLATELETCT 147*   MPV 11.9     Recent Labs     10/24/21  0607   SODIUM 135   POTASSIUM 4.5   CHLORIDE 96   CO2 33   GLUCOSE 107*   BUN 10   CREATININE 0.58   CALCIUM 8.4*                   Imaging  DX-CHEST-PORTABLE (1 VIEW)   Final Result      Cardiomegaly and mild interstitial edema suggesting CHF.      DX-CHEST-PORTABLE (1 VIEW)   Final Result      1.  Bihilar prominence with mild diffuse interstitial thickening may represent mild interstitial edema.           Assessment/Plan  * Atrial fibrillation with RVR (HCC)- (present on admission)  Assessment & Plan  Last echo 10/3/21  Patient admitted with atrial fibrillation with RVR.  On previous admission patient was diagnosed with atrial fibrillation but was initially rate controlled with metoprolol 12.5mg BID    On admission patient initially required a Cardizem and an amnio drip, patient was started on metoprolol 25 twice daily, I increased this to 50 mg twice daily and patient is responding appropriately.    Oral Amio added 10/21.  CTM.      We will continue to monitor patient's heart rate overnight, and adjust medications as needed.  Continue with Eliquis.    Advanced care planning/counseling discussion- (present on admission)  Assessment & Plan  Per prior records, has been dnr/dni in the past, however patient wishes to be FULL CODE  I spoke with patient at length regarding advanced care planning, patient wishes to be FULL CODE including cpr chest compressions and intubation.  ACP: 16 mins      Pulmonary hypertension (HCC)- (present on admission)  Assessment & Plan  Chronic, cont home meds    Acute hypoxemic respiratory failure (HCC)-  (present on admission)  Assessment & Plan  In setting of t cell lymphoma chest mass, and chronic copd, now in uncontrolled afib on presentation  Oxygen support  Duonebs, copd treatments  Treat underlying conditions.  Incentive spirometry    Lymphoma (HCC)- (present on admission)  Assessment & Plan  To start induction chemo soon. Follows with Dr. Fleming      EtOH dependence (HCC)- (present on admission)  Assessment & Plan  Alcohol level  Consider ciwa   Denies recent use     COPD without exacerbation (HCC)- (present on admission)  Assessment & Plan  Comorbid chest mass, afib uncontrolled on presentation  Continue advair ellipta  lian  Consider steroids if needed  Has wbc count on presentation, but leukocytosis chronically elevated       VTE prophylaxis: SCDs/TEDs and therapeutic anticoagulation with Eliquis    I have performed a physical exam and reviewed and updated ROS and Plan today (10/25/2021). In review of yesterday's note (10/24/2021), there are no changes except as documented above.

## 2021-10-26 NOTE — PROGRESS NOTES
BP 90/47. MAP 61. Post 500 mL fluid bolus. MD Gutierrez notified. Orders for another 500 mL fluid bolus. Vital signs post bolus 92/50 with MAP 64. MD Gutierrez notified. Orders to monitor BP every 3 hrs.

## 2021-10-26 NOTE — PROGRESS NOTES
Hospital Medicine Daily Progress Note    Date of Service  10/26/2021    Chief Complaint  Gary Severino Gil is a 76 y.o. male admitted 10/19/2021 with palpitations    Hospital Course  This is a 76 year old male with PMHx of chronic respiratory failure secondary to COPD on 3L, t-cell lymphoma/Leukemia awaiting induction chemo follows with Onc, atrial fibrillation on Eliquis who was admitted on 10/19/2021 due to atrial fibrillation with RVR.    Patient was diagnosed with atrial fibrillation on previous admission 09/30-10/5/21, started on Eliquis only. On last admission patient was evaluated by PT/OT, they recommended SNF however patient was very adamant about going home, home health was arranged and patient's daughter was instructed to set up life alert.    On admission patient initially required a Cardizem and an amnio drip, patient was started on metoprolol 25 twice daily, I increased this to 50 mg twice daily and patient is responding appropriately.    Patient evaluated by PT/OT recommend HH and FWW. Orders placed.    Interval Problem Update  Patient admitted with atrial fibrillation with RVR.  On previous admission patient was diagnosed with atrial fibrillation but was initially rate controlled with metoprolol 12.5mg BID    Beta-blockade was increased to 50 twice daily on 10/20, unfortunately patient continued to have some mild breakthrough tachycardia, oral amiodarone will be added on 10/21.  Patient's tachyarrhythmia resolved after reintroduction of amiodarone, patient was medically cleared to return home on the above regimen on 10/22/2021, given that he had refused skilled placement.  Family is unable to care for him, and faced with this prospect patient is now agreeable to skilled nursing placement.  Referral to skilled nursing facilities have been made, medically cleared for placement. Today willing to undergo chemotherapy for his lymphoma.      PATIENT SEEN BY PREVIOUS HOSPITALIST UNTIL 10/25.    10/26:  Patient seen at bedside this morning.  The patient has been having episodes of on and off RVR.  Patient already on amiodarone and metoprolol.  We will give metoprolol IV pushes for now.  We have consulted cardiology, we appreciate further recommendations.  Pending placement.    I have personally seen and examined the patient at bedside. I discussed the plan of care with patient and bedside RN.    Consultants/Specialty  cardiology    Code Status  DNAR/DNI     Disposition  Patient is medically cleared.   Anticipate discharge to to skilled nursing facility pending bed availiability.  I have placed the appropriate orders for post-discharge needs.    Review of Systems  Review of Systems   Constitutional: Negative for chills and fever.   HENT: Negative for hearing loss and nosebleeds.    Eyes: Negative for blurred vision and double vision.   Respiratory: Negative for cough and shortness of breath.    Cardiovascular: Negative for chest pain and palpitations.   Gastrointestinal: Negative for abdominal pain, heartburn and vomiting.   Genitourinary: Negative for dysuria and urgency.   Musculoskeletal: Negative for back pain and falls.   Skin: Negative for itching and rash.   Neurological: Negative for dizziness and headaches.   Psychiatric/Behavioral: Negative for substance abuse. The patient is not nervous/anxious.    All other systems reviewed and are negative.       Physical Exam  Temp:  [35.9 °C (96.7 °F)-36.6 °C (97.8 °F)] 35.9 °C (96.7 °F)  Pulse:  [] 122  Resp:  [15-24] 18  BP: ()/(41-84) 105/59  SpO2:  [90 %-97 %] 91 %    Physical Exam  Vitals and nursing note reviewed.   Constitutional:       General: He is not in acute distress.     Appearance: Normal appearance. He is ill-appearing (chronically ill).   HENT:      Head: Normocephalic and atraumatic.      Right Ear: External ear normal.      Left Ear: External ear normal.   Eyes:      General:         Right eye: No discharge.         Left eye: No  discharge.      Conjunctiva/sclera: Conjunctivae normal.   Cardiovascular:      Rate and Rhythm: Tachycardia present. Rhythm irregular.      Pulses: Normal pulses.      Heart sounds: No murmur heard.   No friction rub. No gallop.    Pulmonary:      Effort: Pulmonary effort is normal. No respiratory distress.      Breath sounds: Normal breath sounds. No wheezing, rhonchi or rales.   Abdominal:      General: Bowel sounds are normal. There is no distension.      Palpations: Abdomen is soft.      Tenderness: There is no abdominal tenderness. There is no guarding.   Musculoskeletal:         General: No swelling or tenderness. Normal range of motion.      Cervical back: Normal range of motion and neck supple. No muscular tenderness.      Right lower leg: No edema.      Left lower leg: No edema.   Skin:     General: Skin is warm and dry.      Capillary Refill: Capillary refill takes less than 2 seconds.   Neurological:      General: No focal deficit present.      Mental Status: He is alert and oriented to person, place, and time.   Psychiatric:         Mood and Affect: Mood normal.         Behavior: Behavior normal.         Fluids    Intake/Output Summary (Last 24 hours) at 10/26/2021 1644  Last data filed at 10/26/2021 1000  Gross per 24 hour   Intake 480 ml   Output 300 ml   Net 180 ml       Laboratory  Recent Labs     10/24/21  0256   WBC 17.6*   RBC 4.14*   HEMOGLOBIN 11.3*   HEMATOCRIT 34.5*   MCV 83.3   MCH 27.3   MCHC 32.8*   RDW 50.8*   PLATELETCT 147*   MPV 11.9     Recent Labs     10/24/21  0607   SODIUM 135   POTASSIUM 4.5   CHLORIDE 96   CO2 33   GLUCOSE 107*   BUN 10   CREATININE 0.58   CALCIUM 8.4*                   Imaging  DX-CHEST-PORTABLE (1 VIEW)   Final Result      Cardiomegaly and mild interstitial edema suggesting CHF.      DX-CHEST-PORTABLE (1 VIEW)   Final Result      1.  Bihilar prominence with mild diffuse interstitial thickening may represent mild interstitial edema.           Assessment/Plan  *  Atrial fibrillation with RVR (HCC)- (present on admission)  Assessment & Plan  Last echo 10/3/21  Patient admitted with atrial fibrillation with RVR.  On previous admission patient was diagnosed with atrial fibrillation but was initially rate controlled with metoprolol 12.5mg BID    On admission patient initially required a Cardizem and an amnio drip, patient was started on metoprolol 25 twice daily, I increased this to 50 mg twice daily and patient is responding appropriately.    Oral Amio added 10/21.  CTM.      We will continue to monitor patient's heart rate overnight, and adjust medications as needed.  Continue with Eliquis.    10/26: Patient had been going on and off RVR today with a HR as high as 180. We have ordered metoprolol IV pushes PRN and have consulted cardiology. Patient on amiodarone, we appreciate further recommendations by cardiology.    Advanced care planning/counseling discussion- (present on admission)  Assessment & Plan  Per prior records, has been dnr/dni in the past, however patient wishes to be FULL CODE  I spoke with patient at length regarding advanced care planning, patient wishes to be FULL CODE including cpr chest compressions and intubation.  ACP: 16 mins      Pulmonary hypertension (HCC)- (present on admission)  Assessment & Plan  Chronic, cont home meds    Acute hypoxemic respiratory failure (HCC)- (present on admission)  Assessment & Plan  In setting of t cell lymphoma chest mass, and chronic copd, now in uncontrolled afib on presentation  Oxygen support  Duonebs, copd treatments  Treat underlying conditions.  Incentive spirometry    Lymphoma (HCC)- (present on admission)  Assessment & Plan  To start induction chemo soon. Follows with Dr. Fleming      EtOH dependence (HCC)- (present on admission)  Assessment & Plan  Alcohol level  Consider ciwa   Denies recent use     10/26: Patient has not had symptoms of withdrawal. Counseled on lifestyle modifications.    COPD without  exacerbation (HCC)- (present on admission)  Assessment & Plan  Comorbid chest mass, afib uncontrolled on presentation  Continue advair ellipta  duonebs  Consider steroids if needed  Has wbc count on presentation, but leukocytosis chronically elevated       VTE prophylaxis: SCDs/TEDs and therapeutic anticoagulation with Eliquis    I have performed a physical exam and reviewed and updated ROS and Plan today (10/26/2021). In review of yesterday's note (10/25/2021), there are no changes except as documented above.

## 2021-10-26 NOTE — PROGRESS NOTES
Assumed care of patient, bedside report received from svitlana RN. Updated on POC, call light within reach and fall precautions in place. Bed locked and in lowest position. Patient instructed to call for assistance before getting out of bed. All questions answered, no other needs at this time.

## 2021-10-26 NOTE — CARE PLAN
Problem: Discharge Barriers/Planning  Goal: Patient's continuum of care needs are met  Outcome: Progressing  Flowsheets (Taken 10/25/2021 4785)  Continuum of Care Needs: Assessed for discharge barriers   The patient is Watcher - Medium risk of patient condition declining or worsening    Shift Goals  Clinical Goals: Monitor BP   Patient Goals: sleep   Family Goals: na    Progress made toward(s) clinical / shift goals:  yes    Patient is not progressing towards the following goals:

## 2021-10-27 PROBLEM — R45.89 DEPRESSED MOOD: Status: ACTIVE | Noted: 2021-01-01

## 2021-10-27 NOTE — CONSULTS
Cardiology Consult Note:    [facility] PGY-2  Date & Time note created:    10/27/2021   1:53 PM     Attending Physician: Joseph Cifuentes MD    Patient ID:   Name:             Gil , Gary Severino   YOB: 1945  Age:                 76 y.o.  male   MRN:               [id number]                                                             Reason for Consult:      Afib    History of Present Illness:    Mr Hassan is a 76-year-old male with a history significant for atrial fibrillation on Eliquis, COPD, T-cell lymphoma/leukemia, that was admitted on 10/19/2021 with concern of rapid heart rate in the 180s to 200s.  On admission, and per chart review, patient was given Cardizem and amnio drip with subsequent improvement of his rate that was therefore maintained with metoprolol 25 mg twice daily.  This dose of metoprolol was changed to 50 mg twice daily to adjust several breakthrough of A. fib.  Amiodarone was also added in an attempt to control the rhythm, but patient continued to have breakthrough of A. fib with rapid ventricular rate.    At bedside on 10/27/2021, patient stated that he has never had any palpitation or chest pain at the time. He has only had shortness of breath chronically. He drinks about 3-4 cans of beers daily. He was a recent smokers and quit few months ago with an associated COPD. He has significant obesity with a BMI of 37.78. He has denied any changes in weight, skin change, constipation or diarrhea, sensation of cold or warmth and latest TSH on 10/02/2021 was 0.740. His latest echo on 10/03/2021 shows a grade II diastolic dysfunction without apparent significant mitral valve abnormality    Review of Systems:      Constitutional: Denies fevers, Denies weight changes  Eyes: Denies changes in vision, no eye pain  Cardiovascular: No palpitation  Respiratory: SOB  Gastrointestinal/Hepatic: Denies abdominal pain, nausea, vomiting, diarrhea, constipation or GI bleeding  "  Musculoskeletal/Rheum: No swelling in the extremities  Skin: Denies rash  Neurological: Denies headache, confusion, memory loss or focal weakness/parasthesias                Past Medical History:   Past Medical History:   Diagnosis Date   • A-fib ([facility])    • Acute hypoxemic respiratory failure ([facility]) 10/2/2021   • Chronic obstructive pulmonary disease ([facility])    • MRSA (methicillin resistant Staphylococcus aureus)    • Productive cough     \"smoker\"   • Snoring     sleep apnea questionairre completed     Active Hospital Problems    Diagnosis    • Advanced care planning/counseling discussion [Z71.89]    • Pulmonary hypertension ([facility]) [I27.20]    • Acute hypoxemic respiratory failure ([facility]) [J96.01]    • Atrial fibrillation with RVR ([facility]) [I48.91]    • Lymphoma ([facility]) [C85.90]    • EtOH dependence ([facility]) [F10.20]    • COPD without exacerbation ([facility]) [J44.9]        Past Surgical History:  Past Surgical History:   Procedure Laterality Date   • FLAP GRAFT  6/1/2010    Performed by FRANCESCA VIDAL at SURGERY [facility] ORS   • FISTULA IN ANO REPAIR  6/1/2010    Performed by FRANCESCA VIDAL at SURGERY [facility] ORS   • RECTAL EXPLORATION  11/17/2009    Performed by FRANCESCA VIDAL at SURGERY [facility] DAY [facility] ORS   • DEBRIDEMENT  11/17/2009    Performed by FRANCESCA VIDAL at SURGERY SAME DAY [facility] ORS   • RECTAL EXPLORATION  9/3/2009    Performed by FRANCESCA VIDAL at SURGERY SAME DAY [facility] ORS   • DEBRIDEMENT  9/3/2009    Performed by FRANCESCA VIDAL at SURGERY SAME DAY [facility] ORS   • LUIS ENRIQUE RECTAL ABSCESS INCISION AND DRAINAGE  4/6/2009    Performed by FRANCESCA VIDAL at SURGERY [facility] ORS   • LUMBAR FUSION POSTERIOR  1987   • LUMBAR FUSION ANTERIOR  1983       Hospital Medications:  Current Facility-Administered Medications   Medication Dose   • metoprolol tartrate (LOPRESSOR) tablet 50 mg  50 mg   • albuterol inhaler 2 Puff  2 Puff   • traZODone (DESYREL) tablet 50 mg  50 mg   • [Held " by provider] HYDROcodone-acetaminophen (NORCO) 5-325 MG per tablet 1 Tablet  1 Tablet   • naloxone (NARCAN) injection 0.4 mg  0.4 mg   • metoprolol tartrate (LOPRESSOR) tablet 50 mg  50 mg   • apixaban (ELIQUIS) tablet 5 mg  5 mg   • pregabalin (LYRICA) capsule 75 mg  75 mg   • melatonin tablet 5 mg  5 mg   • famotidine (PEPCID) tablet 20 mg  20 mg   • ascorbic acid (Vitamin C) tablet 500 mg  500 mg   • senna-docusate (PERICOLACE or SENOKOT S) 8.6-50 MG per tablet 2 Tablet  2 Tablet    And   • polyethylene glycol/lytes (MIRALAX) PACKET 1 Packet  1 Packet    And   • magnesium hydroxide (MILK OF MAGNESIA) suspension 30 mL  30 mL    And   • bisacodyl (DULCOLAX) suppository 10 mg  10 mg   • ipratropium-albuterol (DUONEB) nebulizer solution  3 mL   • acetaminophen (Tylenol) tablet 650 mg  650 mg   • enalaprilat (Vasotec) injection 1.25 mg 1 mL  1.25 mg   • labetalol (NORMODYNE/TRANDATE) injection 10 mg  10 mg   • Metoprolol Tartrate (LOPRESSOR) injection 5 mg  5 mg   • aspirin EC (ECOTRIN) tablet 81 mg  81 mg   • ondansetron (ZOFRAN) syringe/vial injection 4 mg  4 mg   • ondansetron (ZOFRAN ODT) dispertab 4 mg  4 mg   • guaiFENesin dextromethorphan (ROBITUSSIN DM) 100-10 MG/5ML syrup 10 mL  10 mL   • glucose 4 g chewable tablet 16 g  16 g    And   • dextrose 50% (D50W) injection 50 mL  50 mL   • budesonide-formoterol (SYMBICORT) 80-4.5 MCG/ACT inhaler 2 Puff  2 Puff   • tiotropium (Spiriva Respimat) 2.5 mcg/Act inhalation spray 5 mcg  5 mcg         Current Outpatient Medications:  Medications Prior to Admission   Medication Sig Dispense Refill Last Dose   • Umeclidinium Bromide (INCRUSE ELLIPTA) 62.5 MCG/INH AEROSOL POWDER, BREATH ACTIVATED Inhale 1 Puff every day.   10/19/2021 at 0800   • multivitamin (THERAGRAN) Tab Take 1 Tablet by mouth every day.   10/19/2021 at 0800   • Pollen Extracts (PROSTAT PO) Take 1 Tablet by mouth every day.   10/18/2021 at 1200   • QUERCETIN PO Take 1 Tablet by mouth at bedtime. Take for 14  days   10/18/2021 at 2000   • Non Formulary Request Take 1 Tablet by mouth every day. Therbiotic Complete   10/18/2021 at 1200   • ascorbic acid (VITAMIN C) 500 MG tablet Take 500 mg by mouth every day.   10/19/2021 at 0800   • Cholecalciferol (VITAMIN D3) 125 MCG (5000 UT) Cap Take 5,000 Units by mouth every day.   10/19/2021 at 0800   • zinc sulfate (ZINCATE) 220 (50 Zn) MG Cap Take 220 mg by mouth every day.   10/18/2021 at 2000   • Probiotic Product (PROBITROL PO) Take 1 Tablet by mouth 2 times a day. 10 day course   10/17/2021 at 1200   • famotidine (PEPCID) 20 MG Tab Take 20 mg by mouth 2 times a day.   10/19/2021 at 0800   • fluticasone-salmeterol (WIXELA INHUB) 100-50 MCG/DOSE AEROSOL POWDER, BREATH ACTIVATED Inhale 1 Puff every 12 hours.   10/19/2021 at 0800   • nystatin (MYCOSTATIN) 673543 UNIT/ML Suspension Take 400,000 Units by mouth 4 times a day. 10 day course   10/19/2021 at 0800   • loperamide (IMODIUM) 2 MG Cap Take 2 mg by mouth every 8 hours as needed for Diarrhea.   10/19/2021 at 0350   • acetaminophen (TYLENOL) 325 MG Tab Take 650 mg by mouth every four hours as needed.   10/16/2021 at 1811   • [DISCONTINUED] metoprolol tartrate (LOPRESSOR) 25 MG Tab Take 12.5 mg by mouth every evening.   10/18/2021 at 1500   • apixaban (ELIQUIS) 5mg Tab Take 1 Tablet by mouth 2 times a day. Indications: Thromboembolism secondary to Atrial Fibrillation 60 Tablet 3 10/19/2021 at 0800   • oxyCODONE-acetaminophen (PERCOCET-10)  MG Tab Take 1 Tablet by mouth every 6 hours as needed.   10/10/2021 at PRN   • pregabalin (LYRICA) 75 MG Cap Take 75 mg by mouth 2 times a day.   10/19/2021 at 0800   • melatonin 5 mg Tab Take 5 mg by mouth at bedtime as needed (Sleep).   10/16/2021 at 1950       Medication Allergy:  Allergies   Allergen Reactions   • Morphine Sulfate Unspecified     Hallucinations        Family History:  Family History   Problem Relation Age of Onset   • Stroke Other        Social History:  Social  History     Socioeconomic History   • Marital status:      Spouse name: Not on file   • Number of children: Not on file   • Years of education: Not on file   • Highest education level: Not on file   Occupational History   • Not on file   Tobacco Use   • Smoking status: Former Smoker     Packs/day: 1.50     Years: 45.00     Pack years: 67.50     Types: Cigarettes     Quit date: 10/27/2017     Years since quittin.0   • Smokeless tobacco: Never Used   Substance and Sexual Activity   • Alcohol use: Yes     Alcohol/week: 8.0 oz     Types: 16 Standard drinks or equivalent per week     Comment: noted 4 per day   • Drug use: No   • Sexual activity: Not on file   Other Topics Concern   • Not on file   Social History Narrative   • Not on file     Social Determinants of Health     Financial Resource Strain:    • Difficulty of Paying Living Expenses:    Food Insecurity:    • Worried About Running Out of Food in the Last Year:    • Ran Out of Food in the Last Year:    Transportation Needs:    • Lack of Transportation (Medical):    • Lack of Transportation (Non-Medical):    Physical Activity:    • Days of Exercise per Week:    • Minutes of Exercise per Session:    Stress:    • Feeling of Stress :    Social Connections:    • Frequency of Communication with Friends and Family:    • Frequency of Social Gatherings with Friends and Family:    • Attends [employer] Services:    • Active Member of Clubs or Organizations:    • Attends Club or Organization Meetings:    • Marital Status:    Intimate Partner Violence:    • Fear of Current or Ex-Partner:    • Emotionally Abused:    • Physically Abused:    • Sexually Abused:          Physical Exam:  Vitals/ General Appearance:   Weight/BMI: Body mass index is 35.87 kg/m².  BP (!) 94/53   Pulse 100   Temp 36.8 °C (98.3 °F) (Temporal)   Resp 20   Ht 1.524 m (5')   Wt 83.3 kg (183 lb 10.3 oz)   SpO2 96%   Vitals:    10/27/21 0406 10/27/21 0700 10/27/21 0734 10/27/21 1138   BP:  (!) 95/50 (!) 96/50  (!) 94/53   Pulse: 85  (!) 112 100   Resp: (!) 22 20 20 20   Temp: 36.2 °C (97.1 °F)   36.8 °C (98.3 °F)   TempSrc: Temporal   Temporal   SpO2: 96% 96% 95% 96%   Weight:       Height:         Oxygen Therapy:  Pulse Oximetry: 96 %, O2 (LPM): 2.5, O2 Delivery Device: Oxymask    Constitutional:  Morbidly obese and weak  HENMT:  Normocephalic, Atraumatic, Oropharynx moist mucous membranes, No oral exudates, Nose normal.  No thyromegaly..  Neck:  Normal range of motion, No cervical tenderness,  no JVD.  Cardiovascular: Irregularly irregular heart rhythm without any tachycardia  Lungs:  Normal breath sounds, breath sounds clear to auscultation bilaterally,  no crackles, mild wheezing   Abdomen: Obese abdomen without guarding or organomegaly  Skin: Warm, Dry, No erythema, No rash, no induration.  Neurologic: Alert & oriented x 3, No focal deficits noted, cranial nerves II through X are grossly intact.  Psychiatric: Affect normal, Judgment normal, Mood normal.      MDM (Data Review):     Records reviewed and summarized in current documentation    Lab Data Review:  Recent Results (from the past 24 hour(s))   EKG    Collection Time: 10/26/21  5:46 PM   Result Value Ref Range    Report       [facility]    Test Date:  2021-10-26  Pt Name:    CARLOS GO                     Department: 183  MRN:        [id number]                      Room:       T813  Gender:     Male                         Technician: [facility]  :        [id number]                   Requested By:ASTER JACKSON  Order #:    [id number]                    Reading MD: Kay Mae MD    Measurements  Intervals                                Axis  Rate:       92                           P:          65  MS:         152                          QRS:        65  QRSD:       94                           T:          25  QT:         384  QTc:        476    Interpretive Statements  SINUS RHYTHM  LOW VOLTAGE IN FRONTAL LEADS  Compared to  ECG 10/26/2021 13:07:09  Low QRS voltage now present  Atrial premature complex(es) no longer present  T-wave abnormality no longer present  Electronically Signed On 10- 19:53:02 PDT by Kay Mae MD         EKG: 10/26/21  Interpretive Statements   SINUS RHYTHM   LOW VOLTAGE IN FRONTAL LEADS   Compared to ECG 10/26/2021 13:07:09   Low QRS voltage now present   Atrial premature complex(es) no longer present   T-wave abnormality no longer present   Electronically Signed On 10- 19:53:02 PDT by Kay Mae MD     ECHO: 10/03/21  CONCLUSIONS  Fair quality study, improved with contrast.  The left ventricular ejection fraction is visually estimated to be 60%.  Grade II diastolic dysfunction.  The aortic valve is not well visualized.  Mild tricuspid regurgitation.  Right ventricle is moderately dilated with normal function.  Estimated right ventricular systolic pressure is 60 mmHg severe   pulmonary hypertension.  Right atrial pressure is estimated to be 15 mmHg.    MDM (Assessment and Plan):     Active Hospital Problems    Diagnosis    • Advanced care planning/counseling discussion [Z71.89]    • Pulmonary hypertension ([facility]) [I27.20]    • Acute hypoxemic respiratory failure ([facility]) [J96.01]    • Atrial fibrillation with RVR ([facility]) [I48.91]    • Lymphoma ([facility]) [C85.90]    • EtOH dependence ([facility]) [F10.20]    • COPD without exacerbation ([facility]) [J44.9]      Assessment and Plan:  Patient is a 77 yo male with history of morbid obesity, COPD, alcohol use that was admitted for Afib with RVR initially not initially rate controlled with metoprolol and amiodarone    #Persistent Afib  - On the most recent EKG, patient now appears to be rate-controlled  - He has enough risk factors such as COPD, obesity, and alcohol use (with possibility of mild withdrawal) to trigger a RVR for his chronic Afib  - Given the above, we recommend stopping amiodarone given its long term side effect and continue on metoprolol at the  current dose of 50mg BID tartrate  - Patient would be a poor candidate for ablation.  - Better control of the risks mentioned above with lifestyle change of limited smoking and weight loss program could help the recurrence of RVR. Otherwise, if patient continues to be asymptomatic with rate <110, he can be continued on metoprolol tartrate.

## 2021-10-27 NOTE — ASSESSMENT & PLAN NOTE
Due to the underlying conditions patient seems to have a depressed mood  Psychiatry was consulted by the previous hospitalist, we appreciate further recommendations.    10/28: Patient was visited by psychology today. It seems patient's depressed mood is due to his ongoing medical issues. We appreciate further recommendations.

## 2021-10-27 NOTE — DISCHARGE PLANNING
Anticipated Discharge Disposition: SNF    Action: LSW made phone call to [facility] and left JIN requesting a call back.    LSW made phone to Kimberly with the fundamentals. Kimberly reported she will find out if any of the pending SNFs are able to accept.    Barriers to Discharge: SNF acceptance.    Plan: Care coordination will follow up with SNF referrals.

## 2021-10-27 NOTE — CONSULTS
Brief Behavioral Health Consult    Per Social Work LSW Kylie Quinteros note, patient was accepted to [facility] for transfer.   Patient will transfer soon once insurance auth is secured.  Thank you,    Georgia Caceres, Ph.D., [facility]

## 2021-10-27 NOTE — DISCHARGE PLANNING
Anticipated Discharge Disposition: SNF-[facility]    Action: NISHI Chang notified this LSW that [facility] accepted the pt and is requesting to submit for insurance auth. LSW met with pt at bedside to discuss DC dispo. Pt agreeable with going to [facility] for additional therapies and requested they submit for auth. LSW notified Bernardo ALMODOVAR.    Barriers to Discharge: insurance auth, SNF bed.    Plan: Care coordination will follow up with [facility] regarding insurance auth and bed availability.

## 2021-10-27 NOTE — DISCHARGE PLANNING
Agency/Facility Name: [facility]  Spoke To: Kong  Outcome: [facility] able to accept Pt.     DPA to verify choice with SW for facility to run auth.    0950-  Agency/Facility Name: [facility]   Spoke To: Kong  Outcome: DPA left v-mail regarding request to begin obtaining auth for Pt, requesting for callback to confirm VM was received.     1150-  Agency[facility]Facility Name: [facility]  Outcome: Liaison left DPA v-mail requesting Pt SSN as well as second therapy consult.     1158-  Agency/Facility Name: [facility]  Spoke To: Kimberly  Outcome: DPA supplied Pt SSN to Liaison and updated her on status of obtaining updated PT notes. Notes required 24 hours before running auth, Pt auth will be run once notes are submitted.    NISHI inquired about bed availability and was advised that the bed is now dependent on [facility]'s auth process.    ANAMARIA notified.

## 2021-10-27 NOTE — PROGRESS NOTES
Pt hyptensive 81/41, HR 90s. Asymptomatic, per report patient received fluid bolus yesterday night for hypotension. Cardiology consulted today for better rate control, no notes or orders seen. Dr. Franklin called and notified. Orders to recheck BP in 30 minutes and update as patient is asymptomatic.    2035- BP slighly improved at 86/58 MAP 67. HR afib 90s. Pt remains asymptomatic. Per Dr. Franklin, OK with BP unless patient becomes symptomatic.    2135- Call from lab with critical ABG results, pH 7.23 and CO2 68. Dr. Franklin notified. Bedside to see patient. Pt remains asymptomatic. Hiflo initiated and satting 93%.

## 2021-10-27 NOTE — PROGRESS NOTES
Monitor summary   .13/.08/.36  SR-ST with burst of afib   Rate   afib and PSVT bursts up to 190   F PVC, F-O PAC, R Coup

## 2021-10-27 NOTE — PROGRESS NOTES
Hospital Medicine Daily Progress Note    Date of Service  10/27/2021    Chief Complaint  Gary Severino Gil is a 76 y.o. male admitted 10/19/2021 with palpitations    Hospital Course  This is a 76 year old male with PMHx of chronic respiratory failure secondary to COPD on 3L, t-cell lymphoma/Leukemia awaiting induction chemo follows with Onc, atrial fibrillation on Eliquis who was admitted on 10/19/2021 due to atrial fibrillation with RVR.    Patient was diagnosed with atrial fibrillation on previous admission 09/30-10/5/21, started on Eliquis only. On last admission patient was evaluated by PT/OT, they recommended SNF however patient was very adamant about going home, home health was arranged and patient's daughter was instructed to set up life alert.    On admission patient initially required a Cardizem and an amnio drip, patient was started on metoprolol 25 twice daily, I increased this to 50 mg twice daily and patient is responding appropriately.    Patient evaluated by PT/OT recommend HH and FWW. Orders placed.    Interval Problem Update  Patient admitted with atrial fibrillation with RVR.  On previous admission patient was diagnosed with atrial fibrillation but was initially rate controlled with metoprolol 12.5mg BID    Beta-blockade was increased to 50 twice daily on 10/20, unfortunately patient continued to have some mild breakthrough tachycardia, oral amiodarone will be added on 10/21.  Patient's tachyarrhythmia resolved after reintroduction of amiodarone, patient was medically cleared to return home on the above regimen on 10/22/2021, given that he had refused skilled placement.  Family is unable to care for him, and faced with this prospect patient is now agreeable to skilled nursing placement.  Referral to skilled nursing facilities have been made, medically cleared for placement. Today willing to undergo chemotherapy for his lymphoma.      PATIENT SEEN BY PREVIOUS HOSPITALIST UNTIL 10/25.    10/26:  Patient seen at bedside this morning.  The patient has been having episodes of on and off RVR.  Patient already on amiodarone and metoprolol.  We will give metoprolol IV pushes for now.  We have consulted cardiology, we appreciate further recommendations.  Pending placement.    10/27: Patient seen at bedside this morning.  The patient currently laying comfortably in bed.  The patient seems to be in a depressed mood and previous hospitalist had consulted psychiatry, we appreciate further recommendations.  His heart rate seems better controlled today, we appreciate recommendations by cardiology.  We are pending placement.  As per nursing no other overnight events reported.    I have personally seen and examined the patient at bedside. I discussed the plan of care with patient and bedside RN.    Consultants/Specialty  cardiology    Code Status  DNAR/DNI     Disposition  Patient is medically cleared.   Anticipate discharge to to skilled nursing facility pending bed availiability.  I have placed the appropriate orders for post-discharge needs.    Review of Systems  Review of Systems   Constitutional: Negative for chills and fever.   HENT: Negative for hearing loss and nosebleeds.    Eyes: Negative for blurred vision and double vision.   Respiratory: Negative for cough and shortness of breath.    Cardiovascular: Negative for chest pain and palpitations.   Gastrointestinal: Negative for abdominal pain, heartburn and vomiting.   Genitourinary: Negative for dysuria and urgency.   Musculoskeletal: Negative for back pain and falls.   Skin: Negative for itching and rash.   Neurological: Negative for dizziness and headaches.   Psychiatric/Behavioral: Positive for depression (patient with depressed mood.). Negative for substance abuse. The patient is not nervous/anxious.    All other systems reviewed and are negative.       Physical Exam  Temp:  [36.2 °C (97.1 °F)-37.6 °C (99.7 °F)] 37.6 °C (99.7 °F)  Pulse:  [] 105  Resp:   [18-22] 20  BP: ()/(41-59) 94/52  SpO2:  [91 %-98 %] 95 %    Physical Exam  Vitals and nursing note reviewed.   Constitutional:       General: He is not in acute distress.     Appearance: Normal appearance. He is ill-appearing (chronically ill).   HENT:      Head: Normocephalic and atraumatic.      Right Ear: External ear normal.      Left Ear: External ear normal.   Eyes:      General:         Right eye: No discharge.         Left eye: No discharge.      Conjunctiva/sclera: Conjunctivae normal.   Cardiovascular:      Rate and Rhythm: Tachycardia present. Rhythm irregular.      Pulses: Normal pulses.      Heart sounds: No murmur heard.   No friction rub. No gallop.    Pulmonary:      Effort: Pulmonary effort is normal. No respiratory distress.      Breath sounds: Normal breath sounds. No wheezing, rhonchi or rales.   Abdominal:      General: Bowel sounds are normal. There is no distension.      Palpations: Abdomen is soft.      Tenderness: There is no abdominal tenderness. There is no guarding.   Musculoskeletal:         General: No swelling or tenderness. Normal range of motion.      Cervical back: Normal range of motion and neck supple. No muscular tenderness.      Right lower leg: No edema.      Left lower leg: No edema.   Skin:     General: Skin is warm and dry.      Capillary Refill: Capillary refill takes less than 2 seconds.   Neurological:      General: No focal deficit present.      Mental Status: He is alert and oriented to person, place, and time.   Psychiatric:         Mood and Affect: Mood normal.         Behavior: Behavior normal.         Fluids    Intake/Output Summary (Last 24 hours) at 10/27/2021 1601  Last data filed at 10/27/2021 1300  Gross per 24 hour   Intake 240 ml   Output 375 ml   Net -135 ml       Laboratory                        Imaging  DX-CHEST-PORTABLE (1 VIEW)   Final Result      Cardiomegaly and mild interstitial edema suggesting CHF.      DX-CHEST-PORTABLE (1 VIEW)   Final  Result      1.  Bihilar prominence with mild diffuse interstitial thickening may represent mild interstitial edema.           Assessment/Plan  * Atrial fibrillation with RVR (HCC)- (present on admission)  Assessment & Plan  Last echo 10/3/21  Patient admitted with atrial fibrillation with RVR.  On previous admission patient was diagnosed with atrial fibrillation but was initially rate controlled with metoprolol 12.5mg BID    On admission patient initially required a Cardizem and an amnio drip, patient was started on metoprolol 25 twice daily, I increased this to 50 mg twice daily and patient is responding appropriately.    Oral Amio added 10/21.  CTM.      We will continue to monitor patient's heart rate overnight, and adjust medications as needed.  Continue with Eliquis.    10/26: Patient had been going on and off RVR today with a HR as high as 180. We have ordered metoprolol IV pushes PRN and have consulted cardiology. Patient on amiodarone, we appreciate further recommendations by cardiology.    10/27: Patient seems rate controlled today. Cardiology following, we appreciate further recommendations.     Depressed mood  Assessment & Plan  Due to the underlying conditions patient seems to have a depressed mood  Psychiatry was consulted by the previous hospitalist, we appreciate further recommendations.    Advanced care planning/counseling discussion- (present on admission)  Assessment & Plan  Per prior records, has been dnr/dni in the past, however patient wishes to be FULL CODE  I spoke with patient at length regarding advanced care planning, patient wishes to be FULL CODE including cpr chest compressions and intubation.  ACP: 16 mins      Pulmonary hypertension (HCC)- (present on admission)  Assessment & Plan  Chronic, cont home meds    Acute hypoxemic respiratory failure (HCC)- (present on admission)  Assessment & Plan  In setting of t cell lymphoma chest mass, and chronic copd, now in uncontrolled afib on  presentation  Oxygen support  Duonebs, copd treatments  Treat underlying conditions.  Incentive spirometry    Lymphoma (HCC)- (present on admission)  Assessment & Plan  To start induction chemo soon. Follows with Dr. Fleming      EtOH dependence (HCC)- (present on admission)  Assessment & Plan  Alcohol level  Consider ciwa   Denies recent use     10/27: Patient has not had symptoms of withdrawal. Counseled on lifestyle modifications.    COPD without exacerbation (HCC)- (present on admission)  Assessment & Plan  Comorbid chest mass, afib uncontrolled on presentation  Continue advair ellipta  duoneray  Consider steroids if needed  Has wbc count on presentation, but leukocytosis chronically elevated       VTE prophylaxis: SCDs/TEDs and therapeutic anticoagulation with Eliquis    I have performed a physical exam and reviewed and updated ROS and Plan today (10/27/2021). In review of yesterday's note (10/26/2021), there are no changes except as documented above.

## 2021-10-28 NOTE — THERAPY
Physical Therapy   Daily Treatment     Patient Name: Gary Severino Gil  Age:  76 y.o., Sex:  male  Medical Record #: [id number]  Today's Date: 10/28/2021     Precautions  Precautions: Fall Risk  Comments: desats during activity    Assessment    The pt presents today w/ significant decline in function and volitional movement.  He performs bed mobility mod-maxA for trunk and LE positioning required w/ hob elevated and use of bed rails, along w/ consistent cuing for sequencing throughout the task.  The pt performed STS eob x1 w/ fww modA for stability and weight shift, however c/o dizziness and unsteadiness before returning to sit eob.  Pt's VSS throughout, HR up to 120 w/ activity.  Further mobility deferred d/t pt c/o of dizziness and decreased responsiveness after returning to eob.  Reinforced OOB activity w/ the pt and reviewed ankle pumps exercise to perform in supine.  Recommend placement.  Will follow.      Plan    Continue current treatment plan.    DC Equipment Recommendations: Unable to determine at this time  Discharge Recommendations: Recommend post-acute placement for additional physical therapy services prior to discharge home      Subjective/Objective       10/28/21 1436   Cognition    Cognition / Consciousness X   Level of Consciousness Alert   Comments pt cooperative, however demo's limited self-advocacy   Passive ROM Lower Body   Passive ROM Lower Body WDL   Active ROM Lower Body    Active ROM Lower Body  WDL   Strength Lower Body   Lower Body Strength  X   Comments generalized weakness BLE   Sensation Lower Body   Lower Extremity Sensation   WDL   Supine Lower Body Exercise   Supine Lower Body Exercises Yes   Ankle Pumps 1 set of 10;Reciprocal   Sitting Lower Body Exercises   Sitting Lower Body Exercises Yes   Sit to Stand   (1 rep eob)   Neuro-Muscular Treatments   Neuro-Muscular Treatments Anterior weight shift;Weight Shift Right;Weight Shift Left   Comments stand w/ fww   Other Treatments   Other  Treatments Provided edu regarding self advocacy and regular OOB activity   Balance   Sitting Balance (Static) Fair -   Sitting Balance (Dynamic) Poor +   Standing Balance (Static) Poor   Standing Balance (Dynamic) Poor -   Weight Shift Sitting Fair   Weight Shift Standing Poor   Skilled Intervention Verbal Cuing;Tactile Cuing;Sequencing;Facilitation;Compensatory Strategies   Comments stand w/ fww   Gait Analysis   Gait Level Of Assist Unable to Participate   Weight Bearing Status no restrictions   Comments pt c/o dizziness and returning to bed, VSS    Bed Mobility    Supine to Sit Moderate Assist   Sit to Supine Maximal Assist   Scooting Moderate Assist   Rolling Moderate Assist to Rt.   Skilled Intervention Verbal Cuing;Tactile Cuing;Sequencing;Facilitation;Compensatory Strategies   Comments hob elevated, use of bed rails   Functional Mobility   Sit to Stand Moderate Assist   Mobility STS eob w/ fww   Skilled Intervention Verbal Cuing;Tactile Cuing;Sequencing;Facilitation;Compensatory Strategies   Comments further mobility deferred d/t pt c/o dizziness and unsteadiness   Activity Tolerance   Sitting Edge of Bed 12min   Standing 1min   Patient / Family Goals    Patient / Family Goal #1 to go home   Goal #1 Outcome Goal not met   Short Term Goals    Short Term Goal # 1 The pt will demo supine<>sit eob w/ hob flat and no bed rails spv in 6 visits for independence w/ bed mobility.   Goal Outcome # 1 goal not met   Short Term Goal # 2 The pt will demo gait distance >150' using fww spv in a moving environment in 6 visits for household ambulation.   Goal Outcome # 2 Goal not met   Short Term Goal # 3 The pt will demo stand-step txr eob<>chair using fww spv in 6 visits for functional mobility.   Goal Outcome # 3 Goal not met   Short Term Goal # 4 The pt will demo ability to ascend/descend 1 platform step w/ fww spv in 6 visits for access to the community.   Goal Outcome # 4 Goal not met   Education Group   Education  Provided Role of Physical Therapist;Exercises - Supine   Role of Physical Therapist Patient Response Patient;Acceptance;Explanation;Demonstration;Action Demonstration   Exercises - Supine Patient Response Patient;Acceptance;Explanation;Demonstration;Action Demonstration   Additional Comments pt receptive of edu provided   Session Information   Date / Session Number  10/28- 3 (3/4, 10/29)

## 2021-10-28 NOTE — DISCHARGE PLANNING
Agency/Facility Name: [facility]   Outcome: DPA left v-mail for Kimberly regarding Pt auth status with request for callback.     1150-  Agency/Facility Name: [facility]  Spoke To: [place]  Outcome: Auth is still pending, [place] to contact insurance for verification.

## 2021-10-28 NOTE — CONSULTS
"[facility]    INPATIENT ASSESSMENT    Name: Gary Severino Gil  MRN: [id number]  : 1945  Age: 76 y.o.  Date of assessment: 10/28/2021  PCP: Edwin Valentin M.D.  Persons in attendance: Patient     Length of Interview: 60 minutes    HPI: This is a 76 year old male with a history of chronic respiratory failure secondary to COPD per medical record. Patient also has a history of t-cell lymphoma/Leukemia and atrial fibrillation.    CHIEF COMPLAINT/PRESENTING ISSUE (as stated by Patient): Vlad Hassan is a 76 year old man seen lying on hospital bed, alert, oriented and actively engaged in the interview process. Patient's affect was flat, appeared depressed with a sense of hopelessness. Patient denies auditory or visual hallucinations. No homicidal ideations. Patient endorses suicidal ideations however,  denies active suicide plans or intent. Patient states,  \"I'm going to die any way, might as well speed up the process\". Patient denies intent stating, \"I am too much of a coward to actually do anything to myself and I have no idea how I would do it\".  Patient reports his depression is specifically connected to his current medical condition. Patient states, \"when I was at home I had friends to visit, I played golf and other things, I can't do anything now\". Patient reports his desire to go back home is a motivation. Patient states, \"I still want all the medical care I can get, I have not given up\". Patient states he has not discussed with his family any advance directives or end of life decisions. \"I don't know how to have that conversation\". Patient reports having a belief in heaven and told his daughter, \"I'm going where everyone else went\", \"heaven or purgatory or somewhere like that\".     Summary  Vlad Hassan is a 76 year old male with multiple medical conditions which has limited his functioning significantly.. Vlad reports independence prior to this hospitalization. The notion of dependence and " mortality are issues that bring anxiety and depression to this patient. He is challenged in talking about these issues in detail but acknowledges the issues are at the root of his depressive symptoms. Although Vlad appears hopeless, he states he wants all medical efforts tried. Vlad is open to emotional support and asked if I would return for another session.    Chief Complaint   Patient presents with   • Rapid Heart Beat        CURRENT LIVING SITUATION/SOCIAL SUPPORT: Patient resides alone. He was previously  many years ago and has one adult daughter with whom he enjoys a close relationship. Patient reports having his food delivered from a local grocery store and states prior to his admission he was able to maintain his activities of daily living independently.    BEHAVIORAL HEALTH TREATMENT HISTORY  Does patient/parent report a history of prior behavioral health treatment for patient?   No:    SAFETY ASSESSMENT - SELF  Does patient acknowledge current or past symptoms of dangerousness to self? No-have thought of suicide but denies plan or intent  Does parent/significant other report patient has current or past symptoms of dangerousness to self? N\A  Does presenting problem suggest symptoms of dangerousness to self? No    SAFETY ASSESSMENT - OTHERS  Does patient acknowledge current or past symptoms of aggressive behavior or risk to others? no  Does parent/significant other report patient has current or past symptoms of aggressive behavior or risk to others?  N\A  Does presenting problem suggest symptoms of dangerousness to others? No    Crisis Safety Plan completed and copy given to patient? N\A    ABUSE/NEGLECT SCREENING  Does patient report feeling “unsafe” in his/her home, or afraid of anyone?  no  Does patient report any history of physical, sexual, or emotional abuse?  no  Does parent or significant other report any of the above? N\A  Is there evidence of neglect by self?  no  Is there evidence of  neglect by a caregiver? no  Does the patient/parent report any history of CPS/APS/police involvement related to suspected abuse/neglect or domestic violence? no  Based on the information provided during the current assessment, is a mandated report of suspected abuse/neglect being made?  No    SUBSTANCE USE SCREENING  UDS: not obtained during this admission  Diagnostic alcohol 0.0    MENTAL STATUS              Participation: Active verbal participation  Grooming: Casual  Orientation: Alert  Behavior: Tense  Eye contact: Good  Mood: Depressed  Affect: Flat  Thought process: Logical  Thought content: Within normal limits  Speech: Soft  Perception: Within normal limits  Memory:  Recent:  Adequate  Insight: Limited  Judgment:  Limited  Other:    Collateral information:   Source:   Significant other present in person:    Significant other by telephone   [facility]    [facility]   [facility] Medical Record-reviewed   Other:      Unable to complete full assessment due to:   Acute intoxication   Patient declined to participate/engage   Patient verbally unresponsive   Significant cognitive deficits   Significant perceptual distortions or behavioral disorganization   Other:             CLINICAL IMPRESSIONS:  Primary: Situational Depression; Depression due to medical condition  Secondary:                                        IDENTIFIED NEEDS/PLAN:  [Trigger DISPOSITION list for any items marked]      Imminent safety risk - self  Imminent safety risk - others     Acute substance withdrawal   Psychosis/Impaired reality testing   x  Mood/anxiety   Substance use/Addictive behavior     Maladaptive behavior   Parent/child conflict     Family/Couples conflict   Biomedical     Housing   Financial      Legal  Occupational/Educational     Domestic violence   Other:       Legal Hold: N/A    Will continue to follow.    Thank you,       Georgia Caceres, Ph.D., [facility]  10/28/2021

## 2021-10-28 NOTE — THERAPY
"Speech Language Pathology   Clinical Swallow Evaluation     Patient Name: Gary Severino Gil  AGE:  76 y.o., SEX:  male  Medical Record #: [id number]  Today's Date: 10/28/2021     Precautions: Fall Risk  Comments: desats during activity    Assessment    Patient is 76 y.o. male admitted 10/19/2021 with palpitations.     PMHx of chronic respiratory failure secondary to COPD on 3L, t-cell lymphoma/Leukemia awaiting induction chemo follows with Onc, atrial fibrillation on Eliquis.    CXR 10/24/21: Cardiomegaly and mild interstitial edema suggesting CHF.    CT of head 10/6/21:   1.  No acute intracranial abnormality is identified, there are nonspecific white matter changes, commonly associated with small vessel ischemic disease.  Associated mild cerebral atrophy is noted.  2.  Atherosclerosis.    Patient was seen on this date for a clinical swallow evaluation. Per MD, pt reporting difficulty swallowing. Patient was AAOx4, speech intact, and vocal quality clear. Patient denied hx of dysphagia or current trouble swallowing contrary to pt report to MD this AM.     Oral motor examination revealed no gross asymmetry or weakness. Few missing molars and pt reported loose bottom teeth due to recent fall prior to this admission.    PO trials were administered and consisted of thin liquids via cup and straw, applesauce, and soft solids. Onset of swallow trigger was timely. No overt s/sx of aspiration appreciated across all trials tested. Patient had prolonged mastication with first bite of soft solids and reported pain with chewing. Patient spit out second trial of single peach and stated, \"I can't do that.\" Education provided to patient regarding current status and SLP recs. Pt agreeable to diet modification.    Patient is presenting with oral dysphagia r/t pain mouth and loose bottom teeth s/p fall prior to this admission. A modified diet is indicated. A diagnostic swallow study is not indicated at this time. SLP will continue to " "follow and modify diet as needed to promote PO intake.     Recommendations  1. Downgrade to minced & moist and thin liquid diet   2. Meds whole with liquid wash    Plan    Recommend Speech Therapy 3 times per week until therapy goals are met for the following treatments:  Dysphagia Training and Patient / Family / Caregiver Education.    Discharge Recommendations: Anticipate that the patient will have no further speech therapy needs after discharge from the hospital     Objective       10/28/21 1118   Vitals   O2 (LPM) 3   O2 Delivery Device Silicone Nasal Cannula   Prior Level Of Function   Communication Within Functional Limits   Swallow Within Functional Limits   Dentition Poor Quality   (some missing molars, loose bottom teeth s/p fall)   Dentures None   Hearing Within Functional Limits for Evaluation   Hearing Aid None   Patient's Primary Language English   Occupation (Pre-Hospital Vocational) Retired Due To Age   Oral Motor Eval    Is Patient Able to Complete Oral Motor Eval Yes, Within Normal Limits   Laryngeal Function   Voice Quality Within Functional Limits   Volutional Cough Within Functional Limits   Oral Food Presentation   Ice Chips Within Functional Limits   Single Swallow Thin (0) Within Functional Limits   Serial Swallow Thin (0) Within Functional Limits   Liquidised (3) Within Functional Limits   Soft & Bite-Sized (6) - (Dysphagia III) Moderate   Self Feeding Independent   Tracheostomy   Tracheostomy  No   Dysphagia Strategies / Recommendations   Strategies / Interventions Recommended (Yes / No) Yes   Compensatory Strategies Monitor During Meals;Head of Bed 90 Degrees During Eating / Drinking   Diet / Liquid Recommendation Minced & Moist (5) - (Dysphagia II);Thin (0)   Medication Administration  Whole with Liquid Wash   Therapy Interventions Dysphagia Therapy By Speech Language Pathologist   Patient / Family Goals   Patient / Family Goal #1 \"it hurts to chew\"   Short Term Goals   Short Term Goal # 1 " Patient will consume a MM5/TN0 diet with no overt s/sx of aspiration given close monitoring.

## 2021-10-28 NOTE — PROGRESS NOTES
Hospital Medicine Daily Progress Note    Date of Service  10/28/2021    Chief Complaint  Gary Severino Gil is a 76 y.o. male admitted 10/19/2021 with palpitations    Hospital Course  This is a 76 year old male with PMHx of chronic respiratory failure secondary to COPD on 3L, t-cell lymphoma/Leukemia awaiting induction chemo follows with Onc, atrial fibrillation on Eliquis who was admitted on 10/19/2021 due to atrial fibrillation with RVR.    Patient was diagnosed with atrial fibrillation on previous admission 09/30-10/5/21, started on Eliquis only. On last admission patient was evaluated by PT/OT, they recommended SNF however patient was very adamant about going home, home health was arranged and patient's daughter was instructed to set up life alert.    On admission patient initially required a Cardizem and an amnio drip, patient was started on metoprolol 25 twice daily, I increased this to 50 mg twice daily and patient is responding appropriately.    Patient evaluated by PT/OT recommend HH and FWW. Orders placed.    Interval Problem Update  Patient admitted with atrial fibrillation with RVR.  On previous admission patient was diagnosed with atrial fibrillation but was initially rate controlled with metoprolol 12.5mg BID    Beta-blockade was increased to 50 twice daily on 10/20, unfortunately patient continued to have some mild breakthrough tachycardia, oral amiodarone will be added on 10/21.  Patient's tachyarrhythmia resolved after reintroduction of amiodarone, patient was medically cleared to return home on the above regimen on 10/22/2021, given that he had refused skilled placement.  Family is unable to care for him, and faced with this prospect patient is now agreeable to skilled nursing placement.  Referral to skilled nursing facilities have been made, medically cleared for placement. Today willing to undergo chemotherapy for his lymphoma.      PATIENT SEEN BY PREVIOUS HOSPITALIST UNTIL 10/25.    10/26:  Patient seen at bedside this morning.  The patient has been having episodes of on and off RVR.  Patient already on amiodarone and metoprolol.  We will give metoprolol IV pushes for now.  We have consulted cardiology, we appreciate further recommendations.  Pending placement.    10/27: Patient seen at bedside this morning.  The patient currently laying comfortably in bed.  The patient seems to be in a depressed mood and previous hospitalist had consulted psychiatry, we appreciate further recommendations.  His heart rate seems better controlled today, we appreciate recommendations by cardiology.  We are pending placement.  As per nursing no other overnight events reported.    10/28: Patient seen at bedside this morning.  His heart rate seems to be controlled today.  Cardiology have made changes to his metoprolol and currently on 25 mg every 6 hours.  We are still pending placement.    I have personally seen and examined the patient at bedside. I discussed the plan of care with patient and bedside RN.    Consultants/Specialty  cardiology    Code Status  DNAR/DNI     Disposition  Patient is medically cleared.   Anticipate discharge to to skilled nursing facility pending bed availiability.  I have placed the appropriate orders for post-discharge needs.    Review of Systems  Review of Systems   Constitutional: Negative for chills and fever.   HENT: Negative for hearing loss and nosebleeds.    Eyes: Negative for blurred vision and double vision.   Respiratory: Negative for cough and shortness of breath.    Cardiovascular: Negative for chest pain and palpitations.   Gastrointestinal: Negative for abdominal pain, heartburn and vomiting.   Genitourinary: Negative for dysuria and urgency.   Musculoskeletal: Negative for back pain and falls.   Skin: Negative for itching and rash.   Neurological: Negative for dizziness and headaches.   Psychiatric/Behavioral: Positive for depression (patient with depressed mood.). Negative for  substance abuse. The patient is not nervous/anxious.    All other systems reviewed and are negative.       Physical Exam  Temp:  [36.8 °C (98.2 °F)-37.4 °C (99.3 °F)] 37.4 °C (99.3 °F)  Pulse:  [] 105  Resp:  [16-18] 16  BP: ()/(46-52) 104/49  SpO2:  [88 %-96 %] 88 %    Physical Exam  Vitals and nursing note reviewed.   Constitutional:       General: He is not in acute distress.     Appearance: Normal appearance. He is ill-appearing (chronically ill).   HENT:      Head: Normocephalic and atraumatic.      Right Ear: External ear normal.      Left Ear: External ear normal.   Eyes:      General:         Right eye: No discharge.         Left eye: No discharge.      Conjunctiva/sclera: Conjunctivae normal.   Cardiovascular:      Rate and Rhythm: Normal rate. Rhythm irregular.      Pulses: Normal pulses.      Heart sounds: No murmur heard.   No friction rub. No gallop.    Pulmonary:      Effort: Pulmonary effort is normal. No respiratory distress.      Breath sounds: Normal breath sounds. No wheezing, rhonchi or rales.   Abdominal:      General: Bowel sounds are normal. There is no distension.      Palpations: Abdomen is soft.      Tenderness: There is no abdominal tenderness. There is no guarding.   Musculoskeletal:         General: No swelling or tenderness. Normal range of motion.      Cervical back: Normal range of motion and neck supple. No muscular tenderness.      Right lower leg: No edema.      Left lower leg: No edema.   Skin:     General: Skin is warm and dry.      Capillary Refill: Capillary refill takes less than 2 seconds.   Neurological:      General: No focal deficit present.      Mental Status: He is alert and oriented to person, place, and time.   Psychiatric:         Behavior: Behavior normal.      Comments: Depressed mood         Fluids    Intake/Output Summary (Last 24 hours) at 10/28/2021 1600  Last data filed at 10/28/2021 0842  Gross per 24 hour   Intake 480 ml   Output 125 ml   Net 355  ml       Laboratory                        Imaging  DX-CHEST-PORTABLE (1 VIEW)   Final Result      Cardiomegaly and mild interstitial edema suggesting CHF.      DX-CHEST-PORTABLE (1 VIEW)   Final Result      1.  Bihilar prominence with mild diffuse interstitial thickening may represent mild interstitial edema.           Assessment/Plan  * Atrial fibrillation with RVR (HCC)- (present on admission)  Assessment & Plan  Last echo 10/3/21  Patient admitted with atrial fibrillation with RVR.  On previous admission patient was diagnosed with atrial fibrillation but was initially rate controlled with metoprolol 12.5mg BID    On admission patient initially required a Cardizem and an amnio drip, patient was started on metoprolol 25 twice daily, I increased this to 50 mg twice daily and patient is responding appropriately.    Oral Amio added 10/21.  CTM.      We will continue to monitor patient's heart rate overnight, and adjust medications as needed.  Continue with Eliquis.    10/26: Patient had been going on and off RVR today with a HR as high as 180. We have ordered metoprolol IV pushes PRN and have consulted cardiology. Patient on amiodarone, we appreciate further recommendations by cardiology.    10/28: Cardiology have ordered metoprolol 25 mg q6 hours.     Depressed mood  Assessment & Plan  Due to the underlying conditions patient seems to have a depressed mood  Psychiatry was consulted by the previous hospitalist, we appreciate further recommendations.    10/28: Patient was visited by psychology today. It seems patient's depressed mood is due to his ongoing medical issues. We appreciate further recommendations.    Advanced care planning/counseling discussion- (present on admission)  Assessment & Plan  Per prior records, has been dnr/dni in the past, however patient wishes to be FULL CODE  I spoke with patient at length regarding advanced care planning, patient wishes to be FULL CODE including cpr chest compressions and  intubation.  ACP: 16 mins      Pulmonary hypertension (HCC)- (present on admission)  Assessment & Plan  Chronic, cont home meds    Acute hypoxemic respiratory failure (HCC)- (present on admission)  Assessment & Plan  In setting of t cell lymphoma chest mass, and chronic copd, now in uncontrolled afib on presentation  Oxygen support  Duonebs, copd treatments  Treat underlying conditions.  Incentive spirometry    Lymphoma (HCC)- (present on admission)  Assessment & Plan  To start induction chemo soon. Follows with Dr. Fleming      EtOH dependence (HCC)- (present on admission)  Assessment & Plan  Alcohol level  Consider ciwa   Denies recent use     10/28: Patient has not had symptoms of withdrawal. Counseled on lifestyle modifications.    COPD without exacerbation (HCC)- (present on admission)  Assessment & Plan  Comorbid chest mass, afib uncontrolled on presentation  Continue advair ellipta  duonebs  Consider steroids if needed  Has wbc count on presentation, but leukocytosis chronically elevated       VTE prophylaxis: SCDs/TEDs and therapeutic anticoagulation with Eliquis    I have performed a physical exam and reviewed and updated ROS and Plan today (10/28/2021). In review of yesterday's note (10/27/2021), there are no changes except as documented above.

## 2021-10-28 NOTE — DIETARY
Nutrition Services: Update   Consult received for poor PO intake.  Please refer to RD note from 10/26, as we are following this pt.  Oral nutrition supplements in place.  Per ADLs, refusing most meals.  Per Advanced Directive and POLST, TF/nutrition support not within POC.  May consider an appetite stimulant.    RD(s) to follow-up 10/29.

## 2021-10-28 NOTE — PROGRESS NOTES
Cardiology Follow Up Progress Note    Date of Service  10/28/2021    Attending Physician  Johny Wilson*      Presents with palpitations, dyspnea found to be in A. fib RVR, heart rate 180s, patient was recently diagnosed with A. fib (9/2021)    Cardiology consultation for A. fib RVR      PMH: T-cell lymphoma/leukemia, COPD on 2 regular home oxygen, recent diagnosis of A. fib 9/2021) on Eliquis, EtOH use, obesity        Interim Events    Run of PSVT this morning x 3 minutes ( RN was assisting him to get out of the bed.  Metoprolol 50 mg held this morning secondary to soft blood pressure.  Will transition to  low dose metoprolol 25 mg q 6 h  BP 91/50  Labs reviewed  NA, K, CR stable      Review of Systems  Review of Systems   Respiratory: Negative for cough and choking.    Cardiovascular: Negative for chest pain.       Vital signs in last 24 hours  Temp:  [36.8 °C (98.3 °F)-37.6 °C (99.7 °F)] 36.9 °C (98.5 °F)  Pulse:  [100-115] 115  Resp:  [18-20] 18  BP: (82-94)/(50-53) 91/50  SpO2:  [91 %-96 %] 95 %    Physical Exam  Physical Exam  Cardiovascular:      Rate and Rhythm: Normal rate and regular rhythm.      Pulses: Normal pulses.   Skin:     General: Skin is warm.   Neurological:      Mental Status: Mental status is at baseline.         Lab Review  Lab Results   Component Value Date/Time    WBC 17.6 (H) 10/24/2021 02:56 AM    RBC 4.14 (L) 10/24/2021 02:56 AM    HEMOGLOBIN 11.3 (L) 10/24/2021 02:56 AM    HEMATOCRIT 34.5 (L) 10/24/2021 02:56 AM    MCV 83.3 10/24/2021 02:56 AM    MCH 27.3 10/24/2021 02:56 AM    MCHC 32.8 (L) 10/24/2021 02:56 AM    MPV 11.9 10/24/2021 02:56 AM      Lab Results   Component Value Date/Time    SODIUM 135 10/24/2021 06:07 AM    POTASSIUM 4.5 10/24/2021 06:07 AM    CHLORIDE 96 10/24/2021 06:07 AM    CO2 33 10/24/2021 06:07 AM    GLUCOSE 107 (H) 10/24/2021 06:07 AM    BUN 10 10/24/2021 06:07 AM    CREATININE 0.58 10/24/2021 06:07 AM    CREATININE 0.9 04/07/2009 04:25 AM      Lab  Results   Component Value Date/Time    ASTSGOT 75 (H) 10/24/2021 06:07 AM    ALTSGPT 48 10/24/2021 06:07 AM     Lab Results   Component Value Date/Time    CHOLSTRLTOT 59 (L) 10/19/2021 11:49 PM    LDL 24 10/19/2021 11:49 PM    HDL 9 (A) 10/19/2021 11:49 PM    TRIGLYCERIDE 131 10/19/2021 11:49 PM    TROPONINT 60 (H) 10/20/2021 03:06 AM       No results for input(s): NTPROBNP in the last 72 hours.    Cardiac Imaging and Procedures Review  EKG: INGE izaguirre RVR presentation 10/19/2021, 10/26/2021, SR    Echocardiogram:    LVEF 60%  Grade 2 diastolic dysfunction  Mild TR   RVSP 60 mmHg      Cardiac Catheterization: Not applicable    Imaging  Chest X-Ray:  Cardiomegaly and mild interstitial edema suggesting CHF.    Stress Test: Not applicable    Assessment/Plan    PAF  Recent diagnosis of A. zina (9/2021)  -Continue with apixaban  -Continue with rate control therapy  -metoprolol 50 twice daily  -Not a good candidate for ablation or rhythm control.  Consider discontinuing aspirin  -Run of PSVT  this morning for about 3 minutes, asymptomatic  -Metoprolol held this morning due to soft blood pressure, SBP 90    Alcohol use  -[facility] protocol    Lymphoma chest mass  -Outpatient follow-up    We will follow along    Thank you for allowing me to participate in the care of this patient.      Please contact me with any questions.    RUDY Longoria.   Cardiologist, [facility]  [phone]

## 2021-10-28 NOTE — THERAPY
"Occupational Therapy  Daily Treatment     Patient Name: Gary Severino Gil  Age:  76 y.o., Sex:  male  Medical Record #: [id number]  Today's Date: 10/28/2021     Precautions  Precautions: Fall Risk  Comments: desats during activity    Assessment    Pt seen for OT session. Decreased sitting balance and activity tolerance this session. R lateral lean in sitting/standing; g/h seated with min A. Limited by c/o dizziness and decreased responsiveness after short stand mod x2. Desaturated to high 70s at EOB on 3 L so increased to 4L O2, recovered to >90%, but back to 3L in supine. Pt reports he's upset that the [facility] staff hasn't gotten him EOB, but per RN, she has asked multiple times and pt refused. RN reports he has said that he wants to die, but then tells the physicians something different; unclear what is pt true GOC. Encouraged to continue BUE use while in supine and EOB for ADLs, and edu on importance of EOB activity with staff. Continues to be limited by decreased functional mobility, activity tolerance, cognition, strength, balance, and pain which are currently affecting pt's ability to complete ADLs/IADLs at baseline. Will continue to follow.     Plan    Continue current treatment plan.    DC Equipment Recommendations: Unable to determine at this time  Discharge Recommendations: Recommend post-acute placement for additional occupational therapy services prior to discharge home    Subjective    \"It just pisses me off that they don't get me up.\" Educated that pt can requested EOB activity, and per RN, pt has been offered to get EOB with [facility] staff multiple times and has refused.      Objective     10/28/21 1408   Precautions   Precautions Fall Risk   Comments desats during activity   Vitals   O2 Delivery Device Silicone Nasal Cannula   Vitals Comments desaturated to high 70s on 3 L so increased to 4L O2, recovered to >90%,, but back to 3L in supine   Pain 0 - 10 Group   Therapist Pain Assessment Post Activity;During " Activity;Nurse Notified  (not quantified)   Cognition    Cognition / Consciousness X   Level of Consciousness Alert   Safety Awareness Impaired   New Learning Impaired   Attention Impaired   Sequencing Impaired  (for log roll)   Comments cooperative with encouragement, flat affect; reports he's upset that the [facility] staff hasn't gotten him EOB, but per RN, she has asked multiple times and refuses. RN reports he has said that he wants to die, but then tells the physicians something different. limited memory for prior session   Passive ROM Upper Body   Passive ROM Upper Body WDL   Active ROM Upper Body   Active ROM Upper Body  WDL   Strength Upper Body   Upper Body Strength  X   Gross Strength Generalized Weakness, Equal Bilaterally.    Comments encouraged to continue BUE use while in supine for ADL    Balance   Sitting Balance (Static) Fair   Sitting Balance (Dynamic) Fair -   Standing Balance (Static) Poor +   Standing Balance (Dynamic) Poor -   Weight Shift Sitting Fair   Weight Shift Standing Poor   Skilled Intervention Verbal Cuing;Tactile Cuing;Facilitation;Compensatory Strategies;Sequencing;Postural Facilitation   Comments w/FWW; req min A    Bed Mobility    Supine to Sit Moderate Assist   Sit to Supine Maximal Assist   Scooting Moderate Assist   Rolling Moderate Assist to Lt.   Skilled Intervention Verbal Cuing;Tactile Cuing;Facilitation;Compensatory Strategies;Sequencing;Postural Facilitation   Comments HOB elevated and use of rails    Activities of Daily Living   Grooming Minimal Assist;Seated  (wiping face)   Upper Body Dressing Minimal Assist  (gown)   Lower Body Dressing Total Assist  (socks)   Toileting Total Assist  (BM and urine on bed req cleanup)   Skilled Intervention Verbal Cuing;Tactile Cuing;Facilitation;Compensatory Strategies;Sequencing;Postural Facilitation   Comments limited by dizziness and fatigue   How much help from another person does the patient currently need...   Putting on and taking  off regular lower body clothing? 1   Bathing (including washing, rinsing, and drying)? 2   Toileting, which includes using a toilet, bedpan, or urinal? 1   Putting on and taking off regular upper body clothing? 2   Taking care of personal grooming such as brushing teeth? 3   Eating meals? 3   6 Clicks Daily Activity Score 12   Functional Mobility   Sit to Stand Moderate Assist  (x2 ppl with heavy R lateral lean)   Transfer Method Stand Step   Mobility w/FWW; EOB>STS x1 then became less responsive and c/o dizziness; BTB   Skilled Intervention Verbal Cuing;Tactile Cuing;Facilitation;Compensatory Strategies;Sequencing;Postural Facilitation   Activity Tolerance   Comments limited by fatigue and dizziness at end of session   Patient / Family Goals   Patient / Family Goal #1 to go home   Short Term Goals   Short Term Goal # 1 Min A toileting   Goal Outcome # 1 Goal not met   Short Term Goal # 2 S toilet transfer with LRAD   Goal Outcome # 2 Goal not met   Short Term Goal # 3 Min A LB ADLs   Goal Outcome # 3 Progressing slower than expected   Short Term Goal # 4 10 minute stand for ADLs   Goal Outcome # 4 Progressing slower than expected   Education Group   Education Provided Role of Occupational Therapist;Pathology of bedrest   Role of Occupational Therapist Patient Response Patient;Acceptance;Explanation;Verbal Demonstration;Reinforcement Needed   Pathology of Bedrest Patient Response Patient;Acceptance;Explanation;Reinforcement Needed;No Learning Evidence

## 2021-10-29 NOTE — DISCHARGE PLANNING
Agency/Facility Name: [facility]  Spoke To: Kimberly  Outcome: DPA confirmed acceptance today, Kimberly provided transport time via [email or web address] at 1300.     1210-  Per ANAMARIA Espinal, Pt will not be ready for 1300 transport time, DPA to reschedule transport time and advise Kimberly with [facility] admissions.     1213-  Agency/Facility Name: [facility]   Spoke To: Kimberly  Outcome: DPA confirmed updated transport time of 1330 with Kimberly, Pt is now ready to discharge.

## 2021-10-29 NOTE — DISCHARGE INSTRUCTIONS
Discharge Instructions    Discharged to other by medical transportation with escort. Discharged via wheelchair, hospital escort: Yes.  Special equipment needed: Oxygen and Wheelchair    Be sure to schedule a follow-up appointment with your primary care doctor or any specialists as instructed.     Discharge Plan:   Diet Plan: Discussed  Activity Level: Discussed  Confirmed Follow up Appointment: Appointment Scheduled  Confirmed Symptoms Management: Discussed  Medication Reconciliation Updated: Yes  Influenza Vaccine Indication: Patient Refuses    I understand that a diet low in cholesterol, fat, and sodium is recommended for good health. Unless I have been given specific instructions below for another diet, I accept this instruction as my diet prescription.   Other diet: Soft bite sized    Special Instructions: None    · Is patient discharged on Warfarin / Coumadin?   No     Depression / Suicide Risk    As you are discharged from this [facility] facility, it is important to learn how to keep safe from harming yourself.    Recognize the warning signs:  · Abrupt changes in personality, positive or negative- including increase in energy   · Giving away possessions  · Change in eating patterns- significant weight changes-  positive or negative  · Change in sleeping patterns- unable to sleep or sleeping all the time   · Unwillingness or inability to communicate  · Depression  · Unusual sadness, discouragement and loneliness  · Talk of wanting to die  · Neglect of personal appearance   · Rebelliousness- reckless behavior  · Withdrawal from people/activities they love  · Confusion- inability to concentrate     If you or a loved one observes any of these behaviors or has concerns about self-harm, here's what you can do:  · Talk about it- your feelings and reasons for harming yourself  · Remove any means that you might use to hurt yourself (examples: pills, rope, extension cords, firearm)  · Get professional help from the  community (Mental Health, Substance Abuse, psychological counseling)  · Do not be alone:Call your Safe Contact- someone whom you trust who will be there for you.  · Call your local CRISIS HOTLINE 696-0628 or 280-290-0099  · Call your local Children's Mobile Crisis Response Team [place] (824) 660-5833 or www.[employer]  · Call the toll free National Suicide Prevention Hotlines   · National Suicide Prevention Lifeline 181-850-IZQH (8953)  · National Hope Line Network 800-SUICIDE (871-6589)    Discharge Instructions    Discharged to other by medical transportation with self. Discharged via wheelchair, hospital escort: Yes.  Special equipment needed: Not Applicable    Be sure to schedule a follow-up appointment with your primary care doctor or any specialists as instructed.     Discharge Plan:   Diet Plan: Discussed  Activity Level: Discussed  Confirmed Follow up Appointment: Appointment Scheduled  Confirmed Symptoms Management: Discussed  Medication Reconciliation Updated: Yes  Influenza Vaccine Indication: Patient Refuses    I understand that a diet low in cholesterol, fat, and sodium is recommended for good health. Unless I have been given specific instructions below for another diet, I accept this instruction as my diet prescription.   Other diet: Heart Healthy    Special Instructions: None    · Is patient discharged on Warfarin / Coumadin?   No     Depression / Suicide Risk    As [facility] are [facility], it is important to learn how to keep safe from harming yourself.    Recognize the warning signs:  · Abrupt changes in personality, positive or negative- including increase in energy   · Giving away possessions  · Change in eating patterns- significant weight changes-  positive or negative  · Change in sleeping patterns- unable to sleep or sleeping all the time   · Unwillingness or inability to communicate  · Depression  · Unusual sadness, discouragement and loneliness  · Talk  of wanting to die  · Neglect of personal appearance   · Rebelliousness- reckless behavior  · Withdrawal from people/activities they love  · Confusion- inability to concentrate     If you or a loved one observes any of these behaviors or has concerns about self-harm, here's what you can do:  · Talk about it- your feelings and reasons for harming yourself  · Remove any means that you might use to hurt yourself (examples: pills, rope, extension cords, firearm)  · Get professional help from the community (Mental Health, Substance Abuse, psychological counseling)  · Do not be alone:Call your Safe Contact- someone whom you trust who will be there for you.  · Call your local CRISIS HOTLINE 930-8122 or 811-149-6452  · Call your local Children's Mobile Crisis Response Team [place] (780) 183-0648 or www.[employer]  · Call the toll free National Suicide Prevention Hotlines   · National Suicide Prevention Lifeline 369-101-TBLI (4870)  · National Hope Line Network 800-SUICIDE (886-4995)

## 2021-10-29 NOTE — DISCHARGE SUMMARY
"Discharge Summary    CHIEF COMPLAINT ON ADMISSION  Chief Complaint   Patient presents with   • Rapid Heart Beat       Reason for Admission  EMS     CODE STATUS  DNAR/DNI    HPI & HOSPITAL COURSE  As per chart review:  \"  This is a 76 year old male with PMHx of chronic respiratory failure secondary to COPD on 3L, t-cell lymphoma/Leukemia awaiting induction chemo follows with Onc, atrial fibrillation on Eliquis who was admitted on 10/19/2021 due to atrial fibrillation with RVR.    Patient was diagnosed with atrial fibrillation on previous admission 09/30-10/5/21, started on Eliquis only. On last admission patient was evaluated by PT/OT, they recommended SNF however patient was very adamant about going home, home health was arranged and patient's daughter was instructed to set up life alert.    On admission patient initially required a Cardizem and an amnio drip, patient was started on metoprolol 25 twice daily. Patient was initially increased to 50 mg BID.  \"  On admission the patient received 3 days of antibiotics.    The patient was admitted for further management and care.  At first the patient was doing well with 50 mg of metoprolol twice daily, but apparently the patient continued to have some mild breakthrough tachycardia.  Initially the patient was started on amiodarone.  It seemed like the patient continued to improve, however the patient again had some breakthrough tachycardia and cardiology was consulted.  Cardiology discontinued the amiodarone and have now started the patient on metoprolol 25 mg every 6 hours.  The patient appears to have been doing better at this regimen.  I spoke to cardiology today and the patient is ready to be discharged to a skilled nursing facility.    I talked to the patient at bedside this morning the patient currently not complaining of any pain, his heart rate seems to be better controlled.  We have an acceptance bed to a skilled nursing facility and patient will be discharged " today.    The patient had remained afebrile during this hospitalization.  And the patient was not complaining of any other signs of infection.  Patient did not complain of any chest pain, cough, abdominal pain, diarrhea or urinary symptoms.    The patient seems to have a depressed mood due to the ongoing medical issues that the patient is facing.  The patient would probably benefit from outpatient referral to psychiatry/counseling and even starting possibly SSRI as an outpatient.    The patient requires close follow-up as an outpatient with PCP, cardiology and oncology.    Therefore, he is discharged in guarded and stable condition to skilled nursing facility.    The patient met 2-midnight criteria for an inpatient stay at the time of discharge.      FOLLOW UP ITEMS POST DISCHARGE  The patient will be discharged to skilled nursing facility.  The patient will require close follow-up with PCP, cardiology and oncology as an outpatient.    DISCHARGE DIAGNOSES  Principal Problem:    Atrial fibrillation with RVR (HCC) POA: Yes  Active Problems:    COPD without exacerbation (HCC) POA: Yes    EtOH dependence (HCC) POA: Yes    Lymphoma (HCC) (Chronic) POA: Yes    Acute hypoxemic respiratory failure (HCC) POA: Yes    Pulmonary hypertension (HCC) POA: Yes    Advanced care planning/counseling discussion POA: Yes    Depressed mood POA: Unknown  Resolved Problems:    Sepsis (HCC) POA: Yes      FOLLOW UP  No future appointments.  [address]  [facility] [address]  [phone]        Edwin Valentin M.D.  [address]  [place] [address] [phone]      Please call your primary care provider to schedule a hospital follow up. Thank you.     [facility]  [address] [address]  [phone]        Ivan Garcia M.D.  [address]  [place] [address]  [phone]    Schedule an appointment as soon as possible for a  visit      Joseph Cifuentes M.D.  [address]  [address]  [place] [address]  [phone]    Schedule an appointment as soon as possible for a visit        MEDICATIONS ON DISCHARGE     Medication List      CHANGE how you take these medications      Instructions   metoprolol tartrate 25 MG Tabs  What changed:   · how much to take  · when to take this  Commonly known as: LOPRESSOR   Take 1 Tablet by mouth every 6 hours.  Dose: 25 mg        CONTINUE taking these medications      Instructions   acetaminophen 325 MG Tabs  Commonly known as: Tylenol   Take 650 mg by mouth every four hours as needed.  Dose: 650 mg     apixaban 5mg Tabs  Commonly known as: ELIQUIS   Take 1 Tablet by mouth 2 times a day. Indications: Thromboembolism secondary to Atrial Fibrillation  Dose: 5 mg     ascorbic acid 500 MG tablet  Commonly known as: Vitamin C   Take 500 mg by mouth every day.  Dose: 500 mg     famotidine 20 MG Tabs  Commonly known as: PEPCID   Take 20 mg by mouth 2 times a day.  Dose: 20 mg     Incruse Ellipta 62.5 MCG/INH Aepb  Generic drug: Umeclidinium Bromide   Inhale 1 Puff every day.  Dose: 1 Puff     loperamide 2 MG Caps  Commonly known as: IMODIUM   Take 2 mg by mouth every 8 hours as needed for Diarrhea.  Dose: 2 mg     melatonin 5 mg Tabs   Take 5 mg by mouth at bedtime as needed (Sleep).  Dose: 5 mg     multivitamin Tabs   Take 1 Tablet by mouth every day.  Dose: 1 Tablet     Non Formulary Request   Take 1 Tablet by mouth every day. Therbiotic Complete  Dose: 1 Tablet     nystatin 136250 UNIT/ML Susp  Commonly known as: MYCOSTATIN   Take 400,000 Units by mouth 4 times a day. 10 day course  Dose: 400,000 Units     oxyCODONE-acetaminophen  MG Tabs  Commonly known as: PERCOCET-10   Take 1 Tablet by mouth every 6 hours as needed.  Dose: 1 Tablet     pregabalin 75 MG Caps  Commonly known as: LYRICA   Take 75 mg by mouth 2 times a day.  Dose: 75 mg     PROBITROL PO   Take 1 Tablet by mouth 2 times a day. 10 day  course  Dose: 1 Tablet     PROSTAT PO   Take 1 Tablet by mouth every day.  Dose: 1 Tablet     QUERCETIN PO   Take 1 Tablet by mouth at bedtime. Take for 14 days  Dose: 1 Tablet     vitamin D3 125 MCG (5000 UT) Caps   Take 5,000 Units by mouth every day.  Dose: 5,000 Units     Wixela Inhub 100-50 MCG/DOSE Aepb  Generic drug: fluticasone-salmeterol   Inhale 1 Puff every 12 hours.  Dose: 1 Puff     zinc sulfate 220 (50 Zn) MG Caps  Commonly known as: ZINCATE   Take 220 mg by mouth every day.  Dose: 220 mg            Allergies  Allergies   Allergen Reactions   • Morphine Sulfate Unspecified     Hallucinations        DIET  Orders Placed This Encounter   Procedures   • Diet Order Diet: Level 5 - Minced and Moist; Liquid level: Level 0 - Thin; Fluid modifications: (optional): 2000 ml Fluid Restriction     Standing Status:   Standing     Number of Occurrences:   1     Order Specific Question:   Diet:     Answer:   Level 5 - Minced and Moist [24]     Order Specific Question:   Liquid level     Answer:   Level 0 - Thin     Order Specific Question:   Fluid modifications: (optional)     Answer:   2000 ml Fluid Restriction [11]       ACTIVITY  As tolerated and directed by skilled nursing.  Weight bearing as tolerated and directed by skilled nursing.    LINES, DRAINS, AND WOUNDS  This is an automated list. Peripheral IVs will be removed prior to discharge.  Peripheral IV 10/24/21 20 G Left Forearm (Active)   Site Assessment Clean;Dry;Intact 10/29/21 1000   Dressing Type Transparent 10/29/21 1000   Line Status Scrubbed the hub prior to access;Flushed;Blood return noted 10/29/21 1000   Dressing Status Intact;Clean;Dry 10/29/21 1000   Dressing Intervention N/A 10/29/21 1000   Infiltration Grading ([facility]) 0 10/29/21 1000   [facility]) 0 10/29/21 1000       Wound 10/03/21 Generalized skin lesions (Active)   Wound Image      10/03/21 0900   Site Assessment Pink;Red;Grey 10/29/21 0933   Periwound Assessment  Pink;Purple;Fragile 10/29/21 0933   Margins Defined edges 10/29/21 0933   Closure Open to air 10/29/21 0933   Drainage Amount None 10/29/21 0933   Treatments Offloading 10/04/21 2000   Wound Cleansing Approved Wound Cleanser 10/03/21 0900   Periwound Protectant Not Applicable 10/03/21 0900   Dressing Cleansing/Solutions Not Applicable 10/03/21 0900   Dressing Options Open to Air 10/05/21 0725   Dressing Changed Observed 10/29/21 0933   Dressing Status Open to Air 10/29/21 0933   NEXT Weekly Photo (Inpatient Only) 10/10/21 10/03/21 0900   Shape circular 10/03/21 0900   Wound Odor None 10/03/21 0900   Pulses N/A 10/03/21 0900   Exposed Structures None 10/03/21 0900   WOUND NURSE ONLY - Time Spent with Patient (mins) 45 10/03/21 0900       Wound 10/20/21 Face Left (Active)   Wound Image   10/20/21 1404   Site Assessment [place]cl[place];Dry 10/29/21 0933   Margins Attached edges;Defined edges 10/29/21 0933   Closure Open to air 10/29/21 0933   Drainage Amount None 10/29/21 0933   Dressing Status Open to Air 10/29/21 0933       Wound 10/20/21 Sternum (Active)   Wound Image   10/20/21 1410   Site Assessment Brown 10/29/21 0933   Periwound Assessment Pink 10/29/21 0933   Margins Defined edges 10/29/21 0933   Closure Open to air 10/29/21 0933   Dressing Options Open to Air 10/29/21 0933       Wound 10/20/21 Thigh (Active)   Wound Image   10/20/21 1407   Site Assessment Dry;Pink;Brown 10/29/21 0933   Periwound Assessment Intact 10/29/21 0933   Margins Defined edges 10/29/21 0933   Closure Secondary intention 10/29/21 0933   Drainage Amount RACHEL 10/24/21 0800   Dressing Options Mepilex 10/29/21 0933   Dressing Changed Observed 10/29/21 0933       Wound 10/20/21 Thigh Proximal (Active)   Wound Image   10/20/21 1409   Site Assessment Pale;Pink;Red 10/29/21 0933   Periwound Assessment Clean;Intact;Dry 10/29/21 0933   Margins Unattached edges 10/29/21 0933       Wound (Active)       Wound 10/20/21 Buttocks (Active)   Wound Image   10/29/21  0933   Site Assessment Pink;Red;Fragile 10/29/21 0933   Periwound Assessment Pink 10/29/21 0933   Margins Defined edges 10/29/21 0933   Closure Secondary intention 10/29/21 0933   Drainage Amount Scant 10/23/21 2025   Dressing Options Mepilex 10/29/21 0933   Dressing Changed Changed 10/29/21 0933       Wound 10/22/21 Full Thickness Wound Sacrum and posterior thighs (Active)   Wound Image    10/22/21 1400   Site Assessment Bleeding;Excoriated;Red 10/29/21 0933   Periwound Assessment Pink 10/29/21 0933   Margins Unattached edges 10/29/21 0933   Closure Secondary intention 10/29/21 0933   Drainage Amount Small 10/29/21 0933   Drainage Description Sanguineous 10/29/21 0933   Treatments Cleansed;Offloading 10/29/21 0933   Wound Cleansing Foam Cleanser/Washcloth 10/29/21 0933   Periwound Protectant Not Applicable 10/29/21 0933   Dressing Cleansing/Solutions Not Applicable 10/29/21 0933   Dressing Options Viscopaste;Mepilex 10/29/21 0933   Dressing Changed Changed 10/29/21 0933   Dressing Status Clean;Dry;Intact 10/29/21 0933   Dressing Change/Treatment Frequency Daily, and As Needed 10/29/21 0933   NEXT Dressing Change/Treatment Date 10/30/21 10/29/21 0933   NEXT Weekly Photo (Inpatient Only) 10/29/21 10/22/21 1400   Shape oval, scattered 10/22/21 1400   Exposed Structures None 10/22/21 1400       Peripheral IV 10/24/21 20 G Left Forearm (Active)   Site Assessment Clean;Dry;Intact 10/29/21 1000   Dressing Type Transparent 10/29/21 1000   Line Status Scrubbed the hub prior to access;Flushed;Blood return noted 10/29/21 1000   Dressing Status Intact;Clean;Dry 10/29/21 1000   Dressing Intervention N/A 10/29/21 1000   Infiltration Grading ([facility]) 0 10/29/21 1000   [facility] ([facility]) 0 10/29/21 1000               MENTAL STATUS ON TRANSFER             CONSULTATIONS  Palliative Care  Psychology  Cardiology    PROCEDURES      DX-CHEST-PORTABLE (1 VIEW)   Final Result      Cardiomegaly and mild interstitial edema  suggesting CHF.      DX-CHEST-PORTABLE (1 VIEW)   Final Result      1.  Bihilar prominence with mild diffuse interstitial thickening may represent mild interstitial edema.          LABORATORY  Lab Results   Component Value Date    SODIUM 135 10/24/2021    POTASSIUM 4.5 10/24/2021    CHLORIDE 96 10/24/2021    CO2 33 10/24/2021    GLUCOSE 107 (H) 10/24/2021    BUN 10 10/24/2021    CREATININE 0.58 10/24/2021    CREATININE 0.9 04/07/2009        Lab Results   Component Value Date    WBC 17.6 (H) 10/24/2021    HEMOGLOBIN 11.3 (L) 10/24/2021    HEMATOCRIT 34.5 (L) 10/24/2021    PLATELETCT 147 (L) 10/24/2021        Total time of the discharge process exceeds 35 minutes.

## 2021-10-29 NOTE — CARE PLAN
The patient is Watcher - Medium risk of patient condition declining or worsening    Shift Goals  Clinical Goals: BP management, rate control  Patient Goals: Rest  Family Goals: na    Progress made toward(s) clinical / shift goals:  Progressing      Problem: Discharge Barriers/Planning  Goal: Patient's continuum of care needs are met  Outcome: Progressing

## 2021-10-29 NOTE — CARE PLAN
Problem: Nutritional:  Goal: Achieve adequate nutritional intake  Description: Patient will consume 50% of meals  Outcome: Not Progressing  See RD note.

## 2021-10-29 NOTE — PROGRESS NOTES
MONITOR SUMMARY    .14/.11/.36    SR/ST  up to 170's x2 instances    Ectopy: PAC/PVC/couplets    Patient asymptomatic with SVT runs    COVID 19 surge in effect

## 2021-10-29 NOTE — PROGRESS NOTES
Cardiology Follow Up Progress Note    Date of Service  10/29/2021    Attending Physician  Johny Wilson*      Presents with palpitations, dyspnea found to be in A. fib RVR, heart rate 180s, patient was recently diagnosed with A. fib (9/2021)    Cardiology consultation for A. fib RVR      PMH: T-cell lymphoma/leukemia, COPD on 2 regular home oxygen, recent diagnosis of A. fib 9/2021) on Eliquis, EtOH use, obesity        Interim Events    Telemetry-SR, burst of afib for about 12 minutes overnight , rate 170's, asymptomatic  BP 91/50  Labs reviewed  NA, K, CR stable      Review of Systems  Review of Systems   Respiratory: Negative for cough and choking.    Cardiovascular: Negative for chest pain.       Vital signs in last 24 hours  Temp:  [36.5 °C (97.7 °F)-37.4 °C (99.3 °F)] 36.9 °C (98.4 °F)  Pulse:  [] 96  Resp:  [16-20] 20  BP: ()/(42-49) 96/42  SpO2:  [88 %-94 %] 90 %    Physical Exam  Physical Exam  Cardiovascular:      Rate and Rhythm: Normal rate and regular rhythm.      Pulses: Normal pulses.   Skin:     General: Skin is warm.   Neurological:      Mental Status: Mental status is at baseline.         Lab Review  Lab Results   Component Value Date/Time    WBC 17.6 (H) 10/24/2021 02:56 AM    RBC 4.14 (L) 10/24/2021 02:56 AM    HEMOGLOBIN 11.3 (L) 10/24/2021 02:56 AM    HEMATOCRIT 34.5 (L) 10/24/2021 02:56 AM    MCV 83.3 10/24/2021 02:56 AM    MCH 27.3 10/24/2021 02:56 AM    MCHC 32.8 (L) 10/24/2021 02:56 AM    MPV 11.9 10/24/2021 02:56 AM      Lab Results   Component Value Date/Time    SODIUM 135 10/24/2021 06:07 AM    POTASSIUM 4.5 10/24/2021 06:07 AM    CHLORIDE 96 10/24/2021 06:07 AM    CO2 33 10/24/2021 06:07 AM    GLUCOSE 107 (H) 10/24/2021 06:07 AM    BUN 10 10/24/2021 06:07 AM    CREATININE 0.58 10/24/2021 06:07 AM    CREATININE 0.9 04/07/2009 04:25 AM      Lab Results   Component Value Date/Time    ASTSGOT 75 (H) 10/24/2021 06:07 AM    ALTSGPT 48 10/24/2021 06:07 AM     Lab Results    Component Value Date/Time    CHOLSTRLTOT 59 (L) 10/19/2021 11:49 PM    LDL 24 10/19/2021 11:49 PM    HDL 9 (A) 10/19/2021 11:49 PM    TRIGLYCERIDE 131 10/19/2021 11:49 PM    TROPONINT 60 (H) 10/20/2021 03:06 AM       No results for input(s): NTPROBNP in the last 72 hours.    Cardiac Imaging and Procedures Review  EKG: INGE izaguirre RVR presentation 10/19/2021, 10/26/2021, SR    Echocardiogram:    LVEF 60%  Grade 2 diastolic dysfunction  Mild TR   RVSP 60 mmHg      Cardiac Catheterization: Not applicable    Imaging  Chest X-Ray:  Cardiomegaly and mild interstitial edema suggesting CHF.    Stress Test: Not applicable    Assessment/Plan    PAF  Recent diagnosis of STEFFANIE. zina (9/2021)  -Continue with apixaban  -Continue with rate control therapy  -Tolerating metoprolol 25 every 6 hours, blood pressure holding  -Uptitrate Metoprolol as BP allows for breakthrough RVR.  -Not a good candidate for ablation or rhythm control.  -LVEF 60%      Alcohol use  -[facility] protocol    Lymphoma chest mass  -Outpatient follow-up        Thank you for allowing me to participate in the care of this patient.      Please contact me with any questions.    RUDY Longoria.   Cardiologist, [facility]  [phone]

## 2021-10-29 NOTE — PROGRESS NOTES
Pt dc'd to [facility] with medical transport. IV and monitor removed; monitor room notified. Pt left unit via wheelchair with Hai. Personal belongings with pt when leaving unit. Pt given discharge instructions prior to leaving unit including where to  prescriptions and when to follow-up; verbalizes understanding. Copy of discharge instructions with pt and in the chart. Pt was A&Ox4 at time of discharge, report called to MARIA DEL CARMEN Zuniga at [facility]. All questions answered at this time.

## 2021-10-29 NOTE — DIETARY
Nutrition Services: Brief Note   RD briefly visited pt at bedside due to ongoing PO intake of <25% with most meals meals refused. Current diet order is Level 5 - minced/moist with Level 0 - thin liquids, 2000 mL fluid restriction. Pt has been receiving Magic Cup supplements TID (=870 kcals/day) and boost plus TID with meals (=1080 kcals/day).     As previously noted per RD note 10/28, pt's ACP documents, TF/nutrition support not within pt's GOC. RD on 10/28 recommended consideration of an appetite stimulant.    RD visited pt at bedside. Pt unable to describe intake of supplements, but declines them at this time. RD reviewed alternate snacks and meal items w/ pt. Pt agreeable to high protein milkshakes. To send w/ straws to allow pt to drink while oxymask in place (per flowsheets, oxymask @ 8L).    Recommendations/Plan:  1. Add high protein shakes BID with meals; daily soups; scoop of egg salad with lunch per pt preference.   2. Hold supplements at this time per pt preference.  3. Encourage intake of meals; document intake of all meals as % taken in ADL's to provide interdisciplinary communication across all shifts.   4. Monitor weight.  5. Nutrition rep will continue to see patient for ongoing meal and snack preferences.    RD following.

## 2021-10-29 NOTE — DISCHARGE PLANNING
Anticipated Discharge Disposition: SNF[facility]    Action: LSW made phone call to [facility] for update on insurance auth. [facility] reported they are still pending auth at this time.    Barriers to Discharge: SNF insurance auth and bed avialibility.    Plan: Care coordination will continue to follow up with [facility] for auth and bed.

## 2021-10-30 PROBLEM — R79.89 ELEVATED LFTS: Status: ACTIVE | Noted: 2021-01-01

## 2021-10-30 PROBLEM — J44.1 COPD EXACERBATION (HCC): Status: ACTIVE | Noted: 2021-01-01

## 2021-10-30 PROBLEM — D64.9 ANEMIA: Status: ACTIVE | Noted: 2021-01-01

## 2021-10-30 PROBLEM — J18.9 PNEUMONIA: Status: ACTIVE | Noted: 2021-01-01

## 2021-10-30 PROBLEM — A41.9 SEPSIS (HCC): Status: ACTIVE | Noted: 2021-01-01

## 2021-10-30 NOTE — ED NOTES
Pt sustained in SVT at 170's for 5 minutes, blood pressure 93/52. Pt asymptomatic pt denies chest pain.EKG obtained. Pt converted back to sinus tach in the low 100's. Dr. Beba Lazcano updated.  New orders to be placed by MD. Will obtain speech evaluation per MD.

## 2021-10-30 NOTE — ED NOTES
"Patient in room moaning out very loudly. Patient asked if he was in pain, to which he replied \"no\". Patient asked if anything could be done to make him more comfortable to which he again replied \"no\". Patient continues to moan out loudly in room.  "

## 2021-10-30 NOTE — CARE PLAN
Problem: Nutritional:  Goal: Nutrition support tolerated and meeting greater than 85% of estimated needs  Outcome: Not Met   See dietary note. RD following.

## 2021-10-30 NOTE — H&P
Hospital Medicine History & Physical Note    Date of Service  10/30/2021    Primary Care Physician  Edwin Valentin M.D.    Consultants      Code Status  DNAR/DNI per signed POLST form and also discussed with patient's daughter    Chief Complaint  Chief Complaint   Patient presents with   • Palpitations     Patient sent from SNF for SOB and HR in the 190's. Pt was cardioverted by Romario Martinez prior to arrival.       History of Presenting Illness  Gary Severino Gil is a 76 y.o. male who presented 10/30/2021 with dyspnea and tachycardia.  Patient was recently hospitalized for A. fib with RVR was evaluated by cardiology and discharged on Eliquis and metoprolol 25 mg 4 times a day.  While he was at his skilled nursing facility he was noted to have increased dyspnea and tachycardia.  When the paramedics arrived he had a narrow complex tachycardia and they were unable to get a blood pressure measurement so he was cardioverted.  In the emergency department he was noted to have paroxysmal atrial fibrillation and subsequently went into sinus rhythm with paroxysms of PSVT.  The patient is confused he is oriented to self.  He has been having a cough.  He denies pain.  He is otherwise unable to provide any history or review of systems.    I discussed the plan of care with patient, family and bedside RN.    Review of Systems  Review of Systems   Unable to perform ROS: Medical condition       Past Medical History   has a past medical history of A-fib ([facility]), Acute hypoxemic respiratory failure (HCC) (10/2/2021), Chronic obstructive pulmonary disease (HCC), MRSA (methicillin resistant Staphylococcus aureus), Productive cough, and Snoring.    Surgical History   has a past surgical history that includes imelda rectal abscess incision and drainage (4/6/2009); rectal exploration (9/3/2009); debridement (9/3/2009); rectal exploration (11/17/2009); debridement (11/17/2009); lumbar fusion anterior (1983); lumbar fusion posterior (1987);  flap graft (6/1/2010); and fistula in ano repair (6/1/2010).     Family History  family history includes Stroke in an other family member.       Social History   reports that he quit smoking about 4 years ago. His smoking use included cigarettes. He has a 67.50 pack-year smoking history. He has never used smokeless tobacco. He reports current alcohol use of about 8.0 oz of alcohol per week. He reports that he does not use drugs.    Allergies  Allergies   Allergen Reactions   • Morphine Sulfate Unspecified     Hallucinations        Medications  Prior to Admission Medications   Prescriptions Last Dose Informant Patient Reported? Taking?   Cholecalciferol (VITAMIN D3) 125 MCG (5000 UT) Cap unknown at unknown MAR from Other Facility Yes No   Sig: Take 5,000 Units by mouth every day.   Probiotic Product (PROBITROL PO) unknown at unknown MAR from Other Facility Yes No   Sig: Take 1 Tablet by mouth every day.   Umeclidinium Bromide (INCRUSE ELLIPTA) 62.5 MCG/INH AEROSOL POWDER, BREATH ACTIVATED unknown at unknown MAR from Other Facility Yes No   Sig: Inhale 1 Puff every day.   acetaminophen (TYLENOL) 325 MG Tab unknown at unknown MAR from Other Facility Yes No   Sig: Take 650 mg by mouth every four hours as needed.   apixaban (ELIQUIS) 5mg Tab 10/29/2021 at 1932 MAR from Other Facility No No   Sig: Take 1 Tablet by mouth 2 times a day. Indications: Thromboembolism secondary to Atrial Fibrillation   ascorbic acid (VITAMIN C) 500 MG tablet 10/29/2021 at 1932 MAR from Other Facility Yes No   Sig: Take 500 mg by mouth every day.   famotidine (PEPCID) 20 MG Tab 10/29/2021 at 1932 MAR from Other Facility Yes No   Sig: Take 20 mg by mouth 2 times a day.   fluticasone-salmeterol (WIXELA INHUB) 100-50 MCG/DOSE AEROSOL POWDER, BREATH ACTIVATED unknown at unknown MAR from Other Facility Yes No   Sig: Inhale 1 Puff every 12 hours.   loperamide (IMODIUM) 2 MG Cap unknown at unknown MAR from Other Facility Yes No   Sig: Take 2 mg by  mouth every 8 hours as needed for Diarrhea.   melatonin 5 mg Tab 10/29/2021 at 1951 MAR from Other Facility Yes No   Sig: Take 5 mg by mouth at bedtime as needed (Sleep).   metoprolol tartrate (LOPRESSOR) 25 MG Tab 10/29/2021 at 2321 MAR from Other Facility No No   Sig: Take 1 Tablet by mouth every 6 hours.   multivitamin (THERAGRAN) Tab unknown at unknown MAR from Other Facility Yes No   Sig: Take 1 Tablet by mouth every day.   nystatin (MYCOSTATIN) 939125 UNIT/ML Suspension unknown at unknown MAR from Other Facility Yes No   Sig: Take 400,000 Units by mouth 4 times a day. 10 day course   oxyCODONE immediate release (ROXICODONE) 10 MG immediate release tablet unknown at unknown MAR from Other Facility Yes Yes   Sig: Take 10 mg by mouth every 6 hours as needed for Moderate Pain.   pregabalin (LYRICA) 75 MG Cap unknown at unknown MAR from Other Facility Yes No   Sig: Take 75 mg by mouth 2 times a day.   zinc sulfate (ZINCATE) 220 (50 Zn) MG Cap unknown at unknown MAR from Other Facility Yes No   Sig: Take 220 mg by mouth every day.      Facility-Administered Medications: None       Physical Exam  Temp:  [36.2 °C (97.1 °F)] 36.2 °C (97.1 °F)  Pulse:  [] 96  Resp:  [18-22] 20  BP: ()/(47-70) 86/63  SpO2:  [88 %-97 %] 97 %  Blood Pressure : (!) 86/63   Temperature: 36.2 °C (97.1 °F)   Pulse: 96   Respiration: 20   Pulse Oximetry: 97 %       Physical Exam  Vitals and nursing note reviewed.   Constitutional:       Appearance: He is well-developed. He is not diaphoretic.   HENT:      Head: Normocephalic and atraumatic.      Mouth/Throat:      Pharynx: No oropharyngeal exudate.   Eyes:      General: No scleral icterus.        Right eye: No discharge.         Left eye: No discharge.      Conjunctiva/sclera: Conjunctivae normal.      Pupils: Pupils are equal, round, and reactive to light.   Neck:      Vascular: No JVD.      Trachea: No tracheal deviation.   Cardiovascular:      Rate and Rhythm: Regular rhythm.  Tachycardia present.      Heart sounds: Murmur heard.   No friction rub. No gallop.    Pulmonary:      Effort: Pulmonary effort is normal. No respiratory distress.      Breath sounds: No stridor. Wheezing and rhonchi present.   Chest:      Chest wall: No tenderness.   Abdominal:      General: Bowel sounds are normal. There is no distension.      Palpations: Abdomen is soft.      Tenderness: There is no abdominal tenderness. There is no rebound.   Musculoskeletal:         General: No tenderness.      Cervical back: Neck supple.   Skin:     General: Skin is warm and dry.      Nails: There is no clubbing.   Neurological:      General: No focal deficit present.      Mental Status: He is alert.      Cranial Nerves: No cranial nerve deficit.      Motor: No abnormal muscle tone.      Comments: Oriented to self   Psychiatric:         Behavior: Behavior normal.         Laboratory:  Recent Labs     10/30/21  0435   WBC 21.0*   RBC 3.74*   HEMOGLOBIN 9.7*   HEMATOCRIT 31.0*   MCV 82.9   MCH 25.9*   MCHC 31.3*   RDW 52.2*   PLATELETCT 81*   MPV 11.3     Recent Labs     10/30/21  0435   SODIUM 133*   POTASSIUM 4.6   CHLORIDE 96   CO2 29   GLUCOSE 102*   BUN 14   CREATININE 0.58   CALCIUM 8.0*     Recent Labs     10/30/21  0435   ALTSGPT 49   ASTSGOT 139*   ALKPHOSPHAT 315*   TBILIRUBIN 4.3*   GLUCOSE 102*         Recent Labs     10/30/21  0435   NTPROBNP 1283*         Recent Labs     10/30/21  0435 10/30/21  0944   TROPONINT 56* 48*       Imaging:  [id number]-RUQ   Final Result         1.  Hepatomegaly and echogenic liver, compatible with fatty change versus fibrosis.   2.  Cholelithiasis without additional sonographic findings of acute cholecystitis.   3.  Splenomegaly   4.  Trace bilateral upper quadrant ascites      DX-CHEST-PORTABLE (1 VIEW)   Final Result         1.  Pulmonary edema and/or infiltrates.   2.  Cardiomegaly              Assessment/Plan:  I anticipate this patient will require at least two midnights for appropriate  medical management, necessitating inpatient admission.    * Pneumonia  Assessment & Plan  Patient will be started on ceftriaxone and doxycycline  Follow-up on cultures  Chest x-ray reviewed    Anemia  Assessment & Plan  Chronic  No clinical evidence of active blood loss at this time  Monitor CBC    Elevated LFTs  Assessment & Plan  Abdominal exam is benign  Liver ultrasound negative for acute cholecystitis reveals echogenic liver compatible with fatty changes versus fibrosis ?  Cirrhosis  given history of EtOH  Monitor clinically and monitor LFTs    Sepsis (HCC)  Assessment & Plan  This is Sepsis Present on admission  SIRS criteria identified on my evaluation include: Tachycardia, with heart rate greater than 90 BPM, Tachypnea, with respirations greater than 20 per minute and Leukocytosis, with WBC greater than 12,000  Source is likely pneumonia  Sepsis protocol initiated  Fluid resuscitation ordered per protocol  IV antibiotics as appropriate for source of sepsis  While organ dysfunction may be noted elsewhere in this problem list or in the chart, degree of organ dysfunction does not meet CMS criteria for severe sepsis          COPD exacerbation (HCC)  Assessment & Plan   Spiriva and Symbicort  We will start him on prednisone 40 mg daily 5-day course  Bronchodilators per RT protocol  Aspiration precautions  Treat pneumonia    Acute hypoxemic respiratory failure (HCC)- (present on admission)  Assessment & Plan  Continue oxygen and RT protocol    Atrial fibrillation with RVR (HCC)- (present on admission)  Assessment & Plan  With PSVT  Reviewed EKG  Patient failed bedside swallow unable to resume oral metoprolol pending SLP eval  We will order as needed IV Cardizem and start him on IV digoxin  Continue apixaban  Plan to resume metoprolol when cleared by speech therapy or  Enteral access secured and BP stable  Recent echo reviewed with normal EF    Hyponatremia- (present on admission)  Assessment & Plan  Mild  Gentle  hydration while n.p.o.  Monitor electrolytes      VTE prophylaxis: therapeutic anticoagulation with Eliquis

## 2021-10-30 NOTE — DIETARY
Nutrition Support Assessment:  Day 0 of admit.  Gary Severino Gil is a 76 y.o. male with admitting DX of pneumonia.     Current problem list:  1. COPD exacerbation  2. Pneumonia  3. Sepsis  4. Elevated LFTs  5. Anemia  6. Acute hypoxemic respiratory failure  7. Atrial fibrillation with RVR  8. Hyponatremia      Assessment:  Estimated Nutritional Needs based on:   Height: 152.4 cm (5')  Weight: 83 kg (182 lb 15.7 oz)  Weight to Use in Calculations: 83 kg (182 lb 15.7 oz)  Ideal Body Weight: 48.1 kg (106 lb)  Percent Ideal Body Weight: 172.6  Body mass index is 35.74 kg/m²., BMI classification: obese, class II     Calculation/Equation: REE per MSJ x 1.0 = 1400 kcal/day  Total Calories / day: 1300 - 1500 (Calories / k - 18)  Total Grams Protein / day: 83 - 116 (Grams Protein / k - 1.4)     Evaluation:   1. Consult received for TF - per H&P pt is confused - per SLP note pt not at level to participate in swallow  2. Cortrak placed this afternoon - confirmation KUB pending  3. T-cell lymphoma/leukemia, COPD, afib  4. Pt with multiple previous admits this month during which RD visited / followed:  · 10/4: pt reported poor PO intake  · 10/26: screened @ LOS x 7 d and found to have inadequate intake  · 10/29: most meals refused or < 25% consumed  · Overall appears nutritional status has been declining for at least 1 month  5. Weight hx:  · 10/2 = 88.9 kg via bed scale  · 10/22 = 86.1 kg via bed scale  · 10/30 = 83 kg via bed scale  · 5.9 kg (7%) weight loss x 1 month is severe  6. Labs: Na 133, glucose 102, , alk phos 315, total bili 4.3  7. Meds: eliquis, rocephin, adoxa, prednisone, pericolace  8. Skin: multiple areas of skin breakdown, however no staged wounds at this time  9. Tube feed choice: high protein standard formula most appropriate at this time       Malnutrition Risk: Severe malnutrition in context of acute vs. Chronic illness, evidenced by energy intake < 75% of needs for 1 month (per records of  recent admits) and 7% weight loss in 1 month (severe).      Recommendations/Plan:  1. Replete with Fiber @ 60 ml/hr to provide 1440 kcal, 92 gm protein (1.1 gm/kg), and 1195 ml free water  2. Fluids per MD  3. PO diet as safe/feasible - however given decline in nutritional status over past month, would consider keeping cortrak for supplemental nutrition until pt able to show adequate nutritional intake  4. Monitor weight trend    RD to follow

## 2021-10-30 NOTE — ED NOTES
Patient calm and in room. He is no longer yelling out. Respirations even and unlabored. IV ABT's initiated per MD order. IV LR infusing as well. Pt to be admitted. Awaiting hospitalist examination and bed placement

## 2021-10-30 NOTE — PROGRESS NOTES
Cortrak Placement    Tube Team verified patient name and medical record number prior to tube placement.  Cortrak tube (43 inches, 10 Japanese) placed at 65 cm in right nare.  Per Cortrak picture, tube appears to be in the stomach.  Nursing Instructions: Awaiting KUB to confirm placement before use for medications or feeding. Once placement confirmed, flush tube with 30 ml of water, and then remove and save stylet, in patient medication drawer.

## 2021-10-30 NOTE — ASSESSMENT & PLAN NOTE
Spiriva and Symbicort  We will start him on prednisone 40 mg daily 5-day course  Bronchodilators per RT protocol  Aspiration precautions  Treat pneumonia

## 2021-10-30 NOTE — ED PROVIDER NOTES
"ED Provider Note    CHIEF COMPLAINT  Chief Complaint   Patient presents with   • Palpitations     Patient sent from SNF for SOB and HR in the 190's. Pt was cardioverted by Romario Martinez prior to arrival.       HPI Gary Severino Gil is a 76 y.o. male who presents to the emergency department with difficulty breathing.  Paramedics were called.  They found the patient to be tachycardic with a heart rate of 190 narrow complex rhythm.  He was hypotensive on scene.  He was electrically cardioverted to better rate atrial fibrillation.  Blood pressure improved.  Patient states he does feel short of breath.  He has been coughing.  He does not think he has had fever.  He feels generally weak.  No nausea vomiting abdominal pain.  Denies chest pain.  He is extremely tired and has a hard time providing history.    Chart reviewed  DNR/DNI.  Patient confirms    REVIEW OF SYSTEMS  As above, otherwise unable to obtain    PAST MEDICAL HISTORY  Past Medical History:   Diagnosis Date   • A-fib (HCC)    • Acute hypoxemic respiratory failure (HCC) 10/2/2021   • Chronic obstructive pulmonary disease (HCC)    • MRSA (methicillin resistant Staphylococcus aureus)    • Productive cough     \"smoker\"   • Snoring     sleep apnea questionairre completed       Family history  No pertinent family medical history    SOCIAL HISTORY  Social History     Tobacco Use   • Smoking status: Former Smoker     Packs/day: 1.50     Years: 45.00     Pack years: 67.50     Types: Cigarettes     Quit date: 10/27/2017     Years since quittin.0   • Smokeless tobacco: Never Used   Substance Use Topics   • Alcohol use: Yes     Alcohol/week: 8.0 oz     Types: 16 Standard drinks or equivalent per week     Comment: noted 4 per day   • Drug use: No       SURGICAL HISTORY  Past Surgical History:   Procedure Laterality Date   • FLAP GRAFT  2010    Performed by FRANCESCA VIDAL at [facility]   • FISTULA IN ANO REPAIR  2010    Performed by YOUNG" FRANCESCA HUYNH at SURGERY [facility] ORS   • RECTAL EXPLORATION  11/17/2009    Performed by FRANCESCA VIDAL at SURGERY SAME DAY [facility] ORS   • DEBRIDEMENT  11/17/2009    Performed by FRANCESCA VIDAL at SURGERY SAME DAY [facility] ORS   • RECTAL EXPLORATION  9/3/2009    Performed by FRANCESCA VIDAL at SURGERY SAME DAY [facility] ORS   • DEBRIDEMENT  9/3/2009    Performed by FRANCESCA VIDAL at SURGERY SAME DAY [facility] ORS   • LUIS ENRIQUE RECTAL ABSCESS INCISION AND DRAINAGE  4/6/2009    Performed by FRANCESCA VIDAL at SURGERY [facility] ORS   • LUMBAR FUSION POSTERIOR  1987   • LUMBAR FUSION ANTERIOR  1983       CURRENT MEDICATIONS  Include Eliquis.  See chart    ALLERGIES  Allergies   Allergen Reactions   • Morphine Sulfate Unspecified     Hallucinations        PHYSICAL EXAM  VITAL SIGNS: /56   Pulse (!) 120   Temp 36.2 °C (97.1 °F) (Temporal)   Resp 20   Ht 1.524 m (5')   Wt 86.2 kg (190 lb)   SpO2 96%   BMI 37.11 kg/m²    Constitutional: Ill-appearing elderly male  HENT: Normal inspection  Eyes: Normal inspection  Neck: Grossly normal range of motion.,  No JVD  Cardiovascular: Tachycardic  Thorax & Lungs: Diffuse wheezing.  Crackles at both bases.  Abdomen: Bowel sounds normal, soft, non-distended, nontender, no mass  Skin: No obvious rash.  Back: No tenderness, No CVA tenderness.   Extremities: No asymmetric swelling      RADIOLOGY/PROCEDURES  US-RUQ   Final Result         1.  Hepatomegaly and echogenic liver, compatible with fatty change versus fibrosis.   2.  Cholelithiasis without additional sonographic findings of acute cholecystitis.   3.  Splenomegaly   4.  Trace bilateral upper quadrant ascites      DX-CHEST-PORTABLE (1 VIEW)   Final Result         1.  Pulmonary edema and/or infiltrates.   2.  Cardiomegaly           Imaging is interpreted by radiologist    Labs:  Results for orders placed or performed during the hospital encounter of 10/30/21   CBC with Differential   Result Value Ref Range     WBC 21.0 (H) 4.8 - 10.8 K/uL    RBC 3.74 (L) 4.70 - 6.10 M/uL    Hemoglobin 9.7 (L) 14.0 - 18.0 g/dL    Hematocrit 31.0 (L) 42.0 - 52.0 %    MCV 82.9 81.4 - 97.8 fL    MCH 25.9 (L) 27.0 - 33.0 pg    MCHC 31.3 (L) 33.7 - 35.3 g/dL    RDW 52.2 (H) 35.9 - 50.0 fL    Platelet Count 81 (L) 164 - 446 K/uL    MPV 11.3 9.0 - 12.9 fL    Neutrophils-Polys 94.20 (H) 44.00 - 72.00 %    Lymphocytes 0.00 (L) 22.00 - 41.00 %    Monocytes 2.50 0.00 - 13.40 %    Eosinophils 0.00 0.00 - 6.90 %    Basophils 0.00 0.00 - 1.80 %    Nucleated RBC 0.20 /100 WBC    Neutrophils (Absolute) 20.31 (H) 1.82 - 7.42 K/uL    Lymphs (Absolute) 0.00 (L) 1.00 - 4.80 K/uL    Monos (Absolute) 0.53 0.00 - 0.85 K/uL    Eos (Absolute) 0.00 0.00 - 0.51 K/uL    Baso (Absolute) 0.00 0.00 - 0.12 K/uL    NRBC (Absolute) 0.05 K/uL    Anisocytosis 1+     Microcytosis 1+    Complete Metabolic Panel (CMP)   Result Value Ref Range    Sodium 133 (L) 135 - 145 mmol/L    Potassium 4.6 3.6 - 5.5 mmol/L    Chloride 96 96 - 112 mmol/L    Co2 29 20 - 33 mmol/L    Anion Gap 8.0 7.0 - 16.0    Glucose 102 (H) 65 - 99 mg/dL    Bun 14 8 - 22 mg/dL    Creatinine 0.58 0.50 - 1.40 mg/dL    Calcium 8.0 (L) 8.5 - 10.5 mg/dL    AST(SGOT) 139 (H) 12 - 45 U/L    ALT(SGPT) 49 2 - 50 U/L    Alkaline Phosphatase 315 (H) 30 - 99 U/L    Total Bilirubin 4.3 (H) 0.1 - 1.5 mg/dL    Albumin 1.9 (L) 3.2 - 4.9 g/dL    Total Protein 5.4 (L) 6.0 - 8.2 g/dL    Globulin 3.5 1.9 - 3.5 g/dL    A-G Ratio 0.5 g/dL   Troponin STAT   Result Value Ref Range    Troponin T 56 (H) 6 - 19 ng/L   TSH   Result Value Ref Range    TSH 3.850 0.380 - 5.330 uIU/mL   COV-2, FLU A/B, AND RSV BY PCR (2-4 HOURS CEPHEID): Collect NP swab in VTM    Specimen: Nasopharyngeal; Respirate   Result Value Ref Range    Influenza virus A RNA Negative Negative    Influenza virus B, PCR Negative Negative    RSV, PCR Negative Negative    SARS-CoV-2 by PCR NotDetected     SARS-CoV-2 Source NP Swab    LACTIC ACID   Result Value Ref  Range    Lactic Acid 1.8 0.5 - 2.0 mmol/L   proBrain Natriuretic Peptide, NT   Result Value Ref Range    NT-proBNP 1283 (H) 0 - 125 pg/mL   PROCALCITONIN   Result Value Ref Range    Procalcitonin 1.62 (H) <0.25 ng/mL   ESTIMATED GFR   Result Value Ref Range    GFR If African American >60 >60 mL/min/1.73 m 2    GFR If Non African American >60 >60 mL/min/1.73 m 2   DIFFERENTIAL MANUAL   Result Value Ref Range    Bands-Stabs 2.50 0.00 - 10.00 %    Myelocytes 0.80 %    Manual Diff Status PERFORMED    PERIPHERAL SMEAR REVIEW   Result Value Ref Range    Peripheral Smear Review see below    PLATELET ESTIMATE   Result Value Ref Range    Plt Estimation Decreased    MORPHOLOGY   Result Value Ref Range    RBC Morphology Present     Poikilocytosis 1+     Target Cells 1+     Toxic Gran Moderate    EKG   Result Value Ref Range    Report       [facility] Emergency Dept.    Test Date:  2021-10-30  Pt Name:    CARLOS GO                     Department: ER  MRN:        [id number]                      Room:       [facility]  Gender:     Male                         Technician: [id number]  :        [id number]                   Requested By:ABEBE BAÑUELOS  Order #:    [id number]                    Reading MD: ABEBE BAÑUELOS MD    Measurements  Intervals                                Axis  Rate:       125                          P:          59  NJ:         136                          QRS:        82  QRSD:       94                           T:          10  QT:         316  QTc:        456    Interpretive Statements  SINUS TACHYCARDIA  PROBABLE LEFT ATRIAL ABNORMALITY  BORDERLINE RIGHT AXIS DEVIATION  LOW VOLTAGE IN FRONTAL LEADS  BORDERLINE T WAVE ABNORMALITIES  Compared to ECG 10/26/2021 17:46:56  T-wave abnormality now present  Sinus rhythm no longer present  Electronically Signed On 10- 6:25:48 PDT by ABEBE BAÑUELOS MD         Medications   albuterol inhaler 4 Puff (has no administration in time range)    metroNIDAZOLE (FLAGYL) IVPB 500 mg (has no administration in time range)   DILTIAZem (CARDIZEM) injection 10 mg (10 mg Intravenous Given 10/30/21 0512)   methylPREDNISolone sod succ (SOLU-MEDROL) 125 MG injection 40 mg (40 mg Intravenous Given 10/30/21 0511)   cefTRIAXone (Rocephin) injection 2 g (2 g Intravenous Given 10/30/21 0545)   lactated ringers (LR) bolus (1,000 mL Intravenous New Bag 10/30/21 0547)       HYDRATION: Based on the patient's presentation of Sepsis the patient was given IV fluids. IV Hydration was used because oral hydration was not adequate alone. Upon recheck following hydration, the patient was Critically ill.    COURSE & MEDICAL DECISION MAKING  Patient presents to the ER critically ill.  He was cardioverted for a apparent SVT prior to arrival.  He remains tachycardic.  He intermittently was in atrial fibrillation on the cardiac monitor during initial assessment.  He was given diltiazem 10 mg IV.  More persistent sinus tachycardia following.  He was noted to have bronchospasm on examination.  He was given albuterol and methylprednisolone for presumed COPD.  Work-up was undertaken.    Data began to return.  He has elevated liver function test.  No remarkable tenderness on examination.  I ordered an ultrasound.  Empiric antibiotics were given for possible intra-abdominal process.  Given ceftriaxone and Flagyl.    CBC returns with WBC count of 21,000.  Chest x-ray shows bilateral infiltrates.  This could be CHF or pneumonia.    Right upper quadrant ultrasound returned negative for cholecystitis.  I added azithromycin for possible pneumonia.  Patient was given 1 L of saline as perhaps sinus tachycardia is related to dehydration associated with infection.  He will need to be followed closely.  I will hold on full sepsis bolus because of history of CHF and questionable volume overload.  I worry that bolus would be detrimental.  This will need to be monitored closely.    She will need to be  admitted to the hospital for further work-up and treatment.  Hospitalist was paged for admission.    FINAL IMPRESSION  1.  Sepsis  2.  Pneumonia  3.  Elevated liver function tests  4.  PSVT status post electrical cardioversion    CRITICAL CARE TIME 45 minutes  There was a very real possibility of deterioration of the patient's condition.  This patient required the highest level of care.  I provided critical care services which included: review of the medical record, treatment orders, ordering and reviewing test results, frequent reevaluation of the patient's condition and response to treatment, as well as discussing the case with appropriate personnel and various consultants. The critical care time associated with the care of this patient is exclusive of any procedures or specific interventions.    This dictation was created using voice recognition software. The accuracy of the dictation is limited to the abilities of the software.  The nursing notes were reviewed and certain aspects of this information were incorporated into this note.      Electronically signed by: Solo Zarco M.D., 10/30/2021 6:23 AM

## 2021-10-30 NOTE — ASSESSMENT & PLAN NOTE
With PSVT  Reviewed EKG  Patient failed bedside swallow unable to resume oral metoprolol pending SLP eval  We will order as needed IV Cardizem and start him on IV digoxin  Hold apixaban for thrombocytopenia  Plan to resume metoprolol when cleared by speech therapy or  Enteral access secured and BP stable  Recent echo reviewed with normal EF  Discussed with cardiology.  Appreciate recommendations.

## 2021-10-30 NOTE — ASSESSMENT & PLAN NOTE
No active signs of bleeding.  Monitor while on apixaban  Check iron studies  Will continue to trend with CBC  Transfuse to maintain hemoglobin greater than 7.  Results from last 7 days   Lab Units 11/01/21  0332 10/31/21  0302 10/30/21  0435   HGB 1503 g/dL 8.3* 8.5* 9.7*   HCT 1504 % 25.8* 27.8* 31.0*   MCV 1505 fL 83.0 85.3 82.9

## 2021-10-30 NOTE — THERAPY
Missed Therapy     Patient Name: Gary Severino Gil  Age:  76 y.o., Sex:  male  Medical Record #: [id number]  Today's Date: 10/30/2021     10/30/21 1556   Interdisciplinary Plan of Care Collaboration   IDT Collaboration with  Nursing   Collaboration Comments Order received for swallow evaluation.  Spoke with ER nurse this am, and patient was very lethargic and not able to sustain arousal for swallow eval.  Patient now on T7, and just spoke with RN.  Patient very sleepy still and is audibly gurgly.  RN has cortrak in and indicated that patient is not at level to participate in swallow eval.  SLP to complete swallow eval as patient is able. Thank you for the consult.

## 2021-10-30 NOTE — ED NOTES
Med Rec complete per Pt's MAR from [facility].  Pt was just D/C from this facility 10/29/21  Allergies reviewed.

## 2021-10-30 NOTE — ASSESSMENT & PLAN NOTE
This is Sepsis Present on admission  SIRS criteria identified on my evaluation include: Tachycardia, with heart rate greater than 90 BPM, Tachypnea, with respirations greater than 20 per minute and Leukocytosis, with WBC greater than 12,000  Source is likely pneumonia  Sepsis protocol initiated  Fluid resuscitation ordered per protocol  IV antibiotics as appropriate for source of sepsis  While organ dysfunction may be noted elsewhere in this problem list or in the chart, degree of organ dysfunction does not meet CMS criteria for severe sepsis

## 2021-10-30 NOTE — ASSESSMENT & PLAN NOTE
Patient will be started on ceftriaxone and doxycycline  Follow-up on cultures  Chest x-ray reviewed

## 2021-10-30 NOTE — ASSESSMENT & PLAN NOTE
Abdominal exam is benign  Liver ultrasound negative for acute cholecystitis reveals echogenic liver compatible with fatty changes versus fibrosis ?  Cirrhosis  given history of EtOH  Monitor clinically and monitor LFTs

## 2021-10-31 NOTE — THERAPY
Speech Language Pathology   Clinical Swallow Evaluation     Patient Name: Gary Severino Gil  AGE:  76 y.o., SEX:  male  Medical Record #: [id number]  Today's Date: 10/31/2021     Precautions  Precautions: Fall Risk, Swallow Precautions ( See Comments), Nasogastric Tube    Assessment    Patient is 76 y.o. male with a history of atrial fibrillation on apixaban who presented on 10/30/2021 with palpitations, tachycardia shortness of breath.  Patient has multiple recent admissions for atrial fibrillation. Recently d/c to [facility] on a MM5/TN0 diet.     Patient participated in clinical swallow evaluation this date.     Pertinent Information    Respiratory status: COPD and Oxygen: 6 liters/minute via oxymask  PO trials: thin liquids, mildly thick liquids and liquidized  Behavioral observations: A&Ox2, Confused  and Drowsy   Drooling/excess secretions: Clear, dry oral cavity    Oral Mechanism Exam:    Patient with symmetrical facial features. Tongue protrusion to midline. ROM and strength of oral musculature found to be WFL.     Oral/Pharyngeal phase:    Patient with adequate oral acceptance and containment. Clear vocal quality throughout the evaluation. Patient with timely swallow trigger, achieved hyolaryngeal excursion. Patient required mod verbal and tactile cueing to maintain adequate wakefulness for PO intake. Patient with throat clearing and complaints of globus sensation with thin liquids. Consumed liquidized and mildly thick liquids without any overt s/sx of aspiration. Advanced trials were not provided d/t patients somnolence.     Clinical Impressions:    Clinical signs of pharyngeal dysphagia, likely acute related to somnolence, A-fib, weakness, sepsis and CPOD exacerbration. Swallow prognosis is excellent. Instrumental swallow study is indicated should s/sx of aspiration persist.     Recommendations:    Initiation of diet liquidized with mildly thick liquids.     Swallowing instructions/precautions:  "  Recommendations: Direct swallowing treatment  Supervision: 1:1 Constant supervision, do not leave patient alone during PO intake  Positioning: Seat fully upright and midline when eating  Medication: Float whole in puree and Crushed in puree    Please ensure patient is awake and alert for PO intake!    Plan    Recommend Speech Therapy 3 times per week until therapy goals are met for the following treatments:  Dysphagia Training.    Discharge Recommendations: Recommend post-acute placement for additional speech therapy services prior to discharge home    Objective       10/31/21 1226   Oral Motor Eval    Is Patient Able to Complete Oral Motor Eval Yes, Within Normal Limits   Laryngeal Function   Voice Quality Minimal   Volutional Cough Minimal   Oral Food Presentation   Ice Chips Within Functional Limits   Single Swallow Mildly Thick (2) - (Nectar Thick)  Within Functional Limits   Single Swallow Thin (0) Moderate   Liquidised (3) Within Functional Limits   Self Feeding Needs Total Assistance   Tracheostomy   Tracheostomy  No   Dysphagia Strategies / Recommendations   Strategies / Interventions Recommended (Yes / No) Yes   Compensatory Strategies Strict 1:1 Feeding;Head of Bed 90 Degrees During Eating / Drinking;No Straws;Single Sips / Bites   Diet / Liquid Recommendation Mildly Thick (2) - (Nectar Thick);Liquidised (3) - (Nectar Thick Full Liquid)   Medication Administration  Crush all Medications in Puree   Therapy Interventions Dysphagia Therapy By Speech Language Pathologist   Dysphagia Rating   Nutritional Liquid Intake Rating Scale Thickened beverages (mildly thick unless otherwise specified)   Nutritional Food Intake Rating Scale Total oral diet of a single consistency   Patient / Family Goals   Patient / Family Goal #1 \"the water is too much\"   Short Term Goals   Short Term Goal # 1 Pt will consume LQ3/MT2 diet with no overt s/sx of aspiration          "

## 2021-10-31 NOTE — PROGRESS NOTES
Bedside report received and patient care assumed. Pt is resting in bed, A&Ox1, with no complaints of pain, and is on 5L NC. Tele box on. Bilateral soft wrist restraints with order in place. Safety precautions are in place.  Pt was updated on POC. Pt educated on use of call light for assistance. No additional needs at this time Will continue to monitor.     Crisis Charting: COVID-19 surge in effect

## 2021-10-31 NOTE — PROGRESS NOTES
[facility] Medicine Daily Progress Note    Date of Service  10/31/2021    Chief Complaint  Gary Severino Gil is a 76 y.o. male admitted 10/30/2021 with Afib    Hospital Course  No notes on file    Mr. Gary Severino Gil is a 76 y.o. male with a history of atrial fibrillation on apixaban who presented on 10/30/2021 with palpitations, tachycardia shortness of breath.  Patient has multiple recent admissions for atrial fibrillation with rapid response from September 30-October 5 after which she demanded to be discharged home with home health.  He came back to the emergency room on October 5 requesting placement and was discharged from the emergency room on October 6 to [facility].  Patient then came back to the ER on October 19 and was discharged on October 29 to [facility].  He was sent back to the emergency room atrial fibrillation with rapid ventricular response with dyspnea and tachycardia.  When paramedics arrived they were unable to get a blood pressure and he had narrow complex tachycardia so he was cardioverted.  On presentation he was noted to be in paroxysmal atrial fibrillation with paroxysms of supraventricular tachycardia.  He was confused and unable to take p.o. requiring a core track.  Patient is also noted to be in a COPD exacerbation with chest x-ray also concerning for pneumonia.  In addition to atrial fibrillation he was admitted with sepsis secondary to pneumonia and started on ceftriaxone and doxycycline.      Interval Problem Update  Patient was seen and examined at bedside.  I have personally reviewed and interpreted vitals, labs, and imaging.    10/31.  Afebrile.  Has been tachycardic and hypotensive.  On 4-6 L nasal cannula.  Platelets 68.  Patient is oriented only to person.  Very confused and agitated.  Currently in restraints.  States he wants to go home and he would like something to drink.  Otherwise he has no complaints.  Denies chest pain, palpitations, shortness of breath.  Core track  in place.  Patient does have a long QT interval will switch to Ativan rather than Haldol.  Patient demands to go home but does not even know where he is or why he came to the hospital.  As above in HPI he left for home with home health and recent admission but has required placement as he obviously cannot take care of himself.  I did leave a message for daughter Nayeli to update her    I have personally seen and examined the patient at bedside. I discussed the plan of care with patient.    Consultants/Specialty  None    Code Status  DNAR/DNI    Disposition  Patient is not medically cleared.   Anticipate discharge to to skilled nursing facility.  I have placed the appropriate orders for post-discharge needs.    Review of Systems  Review of Systems   Unable to perform ROS: Mental acuity        Physical Exam  Temp:  [36.3 °C (97.4 °F)-37 °C (98.6 °F)] 36.5 °C (97.7 °F)  Pulse:  [] 126  Resp:  [16-22] 16  BP: ()/(44-64) 96/64  SpO2:  [88 %-97 %] 89 %    Physical Exam  Vitals and nursing note reviewed.   Constitutional:       Appearance: Normal appearance. He is obese. He is ill-appearing.   HENT:      Head: Normocephalic and atraumatic.      Nose: Nose normal.      Comments: NGT in place     Mouth/Throat:      Mouth: Mucous membranes are moist.      Pharynx: Oropharynx is clear.   Eyes:      Extraocular Movements: Extraocular movements intact.      Conjunctiva/sclera: Conjunctivae normal.   Cardiovascular:      Rate and Rhythm: Regular rhythm. Tachycardia present.      Pulses: Normal pulses.      Heart sounds: Normal heart sounds. No murmur heard.   No friction rub. No gallop.    Pulmonary:      Effort: Tachypnea, accessory muscle usage and respiratory distress present.      Breath sounds: Wheezing and rhonchi present. No rales.   Chest:      Chest wall: No tenderness.   Abdominal:      General: Abdomen is flat. Bowel sounds are normal. There is no distension.      Palpations: Abdomen is soft. There is no  mass.      Tenderness: There is no abdominal tenderness. There is no guarding.   Musculoskeletal:         General: Normal range of motion.      Cervical back: Normal range of motion and neck supple.   Skin:     General: Skin is warm.      Capillary Refill: Capillary refill takes less than 2 seconds.   Neurological:      General: No focal deficit present.      Mental Status: He is alert and oriented to person, place, and time. Mental status is at baseline.      Cranial Nerves: No cranial nerve deficit.      Motor: No weakness.   Psychiatric:         Mood and Affect: Mood normal.         Behavior: Behavior normal.         Fluids    Intake/Output Summary (Last 24 hours) at 10/31/2021 0723  Last data filed at 10/31/2021 0612  Gross per 24 hour   Intake 90 ml   Output 175 ml   Net -85 ml       Laboratory  Recent Labs     10/30/21  0435 10/31/21  0302   WBC 21.0* 28.5*   RBC 3.74* 3.26*   HEMOGLOBIN 9.7* 8.5*   HEMATOCRIT 31.0* 27.8*   MCV 82.9 85.3   MCH 25.9* 26.1*   MCHC 31.3* 30.6*   RDW 52.2* 54.6*   PLATELETCT 81* 68*   MPV 11.3 12.3     Recent Labs     10/30/21  0435 10/31/21  0302   SODIUM 133* 139   POTASSIUM 4.6 4.9   CHLORIDE 96 102   CO2 29 28   GLUCOSE 102* 126*   BUN 14 18   CREATININE 0.58 0.47*   CALCIUM 8.0* 8.0*                   Imaging  DX-ABDOMEN FOR TUBE PLACEMENT   Final Result      1.  Feeding tube tip projects over the right upper quadrant. The tip is probably in the distal stomach.      US-RUQ   Final Result         1.  Hepatomegaly and echogenic liver, compatible with fatty change versus fibrosis.   2.  Cholelithiasis without additional sonographic findings of acute cholecystitis.   3.  Splenomegaly   4.  Trace bilateral upper quadrant ascites      DX-CHEST-PORTABLE (1 VIEW)   Final Result         1.  Pulmonary edema and/or infiltrates.   2.  Cardiomegaly           Assessment/Plan  * Pneumonia  Assessment & Plan  Patient will be started on ceftriaxone and doxycycline  Follow-up on  cultures  Chest x-ray reviewed    Anemia  Assessment & Plan  Monitor for active signs of bleeding.  Continue apixaban for now.  Hold for hemoglobin less than 7, platelets less than 50.  Transfuse for goal hemoglobin greater than 7    Elevated LFTs  Assessment & Plan  Abdominal exam is benign  Liver ultrasound negative for acute cholecystitis reveals echogenic liver compatible with fatty changes versus fibrosis ?  Cirrhosis  given history of EtOH  Monitor clinically and monitor LFTs    Sepsis (HCC)  Assessment & Plan  This is Sepsis Present on admission  SIRS criteria identified on my evaluation include: Tachycardia, with heart rate greater than 90 BPM, Tachypnea, with respirations greater than 20 per minute and Leukocytosis, with WBC greater than 12,000  Source is likely pneumonia  Sepsis protocol initiated  Fluid resuscitation ordered per protocol  IV antibiotics as appropriate for source of sepsis  While organ dysfunction may be noted elsewhere in this problem list or in the chart, degree of organ dysfunction does not meet CMS criteria for severe sepsis    COPD exacerbation (HCC)  Assessment & Plan  Spiriva and Symbicort  We will start him on prednisone 40 mg daily 5-day course  Bronchodilators per RT protocol  Aspiration precautions  Treat pneumonia    Acute hypoxemic respiratory failure (HCC)- (present on admission)  Assessment & Plan  Continue oxygen and RT protocol    Atrial fibrillation with RVR (HCC)- (present on admission)  Assessment & Plan  With PSVT  Reviewed EKG  Patient failed bedside swallow unable to resume oral metoprolol pending SLP eval  We will order as needed IV Cardizem and start him on IV digoxin  Continue apixaban  Plan to resume metoprolol when cleared by speech therapy or  Enteral access secured and BP stable  Recent echo reviewed with normal EF    Hyponatremia- (present on admission)  Assessment & Plan  Mild  Gentle hydration while n.p.o.  Monitor electrolytes       VTE prophylaxis:  therapeutic anticoagulation with Apixaban    I have performed a physical exam and reviewed and updated ROS and Plan today (10/31/2021). In review of yesterday's note (10/30/2021), there are no changes except as documented above.

## 2021-10-31 NOTE — CARE PLAN
The patient is Watcher - Medium risk of patient condition declining or worsening    Shift Goals  Clinical Goals: Monitor hypotension, maintain oxygen saturation, safety  Patient Goals: Rest  Family Goals: RACHEL    Progress made toward(s) clinical / shift goals:      Problem: Knowledge Deficit - Standard  Goal: Patient and family/care givers will demonstrate understanding of plan of care, disease process/condition, diagnostic tests and medications  Outcome: Not Progressing   Pt needs constant reorienting and tells RN he doesn't know why he is here.     Problem: Ineffective Airway Clearance  Goal: Patient will maintain patent airway with clear/clearing breath sounds  Outcome: Progressing     Problem: Impaired Gas Exchange  Goal: Patient will demonstrate improved ventilation and adequate oxygenation and participate in treatment regimen within the level of ability/situation.  Outcome: Progressing     Problem: Risk for Aspiration  Goal: Patient's risk for aspiration will be absent or decrease  Outcome: Progressing     Problem: Respiratory  Goal: Patient will achieve/maintain optimum respiratory ventilation and gas exchange  Outcome: Progressing     Problem: Safety - Medical Restraint  Goal: Remains free of injury from restraints (Restraint for Interference with Medical Device)  Outcome: Progressing

## 2021-10-31 NOTE — PROGRESS NOTES
Patient flagged by COPD coordinator for admission.  Recently referred to outpatient pulmonologist by Dr. Webster.  Agree with plan for outpatient pulmonary evaluation.

## 2021-10-31 NOTE — RESPIRATORY CARE
COPD EDUCATION by COPD CLINICAL EDUCATOR  10/31/2021 at 9:44 AM by Zenobia Chappell, RRT     Patient not available for COPD education. Our team will revisit    GYN

## 2021-11-01 PROBLEM — G93.40 ENCEPHALOPATHY ACUTE: Status: ACTIVE | Noted: 2021-01-01

## 2021-11-01 PROBLEM — R57.9 SHOCK (HCC): Status: ACTIVE | Noted: 2021-01-01

## 2021-11-01 NOTE — THERAPY
Speech Language Pathology  Daily Treatment     Patient Name: Gary Severino Gil  Age:  76 y.o., Sex:  male  Medical Record #: [id number]  Today's Date: 11/1/2021     Precautions  Precautions: (P) Fall Risk, Swallow Precautions ( See Comments), Nasogastric Tube    Assessment    Pt seen this date for swallow reassessment. Per RN, pt made NPO due to concerns for ALOC and poor direction following. Pt on 4L Oxymask, with bilateral soft wrist restraints and Cortrak in place. PO trials of ice, MTL, liquidized and thins assessed. Hyolaryngeal elevation palpated as weak, severely delayed initiation of swallow appreciated. 10-15 seconds of lingual pumping appreciated prior to swallow initiation with trials of MTL and liquidized. Wet vocal quality and throat clearing appreciated with trials of thins, which is concerning for possible penetration/aspiration. Clinician cued pt to cough with wet vocal quality, however, pt demonstrated weak and uncoordinated cough.     1. Given confusion, poor direction following, delayed swallow initiation and s/sx of aspiration, recommend continuation of NPO/TF with 3-5 single ice chips per hour (1:1 with nursing, HOB at 90*, after oral care) to reduce xerostomia and maintain the integrity of the swallow musculature.   2. Diligent oral care  3. Meds via gastric tube. SLP following.     Plan    Continue current treatment plan.    Discharge Recommendations: (P) Recommend post-acute placement for additional speech therapy services prior to discharge home    Subjective    Pt confused, lethargic and demonstrated poor direction following.      Objective       11/01/21 1146   Dysphagia    Dysphagia X   Positioning / Behavior Modification Cough / Clear after Swallow;Self Monitoring;Modulate Rate or Bite Size   Other Treatments PO trials ice, MTL, LQ3, thins   Diet / Liquid Recommendation NPO;Pre-Feeding Trials with SLP Only;Other (Comments)  (3-5 single ice chips per hour)   Nutritional Liquid Intake Rating  "Scale Nothing by mouth   Nutritional Food Intake Rating Scale Tube dependent with minimal attempts of oral intake  (3-5 single ice chips per hour with nursing)   Nursing Communication Swallow Precaution Sign Posted at Head of Bed   Skilled Intervention Compensatory Strategies;Verbal Cueing   Recommended Route of Medication Administration   Medication Administration  Via Gastric Tube   Patient / Family Goals   Patient / Family Goal #1 \"the water is too much\"   Goal #1 Outcome Goal not met   Short Term Goals   Short Term Goal # 1 Pt will consume LQ3/MT2 diet with no overt s/sx of aspiration    Goal Outcome # 1 Goal not met   Short Term Goal # 2 11/1: Pt will participate in prefeeding trials 1:1 with SLP with no s/sx of aspiration and min cues.          "

## 2021-11-01 NOTE — PROGRESS NOTES
Bedside report received from nurse. Assumed care of pt. Pt resting comfortably in bed. A/Ox1 and agitated, VSS except HR 140s SBP 99,  All needs met. POC reviewed and white board updated. Tele box on. Call light in reach. Bed locked in lowest position with 2 upper bed rails up. B/L soft wrist restraints in place. Moore in place.    Covid 19 Crisis charting in effect

## 2021-11-01 NOTE — RESPIRATORY CARE
" COPD EDUCATION by COPD CLINICAL EDUCATOR  11/1/2021 at 1:21 PM by Karma Trevizo, RRT       Attempted to see patient multiple times since admission and he is not able to interactive with our team. Our team has seen this patient on prior visits, last time on 10/21/2021,Below is a summary of his pulmonary care to date. Of note,  Patient has had multiple admissions. On last admission palliative was consulted. Referrals were sent to [facility] also on last admission. Spoke to Dr Cooley regarding referral for Palliative Care. Pulmonary team has been requested and has seen patient. Our team will continue revisiting this patient as appropriate.       COPD Screen  COPD Risk Screening  Do you have a history of COPD?: Yes  Do you have a Pulmonologist?: Yes    COPD Assessment  COPD Clinical Specialists ONLY  COPD Education Initiated:  (palliative care requested for this visit. rn informed)  DME Company: [employer]  DME Equipment Type: 3 to 3.5L  Physician Name: Chucky Guevara  Pulmonologist Name: referral placed to [facility] by Dr. Webster  Referrals Initiated: Yes  Pulmonary Rehab: Yes (referal placed one week per order set)  Smoking Cessation: N/A  Palliative Care: Yes (requested)  Hospice: N/A  Home Health Care: N/A  [facility] Outreach: N/A  [facility]: N/A  [facility]: N/A  Private In-Home Care Agency: N/A  Is this a COPD exacerbation patient?: Yes  (OP) Pulmonary Function Testing:  (not in file)    Meds to Beds will send for review         MY COPD ACTION PLAN     It is recommended that patients and physicians /healthcare providers complete this action plan together. This plan should be discussed at each physician visit and updated as needed.    The green, yellow and red zones show groups of symptoms of COPD. This list of symptoms is not comprehensive, and you may experience other symptoms. In the \"Actions\" column, your healthcare provider has recommended actions for " "you to take based on your symptoms.    Patient Name: Gary Severino Gil   YOB: 1945   Last Updated on: 10/1/2021 12:56 PM   Green Zone:  I am doing well today Actions   •  Usual activitiy and exercise level •  Take daily medications   •  Usual amounts of cough and phlegm/mucus •  Use oxygen as prescribed   •  Sleep well at night •  Continue regular exercise/diet plan   •  Appetite is good •  At all times avoid cigarette smoke, inhaled irritants     Daily Medications (these medications are taken every day):   Trelegy    1 puff daily      Rinse mouth after use     Yellow Zone:  I am having a bad day or a COPD flare Actions   •  More breathless than usual •  Continue daily medications   •  I have less energy for my daily activities •  Use quick relief inhaler as ordered   •  Increased or thicker phlegm/mucus •  Use oxygen as prescribed   •  Using quick relief inhaler/nebulizer more often •  Get plenty of rest   •  Swelling of ankles more than usual •  Use pursed lip breathing   •  More coughing than usual •  At all times avoid cigarette smoke, inhaled irritants   •  I feel like I have a \"chest cold\"   •  Poor sleep and my symptoms woke me up   •  My appetite is not good   •  My medicine is not helping    •  Call provider immediately if symptoms don’t improve     Continue daily medications, add rescue medications:               Medications to be used during a flare up, (as Discussed with Provider):              Red Zone:  I need urgent medical care Actions   •  Severe shortness of breath even at rest •  Call 911 or seek medical care immediately   •  Not able to do any activity because of breathing    •  Fever or shaking chills    •  Feeling confused or very drowsy     •  Chest pains    •  Coughing up blood              "

## 2021-11-01 NOTE — PROGRESS NOTES
Report received, pt care assumed, tele box on and rate verified. VSS and pt is on 6 L O2 via oxymask. Pt aaox1, with orientation to himself. Pt c/o of no pain at this time. Pt in bilateral wrist restraints at this time.Pt denies any additional needs at this time. Bed in lowest position, bed alarm on, pt educated on fall risk and no learning evidenced, call light within reach, will continue with plan of care.    Covid-19 surge in effect

## 2021-11-01 NOTE — CONSULTS
Reason for Consult:  Asked by Dr Johny Cooley D.O. to see this patient with AF RVR    CC:   Chief Complaint   Patient presents with   • Palpitations     Patient sent from SNF for SOB and HR in the 190's. Pt was cardioverted by Romario Martinez prior to arrival.       HPI:      76 year old man with PMH AF, COPD, active tobacco use, MATTY, alcohol dependence presents with palpitations found recurrent AF RVR setting of copd exacerbation and pneumonia. Currently without complaints. Doesn't feel palpitations.     Medications / Drug list prior to admission:  No current facility-administered medications on file prior to encounter.     Current Outpatient Medications on File Prior to Encounter   Medication Sig Dispense Refill   • oxyCODONE immediate release (ROXICODONE) 10 MG immediate release tablet Take 10 mg by mouth every 6 hours as needed for Moderate Pain.     • metoprolol tartrate (LOPRESSOR) 25 MG Tab Take 1 Tablet by mouth every 6 hours. 60 Tablet    • Umeclidinium Bromide (INCRUSE ELLIPTA) 62.5 MCG/INH AEROSOL POWDER, BREATH ACTIVATED Inhale 1 Puff every day.     • multivitamin (THERAGRAN) Tab Take 1 Tablet by mouth every day.     • ascorbic acid (VITAMIN C) 500 MG tablet Take 500 mg by mouth every day.     • Cholecalciferol (VITAMIN D3) 125 MCG (5000 UT) Cap Take 5,000 Units by mouth every day.     • zinc sulfate (ZINCATE) 220 (50 Zn) MG Cap Take 220 mg by mouth every day.     • Probiotic Product (PROBITROL PO) Take 1 Tablet by mouth every day.     • famotidine (PEPCID) 20 MG Tab Take 20 mg by mouth 2 times a day.     • fluticasone-salmeterol (WIXELA INHUB) 100-50 MCG/DOSE AEROSOL POWDER, BREATH ACTIVATED Inhale 1 Puff every 12 hours.     • nystatin (MYCOSTATIN) 403597 UNIT/ML Suspension Take 400,000 Units by mouth 4 times a day. 10 day course     • loperamide (IMODIUM) 2 MG Cap Take 2 mg by mouth every 8 hours as needed for Diarrhea.     • acetaminophen (TYLENOL) 325 MG Tab Take 650 mg by mouth every four hours as  needed.     • apixaban (ELIQUIS) 5mg Tab Take 1 Tablet by mouth 2 times a day. Indications: Thromboembolism secondary to Atrial Fibrillation 60 Tablet 3   • pregabalin (LYRICA) 75 MG Cap Take 75 mg by mouth 2 times a day.     • melatonin 5 mg Tab Take 5 mg by mouth at bedtime as needed (Sleep).         Current list of administered Medications:    Current Facility-Administered Medications:   •  metoprolol tartrate (LOPRESSOR) tablet 25 mg, 25 mg, Enteral Tube, Q6HRS, Katherin Oconnor M.D.  •  DILTIAZem (CARDIZEM) injection 10 mg, 10 mg, Intravenous, Once, Katherin Oconnor M.D.  •  haloperidol lactate (HALDOL) injection 1 mg, 1 mg, Intravenous, Q4HRS PRN, Katherin Oconnor M.D.  •  Metoprolol Tartrate (LOPRESSOR) injection 5 mg, 5 mg, Intravenous, Q5 MIN PRN, Katherin Oconnor M.D.  •  NS infusion, , Intravenous, Continuous, Katherin Oconnor M.D., Last Rate: 100 mL/hr at 11/01/21 0621, New Bag at 11/01/21 0621  •  dextrose 5% infusion, , Intravenous, Continuous, Katherin Oconnor M.D., Last Rate: 30 mL/hr at 11/01/21 0502, New Bag at 11/01/21 0502  •  amiodarone (NEXTERONE) 360 mg/200 mL infusion, 0.5-1 mg/min, Intravenous, Continuous, Katherin Oconnor M.D., Last Rate: 33 mL/hr at 11/01/21 0512, 1 mg/min at 11/01/21 0512  •  LORazepam (ATIVAN) tablet 0.5 mg, 0.5 mg, Enteral Tube, Q4HRS PRN, Johny Cooley D.KATHY, 0.5 mg at 10/31/21 1652  •  phosphorus (K-Phos-Neutral) per tablet 500 mg, 500 mg, Oral, TID, Kristina Franklin M.D., 500 mg at 11/01/21 0622  •  levalbuterol (XOPENEX) 0.63 MG/3ML nebulizer solution 0.63 mg, 0.63 mg, Nebulization, Q4H PRN (RT), Obi Lazcano M.D.  •  budesonide-formoterol (SYMBICORT) 80-4.5 MCG/ACT inhaler 2 Puff, 2 Puff, Inhalation, Q12HRS, Obi Lazcano M.D., 2 Puff at 10/31/21 1652  •  nystatin (MYCOSTATIN) 390310 UNIT/ML suspension 500,000 Units, 5 mL, Swish & Swallow, 4XDAY, Obi Lazcano M.D.  •  tiotropium (Spiriva Respimat) 2.5 mcg/Act inhalation spray 5  mcg, 5 mcg, Inhalation, DAILY, Obi Lazcano M.D., 5 mcg at 10/31/21 0733  •  Respiratory Therapy Consult, , Nebulization, Continuous RT, Obi Lazcano M.D.  •  lactated ringers infusion (BOLUS), 500 mL, Intravenous, Once PRN, Obi Lazcano M.D.  •  Notify provider if pain remains uncontrolled, , , CONTINUOUS **AND** Use the Numeric Rating Scale (NRS), Zelaya-Baker Faces (WBF), or FLACC on regular floors and Critical-Care Pain Observation Tool (CPOT) on ICUs/Trauma to assess pain, , , CONTINUOUS **AND** Pulse Ox, , , CONTINUOUS **AND** Pharmacy Consult Request ...Pain Management Review 1 Each, 1 Each, Other, PHARMACY TO DOSE **AND** If patient difficult to arouse and/or has respiratory depression (respiratory rate of 10 or less), stop any opiates that are currently infusing and call a Rapid Response., , , CONTINUOUS, Obi Lazcano M.D.  •  cefTRIAXone (Rocephin) 2 g in  mL IVPB, 2 g, Intravenous, Q24HRS, Obi Lazcano M.D., Stopped at 11/01/21 0649  •  ondansetron (ZOFRAN) syringe/vial injection 4 mg, 4 mg, Intravenous, Q4HRS PRN, Obi Lazcano M.D.  •  DILTIAZem (CARDIZEM) injection 10 mg, 10 mg, Intravenous, Q6HRS PRN, Obi Lazcano M.D., 10 mg at 11/01/21 0835  •  Pharmacy Consult: Enteral tube insertion - review meds/change route/product selection, 1 Each, Other, PHARMACY TO DOSE, Obi Lazcano M.D.  •  digoxin (Lanoxin) injection 125 mcg, 125 mcg, Intravenous, DAILY AT 1800, Obi Lazcano M.D., 125 mcg at 10/31/21 1653  •  [Held by provider] apixaban (ELIQUIS) tablet 5 mg, 5 mg, Enteral Tube, BID, Obi Lazcano M.D., 5 mg at 11/01/21 0622  •  midodrine (PROAMATINE) tablet 5 mg, 5 mg, Enteral Tube, TID WITH MEALS, Obi Lazcano M.D., 5 mg at 11/01/21 0828  •  predniSONE (DELTASONE) tablet 40 mg, 40 mg, Enteral Tube, DAILY, Obi Lazcano M.D., 40 mg at 11/01/21 0622  •  pregabalin (LYRICA) capsule 75 mg, 75 mg, Enteral Tube,  "BID, Obi Lazcano M.D., 75 mg at 11/01/21 0622  •  senna-docusate (PERICOLACE or SENOKOT S) 8.6-50 MG per tablet 2 Tablet, 2 Tablet, Enteral Tube, BID, 2 Tablet at 10/31/21 1652 **AND** polyethylene glycol/lytes (MIRALAX) PACKET 1 Packet, 1 Packet, Enteral Tube, QDAY PRN **AND** magnesium hydroxide (MILK OF MAGNESIA) suspension 30 mL, 30 mL, Enteral Tube, QDAY PRN **AND** bisacodyl (DULCOLAX) suppository 10 mg, 10 mg, Rectal, QDAY PRN, Obi Lazcano M.D.  •  acetaminophen (Tylenol) tablet 650 mg, 650 mg, Enteral Tube, Q6HRS PRN, Obi Lazcano M.D.  •  melatonin tablet 5 mg, 5 mg, Enteral Tube, QHS PRN, Obi Lazcano M.D.  •  ondansetron (ZOFRAN ODT) dispertab 4 mg, 4 mg, Enteral Tube, Q4HRS PRN, Obi Lazcano M.D.  •  oxyCODONE immediate-release (ROXICODONE) tablet 2.5 mg, 2.5 mg, Enteral Tube, Q3HRS PRN **OR** oxyCODONE immediate-release (ROXICODONE) tablet 5 mg, 5 mg, Enteral Tube, Q3HRS PRN **OR** HYDROmorphone (Dilaudid) injection 0.25 mg, 0.25 mg, Intravenous, Q3HRS PRN, Obi Lazcano M.D., 0.25 mg at 11/01/21 0511  •  guaiFENesin (ROBITUSSIN) 100 MG/5ML solution 200 mg, 10 mL, Enteral Tube, Q4HRS PRN, Obi Lazcano M.D.  •  doxycycline monohydrate (ADOXA) tablet 100 mg, 100 mg, Enteral Tube, Q12HRS, Obi Lazcano M.D., 100 mg at 11/01/21 0622    Past Medical History:   Diagnosis Date   • A-fib (HCC)    • Acute hypoxemic respiratory failure (HCC) 10/2/2021   • Chronic obstructive pulmonary disease (HCC)    • MRSA (methicillin resistant Staphylococcus aureus)    • Productive cough     \"smoker\"   • Snoring     sleep apnea questionairre completed       Past Surgical History:   Procedure Laterality Date   • FLAP GRAFT  6/1/2010    Performed by FRANCESCA VIDAL at SURGERY [facility]   • FISTULA IN ANO REPAIR  6/1/2010    Performed by FRANCESCA VIDAL at [facility]   • RECTAL EXPLORATION  11/17/2009    Performed by FRANCESCA VIDAL " at SURGERY SAME DAY [facility]   • DEBRIDEMENT  2009    Performed by FRANCESCA VIDAL at SURGERY SAME DAY [facility]   • RECTAL EXPLORATION  9/3/2009    Performed by FRANCESCA VIDAL at SURGERY SAME DAY [facility]   • DEBRIDEMENT  9/3/2009    Performed by FRANCESCA VIDAL at SURGERY SAME DAY [facility]   • LUIS ENRIQUE RECTAL ABSCESS INCISION AND DRAINAGE  2009    Performed by FRANCESCA VIDAL at SURGERY [facility]   • LUMBAR FUSION POSTERIOR     • LUMBAR FUSION ANTERIOR         Family History   Problem Relation Age of Onset   • Stroke Other      Patient family history was personally reviewed, no pertinent family history to current presentation    Social History     Socioeconomic History   • Marital status:      Spouse name: Not on file   • Number of children: Not on file   • Years of education: Not on file   • Highest education level: Not on file   Occupational History   • Not on file   Tobacco Use   • Smoking status: Former Smoker     Packs/day: 1.50     Years: 45.00     Pack years: 67.50     Types: Cigarettes     Quit date: 10/27/2017     Years since quittin.0   • Smokeless tobacco: Never Used   Substance and Sexual Activity   • Alcohol use: Yes     Alcohol/week: 8.0 oz     Types: 16 Standard drinks or equivalent per week     Comment: noted 4 per day   • Drug use: No   • Sexual activity: Not on file   Other Topics Concern   • Not on file   Social History Narrative   • Not on file     Social Determinants of Health     Financial Resource Strain:    • Difficulty of Paying Living Expenses:    Food Insecurity:    • Worried About Running Out of Food in the Last Year:    • Ran Out of Food in the Last Year:    Transportation Needs:    • Lack of Transportation (Medical):    • Lack of Transportation (Non-Medical):    Physical Activity:    • Days of Exercise per Week:    • Minutes of Exercise per Session:    Stress:    • Feeling of Stress :    Social Connections:    • Frequency of  Communication with Friends and Family:    • Frequency of Social Gatherings with Friends and Family:    • Attends Voodoo Services:    • Active Member of Clubs or Organizations:    • Attends Club or Organization Meetings:    • Marital Status:    Intimate Partner Violence:    • Fear of Current or Ex-Partner:    • Emotionally Abused:    • Physically Abused:    • Sexually Abused:        ALLERGIES:  Allergies   Allergen Reactions   • Morphine Sulfate Unspecified     Hallucinations        Review of systems:  A complete review of symptoms was reviewed with patient. This is reviewed in H&P and PMH. ALL OTHERS reviewed and negative    Physical exam:  Patient Vitals for the past 24 hrs:   BP Temp Temp src Pulse Resp SpO2   11/01/21 0827 -- -- -- (!) 142 -- --   11/01/21 0723 (!) 99/61 36.3 °C (97.3 °F) Temporal (!) 143 20 96 %   11/01/21 0325 (!) 97/58 -- -- (!) 140 -- --   11/01/21 0000 (!) 88/46 -- -- -- -- --   10/31/21 2330 112/59 36.4 °C (97.6 °F) Temporal (!) 113 20 94 %   10/31/21 2059 113/60 -- -- (!) 126 -- --   10/31/21 1953 137/63 -- -- (!) 121 18 --   10/31/21 1931 -- -- -- 99 18 96 %   10/31/21 1659 (!) 90/53 36.8 °C (98.3 °F) Temporal (!) 101 18 97 %   10/31/21 1200 (!) 91/60 -- -- -- -- --   10/31/21 1108 (!) 91/60 37.1 °C (98.8 °F) Temporal (!) 110 18 96 %     General: No acute distress.   EYES: no jaundice  HEENT: OP clear   Neck: No bruits No JVD.   CVS:  irreg irreg. S1 + S2. No M/R/G. No edema.  Resp: CTAB. No wheezing or crackles/rhonchi.  Abdomen: Soft, NT, ND,  Skin: Grossly nothing acute no obvious rashes  Neurological: Alert, Moves all extremities, no cranial nerve defects on limited exam  Extremities: Pulse 2+ in b/l LE. No cyanosis.     Data:  Laboratory studies personally reviewed by me:  Recent Results (from the past 24 hour(s))   MAGNESIUM    Collection Time: 10/31/21  8:56 PM   Result Value Ref Range    Magnesium 2.0 1.5 - 2.5 mg/dL   LDH    Collection Time: 10/31/21  8:56 PM   Result Value  Ref Range    LDH Total 296 (H) 107 - 266 U/L   BILIRUBIN DIRECT    Collection Time: 10/31/21  8:56 PM   Result Value Ref Range    Direct Bilirubin 5.3 (H) 0.1 - 0.5 mg/dL   LACTIC ACID    Collection Time: 10/31/21  8:56 PM   Result Value Ref Range    Lactic Acid 2.6 (H) 0.5 - 2.0 mmol/L   PHOSPHORUS    Collection Time: 10/31/21  8:56 PM   Result Value Ref Range    Phosphorus 1.6 (L) 2.5 - 4.5 mg/dL   ABG - LAB    Collection Time: 11/01/21  3:25 AM   Result Value Ref Range    Ph 7.47 7.40 - 7.50    Pco2 44.9 (H) 26.0 - 37.0 mmHg    Po2 101.4 (H) 64.0 - 87.0 mmHg    O2 Saturation 97.3 93.0 - 99.0 %    Hco3 32 (H) 17 - 25 mmol/L    Base Excess 7 (H) -4 - 3 mmol/L    Body Temp 36.4 Centigrade    O2 Therapy 7.0 2.0 - 10.0 L/min    Ph -TC 7.48 7.40 - 7.50    Pco2 -TC 43.7 (H) 26.0 - 37.0 mmHg   CBC WITH DIFFERENTIAL    Collection Time: 11/01/21  3:32 AM   Result Value Ref Range    WBC 17.8 (H) 4.8 - 10.8 K/uL    RBC 3.11 (L) 4.70 - 6.10 M/uL    Hemoglobin 8.3 (L) 14.0 - 18.0 g/dL    Hematocrit 25.8 (L) 42.0 - 52.0 %    MCV 83.0 81.4 - 97.8 fL    MCH 26.7 (L) 27.0 - 33.0 pg    MCHC 32.2 (L) 33.7 - 35.3 g/dL    RDW 53.2 (H) 35.9 - 50.0 fL    Platelet Count 50 (L) 164 - 446 K/uL    MPV 12.1 9.0 - 12.9 fL    Neutrophils-Polys 100.00 (H) 44.00 - 72.00 %    Lymphocytes 0.00 (L) 22.00 - 41.00 %    Monocytes 0.00 0.00 - 13.40 %    Eosinophils 0.00 0.00 - 6.90 %    Basophils 0.00 0.00 - 1.80 %    Nucleated RBC 0.30 /100 WBC    Neutrophils (Absolute) 17.80 (H) 1.82 - 7.42 K/uL    Lymphs (Absolute) 0.00 (L) 1.00 - 4.80 K/uL    Monos (Absolute) 0.00 0.00 - 0.85 K/uL    Eos (Absolute) 0.00 0.00 - 0.51 K/uL    Baso (Absolute) 0.00 0.00 - 0.12 K/uL    NRBC (Absolute) 0.05 K/uL    Hypochromia 2+ (A)     Anisocytosis 1+     Macrocytosis 1+    Comp Metabolic Panel    Collection Time: 11/01/21  3:32 AM   Result Value Ref Range    Sodium 141 135 - 145 mmol/L    Potassium 4.4 3.6 - 5.5 mmol/L    Chloride 104 96 - 112 mmol/L    Co2 31 20 - 33  mmol/L    Anion Gap 6.0 (L) 7.0 - 16.0    Glucose 123 (H) 65 - 99 mg/dL    Bun 16 8 - 22 mg/dL    Creatinine 0.29 (L) 0.50 - 1.40 mg/dL    Calcium 8.1 (L) 8.5 - 10.5 mg/dL    AST(SGOT) 134 (H) 12 - 45 U/L    ALT(SGPT) 48 2 - 50 U/L    Alkaline Phosphatase 322 (H) 30 - 99 U/L    Total Bilirubin 6.4 (H) 0.1 - 1.5 mg/dL    Albumin 1.7 (L) 3.2 - 4.9 g/dL    Total Protein 4.5 (L) 6.0 - 8.2 g/dL    Globulin 2.8 1.9 - 3.5 g/dL    A-G Ratio 0.6 g/dL   MAGNESIUM    Collection Time: 11/01/21  3:32 AM   Result Value Ref Range    Magnesium 2.0 1.5 - 2.5 mg/dL   PHOSPHORUS    Collection Time: 11/01/21  3:32 AM   Result Value Ref Range    Phosphorus 2.2 (L) 2.5 - 4.5 mg/dL   AMMONIA    Collection Time: 11/01/21  3:32 AM   Result Value Ref Range    Ammonia 39 11 - 45 umol/L   LDH    Collection Time: 11/01/21  3:32 AM   Result Value Ref Range    LDH Total 323 (H) 107 - 266 U/L   ESTIMATED GFR    Collection Time: 11/01/21  3:32 AM   Result Value Ref Range    GFR If African American >60 >60 mL/min/1.73 m 2    GFR If Non African American >60 >60 mL/min/1.73 m 2   PROCALCITONIN    Collection Time: 11/01/21  3:32 AM   Result Value Ref Range    Procalcitonin 1.29 (H) <0.25 ng/mL   DIFFERENTIAL MANUAL    Collection Time: 11/01/21  3:32 AM   Result Value Ref Range    Manual Diff Status PERFORMED    PERIPHERAL SMEAR REVIEW    Collection Time: 11/01/21  3:32 AM   Result Value Ref Range    Peripheral Smear Review see below    PLATELET ESTIMATE    Collection Time: 11/01/21  3:32 AM   Result Value Ref Range    Plt Estimation Decreased    MORPHOLOGY    Collection Time: 11/01/21  3:32 AM   Result Value Ref Range    RBC Morphology Present     Large Platelets 1+     Target Cells 1+     Toxic Gran Marked    EKG    Collection Time: 11/01/21  4:55 AM   Result Value Ref Range    Report       [facility]    Test Date:  2021-11-01  Pt Name:    CALROS GO                     Department: 171  MRN:        [id number]                      Room:        T712  Gender:     Male                         Technician: YARON  :        1945                   Requested By:VALENTINA ELMORE  Order #:    [id number]                    Reading MD: Dallas Barber MD    Measurements  Intervals                                Axis  Rate:       141                          P:          0  RI:         172                          QRS:        64  QRSD:       100                          T:          -76  QT:         336  QTc:        515    Interpretive Statements  SUPRAVENTRICULAR TACHYCARDIA  LOW VOLTAGE IN FRONTAL LEADS  BORDERLINE T ABNORMALITIES, DIFFUSE LEADS  Electronically Signed On 2021 6:22:37 PDT by Dallas Barber MD       EKG 21 interpreted ny be, AF/flutter, rate 111, low voltage, non    All pertinent features of laboratory and imaging reviewed including primary images where applicable    TTE 10/3/21  CONCLUSIONS  Fair quality study, improved with contrast.  The left ventricular ejection fraction is visually estimated to be 60%.  Grade II diastolic dysfunction.  The aortic valve is not well visualized.  Mild tricuspid regurgitation.  Right ventricle is moderately dilated with normal function.  Estimated right ventricular systolic pressure is 60 mmHg severe   pulmonary hypertension.  Right atrial pressure is estimated to be 15 mmHg.    Principal Problem:    Pneumonia POA: Unknown  Active Problems:    Hyponatremia POA: Yes    Atrial fibrillation with RVR (HCC) POA: Yes    Acute hypoxemic respiratory failure (HCC) POA: Yes    COPD exacerbation (HCC) POA: Unknown    Sepsis (HCC) POA: Unknown    Elevated LFTs POA: Unknown    Anemia POA: Unknown  Resolved Problems:    * No resolved hospital problems. *      Assessment / Plan:  76 year old man with PMH AF, COPD, active tobacco use, MATTY, alcohol dependence presents with palpitations found recurrent AF RVR setting of copd exacerbation and pneumonia.    -continue rate control with metoprolol and digoxin  -continue amiodarone.  Can convert IV to PO 400mg bid for 10 days then 200mg daily  -cva prevention with doac; chadsvasc 2  -goals of care and pulmonary prognosis.  -low threshold for ICU especially with severe sepsis maintaining blood pressures on midodrine. Possibly entering shock territory.  -discussed smoking cessation 4 min does not want to quit.     I personally discussed his case with Dr Cooley    It is my pleasure to participate in the care of Mr. Hassan.  Please do not hesitate to contact me with questions or concerns.    Binh Rodriguez MD  Cardiologist [facility]

## 2021-11-01 NOTE — PROGRESS NOTES
[facility] Medicine Daily Progress Note    Date of Service  11/1/2021    Chief Complaint  Gary Severino Gil is a 76 y.o. male admitted 10/30/2021 with Afib    Hospital Course  No notes on file    Mr. Gary Severino Gil is a 76 y.o. male with a history of atrial fibrillation on apixaban who presented on 10/30/2021 with palpitations, tachycardia shortness of breath.  Patient has multiple recent admissions for atrial fibrillation with rapid response from September 30-October 5 after which she demanded to be discharged home with home health.  He once again declined home and came back to the emergency room on October 5 just hours later requesting placement and was discharged from the emergency room on October 6 to [place].  Patient then came back to the ER on October 19 and was discharged on October 29 to [facility].  He was sent back to the emergency room atrial fibrillation with rapid ventricular response with dyspnea and tachycardia.  When paramedics arrived they were unable to get a blood pressure and he had narrow complex tachycardia so he was cardioverted.  On presentation he was noted to be in paroxysmal atrial fibrillation with paroxysms of supraventricular tachycardia.  He was confused and unable to take p.o. requiring a core track.  Patient is also noted to be in a COPD exacerbation with chest x-ray also concerning for pneumonia.  In addition to atrial fibrillation he was admitted with sepsis secondary to pneumonia and started on ceftriaxone and doxycycline.      Interval Problem Update  Patient was seen and examined at bedside.  I have personally reviewed and interpreted vitals, labs, and imaging.    10/31.  Afebrile.  Has been tachycardic and hypotensive.  On 4-6 L nasal cannula.  Platelets 68.  Patient is oriented only to person.  Very confused and agitated.  Currently in restraints.  States he wants to go home and he would like something to drink.  Otherwise he has no complaints.  Denies chest pain,  "palpitations, shortness of breath.  Core track in place.  Patient does have a long QT interval will switch to Ativan rather than Haldol.  Patient demands to go home but does not even know where he is or why he came to the hospital.  As above in HPI he left for home with home health and recent admission but has required placement as he obviously cannot take care of himself.  I did leave a message for daughter Nayeli to update her  11/1.  Afebrile.  Has been tachycardic.  Telemetry shows atrial fibrillation ventricular response.  Has been hypotensive.  On 6 L oxygen mask.  Requiring significant IV metoprolol and diltiazem patient was started on amiodarone drip last night.  Hold apixaban with thrombocytopenia of 50.  Replete Phos, magnesium.  Worsening LFTs.  Bilirubin is trending up.  Right upper quadrant ultrasound shows no obvious obstruction.  Patient denies fever, chills, chest pains, shortness of breath, palpitations.  He states \"I feel wonderful\" reports no complaints.  He is however very confused and oriented only to person.  Discussed with cardiology and appreciate recommendations.  We will have speech therapy evaluate the patient for appropriateness of swallowing before discontinuing NG tube.  Discussed with respiratory therapy.  Switch from duo nebs to Xopenex.  Palliative care consult.    I have personally seen and examined the patient at bedside. I discussed the plan of care with patient.    Consultants/Specialty  cardiology and None    Code Status  DNAR/DNI    Disposition  Patient is not medically cleared.   Anticipate discharge to to skilled nursing facility.  I have placed the appropriate orders for post-discharge needs.    Review of Systems  Review of Systems   Unable to perform ROS: Mental acuity        Physical Exam  Temp:  [36.4 °C (97.6 °F)-37.2 °C (98.9 °F)] 36.4 °C (97.6 °F)  Pulse:  [] 140  Resp:  [18-20] 20  BP: ()/(46-63) 97/58  SpO2:  [94 %-97 %] 94 %    Physical Exam  Vitals and " nursing note reviewed.   Constitutional:       Appearance: Normal appearance. He is obese. He is ill-appearing.   HENT:      Head: Normocephalic and atraumatic.      Nose: Nose normal.      Comments: NGT in place     Mouth/Throat:      Mouth: Mucous membranes are moist.      Pharynx: Oropharynx is clear.   Eyes:      Extraocular Movements: Extraocular movements intact.      Conjunctiva/sclera: Conjunctivae normal.   Cardiovascular:      Rate and Rhythm: Tachycardia present. Rhythm irregular.      Pulses: Normal pulses.      Heart sounds: Normal heart sounds. No murmur heard.   No friction rub. No gallop.    Pulmonary:      Effort: Tachypnea, accessory muscle usage and respiratory distress present.      Breath sounds: Wheezing and rhonchi present. No rales.   Chest:      Chest wall: No tenderness.   Abdominal:      General: Abdomen is flat. Bowel sounds are normal. There is no distension.      Palpations: Abdomen is soft. There is no mass.      Tenderness: There is no abdominal tenderness. There is no guarding.   Musculoskeletal:         General: Normal range of motion.      Cervical back: Normal range of motion and neck supple.   Skin:     General: Skin is warm.      Capillary Refill: Capillary refill takes less than 2 seconds.      Comments: Chronic venous stasis skin changes   Neurological:      General: No focal deficit present.      Mental Status: He is alert. Mental status is at baseline. He is disoriented.      Cranial Nerves: No cranial nerve deficit.   Psychiatric:         Cognition and Memory: Cognition is impaired. He exhibits impaired recent memory.         Judgment: Judgment is inappropriate.         Fluids    Intake/Output Summary (Last 24 hours) at 11/1/2021 0614  Last data filed at 10/31/2021 2212  Gross per 24 hour   Intake 100 ml   Output --   Net 100 ml       Laboratory  Recent Labs     10/30/21  0435 10/31/21  0302 11/01/21  0332   WBC 21.0* 28.5* 17.8*   RBC 3.74* 3.26* 3.11*   HEMOGLOBIN 9.7*  8.5* 8.3*   HEMATOCRIT 31.0* 27.8* 25.8*   MCV 82.9 85.3 83.0   MCH 25.9* 26.1* 26.7*   MCHC 31.3* 30.6* 32.2*   RDW 52.2* 54.6* 53.2*   PLATELETCT 81* 68* 50*   MPV 11.3 12.3 12.1     Recent Labs     10/30/21  0435 10/31/21  0302 11/01/21  0332   SODIUM 133* 139 141   POTASSIUM 4.6 4.9 4.4   CHLORIDE 96 102 104   CO2 29 28 31   GLUCOSE 102* 126* 123*   BUN 14 18 16   CREATININE 0.58 0.47* 0.29*   CALCIUM 8.0* 8.0* 8.1*                   Imaging  DX-ABDOMEN FOR TUBE PLACEMENT   Final Result      1.  Feeding tube tip projects over the right upper quadrant. The tip is probably in the distal stomach.      US-RUQ   Final Result         1.  Hepatomegaly and echogenic liver, compatible with fatty change versus fibrosis.   2.  Cholelithiasis without additional sonographic findings of acute cholecystitis.   3.  Splenomegaly   4.  Trace bilateral upper quadrant ascites      DX-CHEST-PORTABLE (1 VIEW)   Final Result         1.  Pulmonary edema and/or infiltrates.   2.  Cardiomegaly      DX-CHEST-LIMITED (1 VIEW)    (Results Pending)        Assessment/Plan  * Pneumonia  Assessment & Plan  Patient will be started on ceftriaxone and doxycycline  Follow-up on cultures  Chest x-ray reviewed    Anemia  Assessment & Plan  No active signs of bleeding.  Monitor while on apixaban  Check iron studies  Will continue to trend with CBC  Transfuse to maintain hemoglobin greater than 7.  Results from last 7 days   Lab Units 11/01/21  0332 10/31/21  0302 10/30/21  0435   HGB 1503 g/dL 8.3* 8.5* 9.7*   HCT 1504 % 25.8* 27.8* 31.0*   MCV 1505 fL 83.0 85.3 82.9       Elevated LFTs  Assessment & Plan  Abdominal exam is benign  Liver ultrasound negative for acute cholecystitis reveals echogenic liver compatible with fatty changes versus fibrosis ?  Cirrhosis  given history of EtOH  Monitor clinically and monitor LFTs    Sepsis (HCC)  Assessment & Plan  This is Sepsis Present on admission  SIRS criteria identified on my evaluation include:  Tachycardia, with heart rate greater than 90 BPM, Tachypnea, with respirations greater than 20 per minute and Leukocytosis, with WBC greater than 12,000  Source is likely pneumonia  Sepsis protocol initiated  Fluid resuscitation ordered per protocol  IV antibiotics as appropriate for source of sepsis  While organ dysfunction may be noted elsewhere in this problem list or in the chart, degree of organ dysfunction does not meet CMS criteria for severe sepsis    COPD exacerbation (HCC)  Assessment & Plan  Spiriva and Symbicort  We will start him on prednisone 40 mg daily 5-day course  Bronchodilators per RT protocol  Aspiration precautions  Treat pneumonia    Acute hypoxemic respiratory failure (HCC)- (present on admission)  Assessment & Plan  Continue oxygen and RT protocol    Atrial fibrillation with RVR (HCC)- (present on admission)  Assessment & Plan  With PSVT  Reviewed EKG  Patient failed bedside swallow unable to resume oral metoprolol pending SLP eval  We will order as needed IV Cardizem and start him on IV digoxin  Hold apixaban for thrombocytopenia  Plan to resume metoprolol when cleared by speech therapy or  Enteral access secured and BP stable  Recent echo reviewed with normal EF  Discussed with cardiology.  Appreciate recommendations.    Hyponatremia- (present on admission)  Assessment & Plan  Mild  Gentle hydration while n.p.o.  Monitor electrolytes       VTE prophylaxis: pharmacologic prophylaxis contraindicated due to thrombocytopenia    I have performed a physical exam and reviewed and updated ROS and Plan today (11/1/2021). In review of yesterday's note (10/31/2021), there are no changes except as documented above.

## 2021-11-01 NOTE — PROGRESS NOTES
12 hr cc        Monitor summary:  -118  With SVT up to 180s, rare PVC, rare couplet  .14/.07/.34

## 2021-11-01 NOTE — CARE PLAN
Problem: Bronchoconstriction  Goal: Improve in air movement and diminished wheezing  Description: 1.  Implement inhaled treatments  2.  Evaluate and manage medication effects  Outcome: Not Met   Restart SVN Xopenex Discussed with MD

## 2021-11-01 NOTE — PROGRESS NOTES
19 :45 Monitors called that pt has been sustaining in the 170s for a minute. Pt then went back down to 110s-120s.     19:57: Dr. Franklin notifed. MD came to bedside    20:30 MD ordered PRN IV metoprolol 5 mg.

## 2021-11-01 NOTE — CARE PLAN
Problem: Nutrition - Advanced  Goal: Patient will display progressive weight gain toward goal have adequate food and fluid intake  Outcome: Progressing     Problem: Risk for Aspiration  Goal: Patient's risk for aspiration will be absent or decrease  Outcome: Progressing     Problem: Hemodynamics  Goal: Patient's hemodynamics, fluid balance and neurologic status will be stable or improve  Outcome: Progressing     Problem: Fluid Volume  Goal: Fluid volume balance will be maintained  Outcome: Progressing   The patient is Watcher - Medium risk of patient condition declining or worsening    Shift Goals  Clinical Goals: manage HR  Patient Goals: discharge  Family Goals: RACHEL    Progress made toward(s) clinical / shift goals:  manage HR    Patient is not progressing towards the following goals:

## 2021-11-02 NOTE — ASSESSMENT & PLAN NOTE
On 3L NC at home  IV solumedrol  RT/O2 Protocols, bronchodilators  Titrate supplemental FiO2 to maintain SpO2 88%-92%

## 2021-11-02 NOTE — PROCEDURES
Central Line Insertion    Date/Time: 11/2/2021 12:58 AM  Performed by: LARRY Brand  Authorized by: LARRY Brand     Consent:     Consent obtained:  Emergent situation    Risks discussed:  Incorrect placement, infection, bleeding, arterial puncture, pneumothorax and nerve damage    Alternatives discussed:  Delayed treatment  Universal protocol:     Immediately prior to procedure, a time out was called: yes      Patient identity confirmed:  Arm band  Pre-procedure details:     Hand hygiene: Hand hygiene performed prior to insertion      Sterile barrier technique: All elements of maximal sterile technique followed      Skin preparation:  ChloraPrep    Skin preparation agent: Skin preparation agent completely dried prior to procedure    Sedation:     Sedation type:  Anxiolysis (Ativan 2 mg IV. Fentanyl 50 mch IV)  Anesthesia:     Anesthesia method:  Local infiltration    Local anesthetic:  Lidocaine 1% w/o epi  Procedure details:     Location:  R internal jugular    Patient position:  Trendelenburg    Procedural supplies:  Triple lumen    Catheter size:  7 Fr    Ultrasound guidance: yes      Number of attempts:  1    Successful placement: yes    Post-procedure details:     Post-procedure:  Dressing applied and line sutured    Assessment:  Blood return through all ports, free fluid flow, no pneumothorax on x-ray and placement verified by x-ray    Patient tolerance of procedure:  Tolerated well, no immediate complications

## 2021-11-02 NOTE — ASSESSMENT & PLAN NOTE
This is Severe Sepsis Present on admission  SIRS criteria identified on my evaluation include: Tachycardia, with heart rate greater than 90 BPM, Tachypnea, with respirations greater than 20 per minute and Leukocytosis, with WBC greater than 12,000  Source is pulmonary  Clinical indicators of end organ dysfunction include Acute respiratory failure as evidenced by a new need for invasive or non-invasive mechanical ventilation (Ventilator or BiPap)   Sepsis protocol initiated  Fluid resuscitation ordered per protocol  IV antibiotics as appropriate for source of sepsis  Reassessment: I have reassessed the patient's hemodynamic status  End organ dysfunction include(s):  Acute respiratory failure and Encephalopathy (metabolic)     sepsis protocol  source targeted antibiotics: rocephin/doxycycline  PRN IVF bolus to maintain MAP >65 mmHg  Pressors if needed to maintain MAP >65 mmHg  blood, respiratory and urine cultures  lactate every 4 hours until normalized or downtrending

## 2021-11-02 NOTE — PROGRESS NOTES
1630- increase WOB, HR sustatining 150s. BP 93/59. Given iv diltiazem. Increase O2 to 6L. Pt a/ox1 to self. Pt shaky and cool to touch. Heating blankets and warm packs applied. Rapid RN called to bedside to assess. New BP 63/45. O2 increased to 8L. On call hospitalist paged to bedside.    1723-Rapid Response activated. Lactic acid ordered and drawn by Rapid RN Fallon. Cem stick given x2. IV bolus started. Orders for transfer to [facility] [id number]. Report given to Rapid RN bedside. Dr. Franklin called pt's daughter for update on POC.   1805-Pt transferred w/ all belongings, chart and medications.

## 2021-11-02 NOTE — ASSESSMENT & PLAN NOTE
Hx of, now worsened in the setting of sepsis and acute exacerbation of COPD  Treat underlying conditions  Continue digoxin, amiodarone and beta-blocker (if hemodynamics allow)  Continue apixaban given elevated [id number]  Will reload amiodarone at this time

## 2021-11-02 NOTE — ASSESSMENT & PLAN NOTE
Worsening, will continue  Right upper quadrant ultrasound with hepatic steatosis, cholelithiasis without cholecystitis and trace ascites  Limit hepatotoxins  Monitor synthetic function

## 2021-11-02 NOTE — PROGRESS NOTES
Rapid response was called on the patient due to hypotension around 5 PM, patient was evaluated and examined at bedside, patient was alert and not oriented, patient has some wheezing, no lower extremity edema, patient has hypotension, systolic blood pressure around 70s, also patient has tachycardia, A. fib with RVR, earlier patient has been evaluated cardiologist and patient is on digoxin, amiodarone and metoprolol, case was reviewed with intensivist, who agreed to transfer the patient to the ICU.  Chest x-ray early morning reviewed personally did not show significant changes.    Cardiogenic shock versus septic shock versus multiorgan failure including liver failure  Lactic acidosis, and A. fib with RVR.    IV bolus, check lactic acid and procalcitonin with blood culture  Continue antibiotics ceftriaxone and doxycycline, possible aspiration pneumonia  ICU transfer for possible pressors.      Patient has multiorgan failure with liver failure and lymphoma, the case was discussed in detail with the patient's daughter Ms. Tyler who is the power of , answered all her questions, discussed the poor prognosis and comorbidities, she agreed for ICU transfer also she agreed for DNR/DNI.    Patient is critically ill.   The patient continues to have: Multiorgan failure and hypotension  The vital organ system that is affected is the: Heart, lung and liver  If untreated there is a high chance of deterioration into: Septic shock cardiogenic shock  And eventually death.   The critical care that I am providing today is: IV fluid and IV antibiotics.   The critical that has been undertaken is medically complex.   There has been no overlap in critical care time.   Critical Care Time not including procedures: 30 min

## 2021-11-02 NOTE — PROGRESS NOTES
Critical Care Progress Note    Date of admission  10/30/2021    Chief Complaint  76 y.o. 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  Transfer to ICU 11/1 with hypotension, AF/RVR, sepsis secondary to pneumonia.  Treated with fluid bolus, amiodarone load and started on phenylephrine.  DNR/DNI.    Hospital Course  11/2 -oriented to self only, moderate respiratory distress, remains on vasopressors; family at bedside, considering goals of care    Interval Problem Update  Reviewed last 24 hour events:  Transferred to ICU from telemetry yesterday evening with AF/RVR, hypotension requiring phenylephrine and presumed pneumonia/sepsis  T-max 98.5  Phenylephrine 200; midodrine TID  Amiodarone infusion  Heart rate 100-120,  WBC 21.8  Platelets 52  Magnesium 1.7  Altered, moaning, oriented to self only  AF/flutter 140  I/O = 7.7/550  apixiban held for low plts - no bleeding; continue for now  C3/doxy  PCT 1.1  Amio, dig  Solu-Medrol 62.5 every 6    Review of Systems  Review of Systems   Unable to perform ROS: Acuity of condition   HENT: Negative for hearing loss.         Vital Signs for last 24 hours   Temp:  [36.6 °C (97.8 °F)-36.9 °C (98.5 °F)] 36.7 °C (98 °F)  Pulse:  [] 115  Resp:  [16-51] 32  BP: ()/(37-87) 91/38  SpO2:  [92 %-100 %] 98 %    Hemodynamic parameters for last 24 hours       Respiratory Information for the last 24 hours       Physical Exam   Physical Exam  Vitals and nursing note reviewed.   Constitutional:       General: He is in acute distress.      Appearance: He is ill-appearing.   HENT:      Head: Normocephalic and atraumatic.      Right Ear: External ear normal.      Left Ear: External ear normal.      Nose: Nose normal.      Mouth/Throat:      Mouth: Mucous membranes are dry.      Pharynx: Oropharynx is clear.   Eyes:      Extraocular Movements: Extraocular movements  intact.      Conjunctiva/sclera: Conjunctivae normal.   Cardiovascular:      Rate and Rhythm: Tachycardia present. Rhythm irregular.      Pulses: Normal pulses.   Pulmonary:      Breath sounds: Wheezing and rhonchi present.   Musculoskeletal:         General: No tenderness or signs of injury. Normal range of motion.      Cervical back: Neck supple.   Skin:     Capillary Refill: Capillary refill takes 2 to 3 seconds.      Findings: Rash (diffuse plaques) present.   Neurological:      General: No focal deficit present.      Mental Status: He is disoriented.      GCS: GCS eye subscore is 4. GCS verbal subscore is 3. GCS motor subscore is 6.      Cranial Nerves: No cranial nerve deficit.      Motor: No weakness.   Psychiatric:         Behavior: Behavior is agitated.         Cognition and Memory: Cognition is impaired.         Medications  Current Facility-Administered Medications   Medication Dose Route Frequency Provider Last Rate Last Admin   • metoprolol tartrate (LOPRESSOR) tablet 25 mg  25 mg Enteral Tube Q6HRS Katherin Oconnor M.D.   25 mg at 11/01/21 1212   • haloperidol lactate (HALDOL) injection 1 mg  1 mg Intravenous Q4HRS PRN Katherin Oconnor M.D.       • Metoprolol Tartrate (LOPRESSOR) injection 5 mg  5 mg Intravenous Q5 MIN PRN Katherin Oconnor M.D.   5 mg at 11/01/21 1749   • dextrose 5% infusion   Intravenous Continuous Katherin Oconnor M.D. 30 mL/hr at 11/01/21 0502 New Bag at 11/01/21 0502   • amiodarone (NEXTERONE) 360 mg/200 mL infusion  0.5-1 mg/min Intravenous Continuous Katherin Oconnor M.D. 17 mL/hr at 11/02/21 0148 0.5 mg/min at 11/02/21 0148   • phosphorus (K-Phos-Neutral) per tablet 500 mg  500 mg Enteral Tube TID Johny Cooley D.O.   500 mg at 11/02/21 0635   • levalbuterol (XOPENEX) 0.63 MG/3ML nebulizer solution 1.25 mg  1.25 mg Nebulization Q4H PRN (RT) Johny Cooley D.O.   0.63 mg at 11/02/21 0250   • phenylephrine (LACI-SYNEPHRINE) 40 mg in  mL infusion  0-300 mcg/min  Intravenous Continuous МАРИЯ Agrawal Jr..O. 93.8 mL/hr at 11/02/21 0748 250 mcg/min at 11/02/21 0748   • amiodarone (NEXTERONE) IVPB 150 mg  150 mg Intravenous Once МАРИЯ Agrawal Jr..O.       • lactated ringer BOLUS infusion  500 mL Intravenous Once МАРИЯ Agrawal Jr..O.   Held at 11/01/21 2245   • midodrine (PROAMATINE) tablet 15 mg  15 mg Enteral Tube TID WITH MEALS Juan Jimenez Jr. D.O.   15 mg at 11/02/21 0641   • ipratropium (ATROVENT) 0.02 % nebulizer solution 0.5 mg  0.5 mg Nebulization Q4HRS (RT) МАРИЯ Agrawal Jr..O.   0.5 mg at 11/02/21 0703   • levalbuterol (XOPENEX) 1.25 MG/3ML nebulizer solution 1.25 mg  1.25 mg Nebulization Q4HRS (RT) Juan Jimenez Jr. D.O.   1.25 mg at 11/02/21 0704   • methylPREDNISolone sod succ (SOLU-MEDROL) 125 MG injection 62.5 mg  62.5 mg Intravenous Q6HRS МАРИЯ Agrawal Jr..O.   62.5 mg at 11/02/21 0634   • LORazepam (ATIVAN) tablet 0.5 mg  0.5 mg Enteral Tube Q4HRS PRN МАРИЯ Rose.OMiller   0.5 mg at 11/02/21 0417   • budesonide-formoterol (SYMBICORT) 80-4.5 MCG/ACT inhaler 2 Puff  2 Puff Inhalation Q12HRS Obi Lazcano M.D.   2 Puff at 10/31/21 1652   • nystatin (MYCOSTATIN) 510390 UNIT/ML suspension 500,000 Units  5 mL Swish & Swallow 4XDAY Obi Lazcano M.D.   500,000 Units at 11/02/21 0749   • tiotropium (Spiriva Respimat) 2.5 mcg/Act inhalation spray 5 mcg  5 mcg Inhalation DAILY Obi Lazcano M.D.   5 mcg at 10/31/21 0733   • Respiratory Therapy Consult   Nebulization Continuous RT Obi Lazcano M.D.       • Pharmacy Consult Request ...Pain Management Review 1 Each  1 Each Other PHARMACY TO DOSE Obi Lazcano M.D.       • cefTRIAXone (Rocephin) 2 g in  mL IVPB  2 g Intravenous Q24HRS Obi Lazcano M.D.   Stopped at 11/02/21 0704   • ondansetron (ZOFRAN) syringe/vial injection 4 mg  4 mg Intravenous Q4HRS PRN Obi Lazcano M.D.       • DILTIAZem (CARDIZEM) injection 10 mg  10 mg  Intravenous Q6HRS PRN Obi Lazcano M.D.   10 mg at 11/02/21 0358   • Pharmacy Consult: Enteral tube insertion - review meds/change route/product selection  1 Each Other PHARMACY TO DOSE Obi Lazcano M.D.       • digoxin (Lanoxin) injection 125 mcg  125 mcg Intravenous DAILY AT 1800 Obi Lazcano M.D.   125 mcg at 11/01/21 1847   • [Held by provider] apixaban (ELIQUIS) tablet 5 mg  5 mg Enteral Tube BID Obi Lazcaon M.D.   5 mg at 11/01/21 0622   • pregabalin (LYRICA) capsule 75 mg  75 mg Enteral Tube BID Obi Lazcano M.D.   75 mg at 11/02/21 0635   • senna-docusate (PERICOLACE or SENOKOT S) 8.6-50 MG per tablet 2 Tablet  2 Tablet Enteral Tube BID Obi Lazcano M.D.   2 Tablet at 11/02/21 0635    And   • polyethylene glycol/lytes (MIRALAX) PACKET 1 Packet  1 Packet Enteral Tube QDAY PRN Obi Lazcano M.D.        And   • magnesium hydroxide (MILK OF MAGNESIA) suspension 30 mL  30 mL Enteral Tube QDAY PRN Obi Lazcano M.D.        And   • bisacodyl (DULCOLAX) suppository 10 mg  10 mg Rectal QDAY PRN Obi Lazcano M.D.       • acetaminophen (Tylenol) tablet 650 mg  650 mg Enteral Tube Q6HRS PRN Obi Lazcano M.D.       • melatonin tablet 5 mg  5 mg Enteral Tube QHS PRN Obi Lazcano M.D.   5 mg at 11/01/21 2203   • ondansetron (ZOFRAN ODT) dispertab 4 mg  4 mg Enteral Tube Q4HRS PRN Obi Lazcano M.D.       • oxyCODONE immediate-release (ROXICODONE) tablet 2.5 mg  2.5 mg Enteral Tube Q3HRS PRN Obi Lazcano M.D.        Or   • oxyCODONE immediate-release (ROXICODONE) tablet 5 mg  5 mg Enteral Tube Q3HRS PRN Obi Lazcano M.D.   5 mg at 11/02/21 0358    Or   • HYDROmorphone (Dilaudid) injection 0.25 mg  0.25 mg Intravenous Q3HRS PRN Obi Lazcano M.D.   0.25 mg at 11/01/21 0511   • guaiFENesin (ROBITUSSIN) 100 MG/5ML solution 200 mg  10 mL Enteral Tube Q4HRS PRN Obi Lazcano M.D.       • doxycycline  monohydrate (ADOXA) tablet 100 mg  100 mg Enteral Tube Q12HRS Obi Lazcano M.D.   100 mg at 11/02/21 0635       Fluids    Intake/Output Summary (Last 24 hours) at 11/2/2021 0753  Last data filed at 11/2/2021 0612  Gross per 24 hour   Intake 7700.1 ml   Output 550 ml   Net 7150.1 ml       Laboratory  Recent Labs     11/01/21  0325   GRFLV85C 7.47   WXVVKS125R 44.9*   PQCJT433L 101.4*   PPKK8YLN 97.3   ARTHCO3 32*   [id number] 7.0   ARTBE 7*     Recent Labs     11/02/21  0400   CPKTOTAL 15     Recent Labs     10/31/21  0302 10/31/21  2056 11/01/21  0332 11/01/21  1849 11/02/21  0400   SODIUM   < >  --  141 140 140   POTASSIUM   < >  --  4.4 4.6 4.3   CHLORIDE   < >  --  104 102 105   CO2   < >  --  31 26 27   BUN   < >  --  16 16 16   CREATININE   < >  --  0.29* 0.29* 0.25*   MAGNESIUM  --  2.0 2.0  --  1.7   PHOSPHORUS  --  1.6* 2.2*  --  2.8   CALCIUM   < >  --  8.1* 8.3* 8.0*    < > = values in this interval not displayed.     Recent Labs     10/31/21  0302 10/31/21  0302 10/31/21  2056 11/01/21  0332 11/01/21  1403 11/01/21  1849 11/02/21  0400   ALTSGPT 37  --   --  48  --  60*  --    ASTSGOT 89*  --   --  134*  --  161*  --    ALKPHOSPHAT 261*  --   --  322*  --  386*  --    TBILIRUBIN 5.1*  --   --  6.4*  --  7.1*  --    DBILIRUBIN  --   --  5.3*  --   --   --   --    LIPASE  --   --   --   --   --  8*  --    PREALBUMIN <3.0*  --   --   --  <3.0*  --   --    GLUCOSE 126*   < >  --  123*  --  108* 147*    < > = values in this interval not displayed.     Recent Labs     10/31/21  0302 10/31/21  0302 11/01/21  0332 11/01/21  1849 11/02/21  0400   WBC 28.5*   < > 17.8* 20.4* 21.8*   NEUTSPOLYS 93.00*   < > 100.00* 100.00* 93.20*   LYMPHOCYTES 0.00*   < > 0.00* 0.00* 0.00*   MONOCYTES 0.90   < > 0.00 0.00 0.80   EOSINOPHILS 0.00   < > 0.00 0.00 0.00   BASOPHILS 0.00   < > 0.00 0.00 0.00   ASTSGOT 89*  --  134* 161*  --    ALTSGPT 37  --  48 60*  --    ALKPHOSPHAT 261*  --  322* 386*  --    TBILIRUBIN 5.1*  --   6.4* 7.1*  --     < > = values in this interval not displayed.     Recent Labs     11/01/21  0332 11/01/21  1849 11/01/21 2020 11/02/21  0400   RBC 3.11* 3.35*  --  3.11*   HEMOGLOBIN 8.3* 8.8*  --  8.2*   HEMATOCRIT 25.8* 28.1*  --  26.4*   PLATELETCT 50* 48*  --  52*   PROTHROMBTM  --   --  20.8*  --    INR  --   --  1.86*  --        Imaging  X-Ray:  I have personally reviewed the images and compared with prior images.    Assessment/Plan  * Sepsis (HCC)- (present on admission)  Assessment & Plan  This is Severe Sepsis Present on admission  SIRS criteria identified on my evaluation include: Tachycardia, with heart rate greater than 90 BPM, Tachypnea, with respirations greater than 20 per minute and Leukocytosis, with WBC greater than 12,000  Source is pulmonary  Clinical indicators of end organ dysfunction include Acute respiratory failure as evidenced by a new need for invasive or non-invasive mechanical ventilation (Ventilator or BiPap)   Sepsis protocol initiated  Fluid resuscitation ordered per protocol  IV antibiotics as appropriate for source of sepsis  Reassessment: I have reassessed the patient's hemodynamic status  End organ dysfunction include(s):  Acute respiratory failure and Encephalopathy (metabolic)     sepsis protocol  source targeted antibiotics: rocephin/doxycycline  PRN IVF bolus to maintain MAP >65 mmHg  Pressors if needed to maintain MAP >65 mmHg  blood, respiratory and urine cultures  lactate every 4 hours until normalized or downtrending    Encephalopathy acute- (present on admission)  Assessment & Plan  Likely multifactorial including metabolic disturbances and sepsis  Check ammonia  Limit sedatives as able    Pneumonia- (present on admission)  Assessment & Plan  Continue Rocephin and doxycycline (prolonged QTC)  Follow-up cultures  Covid negative    Acute hypoxemic respiratory failure (HCC)- (present on admission)  Assessment & Plan  Secondary to acute exacerbation of chronic obstructive  pulmonary disease and possibly pneumonia  COVID negative    Atrial fibrillation with RVR (HCC)- (present on admission)  Assessment & Plan  Hx of, now worsened in the setting of sepsis and acute exacerbation of COPD  Treat underlying conditions  Continue digoxin, amiodarone and beta-blocker (if hemodynamics allow)  Continue apixaban given elevated [id number]  Will reload amiodarone at this time    COPD exacerbation (HCC)- (present on admission)  Assessment & Plan  On 3L NC at home  IV solumedrol  RT/O2 Protocols, bronchodilators  Titrate supplemental FiO2 to maintain SpO2 88%-92%    Shock (HCC)- (present on admission)  Assessment & Plan  Likely multifactorial: septic, cardiogenic and hypovolemic  Has been fluid responsive, received 8L, remains on vasopressors  On steroids    Elevated LFTs- (present on admission)  Assessment & Plan  Worsening, will continue  Right upper quadrant ultrasound with hepatic steatosis, cholelithiasis without cholecystitis and trace ascites  Limit hepatotoxins  Monitor synthetic function    Pulmonary hypertension (HCC)- (present on admission)  Assessment & Plan  Judicious IV fluid use  Closely monitoring in ICU    Lymphoma (HCC)- (present on admission)  Assessment & Plan  Follows with Dr. Garcia  Is scheduled to start chemotherapy soon as an outpatient    Thrombocytopenia (CMS-HCC)- (present on admission)  Assessment & Plan  No active bleeding, continue to monitor    Anemia- (present on admission)  Assessment & Plan  Currently stable at 8.8  Transfuse for hemoglobin less than 7     Updated plan:  Continue empiric antibiotics  Continue vasopressors and amiodarone infusion  In moderate respiratory distress and not protecting airway well  Family to come to bedside shortly, will discuss goals of care.  Overall prognosis quite guarded    I have performed a physical exam and reviewed and updated ROS and Plan today (11/2/2021). In review of yesterday's note (11/1/2021), there are no changes  except as documented above.     Discussed patient condition and risk of morbidity and/or mortality with RN, RT, Pharmacy and QA team  The patient remains critically ill.  Critical care time = 35 minutes in directly providing and coordinating critical care and extensive data review.  No time overlap and excludes procedures.

## 2021-11-02 NOTE — PROGRESS NOTES
2 RN skin check completed upon patient arrival to [facility]    Current skin concerns are as follow:     Extensive scabs, both open and intact scattered throughout entire body   Patient skin is severely dry and jaundice   Bilateral heel scabbing and redness noted   Bilateral posterior ears scabbed and red  Scabs intact to anterior ear   Scab and redness to posterior head   Bilateral elbows scabs and redness noted   Sacrum has extensive open scabs and wounds with profound redness noted, non blanching       Patient is on a waffle pillow for skin integrity   Heels and elbows floated for skin protection  Barrier cream applied to sacrum with barrier wipes   Wound consult has already been placed for this patient.    All appropriate wound interventions are in place at this time. Photos taken and uploaded.

## 2021-11-02 NOTE — ASSESSMENT & PLAN NOTE
Likely multifactorial: septic, cardiogenic and hypovolemic  Has been fluid responsive, received 8L, remains on vasopressors  On steroids

## 2021-11-02 NOTE — PROGRESS NOTES
Interval: overnight upgraded to ICU for shock. downtrending hemoglobin and platelets. Persistently elevated wbc. Increase in lactic acid and urine output now improved with IVF and vasopressors as well as midodrine. AF RVR improved with rhythm control. Unable to tolerate additional rate control overnight due to bradycardia.     Medications / Drug list prior to admission:  No current facility-administered medications on file prior to encounter.     Current Outpatient Medications on File Prior to Encounter   Medication Sig Dispense Refill   • oxyCODONE immediate release (ROXICODONE) 10 MG immediate release tablet Take 10 mg by mouth every 6 hours as needed for Moderate Pain.     • metoprolol tartrate (LOPRESSOR) 25 MG Tab Take 1 Tablet by mouth every 6 hours. 60 Tablet    • Umeclidinium Bromide (INCRUSE ELLIPTA) 62.5 MCG/INH AEROSOL POWDER, BREATH ACTIVATED Inhale 1 Puff every day.     • multivitamin (THERAGRAN) Tab Take 1 Tablet by mouth every day.     • ascorbic acid (VITAMIN C) 500 MG tablet Take 500 mg by mouth every day.     • Cholecalciferol (VITAMIN D3) 125 MCG (5000 UT) Cap Take 5,000 Units by mouth every day.     • zinc sulfate (ZINCATE) 220 (50 Zn) MG Cap Take 220 mg by mouth every day.     • Probiotic Product (PROBITROL PO) Take 1 Tablet by mouth every day.     • famotidine (PEPCID) 20 MG Tab Take 20 mg by mouth 2 times a day.     • fluticasone-salmeterol (WIXELA INHUB) 100-50 MCG/DOSE AEROSOL POWDER, BREATH ACTIVATED Inhale 1 Puff every 12 hours.     • nystatin (MYCOSTATIN) 393330 UNIT/ML Suspension Take 400,000 Units by mouth 4 times a day. 10 day course     • loperamide (IMODIUM) 2 MG Cap Take 2 mg by mouth every 8 hours as needed for Diarrhea.     • acetaminophen (TYLENOL) 325 MG Tab Take 650 mg by mouth every four hours as needed.     • apixaban (ELIQUIS) 5mg Tab Take 1 Tablet by mouth 2 times a day. Indications: Thromboembolism secondary to Atrial Fibrillation 60 Tablet 3   • pregabalin (LYRICA) 75 MG  Cap Take 75 mg by mouth 2 times a day.     • melatonin 5 mg Tab Take 5 mg by mouth at bedtime as needed (Sleep).         Current list of administered Medications:    Current Facility-Administered Medications:   •  MD Alert...ICU Electrolyte Replacement per Pharmacy, , Other, PHARMACY TO DOSE, Mckay Fonseca M.D.  •  magnesium sulfate IVPB premix 2 g, 2 g, Intravenous, Once, Mckay Fonseca M.D., Last Rate: 25 mL/hr at 11/02/21 0832, 2 g at 11/02/21 0832  •  metoprolol tartrate (LOPRESSOR) tablet 25 mg, 25 mg, Enteral Tube, Q6HRS, Katherin Oconnor M.D., 25 mg at 11/01/21 1212  •  haloperidol lactate (HALDOL) injection 1 mg, 1 mg, Intravenous, Q4HRS PRN, Katherin Oconnor M.D.  •  Metoprolol Tartrate (LOPRESSOR) injection 5 mg, 5 mg, Intravenous, Q5 MIN PRN, Katherin Oconnor M.D., 5 mg at 11/01/21 1749  •  dextrose 5% infusion, , Intravenous, Continuous, Katherin Oconnor M.D., Last Rate: 30 mL/hr at 11/01/21 0502, New Bag at 11/01/21 0502  •  amiodarone (NEXTERONE) 360 mg/200 mL infusion, 0.5-1 mg/min, Intravenous, Continuous, Katherin Oconnor M.D., Last Rate: 17 mL/hr at 11/02/21 0148, 0.5 mg/min at 11/02/21 0148  •  phosphorus (K-Phos-Neutral) per tablet 500 mg, 500 mg, Enteral Tube, TID, Johny Cooley D.O., 500 mg at 11/02/21 0635  •  levalbuterol (XOPENEX) 0.63 MG/3ML nebulizer solution 1.25 mg, 1.25 mg, Nebulization, Q4H PRN (RT), Johny Cooley D.O., 0.63 mg at 11/02/21 0250  •  phenylephrine (LACI-SYNEPHRINE) 40 mg in  mL infusion, 0-300 mcg/min, Intravenous, Continuous, МАРИЯ Agrawal Jr..O., Last Rate: 93.8 mL/hr at 11/02/21 0748, 250 mcg/min at 11/02/21 0748  •  amiodarone (NEXTERONE) IVPB 150 mg, 150 mg, Intravenous, Once, МАРИЯ Agrawal Jr..O.  •  lactated ringer BOLUS infusion, 500 mL, Intravenous, Once, Juan Jimenez Jr. D.O., Held at 11/01/21 0633  •  midodrine (PROAMATINE) tablet 15 mg, 15 mg, Enteral Tube, TID WITH MEALS, Juan Jimenez Jr., D.O., 15 mg at 11/02/21 0641  •   ipratropium (ATROVENT) 0.02 % nebulizer solution 0.5 mg, 0.5 mg, Nebulization, Q4HRS (RT), Juan Jimenez Jr., D.O., 0.5 mg at 11/02/21 0703  •  levalbuterol (XOPENEX) 1.25 MG/3ML nebulizer solution 1.25 mg, 1.25 mg, Nebulization, Q4HRS (RT), Juan Jimenez Jr. D.O., 1.25 mg at 11/02/21 0704  •  methylPREDNISolone sod succ (SOLU-MEDROL) 125 MG injection 62.5 mg, 62.5 mg, Intravenous, Q6HRS, Juan Jimenez Jr., D.O., 62.5 mg at 11/02/21 0634  •  LORazepam (ATIVAN) tablet 0.5 mg, 0.5 mg, Enteral Tube, Q4HRS PRN, Johny Cooley D.O., 0.5 mg at 11/02/21 0417  •  budesonide-formoterol (SYMBICORT) 80-4.5 MCG/ACT inhaler 2 Puff, 2 Puff, Inhalation, Q12HRS, Obi Lazcano M.D., 2 Puff at 10/31/21 1652  •  nystatin (MYCOSTATIN) 869881 UNIT/ML suspension 500,000 Units, 5 mL, Swish & Swallow, 4XDAY, Obi Lazcano M.D., 500,000 Units at 11/02/21 0749  •  tiotropium (Spiriva Respimat) 2.5 mcg/Act inhalation spray 5 mcg, 5 mcg, Inhalation, DAILY, Obi Lazcano M.D., 5 mcg at 10/31/21 0733  •  Respiratory Therapy Consult, , Nebulization, Continuous RT, Obi Lazcano M.D.  •  Notify provider if pain remains uncontrolled, , , CONTINUOUS **AND** Use the Numeric Rating Scale (NRS), Zelaya-Baker Faces (WBF), or FLACC on regular floors and Critical-Care Pain Observation Tool (CPOT) on ICUs/Trauma to assess pain, , , CONTINUOUS **AND** Pulse Ox, , , CONTINUOUS **AND** Pharmacy Consult Request ...Pain Management Review 1 Each, 1 Each, Other, PHARMACY TO DOSE **AND** If patient difficult to arouse and/or has respiratory depression (respiratory rate of 10 or less), stop any opiates that are currently infusing and call a Rapid Response., , , CONTINUOUS, Obi Lazcano M.D.  •  cefTRIAXone (Rocephin) 2 g in  mL IVPB, 2 g, Intravenous, Q24HRS, Obi Lazcano M.D., Stopped at 11/02/21 0704  •  ondansetron (ZOFRAN) syringe/vial injection 4 mg, 4 mg, Intravenous, Q4HRS PRN, Obi Lazcano,  M.D.  •  DILTIAZem (CARDIZEM) injection 10 mg, 10 mg, Intravenous, Q6HRS PRN, Obi Lazcano M.D., 10 mg at 11/02/21 0358  •  Pharmacy Consult: Enteral tube insertion - review meds/change route/product selection, 1 Each, Other, PHARMACY TO DOSE, Obi Lazcano M.D.  •  digoxin (Lanoxin) injection 125 mcg, 125 mcg, Intravenous, DAILY AT 1800, Obi Lazcano M.D., 125 mcg at 11/01/21 1847  •  [Held by provider] apixaban (ELIQUIS) tablet 5 mg, 5 mg, Enteral Tube, BID, Oib Lazcano M.D., 5 mg at 11/01/21 0622  •  pregabalin (LYRICA) capsule 75 mg, 75 mg, Enteral Tube, BID, Obi Lazcano M.D., 75 mg at 11/02/21 0635  •  senna-docusate (PERICOLACE or SENOKOT S) 8.6-50 MG per tablet 2 Tablet, 2 Tablet, Enteral Tube, BID, 2 Tablet at 11/02/21 0635 **AND** polyethylene glycol/lytes (MIRALAX) PACKET 1 Packet, 1 Packet, Enteral Tube, QDAY PRN **AND** magnesium hydroxide (MILK OF MAGNESIA) suspension 30 mL, 30 mL, Enteral Tube, QDAY PRN **AND** bisacodyl (DULCOLAX) suppository 10 mg, 10 mg, Rectal, QDAY PRN, Obi Lazcano M.D.  •  acetaminophen (Tylenol) tablet 650 mg, 650 mg, Enteral Tube, Q6HRS PRN, Obi Lazcano M.D.  •  melatonin tablet 5 mg, 5 mg, Enteral Tube, QHS PRN, Obi Lazcano M.D., 5 mg at 11/01/21 2203  •  ondansetron (ZOFRAN ODT) dispertab 4 mg, 4 mg, Enteral Tube, Q4HRS PRN, Obi Lazcano M.D.  •  oxyCODONE immediate-release (ROXICODONE) tablet 2.5 mg, 2.5 mg, Enteral Tube, Q3HRS PRN **OR** oxyCODONE immediate-release (ROXICODONE) tablet 5 mg, 5 mg, Enteral Tube, Q3HRS PRN, 5 mg at 11/02/21 0358 **OR** HYDROmorphone (Dilaudid) injection 0.25 mg, 0.25 mg, Intravenous, Q3HRS PRN, Obi Lazcano M.D., 0.25 mg at 11/01/21 0511  •  guaiFENesin (ROBITUSSIN) 100 MG/5ML solution 200 mg, 10 mL, Enteral Tube, Q4HRS PRN, Obi Lazcano M.D.  •  doxycycline monohydrate (ADOXA) tablet 100 mg, 100 mg, Enteral Tube, Q12HRS, Obi Lazcano M.D., 100  "mg at 21 0635    Past Medical History:   Diagnosis Date   • A-fib (HCC)    • Acute hypoxemic respiratory failure (HCC) 10/2/2021   • Chronic obstructive pulmonary disease (HCC)    • MRSA (methicillin resistant Staphylococcus aureus)    • Productive cough     \"smoker\"   • Snoring     sleep apnea questionairre completed       Past Surgical History:   Procedure Laterality Date   • FLAP GRAFT  2010    Performed by FRANCESCA VIDAL at SURGERY [facility] ORS   • FISTULA IN ANO REPAIR  2010    Performed by FRANCESCA VIDAL at SURGERY [facility] ORS   • RECTAL EXPLORATION  2009    Performed by FRANCESCA VIDAL at SURGERY SAME DAY [facility] ORS   • DEBRIDEMENT  2009    Performed by FRANCESCA VIDAL at SURGERY SAME DAY [facility] ORS   • RECTAL EXPLORATION  9/3/2009    Performed by FRANCESCA VIDAL at SURGERY SAME DAY [facility] ORS   • DEBRIDEMENT  9/3/2009    Performed by FRANCESCA VIDAL at SURGERY SAME DAY [facility] ORS   • LUIS ENRIQUE RECTAL ABSCESS INCISION AND DRAINAGE  2009    Performed by FRANCESCA VIDAL at SURGERY [facility] ORS   • LUMBAR FUSION POSTERIOR     • LUMBAR FUSION ANTERIOR         Family History   Problem Relation Age of Onset   • Stroke Other      Patient family history was personally reviewed, no pertinent family history to current presentation    Social History     Socioeconomic History   • Marital status:      Spouse name: Not on file   • Number of children: Not on file   • Years of education: Not on file   • Highest education level: Not on file   Occupational History   • Not on file   Tobacco Use   • Smoking status: Former Smoker     Packs/day: 1.50     Years: 45.00     Pack years: 67.50     Types: Cigarettes     Quit date: 10/27/2017     Years since quittin.0   • Smokeless tobacco: Never Used   Substance and Sexual Activity   • Alcohol use: Yes     Alcohol/week: 8.0 oz     Types: 16 Standard drinks or equivalent per week     Comment: noted " 4 per day   • Drug use: No   • Sexual activity: Not on file   Other Topics Concern   • Not on file   Social History Narrative   • Not on file     Social Determinants of Health     Financial Resource Strain:    • Difficulty of Paying Living Expenses:    Food Insecurity:    • Worried About Running Out of Food in the Last Year:    • Ran Out of Food in the Last Year:    Transportation Needs:    • Lack of Transportation (Medical):    • Lack of Transportation (Non-Medical):    Physical Activity:    • Days of Exercise per Week:    • Minutes of Exercise per Session:    Stress:    • Feeling of Stress :    Social Connections:    • Frequency of Communication with Friends and Family:    • Frequency of Social Gatherings with Friends and Family:    • Attends Hinduism Services:    • Active Member of Clubs or Organizations:    • Attends Club or Organization Meetings:    • Marital Status:    Intimate Partner Violence:    • Fear of Current or Ex-Partner:    • Emotionally Abused:    • Physically Abused:    • Sexually Abused:        ALLERGIES:  Allergies   Allergen Reactions   • Morphine Sulfate Unspecified     Hallucinations        Review of systems:  Unable to participate in interview    Physical exam:  Patient Vitals for the past 24 hrs:   BP Temp Temp src Pulse Resp SpO2   11/01/21 0827 -- -- -- (!) 142 -- --   11/01/21 0723 (!) 99/61 36.3 °C (97.3 °F) Temporal (!) 143 20 96 %   11/01/21 0325 (!) 97/58 -- -- (!) 140 -- --   11/01/21 0000 (!) 88/46 -- -- -- -- --   10/31/21 2330 112/59 36.4 °C (97.6 °F) Temporal (!) 113 20 94 %   10/31/21 2059 113/60 -- -- (!) 126 -- --   10/31/21 1953 137/63 -- -- (!) 121 18 --   10/31/21 1931 -- -- -- 99 18 96 %   10/31/21 1659 (!) 90/53 36.8 °C (98.3 °F) Temporal (!) 101 18 97 %   10/31/21 1200 (!) 91/60 -- -- -- -- --   10/31/21 1108 (!) 91/60 37.1 °C (98.8 °F) Temporal (!) 110 18 96 %     General: No acute distress.   EYES: no jaundice  HEENT: OP clear   Neck: No bruits No JVD.   CVS:  irreg  irreg. S1 + S2. No M/R/G. No edema.  Resp: ronchi.   Abdomen: Soft, NT, ND,  Skin: Grossly nothing acute no obvious rashes  Neurological: Alert, Moves all extremities, no cranial nerve defects on limited exam  Extremities: Pulse 2+ in b/l LE. No cyanosis.     Data:  Laboratory studies personally reviewed by me:  Recent Results (from the past 24 hour(s))   CRP Quantitive (Non-Cardiac)    Collection Time: 11/01/21  2:03 PM   Result Value Ref Range    Stat C-Reactive Protein 20.44 (H) 0.00 - 0.75 mg/dL   Prealbumin    Collection Time: 11/01/21  2:03 PM   Result Value Ref Range    Pre-Albumin <3.0 (L) 18.0 - 38.0 mg/dL   LACTIC ACID    Collection Time: 11/01/21  5:21 PM   Result Value Ref Range    Lactic Acid 3.7 (H) 0.5 - 2.0 mmol/L   CBC WITH DIFFERENTIAL    Collection Time: 11/01/21  6:49 PM   Result Value Ref Range    WBC 20.4 (H) 4.8 - 10.8 K/uL    RBC 3.35 (L) 4.70 - 6.10 M/uL    Hemoglobin 8.8 (L) 14.0 - 18.0 g/dL    Hematocrit 28.1 (L) 42.0 - 52.0 %    MCV 83.9 81.4 - 97.8 fL    MCH 26.3 (L) 27.0 - 33.0 pg    MCHC 31.3 (L) 33.7 - 35.3 g/dL    RDW 54.8 (H) 35.9 - 50.0 fL    Platelet Count 48 (LL) 164 - 446 K/uL    Neutrophils-Polys 100.00 (H) 44.00 - 72.00 %    Lymphocytes 0.00 (L) 22.00 - 41.00 %    Monocytes 0.00 0.00 - 13.40 %    Eosinophils 0.00 0.00 - 6.90 %    Basophils 0.00 0.00 - 1.80 %    Nucleated RBC 0.30 /100 WBC    Neutrophils (Absolute) 20.40 (H) 1.82 - 7.42 K/uL    Lymphs (Absolute) 0.00 (L) 1.00 - 4.80 K/uL    Monos (Absolute) 0.00 0.00 - 0.85 K/uL    Eos (Absolute) 0.00 0.00 - 0.51 K/uL    Baso (Absolute) 0.00 0.00 - 0.12 K/uL    NRBC (Absolute) 0.06 K/uL    Anisocytosis 1+     Microcytosis 1+    Comp Metabolic Panel    Collection Time: 11/01/21  6:49 PM   Result Value Ref Range    Sodium 140 135 - 145 mmol/L    Potassium 4.6 3.6 - 5.5 mmol/L    Chloride 102 96 - 112 mmol/L    Co2 26 20 - 33 mmol/L    Anion Gap 12.0 7.0 - 16.0    Glucose 108 (H) 65 - 99 mg/dL    Bun 16 8 - 22 mg/dL    Creatinine  0.29 (L) 0.50 - 1.40 mg/dL    Calcium 8.3 (L) 8.5 - 10.5 mg/dL    AST(SGOT) 161 (H) 12 - 45 U/L    ALT(SGPT) 60 (H) 2 - 50 U/L    Alkaline Phosphatase 386 (H) 30 - 99 U/L    Total Bilirubin 7.1 (H) 0.1 - 1.5 mg/dL    Albumin 1.6 (L) 3.2 - 4.9 g/dL    Total Protein 4.9 (L) 6.0 - 8.2 g/dL    Globulin 3.3 1.9 - 3.5 g/dL    A-G Ratio 0.5 g/dL   PROCALCITONIN    Collection Time: 21  6:49 PM   Result Value Ref Range    Procalcitonin 1.10 (H) <0.25 ng/mL   LIPASE    Collection Time: 21  6:49 PM   Result Value Ref Range    Lipase 8 (L) 11 - 82 U/L   ESTIMATED GFR    Collection Time: 21  6:49 PM   Result Value Ref Range    GFR If African American >60 >60 mL/min/1.73 m 2    GFR If Non African American >60 >60 mL/min/1.73 m 2   DIFFERENTIAL MANUAL    Collection Time: 21  6:49 PM   Result Value Ref Range    Manual Diff Status PERFORMED    PERIPHERAL SMEAR REVIEW    Collection Time: 21  6:49 PM   Result Value Ref Range    Peripheral Smear Review see below    PLATELET ESTIMATE    Collection Time: 21  6:49 PM   Result Value Ref Range    Plt Estimation Marked Decrease    MORPHOLOGY    Collection Time: 21  6:49 PM   Result Value Ref Range    RBC Morphology Present     Poikilocytosis 2+     Target Cells 2+     Echinocytes 2+     Toxic Gran Moderate    EKG    Collection Time: 21  7:11 PM   Result Value Ref Range    Report       [facility]    Test Date:  2021  Pt Name:    CARLOS GO                     Department: 161  MRN:        [id number]                      Room:       T608  Gender:     Male                         Technician: [facility]  :        [id number]                   Requested By:FRANCY HURTADO JR  Order #:    [id number]                    Reading MD: Joseph Cifuentes MD    Measurements  Intervals                                Axis  Rate:       148                          P:          0  UT:         224                          QRS:        82  QRSD:       99                            T:          154  QT:         322  QTc:        506    Interpretive Statements  SUPRAVENTRICULAR TACHYCARDIA  BORDERLINE RIGHT AXIS DEVIATION  LOW VOLTAGE, EXTREMITY LEADS  REPOLARIZATION ABNORMALITY, PROB RATE RELATED  Compared to ECG 11/01/2021 04:55:49  Early repolarization now present  T-wave abnormality no longer present  Electronically Signed On 11-1-2021 21:53:32 PDT by Joseph Cifuentes MD     Prothrombin Time    Collection Time: 11/01/21  8:20 PM   Result Value Ref Range    PT 20.8 (H) 12.0 - 14.6 sec    INR 1.86 (H) 0.87 - 1.13   LACTIC ACID    Collection Time: 11/02/21 12:30 AM   Result Value Ref Range    Lactic Acid 2.5 (H) 0.5 - 2.0 mmol/L   AMMONIA    Collection Time: 11/02/21 12:30 AM   Result Value Ref Range    Ammonia 31 11 - 45 umol/L   CBC WITH DIFFERENTIAL    Collection Time: 11/02/21  4:00 AM   Result Value Ref Range    WBC 21.8 (H) 4.8 - 10.8 K/uL    RBC 3.11 (L) 4.70 - 6.10 M/uL    Hemoglobin 8.2 (L) 14.0 - 18.0 g/dL    Hematocrit 26.4 (L) 42.0 - 52.0 %    MCV 84.9 81.4 - 97.8 fL    MCH 26.4 (L) 27.0 - 33.0 pg    MCHC 31.1 (L) 33.7 - 35.3 g/dL    RDW 55.8 (H) 35.9 - 50.0 fL    Platelet Count 52 (L) 164 - 446 K/uL    Neutrophils-Polys 93.20 (H) 44.00 - 72.00 %    Lymphocytes 0.00 (L) 22.00 - 41.00 %    Monocytes 0.80 0.00 - 13.40 %    Eosinophils 0.00 0.00 - 6.90 %    Basophils 0.00 0.00 - 1.80 %    Nucleated RBC 0.30 /100 WBC    Neutrophils (Absolute) 21.63 (H) 1.82 - 7.42 K/uL    Lymphs (Absolute) 0.00 (L) 1.00 - 4.80 K/uL    Monos (Absolute) 0.17 0.00 - 0.85 K/uL    Eos (Absolute) 0.00 0.00 - 0.51 K/uL    Baso (Absolute) 0.00 0.00 - 0.12 K/uL    NRBC (Absolute) 0.06 K/uL    Hypochromia 1+     Anisocytosis 1+     Macrocytosis 1+    Basic Metabolic Panel    Collection Time: 11/02/21  4:00 AM   Result Value Ref Range    Sodium 140 135 - 145 mmol/L    Potassium 4.3 3.6 - 5.5 mmol/L    Chloride 105 96 - 112 mmol/L    Co2 27 20 - 33 mmol/L    Glucose 147 (H) 65 - 99 mg/dL    Bun 16  8 - 22 mg/dL    Creatinine 0.25 (L) 0.50 - 1.40 mg/dL    Calcium 8.0 (L) 8.5 - 10.5 mg/dL    Anion Gap 8.0 7.0 - 16.0   MAGNESIUM    Collection Time: 11/02/21  4:00 AM   Result Value Ref Range    Magnesium 1.7 1.5 - 2.5 mg/dL   PHOSPHORUS    Collection Time: 11/02/21  4:00 AM   Result Value Ref Range    Phosphorus 2.8 2.5 - 4.5 mg/dL   CREATINE KINASE    Collection Time: 11/02/21  4:00 AM   Result Value Ref Range    CPK Total 15 0 - 154 U/L   LACTIC ACID    Collection Time: 11/02/21  4:00 AM   Result Value Ref Range    Lactic Acid 2.2 (H) 0.5 - 2.0 mmol/L   ESTIMATED GFR    Collection Time: 11/02/21  4:00 AM   Result Value Ref Range    GFR If African American >60 >60 mL/min/1.73 m 2    GFR If Non African American >60 >60 mL/min/1.73 m 2   DIFFERENTIAL MANUAL    Collection Time: 11/02/21  4:00 AM   Result Value Ref Range    Bands-Stabs 6.00 0.00 - 10.00 %    Manual Diff Status PERFORMED    PERIPHERAL SMEAR REVIEW    Collection Time: 11/02/21  4:00 AM   Result Value Ref Range    Peripheral Smear Review see below    PLATELET ESTIMATE    Collection Time: 11/02/21  4:00 AM   Result Value Ref Range    Plt Estimation Decreased    MORPHOLOGY    Collection Time: 11/02/21  4:00 AM   Result Value Ref Range    RBC Morphology Present     Poikilocytosis 2+     Target Cells 2+    IMMATURE PLT FRACTION    Collection Time: 11/02/21  4:00 AM   Result Value Ref Range    Imm. Plt Fraction 12.9 0.6 - 13.1 K/uL     EKG 11/1/21 interpreted ny be, AF/flutter, rate 111, low voltage, non    All pertinent features of laboratory and imaging reviewed including primary images where applicable    TTE 10/3/21  CONCLUSIONS  Fair quality study, improved with contrast.  The left ventricular ejection fraction is visually estimated to be 60%.  Grade II diastolic dysfunction.  The aortic valve is not well visualized.  Mild tricuspid regurgitation.  Right ventricle is moderately dilated with normal function.  Estimated right ventricular systolic pressure  is 60 mmHg severe   pulmonary hypertension.  Right atrial pressure is estimated to be 15 mmHg.    Principal Problem:    Shock (HCC) POA: Yes  Active Problems:    Hyponatremia POA: Yes    Thrombocytopenia (CMS-HCC) POA: Yes    Lymphoma (HCC) (Chronic) POA: Yes    Atrial fibrillation with RVR (HCC) POA: Yes    Acute hypoxemic respiratory failure (HCC) POA: Yes    Pulmonary hypertension (HCC) POA: Yes    COPD exacerbation (HCC) POA: Yes    Pneumonia POA: Yes    Sepsis (HCC) POA: Yes    Elevated LFTs POA: Yes    Anemia POA: Yes    Encephalopathy acute POA: Yes  Resolved Problems:    * No resolved hospital problems. *      Assessment / Plan:  76 year old man with PMH AF, COPD, active tobacco use, MATTY, alcohol dependence presents with palpitations found recurrent AF RVR setting of copd exacerbation and pneumonia. C/b septic shock admit to ICU.    -titrate neosynephrine for MAP >65 and uop >30cc/h  -continue digoxin and amiodarone IV. PO when more awake  -cva prevention with doac; chadsvasc 2, when no contraindications  -when can consistently tolerate anticoagulation can consider cardioversion however also suspect will go back to AF due to septic state  -goals of care and pulmonary prognosis. Please consider palliative consult if needed from this perspective    I personally discussed his case with Dr Cooley    It is my pleasure to participate in the care of Mr. Hassan.  Please do not hesitate to contact me with questions or concerns.    Binh Rodriguez MD  Cardiologist [facility]    Gary Severino Gil is critically ill and he is at high risk for clinical deterioration, worsening vital organ dysfunction, and death without the above critical care interventions.  Critical care time 42 minutes. This time was spent at the bedside evaluating the patient's chart, their labs,   imaging, physical exam and discussion with Dr Fonseca as well as the bedside nurse and formulating an assessment and  plan.

## 2021-11-02 NOTE — CONSULTS
Critical Care Consultation    Date of consult: 11/1/2021    Referring Physician  Johny Cooley D.O.    Reason for Consultation  AF with RVR, hypotension    History of Presenting Illness  76 y.o. 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 The patient was recently discharged from our facility when he was admitted in mid October for A. fib with RVR.  He was initially started on diltiazem infusion with amiodarone and was transitioned to metoprolol 50 mg twice daily on discharge.  He had an echocardiogram during that hospitalization which showed an LVEF of 60% and grade 2 diastolic dysfunction with a moderately dilated RV and an RVSP of 60 mmHg.  When the patient was transported from his skilled nursing facility to the ER on this admission EMS was initially unable to obtain a blood pressure therefore an electrical cardioversion was completed and during his stay in the ER he had multiple episodes of paroxysmal A. fib with sinus tachycardia. He was found He was admitted to telemetry and was evaluated by cardiology recommended rate control with metoprolol and digoxin in addition to his ongoing amiodarone.  This evening his ventricular response became more rapid with heart rates in the 150s to 170s and his blood pressure dropped to 60s systolic.  He was transferred to the ICU for ongoing hemodynamic support and further care.    On POCUS examination his IVC is completely collapsible and his LVEF appears to be grossly unchanged from prior.    Code Status  DNAR/DNI    Review of Systems  Review of Systems   Unable to perform ROS: Mental acuity       Past Medical History   has a past medical history of A-fib ([facility]), Acute hypoxemic respiratory failure (HCC) (10/2/2021), Chronic obstructive pulmonary disease (HCC), MRSA (methicillin resistant Staphylococcus aureus), Productive  cough, and Snoring.    Surgical History   has a past surgical history that includes imelda rectal abscess incision and drainage (4/6/2009); rectal exploration (9/3/2009); debridement (9/3/2009); rectal exploration (11/17/2009); debridement (11/17/2009); lumbar fusion anterior (1983); lumbar fusion posterior (1987); flap graft (6/1/2010); and fistula in ano repair (6/1/2010).    Family History  family history includes Stroke in an other family member.    Social History   reports that he quit smoking about 4 years ago. His smoking use included cigarettes. He has a 67.50 pack-year smoking history. He has never used smokeless tobacco. He reports current alcohol use of about 8.0 oz of alcohol per week. He reports that he does not use drugs.    Medications  Home Medications     Reviewed by Raheem Taylor (Pharmacy Tech) on 10/30/21 at 0643  Med List Status: Complete   Medication Last Dose Status   acetaminophen (TYLENOL) 325 MG Tab unknown Active   apixaban (ELIQUIS) 5mg Tab 10/29/2021 Active   ascorbic acid (VITAMIN C) 500 MG tablet 10/29/2021 Active   Cholecalciferol (VITAMIN D3) 125 MCG (5000 UT) Cap unknown Active   famotidine (PEPCID) 20 MG Tab 10/29/2021 Active   fluticasone-salmeterol (WIXELA INHUB) 100-50 MCG/DOSE AEROSOL POWDER, BREATH ACTIVATED unknown Active   loperamide (IMODIUM) 2 MG Cap unknown Active   melatonin 5 mg Tab 10/29/2021 Active   metoprolol tartrate (LOPRESSOR) 25 MG Tab 10/29/2021 Active   multivitamin (THERAGRAN) Tab unknown Active   nystatin (MYCOSTATIN) 302571 UNIT/ML Suspension unknown Active   oxyCODONE immediate release (ROXICODONE) 10 MG immediate release tablet unknown Active   pregabalin (LYRICA) 75 MG Cap unknown Active   Probiotic Product (PROBITROL PO) unknown Active   Umeclidinium Bromide (INCRUSE ELLIPTA) 62.5 MCG/INH AEROSOL POWDER, BREATH ACTIVATED unknown Active   zinc sulfate (ZINCATE) 220 (50 Zn) MG Cap unknown Active              Current Facility-Administered Medications    Medication Dose Route Frequency Provider Last Rate Last Admin   • metoprolol tartrate (LOPRESSOR) tablet 25 mg  25 mg Enteral Tube Q6HRS Katherin Oconnor M.D.   25 mg at 11/01/21 1212   • DILTIAZem (CARDIZEM) injection 10 mg  10 mg Intravenous Once Katherin Oconnor M.D.       • haloperidol lactate (HALDOL) injection 1 mg  1 mg Intravenous Q4HRS PRN Katherin Oconnor M.D.       • Metoprolol Tartrate (LOPRESSOR) injection 5 mg  5 mg Intravenous Q5 MIN PRN Katherin Oconnor M.D.   5 mg at 11/01/21 1749   • NS infusion   Intravenous Continuous Katherin Oconnor M.D. 100 mL/hr at 11/01/21 0621 New Bag at 11/01/21 0621   • dextrose 5% infusion   Intravenous Continuous Katherin Oconnor M.D. 30 mL/hr at 11/01/21 0502 New Bag at 11/01/21 0502   • amiodarone (NEXTERONE) 360 mg/200 mL infusion  0.5-1 mg/min Intravenous Continuous Katherin Oconnor M.D. 17 mL/hr at 11/01/21 1320 0.5 mg/min at 11/01/21 1320   • phosphorus (K-Phos-Neutral) per tablet 500 mg  500 mg Enteral Tube TID Johny Cooley D.O.       • levalbuterol (XOPENEX) 1.25 MG/3ML nebulizer solution 1.25 mg  1.25 mg Nebulization 4XDAY Johny Cooley D.O.   1.25 mg at 11/01/21 1529   • levalbuterol (XOPENEX) 0.63 MG/3ML nebulizer solution 1.25 mg  1.25 mg Nebulization Q4H PRN (RT) Johny Cooley D.O.       • NS (BOLUS) infusion 500 mL  500 mL Intravenous Once Kristina Franklin M.D.       • phenylephrine (LACI-SYNEPHRINE) 40 mg in  mL infusion  0-300 mcg/min Intravenous Continuous Juan Jimenez Jr., D.O.       • LORazepam (ATIVAN) tablet 0.5 mg  0.5 mg Enteral Tube Q4HRS PRN Johny Cooley D.O.   0.5 mg at 10/31/21 1652   • budesonide-formoterol (SYMBICORT) 80-4.5 MCG/ACT inhaler 2 Puff  2 Puff Inhalation Q12HRS Obi Lazcano M.D.   2 Puff at 10/31/21 1652   • nystatin (MYCOSTATIN) 304556 UNIT/ML suspension 500,000 Units  5 mL Swish & Swallow 4XDAY Obi Lazcano M.D.       • tiotropium (Spiriva Respimat) 2.5 mcg/Act inhalation spray 5  mcg  5 mcg Inhalation DAILY Obi Lazcano M.D.   5 mcg at 10/31/21 0733   • Respiratory Therapy Consult   Nebulization Continuous RT Obi Lazcano M.D.       • lactated ringers infusion (BOLUS)  500 mL Intravenous Once PRN Obi Lazcano M.D.       • Pharmacy Consult Request ...Pain Management Review 1 Each  1 Each Other PHARMACY TO DOSE Obi Lazcano M.D.       • cefTRIAXone (Rocephin) 2 g in  mL IVPB  2 g Intravenous Q24HRS Obi Lazcano M.D.   Stopped at 11/01/21 0649   • ondansetron (ZOFRAN) syringe/vial injection 4 mg  4 mg Intravenous Q4HRS PRN Obi Lazcano M.D.       • DILTIAZem (CARDIZEM) injection 10 mg  10 mg Intravenous Q6HRS PRN Obi Lazcano M.D.   10 mg at 11/01/21 1635   • Pharmacy Consult: Enteral tube insertion - review meds/change route/product selection  1 Each Other PHARMACY TO DOSE Obi Lazcano M.D.       • digoxin (Lanoxin) injection 125 mcg  125 mcg Intravenous DAILY AT 1800 Obi Lazcano M.D.   125 mcg at 10/31/21 1653   • [Held by provider] apixaban (ELIQUIS) tablet 5 mg  5 mg Enteral Tube BID Obi Lazcano M.D.   5 mg at 11/01/21 0622   • midodrine (PROAMATINE) tablet 5 mg  5 mg Enteral Tube TID WITH MEALS Obi Lazcano M.D.   5 mg at 11/01/21 1212   • predniSONE (DELTASONE) tablet 40 mg  40 mg Enteral Tube DAILY Obi Lazcano M.D.   40 mg at 11/01/21 0622   • pregabalin (LYRICA) capsule 75 mg  75 mg Enteral Tube BID Obi Lazcano M.D.   75 mg at 11/01/21 0622   • senna-docusate (PERICOLACE or SENOKOT S) 8.6-50 MG per tablet 2 Tablet  2 Tablet Enteral Tube BID Obi Lazcano M.D.   2 Tablet at 10/31/21 1652    And   • polyethylene glycol/lytes (MIRALAX) PACKET 1 Packet  1 Packet Enteral Tube QDAY PRN Obi Lazcano M.D.        And   • magnesium hydroxide (MILK OF MAGNESIA) suspension 30 mL  30 mL Enteral Tube QDAY PRN Obi Lazcano M.D.        And   • bisacodyl (DULCOLAX)  suppository 10 mg  10 mg Rectal QDAY PRN Obi Lazcano M.D.       • acetaminophen (Tylenol) tablet 650 mg  650 mg Enteral Tube Q6HRS PRN Obi Lazcano M.D.       • melatonin tablet 5 mg  5 mg Enteral Tube QHS PRN Obi Lazcano M.D.       • ondansetron (ZOFRAN ODT) dispertab 4 mg  4 mg Enteral Tube Q4HRS PRN Obi Lazcano M.D.       • oxyCODONE immediate-release (ROXICODONE) tablet 2.5 mg  2.5 mg Enteral Tube Q3HRS PRN Obi Lazcano M.D.        Or   • oxyCODONE immediate-release (ROXICODONE) tablet 5 mg  5 mg Enteral Tube Q3HRS PRN Obi Lazcano M.D.        Or   • HYDROmorphone (Dilaudid) injection 0.25 mg  0.25 mg Intravenous Q3HRS PRN Obi Lazcano M.D.   0.25 mg at 11/01/21 0511   • guaiFENesin (ROBITUSSIN) 100 MG/5ML solution 200 mg  10 mL Enteral Tube Q4HRS PRN Obi Lazcano M.D.       • doxycycline monohydrate (ADOXA) tablet 100 mg  100 mg Enteral Tube Q12HRS Obi Lazcano M.D.   100 mg at 11/01/21 0622       Allergies  Allergies   Allergen Reactions   • Morphine Sulfate Unspecified     Hallucinations        Vital Signs last 24 hours  Temp:  [36.3 °C (97.3 °F)-36.6 °C (97.8 °F)] 36.6 °C (97.8 °F)  Pulse:  [] 145  Resp:  [16-22] 18  BP: ()/(45-87) 122/87  SpO2:  [92 %-98 %] 97 %    Physical Exam  Physical Exam  Vitals and nursing note reviewed.   HENT:      Head: Normocephalic and atraumatic.      Right Ear: External ear normal.      Left Ear: External ear normal.      Nose: Nose normal.      Mouth/Throat:      Mouth: Mucous membranes are dry.      Pharynx: Oropharynx is clear.   Eyes:      Extraocular Movements: Extraocular movements intact.      Conjunctiva/sclera: Conjunctivae normal.   Cardiovascular:      Rate and Rhythm: Tachycardia present. Rhythm irregular.      Pulses: Normal pulses.   Pulmonary:      Breath sounds: Wheezing and rhonchi present.   Musculoskeletal:         General: No tenderness or signs of injury. Normal range of  motion.      Cervical back: Neck supple.   Skin:     Capillary Refill: Capillary refill takes 2 to 3 seconds.      Findings: Rash (diffuse plaques) present.   Neurological:      General: No focal deficit present.      Mental Status: He is disoriented.      GCS: GCS eye subscore is 4. GCS verbal subscore is 3. GCS motor subscore is 6.      Cranial Nerves: No cranial nerve deficit.      Motor: No weakness.   Psychiatric:         Behavior: Behavior is agitated.         Cognition and Memory: Cognition is impaired. Memory is impaired.         Judgment: Judgment is impulsive.         Fluids    Intake/Output Summary (Last 24 hours) at 11/1/2021 1825  Last data filed at 11/1/2021 1234  Gross per 24 hour   Intake 220 ml   Output 500 ml   Net -280 ml       Laboratory  Recent Results (from the past 48 hour(s))   CBC with Differential    Collection Time: 10/31/21  3:02 AM   Result Value Ref Range    WBC 28.5 (H) 4.8 - 10.8 K/uL    RBC 3.26 (L) 4.70 - 6.10 M/uL    Hemoglobin 8.5 (L) 14.0 - 18.0 g/dL    Hematocrit 27.8 (L) 42.0 - 52.0 %    MCV 85.3 81.4 - 97.8 fL    MCH 26.1 (L) 27.0 - 33.0 pg    MCHC 30.6 (L) 33.7 - 35.3 g/dL    RDW 54.6 (H) 35.9 - 50.0 fL    Platelet Count 68 (L) 164 - 446 K/uL    MPV 12.3 9.0 - 12.9 fL    Neutrophils-Polys 93.00 (H) 44.00 - 72.00 %    Lymphocytes 0.00 (L) 22.00 - 41.00 %    Monocytes 0.90 0.00 - 13.40 %    Eosinophils 0.00 0.00 - 6.90 %    Basophils 0.00 0.00 - 1.80 %    Nucleated RBC 0.10 /100 WBC    Neutrophils (Absolute) 27.73 (H) 1.82 - 7.42 K/uL    Lymphs (Absolute) 0.00 (L) 1.00 - 4.80 K/uL    Monos (Absolute) 0.26 0.00 - 0.85 K/uL    Eos (Absolute) 0.00 0.00 - 0.51 K/uL    Baso (Absolute) 0.00 0.00 - 0.12 K/uL    NRBC (Absolute) 0.03 K/uL    Hypochromia 1+     Anisocytosis 1+     Microcytosis 1+    Comp Metabolic Panel (CMP)    Collection Time: 10/31/21  3:02 AM   Result Value Ref Range    Sodium 139 135 - 145 mmol/L    Potassium 4.9 3.6 - 5.5 mmol/L    Chloride 102 96 - 112 mmol/L     Co2 28 20 - 33 mmol/L    Anion Gap 9.0 7.0 - 16.0    Glucose 126 (H) 65 - 99 mg/dL    Bun 18 8 - 22 mg/dL    Creatinine 0.47 (L) 0.50 - 1.40 mg/dL    Calcium 8.0 (L) 8.5 - 10.5 mg/dL    AST(SGOT) 89 (H) 12 - 45 U/L    ALT(SGPT) 37 2 - 50 U/L    Alkaline Phosphatase 261 (H) 30 - 99 U/L    Total Bilirubin 5.1 (H) 0.1 - 1.5 mg/dL    Albumin 1.8 (L) 3.2 - 4.9 g/dL    Total Protein 5.0 (L) 6.0 - 8.2 g/dL    Globulin 3.2 1.9 - 3.5 g/dL    A-G Ratio 0.6 g/dL   CRP Quantitive (Non-Cardiac)    Collection Time: 10/31/21  3:02 AM   Result Value Ref Range    Stat C-Reactive Protein 25.73 (H) 0.00 - 0.75 mg/dL   Prealbumin    Collection Time: 10/31/21  3:02 AM   Result Value Ref Range    Pre-Albumin <3.0 (L) 18.0 - 38.0 mg/dL   proBrain Natriuretic Peptide, NT    Collection Time: 10/31/21  3:02 AM   Result Value Ref Range    NT-proBNP 1160 (H) 0 - 125 pg/mL   ESTIMATED GFR    Collection Time: 10/31/21  3:02 AM   Result Value Ref Range    GFR If African American >60 >60 mL/min/1.73 m 2    GFR If Non African American >60 >60 mL/min/1.73 m 2   DIFFERENTIAL MANUAL    Collection Time: 10/31/21  3:02 AM   Result Value Ref Range    Bands-Stabs 4.30 0.00 - 10.00 %    Myelocytes 0.90 %    Progranulocytes 0.90 %    Manual Diff Status PERFORMED    PERIPHERAL SMEAR REVIEW    Collection Time: 10/31/21  3:02 AM   Result Value Ref Range    Peripheral Smear Review see below    PLATELET ESTIMATE    Collection Time: 10/31/21  3:02 AM   Result Value Ref Range    Plt Estimation Decreased    MORPHOLOGY    Collection Time: 10/31/21  3:02 AM   Result Value Ref Range    RBC Morphology Present     Poikilocytosis 2+     Ovalocytes 1+     Target Cells 2+    EKG    Collection Time: 10/31/21  7:48 AM   Result Value Ref Range    Report       [facility]    Test Date:  2021-10-31  Pt Name:    CARLOS GO                     Department: 171  MRN:        [id number]                      Room:       T712  Gender:     Male                         Technician:  [facility]  :        1945                   Requested By:NIURKA BURNETT  Order #:    [id number]                    Reading MD:    Measurements  Intervals                                Axis  Rate:       82                           P:          25  NV:         164                          QRS:        16  QRSD:       68                           T:          22  QT:         448  QTc:        524    Interpretive Statements  SINUS RHYTHM  LOW VOLTAGE THROUGHOUT  PROLONGED QT INTERVAL  Compared to ECG 10/30/2021 09:44:13  Prolonged QT interval now present  Supraventricular tachycardia no longer present  Early repolarization no longer present     MAGNESIUM    Collection Time: 10/31/21  8:56 PM   Result Value Ref Range    Magnesium 2.0 1.5 - 2.5 mg/dL   LDH    Collection Time: 10/31/21  8:56 PM   Result Value Ref Range    LDH Total 296 (H) 107 - 266 U/L   BILIRUBIN DIRECT    Collection Time: 10/31/21  8:56 PM   Result Value Ref Range    Direct Bilirubin 5.3 (H) 0.1 - 0.5 mg/dL   LACTIC ACID    Collection Time: 10/31/21  8:56 PM   Result Value Ref Range    Lactic Acid 2.6 (H) 0.5 - 2.0 mmol/L   PHOSPHORUS    Collection Time: 10/31/21  8:56 PM   Result Value Ref Range    Phosphorus 1.6 (L) 2.5 - 4.5 mg/dL   ABG - LAB    Collection Time: 21  3:25 AM   Result Value Ref Range    Ph 7.47 7.40 - 7.50    Pco2 44.9 (H) 26.0 - 37.0 mmHg    Po2 101.4 (H) 64.0 - 87.0 mmHg    O2 Saturation 97.3 93.0 - 99.0 %    Hco3 32 (H) 17 - 25 mmol/L    Base Excess 7 (H) -4 - 3 mmol/L    Body Temp 36.4 Centigrade    O2 Therapy 7.0 2.0 - 10.0 L/min    Ph -TC 7.48 7.40 - 7.50    Pco2 -TC 43.7 (H) 26.0 - 37.0 mmHg   CBC WITH DIFFERENTIAL    Collection Time: 21  3:32 AM   Result Value Ref Range    WBC 17.8 (H) 4.8 - 10.8 K/uL    RBC 3.11 (L) 4.70 - 6.10 M/uL    Hemoglobin 8.3 (L) 14.0 - 18.0 g/dL    Hematocrit 25.8 (L) 42.0 - 52.0 %    MCV 83.0 81.4 - 97.8 fL    MCH 26.7 (L) 27.0 - 33.0 pg    MCHC 32.2 (L) 33.7 - 35.3 g/dL    RDW 53.2  (H) 35.9 - 50.0 fL    Platelet Count 50 (L) 164 - 446 K/uL    MPV 12.1 9.0 - 12.9 fL    Neutrophils-Polys 100.00 (H) 44.00 - 72.00 %    Lymphocytes 0.00 (L) 22.00 - 41.00 %    Monocytes 0.00 0.00 - 13.40 %    Eosinophils 0.00 0.00 - 6.90 %    Basophils 0.00 0.00 - 1.80 %    Nucleated RBC 0.30 /100 WBC    Neutrophils (Absolute) 17.80 (H) 1.82 - 7.42 K/uL    Lymphs (Absolute) 0.00 (L) 1.00 - 4.80 K/uL    Monos (Absolute) 0.00 0.00 - 0.85 K/uL    Eos (Absolute) 0.00 0.00 - 0.51 K/uL    Baso (Absolute) 0.00 0.00 - 0.12 K/uL    NRBC (Absolute) 0.05 K/uL    Hypochromia 2+ (A)     Anisocytosis 1+     Macrocytosis 1+    Comp Metabolic Panel    Collection Time: 11/01/21  3:32 AM   Result Value Ref Range    Sodium 141 135 - 145 mmol/L    Potassium 4.4 3.6 - 5.5 mmol/L    Chloride 104 96 - 112 mmol/L    Co2 31 20 - 33 mmol/L    Anion Gap 6.0 (L) 7.0 - 16.0    Glucose 123 (H) 65 - 99 mg/dL    Bun 16 8 - 22 mg/dL    Creatinine 0.29 (L) 0.50 - 1.40 mg/dL    Calcium 8.1 (L) 8.5 - 10.5 mg/dL    AST(SGOT) 134 (H) 12 - 45 U/L    ALT(SGPT) 48 2 - 50 U/L    Alkaline Phosphatase 322 (H) 30 - 99 U/L    Total Bilirubin 6.4 (H) 0.1 - 1.5 mg/dL    Albumin 1.7 (L) 3.2 - 4.9 g/dL    Total Protein 4.5 (L) 6.0 - 8.2 g/dL    Globulin 2.8 1.9 - 3.5 g/dL    A-G Ratio 0.6 g/dL   MAGNESIUM    Collection Time: 11/01/21  3:32 AM   Result Value Ref Range    Magnesium 2.0 1.5 - 2.5 mg/dL   PHOSPHORUS    Collection Time: 11/01/21  3:32 AM   Result Value Ref Range    Phosphorus 2.2 (L) 2.5 - 4.5 mg/dL   AMMONIA    Collection Time: 11/01/21  3:32 AM   Result Value Ref Range    Ammonia 39 11 - 45 umol/L   LDH    Collection Time: 11/01/21  3:32 AM   Result Value Ref Range    LDH Total 323 (H) 107 - 266 U/L   ESTIMATED GFR    Collection Time: 11/01/21  3:32 AM   Result Value Ref Range    GFR If African American >60 >60 mL/min/1.73 m 2    GFR If Non African American >60 >60 mL/min/1.73 m 2   PROCALCITONIN    Collection Time: 11/01/21  3:32 AM   Result Value  Ref Range    Procalcitonin 1.29 (H) <0.25 ng/mL   DIFFERENTIAL MANUAL    Collection Time: 21  3:32 AM   Result Value Ref Range    Manual Diff Status PERFORMED    PERIPHERAL SMEAR REVIEW    Collection Time: 21  3:32 AM   Result Value Ref Range    Peripheral Smear Review see below    PLATELET ESTIMATE    Collection Time: 21  3:32 AM   Result Value Ref Range    Plt Estimation Decreased    MORPHOLOGY    Collection Time: 21  3:32 AM   Result Value Ref Range    RBC Morphology Present     Large Platelets 1+     Target Cells 1+     Toxic Gran Marked    EKG    Collection Time: 21  4:55 AM   Result Value Ref Range    Report       [facility]    Test Date:  2021  Pt Name:    CARLOS GO                     Department: 171  MRN:        [id number]                      Room:       T712  Gender:     Male                         Technician: YARON  :        1945                   Requested By:VALENTINA ELMORE  Order #:    [id number]                    Reading MD: Dallas Barber MD    Measurements  Intervals                                Axis  Rate:       141                          P:          0  TN:         172                          QRS:        64  QRSD:       100                          T:          -76  QT:         336  QTc:        515    Interpretive Statements  SUPRAVENTRICULAR TACHYCARDIA  LOW VOLTAGE IN FRONTAL LEADS  BORDERLINE T ABNORMALITIES, DIFFUSE LEADS  Electronically Signed On 2021 6:22:37 PDT by Dallas Barber MD     CRP Quantitive (Non-Cardiac)    Collection Time: 21  2:03 PM   Result Value Ref Range    Stat C-Reactive Protein 20.44 (H) 0.00 - 0.75 mg/dL   Prealbumin    Collection Time: 21  2:03 PM   Result Value Ref Range    Pre-Albumin <3.0 (L) 18.0 - 38.0 mg/dL   LACTIC ACID    Collection Time: 21  5:21 PM   Result Value Ref Range    Lactic Acid 3.7 (H) 0.5 - 2.0 mmol/L       Imaging  DX-CHEST-PORTABLE (1 VIEW)   Final Result      No significant  interval change.      DX-CHEST-LIMITED (1 VIEW)   Final Result      No significant interval change.      DX-ABDOMEN FOR TUBE PLACEMENT   Final Result      1.  Feeding tube tip projects over the right upper quadrant. The tip is probably in the distal stomach.      US-RUQ   Final Result         1.  Hepatomegaly and echogenic liver, compatible with fatty change versus fibrosis.   2.  Cholelithiasis without additional sonographic findings of acute cholecystitis.   3.  Splenomegaly   4.  Trace bilateral upper quadrant ascites      DX-CHEST-PORTABLE (1 VIEW)   Final Result         1.  Pulmonary edema and/or infiltrates.   2.  Cardiomegaly          Assessment/Plan  * Shock (HCC)- (present on admission)  Assessment & Plan  Likely multifactorial: septic being the biggest  with cardiogenic and hypovolemic also playing a role  He is incredibly fluid responsive, will continue small fluid boluses until euvolemic  Titrating vasopressors to maintain SBP >90 mmHg and MAP >65 mmHg    Encephalopathy acute- (present on admission)  Assessment & Plan  Likely multifactorial including metabolic disturbances and sepsis  Check ammonia  Limit sedatives as able    Anemia- (present on admission)  Assessment & Plan  Currently stable at 8.8  Transfuse for hemoglobin less than 7    Elevated LFTs- (present on admission)  Assessment & Plan  Worsening, will continue  Right upper quadrant ultrasound with hepatic steatosis, cholelithiasis without cholecystitis and trace ascites  Limit hepatotoxins  Monitor synthetic function    Sepsis (HCC)- (present on admission)  Assessment & Plan  This is Severe Sepsis Present on admission  SIRS criteria identified on my evaluation include: Tachycardia, with heart rate greater than 90 BPM, Tachypnea, with respirations greater than 20 per minute and Leukocytosis, with WBC greater than 12,000  Source is pulmonary  Clinical indicators of end organ dysfunction include Acute respiratory failure as evidenced by a  new need for invasive or non-invasive mechanical ventilation (Ventilator or BiPap)   Sepsis protocol initiated  Fluid resuscitation ordered per protocol  IV antibiotics as appropriate for source of sepsis  Reassessment: I have reassessed the patient's hemodynamic status  End organ dysfunction include(s):  Acute respiratory failure and Encephalopathy (metabolic)     sepsis protocol  source targeted antibiotics: rocephin/doxycycline  PRN IVF bolus to maintain MAP >65 mmHg  Pressors if needed to maintain MAP >65 mmHg  blood, respiratory and urine cultures  lactate every 4 hours until normalized or downtrending    Pneumonia- (present on admission)  Assessment & Plan  Continue Rocephin and doxycycline (prolonged QTC)  Follow-up cultures  Covid negative    COPD exacerbation (HCC)- (present on admission)  Assessment & Plan  On 3L NC at home  Transition prednisone to IV solumedrol  Change albuterol to xopenex and schedule q 4hrs with atrovent  RT/O2 Protocols  Titrate supplemental FiO2 to maintain SpO2 >88%    Pulmonary hypertension (HCC)- (present on admission)  Assessment & Plan  Judicious IV fluid use  Closely monitoring in ICU    Acute hypoxemic respiratory failure (HCC)- (present on admission)  Assessment & Plan  Secondary to acute exacerbation of chronic obstructive pulmonary disease and possibly pneumonia  COVID negative    Atrial fibrillation with RVR (HCC)- (present on admission)  Assessment & Plan  Hx of, now worsened in the setting of sepsis and acute exacerbation of COPD  Treat underlying conditions  Continue digoxin, amiodarone and beta-blocker (if hemodynamics allow)  Continue apixaban given elevated [id number]  Will reload amiodarone at this time    Lymphoma (HCC)- (present on admission)  Assessment & Plan  Follows with Dr. Garcia  Is scheduled to start chemotherapy soon as an outpatient    Thrombocytopenia (CMS-HCC)- (present on admission)  Assessment & Plan  No active bleeding, continue to  monitor    Hyponatremia- (present on admission)  Assessment & Plan  resolved      Discussed patient condition and risk of morbidity and/or mortality with Hospitalist, RN, RT, Pharmacy, Code status disscussed, Charge nurse / hot rounds and Patient.      The patient remains critically ill.  Critical care time = 80 minutes in directly providing and coordinating critical care and extensive data review.  No time overlap and excludes procedures.

## 2021-11-02 NOTE — ASSESSMENT & PLAN NOTE
Likely multifactorial including metabolic disturbances and sepsis  Check ammonia  Limit sedatives as able

## 2021-11-02 NOTE — ASSESSMENT & PLAN NOTE
Secondary to acute exacerbation of chronic obstructive pulmonary disease and possibly pneumonia  COVID negative

## 2021-11-02 NOTE — DIETARY
Nutrition Services: Update   Pt transferred from T7 to [facility] overnight for ongoing hemodynamic support in the setting of multiorgan failure with liver failure and lymphoma.  Pt currently receiving Replete with Fiber @ 60 mL/hr, providing 1440 kcal, 92 gm protein, and 1195 mL of free water per day.  D/t increased pressor support, will adjust to fiber-free formula: Impact Peptide 1.5 @ 40 mL/hr, providing 1440 kcal, 90 gm protein, 134 gm CHO, and 739 mL of free water per day.   RD continues to monitor.

## 2021-11-02 NOTE — THERAPY
Missed Therapy     Patient Name: Gary Severino Gil  Age:  76 y.o., Sex:  male  Medical Record #: [id number]  Today's Date: 11/2/2021    Patient with change in medical status and transferred to a higher level of care secondary to rapid response. Given this change in status will defer treatment for today and resume therapy as appropriate in collaboration with the care team.

## 2021-11-02 NOTE — PROGRESS NOTES
Pt brought to room by rapid response team. Pt is tachycardic in the 140s and hypotensive SBP 70s-80s. Pt is alert to self. 1L NS bolus infusing. Orders to start eros gtt. Pt in bilateral soft wrist restraints.

## 2021-11-02 NOTE — PROCEDURES
VIDEO ELECTROENCEPHALOGRAM REPORT      Referring provider: Dr. Fonseca    DOS:  11/02/21  (total recording of 29 minutes).     INDICATION:  Gary Severino Gil 76 y.o. male presenting with altered mental status    CURRENT ANTIEPILEPTIC REGIMEN: Lyrica and ativan     TECHNIQUE: 30 channel video electroencephalogram (EEG) was performed in accordance with the international 10-20 system. The study was reviewed in bipolar and referential montages. The recording examined the patient during confused, drowsy and sleep states    DESCRIPTION OF THE RECORD:  During maximal arousal,  the background consisted of intermixed theta and delta slowing.No well-formed posterior dominant rhythm or anterior-posterior gradient was seen.  With drowsiness, there was attenuation of the background theta activity and increased fast frequency. As the patient enters into sleep, rudimentary vertex waves were noted.     ACTIVATION PROCEDURE:  hyperventilation was not performed    Intermittent Photic stimulation was performed in a stepwise fashion from 1 to 30 Hz and elicited no photic driving response.     ICTAL AND/OR INTERICTAL FINDINGS:   No focal or generalized epileptiform activity noted. No regional slowing was seen during this routine study.  No clinical events or seizures were reported or recorded during the study.     EKG: sampling of the EKG recording demonstrated sinus tachycardia.      EVENTS: none     INTERPRETATION:    This is an abnormal video EEG recording in the confused, drowsy and sleep states. The diffuse background slowing is consistent with moderate to severe encephalopathy.  No epileptiform discharges or seizures were seen. This does not preclude a diagnosis of epilepsy.     Joel Valdovinos MD  Diplomate in Neurology&Epilepsy  Office: [phone]  Fax: [phone]

## 2021-11-02 NOTE — CARE PLAN
The patient is Unstable - High likelihood or risk of patient condition declining or worsening    Shift Goals  Clinical Goals: manage HR  Patient Goals: discharge  Family Goals: RACHEL    Progress made toward(s) clinical / shift goals:        Problem: Knowledge Deficit - COPD  Goal: Patient/significant other demonstrates understanding of disease process, utilization of the Action Plan, medications and discharge instruction  Outcome: Progressing     Problem: Nutrition - Advanced  Goal: Patient will display progressive weight gain toward goal have adequate food and fluid intake  Outcome: Progressing     Problem: Ineffective Airway Clearance  Goal: Patient will maintain patent airway with clear/clearing breath sounds  Outcome: Progressing     Problem: Impaired Gas Exchange  Goal: Patient will demonstrate improved ventilation and adequate oxygenation and participate in treatment regimen within the level of ability/situation.  Outcome: Progressing     Problem: Self Care  Goal: Patient will have the ability to perform ADLs independently or with assistance (bathe, groom, dress, toilet and feed)  Outcome: Progressing     Problem: Risk for Aspiration  Goal: Patient's risk for aspiration will be absent or decrease  Outcome: Progressing     Problem: Hemodynamics  Goal: Patient's hemodynamics, fluid balance and neurologic status will be stable or improve  Outcome: Progressing     Problem: Fluid Volume  Goal: Fluid volume balance will be maintained  Outcome: Progressing     Problem: Respiratory  Goal: Patient will achieve/maintain optimum respiratory ventilation and gas exchange  Outcome: Progressing     Problem: Urinary - Renal Perfusion  Goal: Ability to achieve and maintain adequate renal perfusion and functioning will improve  Outcome: Progressing     Problem: Safety - Medical Restraint  Goal: Remains free of injury from restraints (Restraint for Interference with Medical Device)  Outcome: Progressing     Problem: Skin  Integrity  Goal: Skin integrity is maintained or improved  Outcome: Progressing     Problem: Fall Risk  Goal: Patient will remain free from falls  Outcome: Progressing     Problem: Pain - Standard  Goal: Alleviation of pain or a reduction in pain to the patient’s comfort goal  Outcome: Progressing     Patient is not progressing towards the following goals:

## 2021-11-02 NOTE — PROGRESS NOTES
Critical care~    Family at bedside, updated regarding critical condition with ongoing vasopressor requirement despite aggressive crystalloid resuscitation.  He has ongoing respiratory distress, altered and not protecting his airway well.  He has previously endorsed DNR/DNI status.    Given the patient's critical condition, overall declining health, multiorgan dysfunction, and overall poor prognosis for good functional outcome, the patient's daughter Nayeli would like to transition to comfort focused care which is entirely appropriate.    Family would like a  at bedside.  Comfort focused care will be initiated.

## 2021-11-03 NOTE — CARE PLAN
Problem: Knowledge Deficit - COPD  Goal: Patient/significant other demonstrates understanding of disease process, utilization of the Action Plan, medications and discharge instruction  11/2/2021 1940 by Latricia Velazco R.N.  Outcome: Met    Problem: Risk for Infection - COPD  Goal: Patient will remain free from signs and symptoms of infection  11/2/2021 1940 by Latricia Velazco R.N.  Outcome: Met     Problem: Nutrition - Advanced  Goal: Patient will display progressive weight gain toward goal have adequate food and fluid intake  11/2/2021 1940 by Latricia Velazco R.N.  Outcome: Met    Problem: Ineffective Airway Clearance  Goal: Patient will maintain patent airway with clear/clearing breath sounds  11/2/2021 1940 by Latricia Velazco R.N.  Outcome: Met     Problem: Impaired Gas Exchange  Goal: Patient will demonstrate improved ventilation and adequate oxygenation and participate in treatment regimen within the level of ability/situation.  11/2/2021 1940 by Latricia Velazco R.N.  Outcome: Met     Problem: Risk for Aspiration  Goal: Patient's risk for aspiration will be absent or decrease  11/2/2021 1940 by Latricia Velazco R.N.  Outcome: Met     Problem: Self Care  Goal: Patient will have the ability to perform ADLs independently or with assistance (bathe, groom, dress, toilet and feed)  11/2/2021 1940 by Latricia Velazco R.N.  Outcome: Met    Problem: Hemodynamics  Goal: Patient's hemodynamics, fluid balance and neurologic status will be stable or improve  11/2/2021 1940 by Latricia Velazco R.N.  Outcome: Met    Problem: Fluid Volume  Goal: Fluid volume balance will be maintained  11/2/2021 1940 by Latricia Velazco R.N.  Outcome: Met    Problem: Urinary - Renal Perfusion  Goal: Ability to achieve and maintain adequate renal perfusion and functioning will improve  11/2/2021 1940 by Latricia Velazco R.N.  Outcome: Met    Problem: Respiratory  Goal: Patient will achieve/maintain optimum respiratory ventilation and gas  exchange  11/2/2021 1940 by Latricia Velazco R.N.  Outcome: Met       Problem: Mechanical Ventilation  Goal: Safe management of artificial airway and ventilation  11/2/2021 1940 by Latricia Velazco R.N.  Outcome: Met    Goal: Successful weaning off mechanical ventilator, spontaneously maintains adequate gas exchange  11/2/2021 1940 by Latricia Velazco R.N.  Outcome: Met    Goal: Patient will be able to express needs and understand communication  11/2/2021 1940 by Latricia Velazco R.N.  Outcome: Met     Problem: Physical Regulation  Goal: Diagnostic test results will improve  11/2/2021 1940 by Latricia Velazco R.N.  Outcome: Met    Goal: Signs and symptoms of infection will decrease  11/2/2021 1940 by Latricia Velazco R.N.  Outcome: Met       Problem: Knowledge Deficit - Standard  Goal: Patient and family/care givers will demonstrate understanding of plan of care, disease process/condition, diagnostic tests and medications  11/2/2021 1940 by Latricia Velazco R.N.  Outcome: Met       Problem: Safety - Medical Restraint  Goal: Remains free of injury from restraints (Restraint for Interference with Medical Device)  11/2/2021 1940 by Latricia Velazco R.N.  Outcome: Met  11/2/2021 0739 by Latricia Velazco R.N.  Outcome: Progressing  Goal: Free from restraint(s) (Restraint for Interference with Medical Device)  11/2/2021 1940 by Latricia Velazco R.N.  Outcome: Met       Problem: Bronchoconstriction  Goal: Improve in air movement and diminished wheezing  Outcome: Met     Problem: Skin Integrity  Goal: Skin integrity is maintained or improved  11/2/2021 1940 by Latricia Velazco R.N.  Outcome: Met     Problem: Fall Risk  Goal: Patient will remain free from falls  11/2/2021 1940 by Latricia Velazco R.N.  Outcome: Met    Problem: Pain - Standard  Goal: Alleviation of pain or a reduction in pain to the patient’s comfort goal  11/2/2021 1940 by Latricia Velazco R.N.  Outcome: Met

## 2021-11-04 NOTE — DISCHARGE SUMMARY
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    Comorbid Conditions at the Time of Death  Principal Problem:    Sepsis (HCC) POA: Yes  Active Problems:    Atrial fibrillation with RVR (HCC) POA: Yes    Acute hypoxemic respiratory failure (HCC) POA: Yes    Pneumonia POA: Yes    Encephalopathy acute POA: Yes    COPD exacerbation (HCC) POA: Yes    Hyponatremia POA: Yes    Thrombocytopenia (CMS-HCC) POA: Yes    Lymphoma (HCC) (Chronic) POA: Yes    Pulmonary hypertension (HCC) POA: Yes    Elevated LFTs POA: Yes    Shock (HCC) POA: Yes    Anemia POA: Yes  Resolved Problems:    * No resolved hospital problems. *      History of Presenting Illness and Hospital Course  This is a 76 y.o. 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  Transfer to ICU 11/1 with hypotension, AF/RVR, sepsis secondary to pneumonia.  Treated with fluid bolus, amiodarone load and started on phenylephrine.  DNR/DNI.    He was treated with IV amiodarone, broad-spectrum antibiotics and corticosteroids.  He remained altered and was unable to participate in decision-making.    I had an in-depth discussion with the patient's daughter Nayeli at bedside as well as his ex-wife regarding critical condition with ongoing vasopressor requirement despite aggressive crystalloid resuscitation.  He has ongoing respiratory distress, altered and not protecting his airway well.  He has previously endorsed DNR/DNI status.     Given the patient's critical condition, overall declining health, multiorgan dysfunction, and overall poor prognosis for good functional outcome, the patient's daughter opted for transition to comfort focused care which is entirely appropriate.  He passed peacefully with family at bedside.    Death Date: 11/02/21   Death Time: 5279

## 2021-11-10 NOTE — DOCUMENTATION QUERY
[facility]                                                                       Query Response Note      PATIENT:               CARLOS GO  ACCT #:                  [id number]  MRN:                     [id number]  :                      1945  ADMIT DATE:       10/19/2021 12:47 PM  DISCH DATE:        10/29/2021 1:50 PM  RESPONDING  PROVIDER #:        [id number]           QUERY TEXT:    This patient was treated with antibiotics although no source of infection was identified.    Can a diagnosis be provided based on the clinical indicators documented in the medical record.       NOTE:  If an appropriate response is not listed below, please respond with a new note.      The patient's Clinical Indicators include:  Clinical indicators  Laboratory  Recent Labs    10/19/21  1300 10/20/21 0306         10/21/21 0028  WBC 13.6*                 12.3*                 12.3*    ED: pulse 150, resp 39  H&P: pulse 130, resp 27    Sepsis (HCC)- (present on admission)  Assessment & Plan  This is Sepsis Present on admission  SIRS criteria identified on my evaluation include: Tachycardia, with heart rate greater than 90 BPM, Tachypnea, with respirations greater than 20 per minute and Leukocytosis, with WBC greater than 12,000  Source is Unknown    Acute hypoxemic respiratory failure (HCC)- (present on admission)  Assessment & Plan  In setting of t cell lymphoma chest mass, and chronic copd, now in uncontrolled afib on presentation    --blood cultures negative    Treatment or Monitoring  IV antibiotics     Risk Factors:  T-cell lymphoma/leukemia-awaiting induction chemo  Patient admitted with atrial fibrillation with RVR  COPD: leukocytosis chronically elevated;  EtOH dependence - no withdrawal this admission    Coders contact information:  Corina Szymanski CCS  Coding   [email or web address]  Options provided:   -- Sepsis ruled in   -- SIRS  of non-infectious origin, (no sepsis)   -- SIRS of non-infectious origin with acute organ dysfunction (no sepsis)   -- Unable to determine      Query created by: Corina Szymanski on 11/9/2021 5:13 PM    RESPONSE TEXT:    Unable to determine          Electronically signed by:  ASTER REYNOLDS MD 11/9/2021 5:16 PM

## 2021-11-19 NOTE — DOCUMENTATION QUERY
[facility]                                                                       Query Response Note      PATIENT:               CARLOS GO  ACCT #:                  [id number]  MRN:                     [id number]  :                      1945  ADMIT DATE:       10/30/2021 4:20 AM  DISCH DATE:        2021 3:55 PM  RESPONDING  PROVIDER #:        [id number]           QUERY TEXT:    Multiorgan failure including liver failure is documented in the Progress Note of . Per subsequent Progress Notes and Discharge Summary; liver failure is no longer being documented. Please clarify the status of this condition.     NOTE:  If an appropriate response is not listed below, please respond with a new note.    The patient's clinical indicators include:  Per Progress Note   -Patient has multiorgan failure with liver failure and lymphoma    Per Progress Note   -Elevated LFTs  -Worsening, will continue   -Right upper quadrant ultrasound with hepatic steatosis, cholelithiasis without cholecystitis and trace ascites    Results Review:   , 89, 134, 161  ALT 49, 37, 48, 60  Alkaline Phosphatase 315, 261, 322, 389  Total Bilirubin 4.3, 5.1 6.4, 7.1  Direct Bilirubin 5.3    Treatment:   Transfer to ICU, limit hepatotoxins, monitor labs, RUQ ultrasound, IV NS & LR bolus, Cem-Synephrine infusion, amiodarone infusion, Rocephin, & doxycycline    Risk Factors:   Sepsis with septic shock, atrial fibrillation with RVR, acute respiratory failure, COPD exacerbation, metabolic encephalopathy, pneumonia, & hx T-cell lymphoma    Thank You,  Kim Giron RN BSN  Clinical   Connect via [employer]  Options provided:   -- Acute liver failure is a clinically relevant diagnosis   -- Chronic liver failure is a clinically relevant diagnosis   -- Finding of no clinical significance   -- Unable to determine      Query created by:  Kim Giron on 11/8/2021 10:32 AM    RESPONSE TEXT:    Unable to determine       QUERY TEXT:    Low phosphorus levels are noted in the Lab Results. Can a diagnosis be provided to support this finding?    NOTE:  If an appropriate response is not listed below, please respond with a new note.    The patient's Clinical Indicators include:  Phosphorus 1.6, 2.2, 2.8    Treatment:   Potassium phosphate 15mmol IVPB, K-Phous-Neutral via FT, & monitor labs     Risk Factors:   Sepsis with septic shock, atrial fibrillation with RVR, acute respiratory failure, COPD exacerbation, metabolic encephalopathy, pneumonia, hyponatremia, & hx T-cell lymphoma    Thank You,  Kim Giron RN BSN  Clinical   Connect via [email or web address]  Options provided:   -- Hypophosphatemia   -- Findings are clinically insignificant   -- Unable to determine      Query created by: Kim Giron on 11/8/2021 10:43 AM    RESPONSE TEXT:    Hypophosphatemia       QUERY TEXT:    Severe malnutrition is noted in the Registered Dietitian Evaluation. Can a diagnosis be provided to support this finding?     NOTE:  If an appropriate response is not listed below, please respond with a new note.    The patient's Clinical Indicators include:  Per Registered Dietitian Evaluation  -Severe malnutrition in context of acute vs. chronic illness,   -evidenced by energy intake < 75% of needs for 1 month (per records of recent admits) and 7% weight loss in 1 month (severe).     Treatment:   Registered Dietitian Evaluation, tube feeding, I&O, trend labs, & monitor weight     Risk Factors:   Hx of T-cell lymphoma, sepsis with septic shock, atrial fibrillation with RVR, acute respiratory failure, COPD exacerbation, metabolic encephalopathy, & pneumonia    Thank You,  Kim Giron RN BSN  Clinical   Connect via [email or web address]  Options provided:   -- Agree with Registered Dietitian assessment of severe protein calorie  malnutrition   -- Disagree with Registered Dietitian assessment of severe protein calorie malnutrition   -- Unable to determine      Query created by: Kim Giron on 11/8/2021 10:51 AM    RESPONSE TEXT:    Agree with Registered Dietitian assessment of severe protein calorie malnutrition          Electronically signed by:  HARPREET DE LA FUENTE MD 11/19/2021 10:55 AM

## 2024-05-13 NOTE — TELEPHONE ENCOUNTER
Left a message for the patient to contact our office back to reschedule his appointment from 02/06/2019 to 02/25/2019 at 2:20. Due to  not being in the office.   For reevaluation        DISCHARGE MEDICATIONS:     Medication List        ASK your doctor about these medications      diclofenac sodium 1 % Gel  Commonly known as: VOLTAREN  Apply 2 g topically 4 times daily as needed for Pain (legs)     furosemide 40 MG tablet  Commonly known as: LASIX     gabapentin 300 MG capsule  Commonly known as: NEURONTIN  Take 1 capsule by mouth 3 times daily for 180 days. Max Daily Amount: 900 mg     magnesium oxide 400 (240 Mg) MG tablet  Commonly known as: MAG-OX  Take 1 tablet by mouth daily     metoprolol tartrate 25 MG tablet  Commonly known as: LOPRESSOR  Take 1 tablet by mouth daily     midodrine 2.5 MG tablet  Commonly known as: PROAMATINE  Take 1 tablet by mouth 2 times daily as needed (BP < 100)     pantoprazole 40 MG tablet  Commonly known as: PROTONIX  Take 1 tablet by mouth daily     potassium chloride 20 MEQ extended release tablet  Commonly known as: KLOR-CON M  Take 1 tablet by mouth daily     vitamin B-1 100 MG tablet  Commonly known as: THIAMINE                DISCONTINUED MEDICATIONS:  Discharge Medication List as of 5/12/2024  5:07 PM          I am the Primary Clinician of Record. Benito Aguillon MD (electronically signed)    (Please note that parts of this dictation were completed with voice recognition software. Quite often unanticipated grammatical, syntax, homophones, and other interpretive errors are inadvertently transcribed by the computer software. Please disregards these errors. Please excuse any errors that have escaped final proofreading.)     Benito Aguillon MD  05/13/24 [id number]
